# Patient Record
Sex: MALE | Race: WHITE | NOT HISPANIC OR LATINO | Employment: OTHER | URBAN - METROPOLITAN AREA
[De-identification: names, ages, dates, MRNs, and addresses within clinical notes are randomized per-mention and may not be internally consistent; named-entity substitution may affect disease eponyms.]

---

## 2019-03-12 ENCOUNTER — OFFICE VISIT (OUTPATIENT)
Dept: ORTHOPEDICS | Facility: CLINIC | Age: 64
End: 2019-03-12
Payer: COMMERCIAL

## 2019-03-12 VITALS
WEIGHT: 250 LBS | BODY MASS INDEX: 37.03 KG/M2 | HEIGHT: 69 IN | HEART RATE: 80 BPM | DIASTOLIC BLOOD PRESSURE: 82 MMHG | SYSTOLIC BLOOD PRESSURE: 132 MMHG

## 2019-03-12 DIAGNOSIS — M12.812 ROTATOR CUFF TEAR ARTHROPATHY OF LEFT SHOULDER: Primary | ICD-10-CM

## 2019-03-12 DIAGNOSIS — M75.102 ROTATOR CUFF TEAR ARTHROPATHY OF LEFT SHOULDER: Primary | ICD-10-CM

## 2019-03-12 DIAGNOSIS — M12.811 ROTATOR CUFF TEAR ARTHROPATHY OF RIGHT SHOULDER: ICD-10-CM

## 2019-03-12 DIAGNOSIS — M18.12 ARTHRITIS OF CARPOMETACARPAL (CMC) JOINT OF LEFT THUMB: ICD-10-CM

## 2019-03-12 DIAGNOSIS — M75.101 ROTATOR CUFF TEAR ARTHROPATHY OF RIGHT SHOULDER: ICD-10-CM

## 2019-03-12 PROCEDURE — 3008F PR BODY MASS INDEX (BMI) DOCUMENTED: ICD-10-PCS | Mod: ,,, | Performed by: ORTHOPAEDIC SURGERY

## 2019-03-12 PROCEDURE — 73140 X-RAY EXAM OF FINGER(S): CPT | Mod: LT,,, | Performed by: ORTHOPAEDIC SURGERY

## 2019-03-12 PROCEDURE — 73140 PR  X-RAY EXAM OF FINGER(S): ICD-10-PCS | Mod: LT,,, | Performed by: ORTHOPAEDIC SURGERY

## 2019-03-12 PROCEDURE — 73030 X-RAY EXAM OF SHOULDER: CPT | Mod: LT,,, | Performed by: ORTHOPAEDIC SURGERY

## 2019-03-12 PROCEDURE — 99214 OFFICE O/P EST MOD 30 MIN: CPT | Mod: 25,,, | Performed by: ORTHOPAEDIC SURGERY

## 2019-03-12 PROCEDURE — 99214 PR OFFICE/OUTPT VISIT, EST, LEVL IV, 30-39 MIN: ICD-10-PCS | Mod: 25,,, | Performed by: ORTHOPAEDIC SURGERY

## 2019-03-12 PROCEDURE — 20610 DRAIN/INJ JOINT/BURSA W/O US: CPT | Mod: 50,,, | Performed by: ORTHOPAEDIC SURGERY

## 2019-03-12 PROCEDURE — 20610 LARGE JOINT ASPIRATION/INJECTION: R SUBACROMIAL BURSA: ICD-10-PCS | Mod: 50,,, | Performed by: ORTHOPAEDIC SURGERY

## 2019-03-12 PROCEDURE — 3008F BODY MASS INDEX DOCD: CPT | Mod: ,,, | Performed by: ORTHOPAEDIC SURGERY

## 2019-03-12 PROCEDURE — 73030 PR  X-RAY SHOULDER 2+ VW: ICD-10-PCS | Mod: LT,,, | Performed by: ORTHOPAEDIC SURGERY

## 2019-03-12 RX ORDER — ESOMEPRAZOLE MAGNESIUM 40 MG/1
40 CAPSULE, DELAYED RELEASE ORAL DAILY
COMMUNITY
Start: 2019-02-22 | End: 2019-09-12 | Stop reason: SDUPTHER

## 2019-03-12 RX ORDER — METHYLPREDNISOLONE ACETATE 40 MG/ML
40 INJECTION, SUSPENSION INTRA-ARTICULAR; INTRALESIONAL; INTRAMUSCULAR; SOFT TISSUE
Status: DISCONTINUED | OUTPATIENT
Start: 2019-03-12 | End: 2019-03-12 | Stop reason: HOSPADM

## 2019-03-12 RX ORDER — NAPROXEN SODIUM 220 MG
220 TABLET ORAL
COMMUNITY
End: 2019-04-09

## 2019-03-12 RX ORDER — LISINOPRIL 10 MG/1
TABLET ORAL
COMMUNITY
Start: 2018-12-19 | End: 2019-09-12 | Stop reason: SDUPTHER

## 2019-03-12 RX ADMIN — METHYLPREDNISOLONE ACETATE 40 MG: 40 INJECTION, SUSPENSION INTRA-ARTICULAR; INTRALESIONAL; INTRAMUSCULAR; SOFT TISSUE at 05:03

## 2019-03-12 NOTE — PROCEDURES
Large Joint Aspiration/Injection: R subacromial bursa  Date/Time: 3/12/2019 5:12 PM  Performed by: Andres Blanco MD  Authorized by: Andres Blanco MD     Consent Done?:  Yes (Verbal)  Indications:  Pain  Procedure site marked: Yes    Timeout: Prior to procedure the correct patient, procedure, and site was verified      Location:  Shoulder  Site:  R subacromial bursa  Prep: Patient was prepped and draped in usual sterile fashion    Needle size:  25 G  Medications:  40 mg methylPREDNISolone acetate 40 mg/mL; 40 mg methylPREDNISolone acetate 40 mg/mL  Patient tolerance:  Patient tolerated the procedure well with no immediate complications  Large Joint Aspiration/Injection: L subacromial bursa  Date/Time: 3/12/2019 5:13 PM  Performed by: Andres Blanco MD  Authorized by: Andres Blanco MD     Consent Done?:  Yes (Verbal)  Indications:  Pain  Procedure site marked: Yes    Timeout: Prior to procedure the correct patient, procedure, and site was verified      Location:  Shoulder  Site:  L subacromial bursa  Prep: Patient was prepped and draped in usual sterile fashion    Needle size:  25 G  Medications:  40 mg methylPREDNISolone acetate 40 mg/mL; 40 mg methylPREDNISolone acetate 40 mg/mL  Patient tolerance:  Patient tolerated the procedure well with no immediate complications

## 2019-03-12 NOTE — PROGRESS NOTES
[facility]    Subjective:     Chief Complaint:   Chief Complaint   Patient presents with    Left Shoulder - Pain     Left shoulder pain x 2 months ago. States that he woke up with his shoulder pain. States that he does have decreased ROM.        Past Medical History:   Diagnosis Date    Hypertension        Past Surgical History:   Procedure Laterality Date    BACK SURGERY      CHOLECYSTECTOMY      HAND SURGERY      KIDNEY STONE SURGERY      TONSILLECTOMY         Current Outpatient Medications   Medication Sig    esomeprazole (NEXIUM) 40 MG capsule     lisinopril 10 MG tablet     naproxen sodium (ANAPROX) 220 MG tablet Take 220 mg by mouth every 12 (twelve) hours.     No current facility-administered medications for this visit.        Review of patient's allergies indicates:   Allergen Reactions    Iodinated contrast- oral and iv dye Hives and Shortness Of Breath       History reviewed. No pertinent family history.    Social History     Socioeconomic History    Marital status:      Spouse name: Not on file    Number of children: Not on file    Years of education: Not on file    Highest education level: Not on file   Social Needs    Financial resource strain: Not on file    Food insecurity - worry: Not on file    Food insecurity - inability: Not on file    Transportation needs - medical: Not on file    Transportation needs - non-medical: Not on file   Occupational History    Not on file   Tobacco Use    Smoking status: Never Smoker   Substance and Sexual Activity    Alcohol use: Not on file    Drug use: Not on file    Sexual activity: Not on file   Other Topics Concern    Not on file   Social History Narrative    Not on file       History of present illness: Patient comes in today for his bilateral shoulders and his left thumb. His left shoulder is bothering him the most. The thumb is also bothering him at the base of the thumb and his right shoulder bothers him to. He has  been in therapy for the right shoulder which she thinks has helped somewhat.      Review of Systems:    Constitution: Negative for chills, fever, and sweats.  Negative for unexplained weight loss.    HENT:  Negative for headaches and blurry vision.    Cardiovascular:Negative for chest pain or irregular heart beat. Negative for hypertension.    Respiratory:  Negative for cough and shortness of breath.    Gastrointestinal: Negative for abdominal pain, heartburn, melena, nausea, and vomitting.    Genitourinary:  Negative bladder incontinence and dysuria.    Musculoskeletal:  See HPI for details.     Neurological: Negative for numbness.    Psychiatric/Behavioral: Negative for depression.  The patient is not nervous/anxious.      Endocrine: Negative for polyuria    Hematologic/Lymphatic: Negative for bleeding problem.  Does not bruise/bleed easily.    Skin: Negative for poor would healing and rash    Objective:      Physical Examination:    Vital Signs:    Vitals:    03/12/19 1558   BP: 132/82   Pulse: 80       Body mass index is 36.92 kg/m².    This a well-developed, well nourished patient in no acute distress.  They are alert and oriented and cooperative to examination.         has positive impingement on the right. His rotator cuff is weak on the right. Spurling sign is negative. Crossover is positive. His positive impingement on the left. Rotator cuff is weak. Spurling sign is negative. Positive crossover. Severe pain with left CMC grind's. Negative Finkelstein's negative Tinel's bilaterally.  Pertinent New Results:    XRAY Report / Interpretation:   AP and lateral of the left hand demonstrates moderate basilar arthrosis no fractures or subluxations.    AP and lateral of the left shoulder demonstrates a type II acromion.    Assessment/Plan:      Left shoulder impingement syndrome. I injected the left shoulder with Depo-Medrol and lidocaine. Left rotator cuff tear I've ordered an MRI scan. 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 Right impingement syndrome. I injected the right shoulder with Depo-Medrol and lidocaine. Continue physical therapy.      This note was created using Dragon voice recognition software that occasionally misinterpreted phrases or words.

## 2019-04-09 ENCOUNTER — OFFICE VISIT (OUTPATIENT)
Dept: ORTHOPEDICS | Facility: CLINIC | Age: 64
End: 2019-04-09
Payer: COMMERCIAL

## 2019-04-09 VITALS
WEIGHT: 245 LBS | DIASTOLIC BLOOD PRESSURE: 80 MMHG | HEART RATE: 100 BPM | SYSTOLIC BLOOD PRESSURE: 112 MMHG | BODY MASS INDEX: 36.29 KG/M2 | HEIGHT: 69 IN

## 2019-04-09 DIAGNOSIS — M75.42 IMPINGEMENT SYNDROME OF LEFT SHOULDER: ICD-10-CM

## 2019-04-09 DIAGNOSIS — M12.812 ROTATOR CUFF TEAR ARTHROPATHY OF LEFT SHOULDER: Primary | ICD-10-CM

## 2019-04-09 DIAGNOSIS — M75.102 ROTATOR CUFF TEAR ARTHROPATHY OF LEFT SHOULDER: Primary | ICD-10-CM

## 2019-04-09 PROCEDURE — 99213 PR OFFICE/OUTPT VISIT, EST, LEVL III, 20-29 MIN: ICD-10-PCS | Mod: 25,,, | Performed by: ORTHOPAEDIC SURGERY

## 2019-04-09 PROCEDURE — 3008F PR BODY MASS INDEX (BMI) DOCUMENTED: ICD-10-PCS | Mod: ,,, | Performed by: ORTHOPAEDIC SURGERY

## 2019-04-09 PROCEDURE — 20610 LARGE JOINT ASPIRATION/INJECTION: L SUBACROMIAL BURSA: ICD-10-PCS | Mod: LT,,, | Performed by: ORTHOPAEDIC SURGERY

## 2019-04-09 PROCEDURE — 20610 DRAIN/INJ JOINT/BURSA W/O US: CPT | Mod: LT,,, | Performed by: ORTHOPAEDIC SURGERY

## 2019-04-09 PROCEDURE — 3008F BODY MASS INDEX DOCD: CPT | Mod: ,,, | Performed by: ORTHOPAEDIC SURGERY

## 2019-04-09 PROCEDURE — 99213 OFFICE O/P EST LOW 20 MIN: CPT | Mod: 25,,, | Performed by: ORTHOPAEDIC SURGERY

## 2019-04-09 RX ORDER — METHYLPREDNISOLONE ACETATE 40 MG/ML
40 INJECTION, SUSPENSION INTRA-ARTICULAR; INTRALESIONAL; INTRAMUSCULAR; SOFT TISSUE
Status: DISCONTINUED | OUTPATIENT
Start: 2019-04-09 | End: 2019-04-09 | Stop reason: HOSPADM

## 2019-04-09 RX ADMIN — METHYLPREDNISOLONE ACETATE 40 MG: 40 INJECTION, SUSPENSION INTRA-ARTICULAR; INTRALESIONAL; INTRAMUSCULAR; SOFT TISSUE at 01:04

## 2019-04-09 NOTE — PROCEDURES
Large Joint Aspiration/Injection: L subacromial bursa  Date/Time: 4/9/2019 1:45 PM  Performed by: Andres Blanco MD  Authorized by: Andres Blanco MD     Consent Done?:  Yes (Verbal)  Indications:  Pain  Procedure site marked: Yes    Timeout: Prior to procedure the correct patient, procedure, and site was verified      Location:  Shoulder  Site:  L subacromial bursa  Prep: Patient was prepped and draped in usual sterile fashion    Needle size:  25 G  Medications:  40 mg methylPREDNISolone acetate 40 mg/mL; 40 mg methylPREDNISolone acetate 40 mg/mL  Patient tolerance:  Patient tolerated the procedure well with no immediate complications

## 2019-04-09 NOTE — PROGRESS NOTES
[facility]    Subjective:     Chief Complaint:   Chief Complaint   Patient presents with    Left Shoulder - Pain     Left shoulder pain/MRI results 3.26.19. States that his pain is about the same and states that it is still hard for him to put his belt on.        Past Medical History:   Diagnosis Date    Hypertension        Past Surgical History:   Procedure Laterality Date    BACK SURGERY      CHOLECYSTECTOMY      HAND SURGERY      KIDNEY STONE SURGERY      TONSILLECTOMY         Current Outpatient Medications   Medication Sig    esomeprazole (NEXIUM) 40 MG capsule     lisinopril 10 MG tablet      No current facility-administered medications for this visit.        Review of patient's allergies indicates:   Allergen Reactions    Iodinated contrast- oral and iv dye Hives and Shortness Of Breath       History reviewed. No pertinent family history.    Social History     Socioeconomic History    Marital status:      Spouse name: Not on file    Number of children: Not on file    Years of education: Not on file    Highest education level: Not on file   Occupational History    Not on file   Social Needs    Financial resource strain: Not on file    Food insecurity:     Worry: Not on file     Inability: Not on file    Transportation needs:     Medical: Not on file     Non-medical: Not on file   Tobacco Use    Smoking status: Never Smoker   Substance and Sexual Activity    Alcohol use: Not on file    Drug use: Not on file    Sexual activity: Not on file   Lifestyle    Physical activity:     Days per week: Not on file     Minutes per session: Not on file    Stress: Not on file   Relationships    Social connections:     Talks on phone: Not on file     Gets together: Not on file     Attends Taoist service: Not on file     Active member of club or organization: Not on file     Attends meetings of clubs or organizations: Not on file     Relationship status: Not on file   Other Topics  Concern    Not on file   Social History Narrative    Not on file       History of present illness: Patient comes in today for the left shoulder. Her left shoulder pain. Difficulty sleeping at night.      Review of Systems:    Constitution: Negative for chills, fever, and sweats.  Negative for unexplained weight loss.    HENT:  Negative for headaches and blurry vision.    Cardiovascular:Negative for chest pain or irregular heart beat. Negative for hypertension.    Respiratory:  Negative for cough and shortness of breath.    Gastrointestinal: Negative for abdominal pain, heartburn, melena, nausea, and vomitting.    Genitourinary:  Negative bladder incontinence and dysuria.    Musculoskeletal:  See HPI for details.     Neurological: Negative for numbness.    Psychiatric/Behavioral: Negative for depression.  The patient is not nervous/anxious.      Endocrine: Negative for polyuria    Hematologic/Lymphatic: Negative for bleeding problem.  Does not bruise/bleed easily.    Skin: Negative for poor would healing and rash    Objective:      Physical Examination:    Vital Signs:    Vitals:    04/09/19 1325   BP: 112/80   Pulse: 100       Body mass index is 36.18 kg/m².    This a well-developed, well nourished patient in no acute distress.  They are alert and oriented and cooperative to examination.        Patient is full range of motion the left shoulder. Positive impingement. Negative crossover. His rotator cuff is very tender and weak in the right side.  Pertinent New Results:  MRI of the left shoulder demonstrates high-grade partial-thickness tearing but no full thickness tearing  XRAY Report / Interpretation:       Assessment/Plan:      Impingement syndrome, rotator cuff tear left shoulder. I injected him again with Depo-Medrol and lidocaine. Continue physical therapy. Follow-up in a month      This note was created using Dragon voice recognition software that occasionally misinterpreted phrases or words.

## 2019-05-09 ENCOUNTER — OFFICE VISIT (OUTPATIENT)
Dept: ORTHOPEDICS | Facility: CLINIC | Age: 64
End: 2019-05-09
Payer: COMMERCIAL

## 2019-05-09 VITALS
HEART RATE: 80 BPM | DIASTOLIC BLOOD PRESSURE: 70 MMHG | HEIGHT: 69 IN | WEIGHT: 240 LBS | BODY MASS INDEX: 35.55 KG/M2 | SYSTOLIC BLOOD PRESSURE: 120 MMHG

## 2019-05-09 DIAGNOSIS — M75.102 ROTATOR CUFF TEAR ARTHROPATHY OF LEFT SHOULDER: ICD-10-CM

## 2019-05-09 DIAGNOSIS — M12.812 ROTATOR CUFF TEAR ARTHROPATHY OF LEFT SHOULDER: ICD-10-CM

## 2019-05-09 DIAGNOSIS — M75.42 IMPINGEMENT SYNDROME OF LEFT SHOULDER: Primary | ICD-10-CM

## 2019-05-09 DIAGNOSIS — M18.12 ARTHRITIS OF CARPOMETACARPAL (CMC) JOINT OF LEFT THUMB: ICD-10-CM

## 2019-05-09 PROCEDURE — 99213 OFFICE O/P EST LOW 20 MIN: CPT | Mod: ,,, | Performed by: ORTHOPAEDIC SURGERY

## 2019-05-09 PROCEDURE — 99213 PR OFFICE/OUTPT VISIT, EST, LEVL III, 20-29 MIN: ICD-10-PCS | Mod: ,,, | Performed by: ORTHOPAEDIC SURGERY

## 2019-05-09 NOTE — PROGRESS NOTES
[facility]    Subjective:     Chief Complaint:   Chief Complaint   Patient presents with    Left Shoulder - Pain     4-9-19 left shoulder injection helped a lot and P.T is also helping alot       Past Medical History:   Diagnosis Date    Hypertension        Past Surgical History:   Procedure Laterality Date    BACK SURGERY      CHOLECYSTECTOMY      HAND SURGERY      KIDNEY STONE SURGERY      TONSILLECTOMY         Current Outpatient Medications   Medication Sig    esomeprazole (NEXIUM) 40 MG capsule     lisinopril 10 MG tablet      No current facility-administered medications for this visit.        Review of patient's allergies indicates:   Allergen Reactions    Iodinated contrast- oral and iv dye Hives and Shortness Of Breath       History reviewed. No pertinent family history.    Social History     Socioeconomic History    Marital status:      Spouse name: Not on file    Number of children: Not on file    Years of education: Not on file    Highest education level: Not on file   Occupational History    Not on file   Social Needs    Financial resource strain: Not on file    Food insecurity:     Worry: Not on file     Inability: Not on file    Transportation needs:     Medical: Not on file     Non-medical: Not on file   Tobacco Use    Smoking status: Never Smoker   Substance and Sexual Activity    Alcohol use: Not on file    Drug use: Not on file    Sexual activity: Not on file   Lifestyle    Physical activity:     Days per week: Not on file     Minutes per session: Not on file    Stress: Not on file   Relationships    Social connections:     Talks on phone: Not on file     Gets together: Not on file     Attends Latter day service: Not on file     Active member of club or organization: Not on file     Attends meetings of clubs or organizations: Not on file     Relationship status: Not on file   Other Topics Concern    Not on file   Social History Narrative    Not on file        History of present illness: Patient comes in today for the left shoulder. He is doing much better.      Review of Systems:    Constitution: Negative for chills, fever, and sweats.  Negative for unexplained weight loss.    HENT:  Negative for headaches and blurry vision.    Cardiovascular:Negative for chest pain or irregular heart beat. Negative for hypertension.    Respiratory:  Negative for cough and shortness of breath.    Gastrointestinal: Negative for abdominal pain, heartburn, melena, nausea, and vomitting.    Genitourinary:  Negative bladder incontinence and dysuria.    Musculoskeletal:  See HPI for details.     Neurological: Negative for numbness.    Psychiatric/Behavioral: Negative for depression.  The patient is not nervous/anxious.      Endocrine: Negative for polyuria    Hematologic/Lymphatic: Negative for bleeding problem.  Does not bruise/bleed easily.    Skin: Negative for poor would healing and rash    Objective:      Physical Examination:    Vital Signs:    Vitals:    05/09/19 0907   BP: 120/70   Pulse: 80       Body mass index is 35.44 kg/m².    This a well-developed, well nourished patient in no acute distress.  They are alert and oriented and cooperative to examination.        Patient has full range of motion of the left shoulder. No impingement. Mild crossover. His rotator cuff is intact and pain-free  Pertinent New Results:    XRAY Report / Interpretation:       Assessment/Plan:      Impingement syndrome. Doing well with conservative care. Follow-up when necessary.      This note was created using Dragon voice recognition software that occasionally misinterpreted phrases or words.

## 2019-06-20 ENCOUNTER — OFFICE VISIT (OUTPATIENT)
Dept: PODIATRY | Facility: CLINIC | Age: 64
End: 2019-06-20
Payer: COMMERCIAL

## 2019-06-20 VITALS
BODY MASS INDEX: 35.55 KG/M2 | DIASTOLIC BLOOD PRESSURE: 88 MMHG | SYSTOLIC BLOOD PRESSURE: 137 MMHG | HEART RATE: 88 BPM | WEIGHT: 240 LBS | HEIGHT: 69 IN

## 2019-06-20 DIAGNOSIS — M79.672 FOOT PAIN, LEFT: ICD-10-CM

## 2019-06-20 DIAGNOSIS — M72.2 PLANTAR FASCIITIS: Primary | ICD-10-CM

## 2019-06-20 PROCEDURE — 99203 PR OFFICE/OUTPT VISIT, NEW, LEVL III, 30-44 MIN: ICD-10-PCS | Mod: 25,,, | Performed by: PODIATRIST

## 2019-06-20 PROCEDURE — 73630 PR  X-RAY FOOT 3+ VW: ICD-10-PCS | Mod: LT,,, | Performed by: PODIATRIST

## 2019-06-20 PROCEDURE — 99203 OFFICE O/P NEW LOW 30 MIN: CPT | Mod: 25,,, | Performed by: PODIATRIST

## 2019-06-20 PROCEDURE — 99070 SPECIAL SUPPLIES PHYS/QHP: CPT | Mod: CSM,,, | Performed by: PODIATRIST

## 2019-06-20 PROCEDURE — 3008F BODY MASS INDEX DOCD: CPT | Mod: ,,, | Performed by: PODIATRIST

## 2019-06-20 PROCEDURE — 20550 NJX 1 TENDON SHEATH/LIGAMENT: CPT | Mod: LT,,, | Performed by: PODIATRIST

## 2019-06-20 PROCEDURE — 99070 PR SPECIAL SUPPLIES: ICD-10-PCS | Mod: CSM,,, | Performed by: PODIATRIST

## 2019-06-20 PROCEDURE — 3008F PR BODY MASS INDEX (BMI) DOCUMENTED: ICD-10-PCS | Mod: ,,, | Performed by: PODIATRIST

## 2019-06-20 PROCEDURE — 73630 X-RAY EXAM OF FOOT: CPT | Mod: LT,,, | Performed by: PODIATRIST

## 2019-06-20 PROCEDURE — 20550 PR INJECT TENDON SHEATH/LIGAMENT: ICD-10-PCS | Mod: LT,,, | Performed by: PODIATRIST

## 2019-06-20 RX ORDER — METHYLPREDNISOLONE ACETATE 40 MG/ML
40 INJECTION, SUSPENSION INTRA-ARTICULAR; INTRALESIONAL; INTRAMUSCULAR; SOFT TISSUE
Status: COMPLETED | OUTPATIENT
Start: 2019-06-20 | End: 2019-06-21

## 2019-06-20 RX ORDER — DEXAMETHASONE SODIUM PHOSPHATE 4 MG/ML
4 INJECTION, SOLUTION INTRA-ARTICULAR; INTRALESIONAL; INTRAMUSCULAR; INTRAVENOUS; SOFT TISSUE
Status: COMPLETED | OUTPATIENT
Start: 2019-06-20 | End: 2019-06-21

## 2019-06-20 RX ORDER — BUPIVACAINE HYDROCHLORIDE 5 MG/ML
1.5 INJECTION, SOLUTION PERINEURAL
Status: COMPLETED | OUTPATIENT
Start: 2019-06-20 | End: 2019-06-20

## 2019-06-20 RX ADMIN — BUPIVACAINE HYDROCHLORIDE 7.5 MG: 5 INJECTION, SOLUTION PERINEURAL at 03:06

## 2019-06-20 NOTE — PROGRESS NOTES
[address] [facility] [address]  [place] [address]  Phone: [phone]   [phone]    Patient's PCP:Kenyon Watkins MD  Referring Provider: No ref. provider found    Subjective:      Chief Complaint:: Foot Pain (left)    BARBI Matias is a 63 y.o. male who presents with a complaint of left foot pain lasting since the week before Easter. 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  Current symptoms include sharp, stabbing, burning pain and occasional swelling.  Aggravating factors are prolonged walking. Symptoms have not improved. Treatment to date have included PT, boot with gel frozen on foot, new tennis shoes, relaxing/staying off of foot. Patients rates pain 10/10 on pain scale.    Vitals:    06/20/19 1541   BP: 137/88   Pulse: 88     Shoe Size: 10    Past Surgical History:   Procedure Laterality Date    BACK SURGERY      CHOLECYSTECTOMY      HAND SURGERY      KIDNEY STONE SURGERY      TONSILLECTOMY       Past Medical History:   Diagnosis Date    Hypertension      History reviewed. No pertinent family history.     Social History:   Marital Status:   Alcohol History:  has no alcohol history on file.  Tobacco History:  reports that he has never smoked. He does not have any smokeless tobacco history on file.  Drug History:  has no drug history on file.    Review of patient's allergies indicates:   Allergen Reactions    Iodinated contrast- oral and iv dye Hives and Shortness Of Breath       Current Outpatient Medications   Medication Sig Dispense Refill    esomeprazole (NEXIUM) 40 MG capsule       lisinopril 10 MG tablet        No current facility-administered medications for this visit.        Review of Systems   Constitutional: Negative for chills, fatigue, fever and unexpected weight change.   HENT: Negative for hearing loss and trouble swallowing.    Eyes: Negative for  photophobia and visual disturbance.   Respiratory: Negative for cough, shortness of breath and wheezing.    Cardiovascular: Negative for chest pain, palpitations and leg swelling.   Gastrointestinal: Negative for abdominal pain and nausea.   Genitourinary: Negative for dysuria and frequency.   Musculoskeletal: Negative for arthralgias, back pain and joint swelling.   Skin: Negative for rash.   Neurological: Negative for tremors, seizures, weakness, numbness and headaches.   Hematological: Does not bruise/bleed easily.         Objective:        Physical Exam:   Foot Exam    General  General Appearance: appears stated age and healthy   Orientation: alert and oriented to person, place, and time   Affect: appropriate   Gait: unimpaired       Left Foot/Ankle      Inspection and Palpation  Ecchymosis: none  Tenderness: plantar fascia   Swelling: none   Arch: normal  Skin Exam: skin intact;     Neurovascular  Dorsalis pedis: 2+  Posterior tibial: 2+  Saphenous nerve sensation: normal  Tibial nerve sensation: normal  Superficial peroneal nerve sensation: normal  Deep peroneal nerve sensation: normal  Sural nerve sensation: normal    Muscle Strength  Ankle dorsiflexion: 5  Ankle plantar flexion: 5  Ankle inversion: 5  Ankle eversion: 5  Great toe extension: 5  Great toe flexion: 5    Range of Motion    Passive  Ankle dorsiflexion: 5      Comments  Pain on palpation of the infeior medial heel. No pain present  with side to side compression of the calcaneus. Negative tinnel's sign  at the tarsal tunnel. Negative Maharaj's nerve pain. Negative Calcaneal nerve pain. No soft tissue masses. Pain absent  with dorsiflexion of the ankle. No edema, erythema, or ecchymosis noted.       Physical Exam   Cardiovascular:   Pulses:       Dorsalis pedis pulses are 2+ on the left side.        Posterior tibial pulses are 2+ on the left side.       Imaging: X-Ray Foot Complete Left  X-ray 3 views of the left foot were taken AP, lateral, and  oblique, weight bearing showing:    No fracture or dislocation noted. No bone tumors or soft tissue masses. Joint spaces are relatively well preserved. There is a small plantar and posterior calcaneal osteophyte present.     Electronically Signed by: Jose Alexandre DPM               Assessment:       1. Plantar fasciitis    2. Foot pain, left      Plan:   Plantar fasciitis  -     CROSS  FOR HOME USE  -     methylPREDNISolone acetate injection 40 mg  -     bupivacaine 0.5% (5 mg/ml) injection 7.5 mg  -     dexamethasone injection 4 mg    Foot pain, left  -     X-Ray Foot Complete Left  -     CROSS  FOR HOME USE  -     methylPREDNISolone acetate injection 40 mg  -     bupivacaine 0.5% (5 mg/ml) injection 7.5 mg  -     dexamethasone injection 4 mg      Follow up if symptoms worsen or fail to improve.    Procedures - The area was prepped with alcohol, and a steroid injection was performed at left plantar fascia using 1 1/2 cc of 0.5% Marcaine w/out epi, 1 cc Dexamethasone, 1/2 cc Methylprednisolone. Patient tolerated the procedure well.     Plantar Fasciitis Level I Treatment:   Anti-inflammatory  No bare feet  Over the counter insert  Restrict activity level   Use running shoes at full time  No impact exercise and stretch gastrocnemius muscle        Counseling:     I provided patient education verbally regarding:   Patient diagnosis, treatment options, as well as alternatives, risks, and benefits.     Discussed different treatment options for heel pain. I gave written and verbal instructions on heel cord stretching and this was demonstrated for the patient. Patient expressed understanding. Discussed wearing appropriate shoe gear and avoiding flats, slippers, sandals, barefoot, and sockfeet. Recommended arch supports. My recommendation for OTC supports is Spenco polysorb replacement insoles or patient may elect more aggressive treatment with prescription arch supports. We also discussed cortisone injections  and NSAID therapy.       This note was created using Dragon voice recognition software that occasionally misinterpreted phrases or words.

## 2019-06-20 NOTE — PATIENT INSTRUCTIONS
Plantar Fasciitis  Plantar fasciitis is a painful swelling of the plantar fascia. The plantar fascia is a thick, fibrous layer of tissue. It covers the bones on the bottom of your foot. And it supports the foot bones in an arched position.  This can happen gradually or suddenly. It usually affects one foot at a time. Heel pain can be sharp, like a knife sticking into the bottom of your foot. You may feel pain after exercising, long-distance jogging, stair climbing, long periods of standing, or after standing up.  Risk factors include: non-active lifestyle, arthritis, diabetes, obesity or recent weight gain, flat foot, high arch. Wearing high heels, loose shoes, or shoes with poor arch support for long periods of time adds to the risk. This problem is commonly found in runners and dancers. It also found in people who stand on hard surfaces for long periods of time.  Foot pain from this condition is usually worse in the morning. But it improves with walking. By the end of the day there may be a dull aching. Treatment requires short-term rest and controlling swelling. It may take up to 9 months before all symptoms go away. Rarely, a steroid injection into the foot, or surgery, may be needed.  Home care  · If you are overweight, lose weight to help healing.  · Choose supportive shoes with good arch support and shock absorbency. Replace athletic shoes when they become worn out. Dont walk or run barefoot.  · Premade or custom-fitted shoe inserts may be helpful. Inserts made of silicone seem to be the most effective. Custom-made inserts can be provided by a podiatrist or foot specialist, physical therapist, or orthopedist.  · Premade or custom-made night splints keep the heel stretched out while you sleep. They may prevent morning pain.  · Avoid activities that stress the feet: jogging, prolonged standing or walking, contact sports, etc.  · First thing in the morning and before sports, stretch the bottom of your feet.  Gently flex your ankle so the toes move toward your knee.  · Icing may help control heel pain. Apply an ice pack to the heel for 10-20 minutes as a preventive. Or ice your heel after a severe flare-up of symptoms. You may repeat this every 1-2 hours as needed.  · You may use over-the-counter pain medicine to control pain, unless another medicine was prescribed. Anti-inflammatory pain medicines, such as ibuprofen or naproxen, may work better than acetaminophen. If you have chronic liver or kidney disease or ever had a stomach ulcer or GI bleeding, talk with your healthcare provider before using these medicines.  Follow-up care  Follow up with your healthcare provider, physical therapist, or podiatrist or foot specialist as advised.  Call for an appointment if pain worsens or there is no relief after a few weeks of home treatment. Shoe inserts, a night splint, or a special boot may be required.  If X-rays were taken, you will be told of any new findings that may affect your care.  When to seek medical advice  Call your healthcare provider right away if any of these occur:  · Foot swelling  · Redness with increasing pain  Date Last Reviewed: 11/21/2015  © 6950-7259 [employer]. [address] [place] [address], [place] [address]. All rights reserved. This information is not intended as a substitute for professional medical care. Always follow your healthcare professional's instructions.

## 2019-06-21 RX ADMIN — METHYLPREDNISOLONE ACETATE 40 MG: 40 INJECTION, SUSPENSION INTRA-ARTICULAR; INTRALESIONAL; INTRAMUSCULAR; SOFT TISSUE at 03:06

## 2019-06-21 RX ADMIN — DEXAMETHASONE SODIUM PHOSPHATE 4 MG: 4 INJECTION, SOLUTION INTRA-ARTICULAR; INTRALESIONAL; INTRAMUSCULAR; INTRAVENOUS; SOFT TISSUE at 03:06

## 2019-09-12 ENCOUNTER — OFFICE VISIT (OUTPATIENT)
Dept: FAMILY MEDICINE | Facility: CLINIC | Age: 64
End: 2019-09-12
Payer: MEDICARE

## 2019-09-12 VITALS
DIASTOLIC BLOOD PRESSURE: 80 MMHG | WEIGHT: 237 LBS | HEART RATE: 104 BPM | BODY MASS INDEX: 37.2 KG/M2 | HEIGHT: 67 IN | TEMPERATURE: 98 F | SYSTOLIC BLOOD PRESSURE: 118 MMHG

## 2019-09-12 DIAGNOSIS — I10 ESSENTIAL HYPERTENSION: ICD-10-CM

## 2019-09-12 DIAGNOSIS — R07.89 TIGHTNESS IN CHEST: Primary | ICD-10-CM

## 2019-09-12 DIAGNOSIS — K21.9 GASTROESOPHAGEAL REFLUX DISEASE WITHOUT ESOPHAGITIS: ICD-10-CM
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
## 2019-09-12 DIAGNOSIS — T63.301A SPIDER BITE WOUND, ACCIDENTAL OR UNINTENTIONAL, INITIAL ENCOUNTER: ICD-10-CM

## 2019-09-12 DIAGNOSIS — S80.861A TICK BITE OF CALF, RIGHT, INITIAL ENCOUNTER: ICD-10-CM

## 2019-09-12 PROCEDURE — 1101F PR PT FALLS ASSESS DOC 0-1 FALLS W/OUT INJ PAST YR: ICD-10-PCS | Mod: S$GLB,,, | Performed by: FAMILY MEDICINE

## 2019-09-12 PROCEDURE — 96372 THER/PROPH/DIAG INJ SC/IM: CPT | Mod: S$GLB,,, | Performed by: FAMILY MEDICINE

## 2019-09-12 PROCEDURE — 1101F PT FALLS ASSESS-DOCD LE1/YR: CPT | Mod: S$GLB,,, | Performed by: FAMILY MEDICINE

## 2019-09-12 PROCEDURE — 99215 OFFICE O/P EST HI 40 MIN: CPT | Mod: 25,S$GLB,, | Performed by: FAMILY MEDICINE

## 2019-09-12 PROCEDURE — 96372 PR INJECTION,THERAP/PROPH/DIAG2ST, IM OR SUBCUT: ICD-10-PCS | Mod: S$GLB,,, | Performed by: FAMILY MEDICINE

## 2019-09-12 PROCEDURE — 3008F BODY MASS INDEX DOCD: CPT | Mod: S$GLB,,, | Performed by: FAMILY MEDICINE

## 2019-09-12 PROCEDURE — 3008F PR BODY MASS INDEX (BMI) DOCUMENTED: ICD-10-PCS | Mod: S$GLB,,, | Performed by: FAMILY MEDICINE

## 2019-09-12 PROCEDURE — 99215 PR OFFICE/OUTPT VISIT, EST, LEVL V, 40-54 MIN: ICD-10-PCS | Mod: 25,S$GLB,, | Performed by: FAMILY MEDICINE

## 2019-09-12 RX ORDER — LISINOPRIL 10 MG/1
10 TABLET ORAL DAILY
Qty: 90 TABLET | Refills: 1 | Status: SHIPPED | OUTPATIENT
Start: 2019-09-12 | End: 2020-09-22 | Stop reason: SDUPTHER

## 2019-09-12 RX ORDER — ESOMEPRAZOLE MAGNESIUM 40 MG/1
40 CAPSULE, DELAYED RELEASE ORAL DAILY
Qty: 90 CAPSULE | Refills: 1 | Status: SHIPPED | OUTPATIENT
Start: 2019-09-12 | End: 2020-03-09 | Stop reason: SDUPTHER

## 2019-09-12 RX ORDER — DOXYCYCLINE 100 MG/1
100 CAPSULE ORAL 2 TIMES DAILY
Qty: 20 CAPSULE | Refills: 0 | Status: SHIPPED | OUTPATIENT
Start: 2019-09-12 | End: 2020-02-04 | Stop reason: ALTCHOICE

## 2019-09-12 RX ORDER — CEFTRIAXONE 1 G/1
1 INJECTION, POWDER, FOR SOLUTION INTRAMUSCULAR; INTRAVENOUS
Status: COMPLETED | OUTPATIENT
Start: 2019-09-12 | End: 2019-09-12

## 2019-09-12 RX ORDER — CEFTRIAXONE 1 G/1
1 INJECTION, POWDER, FOR SOLUTION INTRAMUSCULAR; INTRAVENOUS ONCE
Qty: 1 G | Refills: 0 | Status: SHIPPED | OUTPATIENT
Start: 2019-09-12 | End: 2019-09-12 | Stop reason: CLARIF

## 2019-09-12 RX ORDER — SILDENAFIL 100 MG/1
100 TABLET, FILM COATED ORAL DAILY PRN
Qty: 6 TABLET | Refills: 3 | Status: SHIPPED | OUTPATIENT
Start: 2019-09-12 | End: 2020-08-10 | Stop reason: SDUPTHER

## 2019-09-12 RX ORDER — METHYLPREDNISOLONE 4 MG/1
TABLET ORAL
Qty: 1 PACKAGE | Refills: 0 | Status: SHIPPED | OUTPATIENT
Start: 2019-09-12 | End: 2019-10-03

## 2019-09-12 RX ADMIN — CEFTRIAXONE 1 G: 1 INJECTION, POWDER, FOR SOLUTION INTRAMUSCULAR; INTRAVENOUS at 10:09

## 2019-09-12 NOTE — PROGRESS NOTES
SUBJECTIVE:    Patient ID: Lewis Matias is a 64 y.o. male.    Chief Complaint: Insect Bite (possible spider bite - right leg)    This 64-year-old male was bit on the right leg by an insect or spider a tick.  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  Since then he has developed fever chills body aches arthralgias in the hip and hand joints he has had a decreased appetite feels he can't breathe.  He has a dry cough with pleuritic chest pains his right leg is tender around the erythematous lesion indicating a bite with cellulitis.  He has been compliant taking his Nexium and his lisinopril for his hypertension and GERD      No visits with results within 6 Month(s) from this visit.   Latest known visit with results is:   Historical on 05/26/2010   Component Date Value Ref Range Status    Stone Analysis 05/26/2010    Final                    Value:No nidus observed in this specimen.    THE STONE IS COMPOSED OF:  CALCIUM OXALATE MONOHYDRATE 100%      PSA, SCREEN 03/22/2010 0.75  0 - 4.0 ng/ml Final       Past Medical History:   Diagnosis Date    Hypertension      Past Surgical History:   Procedure Laterality Date    BACK SURGERY      CHOLECYSTECTOMY      COLONOSCOPY  2012    Dr Silver- rosaline 10 yr    HAND SURGERY      KIDNEY STONE SURGERY      TONSILLECTOMY       No family history on file.    Marital Status:   Alcohol History:  has no alcohol history on file.  Tobacco History:  reports that he has never smoked. He does not have any smokeless tobacco history on file.  Drug History:  has no drug history on file.    Review of patient's allergies indicates:   Allergen Reactions    Iodinated contrast media Hives and Shortness Of Breath       Current Outpatient Medications:     esomeprazole (NEXIUM) 40 MG capsule, Take 1 capsule (40 mg total) by mouth once daily., Disp: 90 capsule, Rfl: 1    doxycycline (MONODOX) 100 MG capsule, Take 1  "capsule (100 mg total) by mouth 2 (two) times daily., Disp: 20 capsule, Rfl: 0    lisinopril 10 MG tablet, Take 1 tablet (10 mg total) by mouth once daily., Disp: 90 tablet, Rfl: 1    methylPREDNISolone (MEDROL DOSEPACK) 4 mg tablet, use as directed, Disp: 1 Package, Rfl: 0    sildenafil (VIAGRA) 100 MG tablet, Take 1 tablet (100 mg total) by mouth daily as needed for Erectile Dysfunction (prn sex)., Disp: 6 tablet, Rfl: 3    Current Facility-Administered Medications:     cefTRIAXone injection 1 g, 1 g, Intramuscular, 1 time in Clinic/HOD, Kenyon Watkins MD    Review of Systems   Constitutional: Negative for appetite change, chills, fatigue, fever and unexpected weight change.   HENT: Negative for congestion, ear pain and trouble swallowing.    Eyes: Negative for pain, discharge and visual disturbance.   Respiratory: Positive for cough (pleurisy pain), chest tightness and shortness of breath. Negative for apnea and wheezing.    Cardiovascular: Negative for chest pain and leg swelling.   Gastrointestinal: Negative for abdominal pain, blood in stool, constipation, diarrhea, nausea and vomiting.   Endocrine: Negative for cold intolerance, heat intolerance and polydipsia.   Genitourinary: Positive for frequency (nocturia 3 x a  nite). Negative for dysuria, hematuria, testicular pain and urgency.        Erectile dysfunction recently   Musculoskeletal: Negative for gait problem, joint swelling and myalgias.   Neurological: Negative for dizziness, seizures and numbness.   Psychiatric/Behavioral: Negative for agitation, behavioral problems and hallucinations. The patient is not nervous/anxious.           Objective:      Vitals:    09/12/19 0837   BP: 118/80   Pulse: 104   Temp: 97.9 °F (36.6 °C)   Weight: 107.5 kg (237 lb)   Height: 5' 7.25" (1.708 m)     Physical Exam   Constitutional: He is oriented to person, place, and time. He appears well-developed and well-nourished.   Patient looks ill and feels poorly "   HENT:   Head: Normocephalic and atraumatic.   Right Ear: External ear normal.   Left Ear: External ear normal.   Nose: Nose normal.   Mouth/Throat: Oropharynx is clear and moist.   Eyes: Pupils are equal, round, and reactive to light. EOM are normal.   Neck: Normal range of motion. Neck supple. Carotid bruit is not present. No thyromegaly present.   Cardiovascular: Normal rate, normal heart sounds and intact distal pulses.   No murmur heard.  Heart is tachycardic with no murmur   Pulmonary/Chest: Effort normal and breath sounds normal. He has no wheezes. He has no rales.   Abdominal: Soft. Bowel sounds are normal. He exhibits no distension. There is no hepatosplenomegaly. There is no tenderness.   Musculoskeletal: Normal range of motion. He exhibits no tenderness or deformity.        Lumbar back: Normal. He exhibits no pain and no spasm.   Bends 90 degrees at  waist   Lymphadenopathy:     He has no cervical adenopathy.   Neurological: He is alert and oriented to person, place, and time. No cranial nerve deficit. Coordination normal.   Skin: Skin is warm and dry. No rash noted.   He has a spider bite or tick bite had on his right lateral calf.  There is surrounding erythema and cellulitis.  No streaking up the leg however.  He has multiple insect bites to both lower legs both lower legs are tender to touch palpation .   Psychiatric: He has a normal mood and affect. His behavior is normal. Judgment and thought content normal.   Nursing note and vitals reviewed.        Assessment:       1. Tightness in chest    2. Spider bite wound, accidental or unintentional, initial encounter    3. Tick bite of calf, right, initial encounter    4. Essential hypertension    5. Gastroesophageal reflux disease without esophagitis         Plan:       Tightness in chest  -     POCT EKG 12-LEAD (NOT FOR [facility] USE)  -     methylPREDNISolone (MEDROL DOSEPACK) 4 mg tablet; use as directed  Dispense: 1 Package; Refill: 0  EKG shows sinus  tachycardia.  No acute changes or ST elevations.  Spider bite wound, accidental or unintentional, initial encounter  -     cefTRIAXone injection 1 g  -     methylPREDNISolone (MEDROL DOSEPACK) 4 mg tablet; use as directed  Dispense: 1 Package; Refill: 0  Suspect generalized systemic reaction to spider bite or tick bite.  Tick bite of calf, right, initial encounter  -     methylPREDNISolone (MEDROL DOSEPACK) 4 mg tablet; use as directed  Dispense: 1 Package; Refill: 0  -     CBC auto differential; Future; Expected date: 09/12/2019  -     Lyme AB screen; Future; Expected date: 09/12/2019    Essential hypertension  Stable on lisinopril  Gastroesophageal reflux disease without esophagitis  Stable on generic Nexium  Other orders  -     Discontinue: cefTRIAXone (ROCEPHIN) 1 gram injection; Inject 1 g into the muscle once. for 1 dose  Dispense: 1 g; Refill: 0  -     doxycycline (MONODOX) 100 MG capsule; Take 1 capsule (100 mg total) by mouth 2 (two) times daily.  Dispense: 20 capsule; Refill: 0  -     lisinopril 10 MG tablet; Take 1 tablet (10 mg total) by mouth once daily.  Dispense: 90 tablet; Refill: 1  -     esomeprazole (NEXIUM) 40 MG capsule; Take 1 capsule (40 mg total) by mouth once daily.  Dispense: 90 capsule; Refill: 1  -     sildenafil (VIAGRA) 100 MG tablet; Take 1 tablet (100 mg total) by mouth daily as needed for Erectile Dysfunction (prn sex).  Dispense: 6 tablet; Refill: 3      No follow-ups on file.

## 2019-09-16 ENCOUNTER — HOSPITAL ENCOUNTER (EMERGENCY)
Facility: HOSPITAL | Age: 64
Discharge: HOME | End: 2019-09-16
Attending: EMERGENCY MEDICINE
Payer: MEDICARE

## 2019-09-16 ENCOUNTER — TELEPHONE (OUTPATIENT)
Dept: FAMILY MEDICINE | Facility: CLINIC | Age: 64
End: 2019-09-16

## 2019-09-16 VITALS
HEART RATE: 93 BPM | WEIGHT: 236 LBS | DIASTOLIC BLOOD PRESSURE: 75 MMHG | RESPIRATION RATE: 18 BRPM | BODY MASS INDEX: 35.77 KG/M2 | HEIGHT: 68 IN | OXYGEN SATURATION: 94 % | TEMPERATURE: 98 F | SYSTOLIC BLOOD PRESSURE: 128 MMHG

## 2019-09-16 DIAGNOSIS — R07.9 CHEST PAIN: ICD-10-CM

## 2019-09-16 DIAGNOSIS — R06.00 DYSPNEA, UNSPECIFIED TYPE: ICD-10-CM

## 2019-09-16 DIAGNOSIS — J40 BRONCHITIS: Primary | ICD-10-CM

## 2019-09-16 DIAGNOSIS — R06.02 SHORTNESS OF BREATH: ICD-10-CM

## 2019-09-16 LAB
ALBUMIN SERPL BCP-MCNC: 3.9 G/DL (ref 3.5–5.2)
ALP SERPL-CCNC: 54 U/L (ref 55–135)
ALT SERPL W/O P-5'-P-CCNC: 34 U/L (ref 10–44)
AMPHET+METHAMPHET UR QL: NEGATIVE
AMYLASE SERPL-CCNC: 52 U/L (ref 20–110)
ANION GAP SERPL CALC-SCNC: 10 MMOL/L (ref 8–16)
APTT PPP: 29.5 SEC (ref 26.2–34.7)
AST SERPL-CCNC: 20 U/L (ref 10–40)
B BURGDOR IGG SER QL IB: NEGATIVE
B BURGDOR18KD IGG SER QL IB: REACTIVE
B BURGDOR23KD IGG SER QL IB: ABNORMAL
B BURGDOR28KD IGG SER QL IB: ABNORMAL
B BURGDOR30KD IGG SER QL IB: ABNORMAL
B BURGDOR39KD IGG SER QL IB: ABNORMAL
B BURGDOR41KD IGG SER QL IB: ABNORMAL
B BURGDOR45KD IGG SER QL IB: ABNORMAL
B BURGDOR58KD IGG SER QL IB: ABNORMAL
B BURGDOR66KD IGG SER QL IB: ABNORMAL
B BURGDOR93KD IGG SER QL IB: ABNORMAL
BARBITURATES UR QL SCN>200 NG/ML: NORMAL
BASOPHILS # BLD AUTO: 0.02 K/UL (ref 0–0.2)
BASOPHILS # BLD AUTO: 19 CELLS/UL (ref 0–200)
BASOPHILS NFR BLD AUTO: 0.3 %
BASOPHILS NFR BLD: 0.3 % (ref 0–1.9)
BENZODIAZ UR QL SCN>200 NG/ML: NEGATIVE
BILIRUB SERPL-MCNC: 1.1 MG/DL (ref 0.1–1)
BILIRUB UR QL STRIP: NEGATIVE
BUN SERPL-MCNC: 19 MG/DL (ref 8–23)
BZE UR QL SCN: NEGATIVE
CALCIUM SERPL-MCNC: 9.1 MG/DL (ref 8.7–10.5)
CANNABINOIDS UR QL SCN: NEGATIVE
CHLORIDE SERPL-SCNC: 98 MMOL/L (ref 95–110)
CK MB SERPL-MCNC: 1.7 NG/ML (ref 0.1–6.5)
CK SERPL-CCNC: 41 U/L (ref 20–200)
CLARITY UR: CLEAR
CO2 SERPL-SCNC: 27 MMOL/L (ref 23–29)
COLOR UR: YELLOW
CREAT SERPL-MCNC: 0.8 MG/DL (ref 0.5–1.4)
CREAT UR-MCNC: 89 MG/DL (ref 23–375)
DIFFERENTIAL METHOD: ABNORMAL
EOSINOPHIL # BLD AUTO: 0 CELLS/UL (ref 15–500)
EOSINOPHIL # BLD AUTO: 0 K/UL (ref 0–0.5)
EOSINOPHIL NFR BLD AUTO: 0 %
EOSINOPHIL NFR BLD: 0.3 % (ref 0–8)
ERYTHROCYTE [DISTWIDTH] IN BLOOD BY AUTOMATED COUNT: 12.7 % (ref 11.5–14.5)
ERYTHROCYTE [DISTWIDTH] IN BLOOD BY AUTOMATED COUNT: 12.9 % (ref 11–15)
ERYTHROCYTE [SEDIMENTATION RATE] IN BLOOD BY WESTERGREN METHOD: 5 MM/HR (ref 0–10)
EST. GFR  (AFRICAN AMERICAN): >60 ML/MIN/1.73 M^2
EST. GFR  (NON AFRICAN AMERICAN): >60 ML/MIN/1.73 M^2
GLUCOSE SERPL-MCNC: 116 MG/DL (ref 70–110)
GLUCOSE UR QL STRIP: NEGATIVE
HCT VFR BLD AUTO: 50.4 % (ref 40–54)
HCT VFR BLD AUTO: 53.7 % (ref 38.5–50)
HGB BLD-MCNC: 17.3 G/DL (ref 14–18)
HGB BLD-MCNC: 18.2 G/DL (ref 13.2–17.1)
HGB UR QL STRIP: NEGATIVE
IMM GRANULOCYTES # BLD AUTO: 0.04 K/UL (ref 0–0.04)
IMM GRANULOCYTES NFR BLD AUTO: 0.6 % (ref 0–0.5)
INFLUENZA A, MOLECULAR: NEGATIVE
INFLUENZA B, MOLECULAR: NEGATIVE
INR PPP: 1
KETONES UR QL STRIP: NEGATIVE
LACTATE SERPL-SCNC: 1.3 MMOL/L (ref 0.5–1.9)
LACTATE SERPL-SCNC: 2.2 MMOL/L (ref 0.5–1.9)
LEUKOCYTE ESTERASE UR QL STRIP: NEGATIVE
LIPASE SERPL-CCNC: 31 U/L (ref 4–60)
LYMPHOCYTES # BLD AUTO: 1.3 K/UL (ref 1–4.8)
LYMPHOCYTES # BLD AUTO: 831 CELLS/UL (ref 850–3900)
LYMPHOCYTES NFR BLD AUTO: 13.4 %
LYMPHOCYTES NFR BLD: 18.7 % (ref 18–48)
MAGNESIUM SERPL-MCNC: 2 MG/DL (ref 1.6–2.6)
MCH RBC QN AUTO: 30.4 PG (ref 27–33)
MCH RBC QN AUTO: 30.6 PG (ref 27–31)
MCHC RBC AUTO-ENTMCNC: 33.9 G/DL (ref 32–36)
MCHC RBC AUTO-ENTMCNC: 34.3 G/DL (ref 32–36)
MCV RBC AUTO: 89 FL (ref 82–98)
MCV RBC AUTO: 89.6 FL (ref 80–100)
MONOCYTES # BLD AUTO: 0.5 K/UL (ref 0.3–1)
MONOCYTES # BLD AUTO: 1029 CELLS/UL (ref 200–950)
MONOCYTES NFR BLD AUTO: 16.6 %
MONOCYTES NFR BLD: 8 % (ref 4–15)
NEUTROPHILS # BLD AUTO: 4.9 K/UL (ref 1.8–7.7)
NEUTROPHILS # BLD AUTO: 4321 CELLS/UL (ref 1500–7800)
NEUTROPHILS NFR BLD AUTO: 69.7 %
NEUTROPHILS NFR BLD: 72.1 % (ref 38–73)
NITRITE UR QL STRIP: NEGATIVE
NRBC BLD-RTO: 0 /100 WBC
OPIATES UR QL SCN: NEGATIVE
PCP UR QL SCN>25 NG/ML: NEGATIVE
PH UR STRIP: 6 [PH] (ref 5–8)
PLATELET # BLD AUTO: 126 THOUSAND/UL (ref 140–400)
PLATELET # BLD AUTO: 200 K/UL (ref 150–350)
PMV BLD AUTO: 11.5 FL (ref 9.2–12.9)
PMV BLD REES-ECKER: 12.3 FL (ref 7.5–12.5)
POTASSIUM SERPL-SCNC: 4.6 MMOL/L (ref 3.5–5.1)
PROT SERPL-MCNC: 7.1 G/DL (ref 6–8.4)
PROT UR QL STRIP: NEGATIVE
PROTHROMBIN TIME: 12.7 SEC (ref 11.7–14)
RBC # BLD AUTO: 5.66 M/UL (ref 4.6–6.2)
RBC # BLD AUTO: 5.99 MILLION/UL (ref 4.2–5.8)
SERVICE CMNT-IMP: ABNORMAL
SODIUM SERPL-SCNC: 135 MMOL/L (ref 136–145)
SP GR UR STRIP: 1.01 (ref 1–1.03)
SPECIMEN SOURCE: NORMAL
TOXICOLOGY INFORMATION: NORMAL
TROPONIN I SERPL DL<=0.01 NG/ML-MCNC: <0.03 NG/ML (ref 0.02–0.04)
URN SPEC COLLECT METH UR: NORMAL
UROBILINOGEN UR STRIP-ACNC: NEGATIVE EU/DL
WBC # BLD AUTO: 6.2 THOUSAND/UL (ref 3.8–10.8)
WBC # BLD AUTO: 6.73 K/UL (ref 3.9–12.7)

## 2019-09-16 PROCEDURE — 87502 INFLUENZA DNA AMP PROBE: CPT

## 2019-09-16 PROCEDURE — 85651 RBC SED RATE NONAUTOMATED: CPT

## 2019-09-16 PROCEDURE — 99285 EMERGENCY DEPT VISIT HI MDM: CPT | Mod: 25

## 2019-09-16 PROCEDURE — 84484 ASSAY OF TROPONIN QUANT: CPT

## 2019-09-16 PROCEDURE — 86618 LYME DISEASE ANTIBODY: CPT

## 2019-09-16 PROCEDURE — 80053 COMPREHEN METABOLIC PANEL: CPT

## 2019-09-16 PROCEDURE — 85730 THROMBOPLASTIN TIME PARTIAL: CPT

## 2019-09-16 PROCEDURE — 80307 DRUG TEST PRSMV CHEM ANLYZR: CPT

## 2019-09-16 PROCEDURE — 25000003 PHARM REV CODE 250: Performed by: EMERGENCY MEDICINE

## 2019-09-16 PROCEDURE — 85610 PROTHROMBIN TIME: CPT

## 2019-09-16 PROCEDURE — 83690 ASSAY OF LIPASE: CPT

## 2019-09-16 PROCEDURE — 83605 ASSAY OF LACTIC ACID: CPT | Mod: 91

## 2019-09-16 PROCEDURE — 63600175 PHARM REV CODE 636 W HCPCS: Performed by: EMERGENCY MEDICINE

## 2019-09-16 PROCEDURE — 96375 TX/PRO/DX INJ NEW DRUG ADDON: CPT

## 2019-09-16 PROCEDURE — 36415 COLL VENOUS BLD VENIPUNCTURE: CPT

## 2019-09-16 PROCEDURE — 94644 CONT INHLJ TX 1ST HOUR: CPT

## 2019-09-16 PROCEDURE — 83735 ASSAY OF MAGNESIUM: CPT

## 2019-09-16 PROCEDURE — 25000242 PHARM REV CODE 250 ALT 637 W/ HCPCS: Performed by: EMERGENCY MEDICINE

## 2019-09-16 PROCEDURE — 81003 URINALYSIS AUTO W/O SCOPE: CPT | Mod: 59

## 2019-09-16 PROCEDURE — 85025 COMPLETE CBC W/AUTO DIFF WBC: CPT

## 2019-09-16 PROCEDURE — 82550 ASSAY OF CK (CPK): CPT

## 2019-09-16 PROCEDURE — 96365 THER/PROPH/DIAG IV INF INIT: CPT

## 2019-09-16 PROCEDURE — 93005 ELECTROCARDIOGRAM TRACING: CPT

## 2019-09-16 PROCEDURE — 82150 ASSAY OF AMYLASE: CPT

## 2019-09-16 PROCEDURE — 82553 CREATINE MB FRACTION: CPT

## 2019-09-16 PROCEDURE — 87040 BLOOD CULTURE FOR BACTERIA: CPT

## 2019-09-16 RX ORDER — METHYLPREDNISOLONE SOD SUCC 125 MG
125 VIAL (EA) INJECTION
Status: COMPLETED | OUTPATIENT
Start: 2019-09-16 | End: 2019-09-16

## 2019-09-16 RX ORDER — ASPIRIN 325 MG
325 TABLET ORAL
Status: COMPLETED | OUTPATIENT
Start: 2019-09-16 | End: 2019-09-16

## 2019-09-16 RX ORDER — PREDNISONE 20 MG/1
40 TABLET ORAL DAILY
Qty: 10 TABLET | Refills: 0 | Status: SHIPPED | OUTPATIENT
Start: 2019-09-16 | End: 2019-09-21

## 2019-09-16 RX ORDER — AMOXICILLIN 875 MG/1
875 TABLET, FILM COATED ORAL 2 TIMES DAILY
Qty: 14 TABLET | Refills: 0 | Status: SHIPPED | OUTPATIENT
Start: 2019-09-16 | End: 2020-02-04 | Stop reason: ALTCHOICE

## 2019-09-16 RX ORDER — ACETAMINOPHEN 10 MG/ML
1000 INJECTION, SOLUTION INTRAVENOUS EVERY 8 HOURS
Status: DISCONTINUED | OUTPATIENT
Start: 2019-09-16 | End: 2019-09-16 | Stop reason: HOSPADM

## 2019-09-16 RX ORDER — ALBUTEROL SULFATE 90 UG/1
1-2 AEROSOL, METERED RESPIRATORY (INHALATION) EVERY 6 HOURS PRN
Qty: 1 INHALER | Refills: 0 | Status: SHIPPED | OUTPATIENT
Start: 2019-09-16 | End: 2020-09-22

## 2019-09-16 RX ORDER — IPRATROPIUM BROMIDE AND ALBUTEROL SULFATE 2.5; .5 MG/3ML; MG/3ML
3 SOLUTION RESPIRATORY (INHALATION)
Status: COMPLETED | OUTPATIENT
Start: 2019-09-16 | End: 2019-09-16

## 2019-09-16 RX ADMIN — METHYLPREDNISOLONE SODIUM SUCCINATE 125 MG: 125 INJECTION, POWDER, FOR SOLUTION INTRAMUSCULAR; INTRAVENOUS at 11:09

## 2019-09-16 RX ADMIN — IPRATROPIUM BROMIDE AND ALBUTEROL SULFATE 3 ML: .5; 3 SOLUTION RESPIRATORY (INHALATION) at 10:09

## 2019-09-16 RX ADMIN — ACETAMINOPHEN 1000 MG: 10 INJECTION, SOLUTION INTRAVENOUS at 04:09

## 2019-09-16 RX ADMIN — CEFTRIAXONE SODIUM 2000 MG: 1 INJECTION, POWDER, FOR SOLUTION INTRAMUSCULAR; INTRAVENOUS at 03:09

## 2019-09-16 RX ADMIN — IPRATROPIUM BROMIDE AND ALBUTEROL SULFATE 3 ML: .5; 3 SOLUTION RESPIRATORY (INHALATION) at 11:09

## 2019-09-16 RX ADMIN — ASPIRIN 325 MG ORAL TABLET 325 MG: 325 PILL ORAL at 01:09

## 2019-09-16 NOTE — CONSULTS
64-year-old gentleman without significant past medical history presented with chief complaint of feeling very weak, polyarthralgia and profound fatigue.  Patient recently had visited his PCP who prescribed doxycycline and steroid pack considering his recent tick bite on right lower extremity. Upon my interview patient's symptomatology is more consistent with viral URI.  Right lower extremity lesion is very minor and is already healing and does not appear like erythema migrans. His vital signs are stable.  Labs are not concerning. Influenza screen is negative.  Chest x-ray is not impressive for any pneumonia/CHF.  UAs negative.  ESR is normal.  EKG did not show any AV block or prolonged NH. Patient was given some IV fluids and Rocephin in ED and already feels better.  So far we do not have any enough subjective or objective findings to justify inpatient/observation admit.  Patient was advised to go home and continue taking his doxycycline and Medrol Dosepak.  He was also advised to take Tylenol and Motrin alternating.  I also instructed ED physician to prescribe him amoxicillin.  Patient was advised to come back to ED if he has high-grade fever, chest pain, shortness of breath, arrhythmia or confusion.

## 2019-09-16 NOTE — TELEPHONE ENCOUNTER
----- Message from Kenyon Watkins MD sent at 9/14/2019  5:38 PM CDT -----  Call pt,CBC shows  Some dehydration, white blood cells are not high(as one would expect with infection) Has he responded to the antibiotics? Needs to increase  His  Fluid intake,

## 2019-09-16 NOTE — DISCHARGE INSTRUCTIONS
Drink lots of fluids.  Rest.  Return to emergency department for worsening symptoms or any problems.  Continue doxycycline.  You must follow up with primary care provider for further management.  Tylenol or ibuprofen for pain on body aches

## 2019-09-16 NOTE — TELEPHONE ENCOUNTER
Spoke to pt that Call pt,CBC shows  Some dehydration, white blood cells are not high(as one would expect with infection) Has he responded to the antibiotics? Needs to increase . Patient needs to increase fluid intake.  Patient said that he just came to our office because he isn't feeling better and we referred him to the emergency room.

## 2019-09-16 NOTE — ED PROVIDER NOTES
Encounter Date: 9/16/2019       History     Chief Complaint   Patient presents with    Back Pain    Shortness of Breath     LAST WEEK     64-year-old male presented emergency department with generalized malaise and body aches for the past 3-4 days.  Patient had a recent wound on the right leg which healed.  Patient said he was in [facility] and was picking up a and noticed that lesion at that time.  Patient 3 days ago went and saw Dr. Watkins and Dr. Watkins started him on steroid and doxycycline thinking this could be a tick bite.  Patient did not see a take.  Patient denies fever or chills.  Patient complains of generalized malaise and weakness and body aches and arthralgias and myalgias and mild headache. Patient complains of chest tightness and shortness of breath and cough.  Patient describes this chest tightness as discomfort in the chest worse with coughing and moving and walking and every time he talks that is making him cough.  Patient did not have any history of smoking.  Denies any cardiac history as well.        Review of patient's allergies indicates:   Allergen Reactions    Iodinated contrast media Hives and Shortness Of Breath     Past Medical History:   Diagnosis Date    GERD (gastroesophageal reflux disease)     Hard of hearing     Hypertension      Past Surgical History:   Procedure Laterality Date    BACK SURGERY      CHOLECYSTECTOMY      COLONOSCOPY  2012    Dr Silver- rtc 10 yr    HAND SURGERY      KIDNEY STONE SURGERY      TONSILLECTOMY       No family history on file.  Social History     Tobacco Use    Smoking status: Never Smoker   Substance Use Topics    Alcohol use: Not on file    Drug use: Not on file     Review of Systems   Constitutional: Positive for fatigue.   HENT: Negative.    Eyes: Negative.    Respiratory: Positive for cough, chest tightness and shortness of breath.    Cardiovascular: Positive for chest pain.   Gastrointestinal: Negative.    Endocrine:  Negative.    Genitourinary: Negative.    Musculoskeletal: Positive for arthralgias and myalgias.   Skin: Negative.    Allergic/Immunologic: Negative.    Neurological: Positive for headaches.   Hematological: Negative.    Psychiatric/Behavioral: Negative.    All other systems reviewed and are negative.      Physical Exam     Initial Vitals [09/16/19 0943]   BP Pulse Resp Temp SpO2   (!) 142/90 81 20 98.1 °F (36.7 °C) 97 %      MAP       --         Physical Exam    Nursing note and vitals reviewed.  Constitutional: He appears well-developed and well-nourished.  Non-toxic appearance. He does not appear ill.   Feeling weak and fatigued   HENT:   Head: Normocephalic and atraumatic.   Nose: Nose normal.   Eyes: Conjunctivae and EOM are normal.   Neck: Normal range of motion. No tracheal deviation present.   Cardiovascular: Normal rate, regular rhythm, normal heart sounds and intact distal pulses. Exam reveals no friction rub.    No murmur heard.  Pulmonary/Chest: No respiratory distress. He has decreased breath sounds. He has wheezes in the right lower field and the left lower field. He has no rales. He exhibits no mass and no tenderness.   Abdominal: Soft. He exhibits no distension. There is no tenderness.   Musculoskeletal: Normal range of motion.        Right lower leg: Normal.        Left lower leg: Normal.   Neurological: He is alert and oriented to person, place, and time. He has normal strength.   Skin: Skin is warm and dry. Capillary refill takes less than 2 seconds. No erythema.   Healed lesion on the right leg laterally consistent with a healed wound   Psychiatric: He has a normal mood and affect. Thought content normal.         ED Course   Procedures  Labs Reviewed   CULTURE, BLOOD   CULTURE, BLOOD   TROPONIN I   DRUG SCREEN PANEL, URINE EMERGENCY   URINALYSIS, REFLEX TO URINE CULTURE   CK   COMPREHENSIVE METABOLIC PANEL   CK-MB   CBC W/ AUTO DIFFERENTIAL   APTT   PROTIME-INR   AMYLASE   LIPASE   MAGNESIUM   B.  BURGDORFERI ABS (LYME DISEASE)   LACTIC ACID, PLASMA   LACTIC ACID, PLASMA     EKG Readings: (Independently Interpreted)   Initial Reading: No STEMI. Rhythm: Normal Sinus Rhythm. Ectopy: No Ectopy. Conduction: Normal. ST Segments: Normal ST Segments. T Waves: Normal. Clinical Impression: Normal Sinus Rhythm       Imaging Results          X-Ray Chest AP Portable (Final result)  Result time 09/16/19 10:04:35   Procedure changed from X-Ray Chest 1 View     Final result by Ilya Oreilly MD (09/16/19 10:04:35)                 Impression:      Low lung volumes.  No acute pulmonary process.      Electronically signed by: Ilya Oreilly MD  Date:    09/16/2019  Time:    10:04             Narrative:    EXAMINATION:  XR CHEST AP PORTABLE    CLINICAL HISTORY:  chest pain; Chest pain, unspecified    COMPARISON:  01/27/2018    FINDINGS:  Low lung volumes with accentuation of cardiac silhouette size and vascular crowding.  Within these limitations, no confluent airspace disease.  No large pleural effusion or pneumothorax.  No acute osseous abnormality.                              X-Rays:   Independently Interpreted Readings:   Other Readings:  Chest x-ray unremarkable    Medical Decision Making:   Differential Diagnosis:   64-year-old male presented emergency department with shortness of breath and cough and upper back pain and generalized malaise and body aches.  Patient was presumed to have a possible tick bite in the right leg and given these symptoms treated with doxycycline.  Patient had improvement of symptoms with breathing treatments in the emergency department.  Screening labs unremarkable.  Patient has symptoms consistent with bronchitis.  Steroids and Rocephin given.  Patient already took doxycycline this morning so IV doxycycline not given.  Hospital Medicine consulted for evaluation.  A Lyme titer sent.  As patient still extremely weak and as failing outpatient treatment Hospital Medicine consulted for  evaluation for further management  Clinical Tests:   Lab Tests: Reviewed  Radiological Study: Reviewed  Medical Tests: Reviewed  ED Management:  Patient presented emergency department with generalized malaise and weakness and body aches.  Patient had consultation by Hospital Medicine who examined the patient and evaluate the patient.  Reviewed the chart and hospitalist recommended that patient can be discharged home as patient is slightly better and no abnormal findings or test results noted and patient is nontoxic appearing at this time.  Patient agreed with that recommendation so discharged with instructions and follow-up.  Patient advised to return to emergency department for worsening symptoms or any problems.                    ED Course as of Sep 16 1021   Mon Sep 16, 2019   0943 Chest tightness, SOB since last week. Seen by Dr. Watkins office last week for questionable tick bite to the RLE. On dose pack and doxy since last Thursday, polyarticular arthralgias as well.    [BF]      ED Course User Index  [BF] COLLIN Atkinson     Clinical Impression:       ICD-10-CM ICD-9-CM   1. Bronchitis J40 490   2. Chest pain R07.9 786.50   3. Shortness of breath R06.02 786.05   4. Dyspnea, unspecified type R06.00 786.09                                Amairani Calhoun MD  09/16/19 1315       Amairani Calhoun MD  09/16/19 1431       Amairani Calhoun MD  09/16/19 1655

## 2019-09-17 LAB — B BURGDOR IGG+IGM W REFLEX TO IB PNL SER: <0.91 ISR (ref 0–0.9)

## 2019-09-19 LAB
BARBITURATES UR QL SCN: NEGATIVE NG/ML
LABORATORY COMMENT REPORT: NORMAL

## 2019-09-21 LAB
BACTERIA BLD CULT: NORMAL
BACTERIA BLD CULT: NORMAL

## 2020-02-04 ENCOUNTER — TELEPHONE (OUTPATIENT)
Dept: FAMILY MEDICINE | Facility: CLINIC | Age: 65
End: 2020-02-04

## 2020-02-04 ENCOUNTER — OFFICE VISIT (OUTPATIENT)
Dept: FAMILY MEDICINE | Facility: CLINIC | Age: 65
End: 2020-02-04
Payer: MEDICARE

## 2020-02-04 VITALS
HEART RATE: 108 BPM | SYSTOLIC BLOOD PRESSURE: 124 MMHG | BODY MASS INDEX: 38.55 KG/M2 | DIASTOLIC BLOOD PRESSURE: 88 MMHG | WEIGHT: 254.38 LBS | HEIGHT: 68 IN | TEMPERATURE: 99 F

## 2020-02-04 DIAGNOSIS — J20.9 ACUTE BRONCHITIS, UNSPECIFIED ORGANISM: ICD-10-CM

## 2020-02-04 DIAGNOSIS — J01.40 ACUTE NON-RECURRENT PANSINUSITIS: Primary | ICD-10-CM

## 2020-02-04 DIAGNOSIS — R05.9 COUGH: ICD-10-CM

## 2020-02-04 PROCEDURE — 3079F DIAST BP 80-89 MM HG: CPT | Mod: S$GLB,,, | Performed by: NURSE PRACTITIONER

## 2020-02-04 PROCEDURE — 99213 PR OFFICE/OUTPT VISIT, EST, LEVL III, 20-29 MIN: ICD-10-PCS | Mod: 25,S$GLB,, | Performed by: NURSE PRACTITIONER

## 2020-02-04 PROCEDURE — 99213 OFFICE O/P EST LOW 20 MIN: CPT | Mod: 25,S$GLB,, | Performed by: NURSE PRACTITIONER

## 2020-02-04 PROCEDURE — 1101F PT FALLS ASSESS-DOCD LE1/YR: CPT | Mod: S$GLB,,, | Performed by: NURSE PRACTITIONER

## 2020-02-04 PROCEDURE — 3079F PR MOST RECENT DIASTOLIC BLOOD PRESSURE 80-89 MM HG: ICD-10-PCS | Mod: S$GLB,,, | Performed by: NURSE PRACTITIONER

## 2020-02-04 PROCEDURE — 3074F PR MOST RECENT SYSTOLIC BLOOD PRESSURE < 130 MM HG: ICD-10-PCS | Mod: S$GLB,,, | Performed by: NURSE PRACTITIONER

## 2020-02-04 PROCEDURE — 3008F BODY MASS INDEX DOCD: CPT | Mod: S$GLB,,, | Performed by: NURSE PRACTITIONER

## 2020-02-04 PROCEDURE — 96372 PR INJECTION,THERAP/PROPH/DIAG2ST, IM OR SUBCUT: ICD-10-PCS | Mod: S$GLB,,, | Performed by: NURSE PRACTITIONER

## 2020-02-04 PROCEDURE — 3074F SYST BP LT 130 MM HG: CPT | Mod: S$GLB,,, | Performed by: NURSE PRACTITIONER

## 2020-02-04 PROCEDURE — 1101F PR PT FALLS ASSESS DOC 0-1 FALLS W/OUT INJ PAST YR: ICD-10-PCS | Mod: S$GLB,,, | Performed by: NURSE PRACTITIONER

## 2020-02-04 PROCEDURE — 3008F PR BODY MASS INDEX (BMI) DOCUMENTED: ICD-10-PCS | Mod: S$GLB,,, | Performed by: NURSE PRACTITIONER

## 2020-02-04 PROCEDURE — 96372 THER/PROPH/DIAG INJ SC/IM: CPT | Mod: S$GLB,,, | Performed by: NURSE PRACTITIONER

## 2020-02-04 RX ORDER — AZITHROMYCIN 250 MG/1
TABLET, FILM COATED ORAL
Qty: 6 TABLET | Refills: 0 | Status: SHIPPED | OUTPATIENT
Start: 2020-02-04 | End: 2020-02-09

## 2020-02-04 RX ORDER — DEXAMETHASONE SODIUM PHOSPHATE 4 MG/ML
8 INJECTION, SOLUTION INTRA-ARTICULAR; INTRALESIONAL; INTRAMUSCULAR; INTRAVENOUS; SOFT TISSUE ONCE
Status: COMPLETED | OUTPATIENT
Start: 2020-02-04 | End: 2020-02-04

## 2020-02-04 RX ORDER — PROMETHAZINE HYDROCHLORIDE AND CODEINE PHOSPHATE 6.25; 1 MG/5ML; MG/5ML
5 SOLUTION ORAL EVERY 6 HOURS PRN
Qty: 80 ML | Refills: 0 | Status: SHIPPED | OUTPATIENT
Start: 2020-02-04 | End: 2020-02-08

## 2020-02-04 RX ADMIN — DEXAMETHASONE SODIUM PHOSPHATE 8 MG: 4 INJECTION, SOLUTION INTRA-ARTICULAR; INTRALESIONAL; INTRAMUSCULAR; INTRAVENOUS; SOFT TISSUE at 04:02

## 2020-02-04 NOTE — PROGRESS NOTES
"  SUBJECTIVE:    Patient ID: Lewis Matias is a 64 y.o. male.    Chief Complaint: Sinusitis (Pt reports he has a "sinus infection" reports runny nose, and productive cough since Thursday.  Has tried OTC meds, but no relief.....mlr)    Pt presents with c/o sinus infection x 5 days. Reports nasal congestion, sinus pain and pressure, rhinorrhea, productive cough with green sputum, and fever 2 nights ago. Denies ear pain, body aches, or chills. He has taken claritin but it has not helped. Has also taken nyquil but is still up all night coughing. Has been sleeping in recliner.        Admission on 09/16/2019, Discharged on 09/16/2019   Component Date Value Ref Range Status    Troponin I 09/16/2019 <0.030  0.020 - 0.040 ng/mL Final    Benzodiazepines 09/16/2019 Negative   Final    Cocaine (Metab.) 09/16/2019 Negative   Final    Opiate Scrn, Ur 09/16/2019 Negative   Final    Barbiturate Screen, Ur 09/16/2019 See [facility] Report   Final    Amphetamine Screen, Ur 09/16/2019 Negative   Final    THC 09/16/2019 Negative   Final    Phencyclidine 09/16/2019 Negative   Final    Creatinine, Random Ur 09/16/2019 89.0  23.0 - 375.0 mg/dL Final    Toxicology Information 09/16/2019 SEE COMMENT   Final    Specimen UA 09/16/2019 Urine, Clean CatchUrine, Illeal LoopUrin   Final    Color, UA 09/16/2019 Yellow  Yellow, Straw, Bee Final    Appearance, UA 09/16/2019 Clear  Clear Final    pH, UA 09/16/2019 6.0  5.0 - 8.0 Final    Specific Gravity, UA 09/16/2019 1.015  1.005 - 1.030 Final    Protein, UA 09/16/2019 Negative  Negative Final    Glucose, UA 09/16/2019 Negative  Negative Final    Ketones, UA 09/16/2019 Negative  Negative Final    Bilirubin (UA) 09/16/2019 Negative  Negative Final    Occult Blood UA 09/16/2019 Negative  Negative Final    Nitrite, UA 09/16/2019 Negative  Negative Final    Urobilinogen, UA 09/16/2019 Negative  Negative EU/dL Final    Leukocytes, UA 09/16/2019 Negative  Negative Final    CPK " 09/16/2019 41  20 - 200 U/L Final    Sodium 09/16/2019 135* 136 - 145 mmol/L Final    Potassium 09/16/2019 4.6  3.5 - 5.1 mmol/L Final    Chloride 09/16/2019 98  95 - 110 mmol/L Final    CO2 09/16/2019 27  23 - 29 mmol/L Final    Glucose 09/16/2019 116* 70 - 110 mg/dL Final    BUN, Bld 09/16/2019 19  8 - 23 mg/dL Final    Creatinine 09/16/2019 0.8  0.5 - 1.4 mg/dL Final    Calcium 09/16/2019 9.1  8.7 - 10.5 mg/dL Final    Total Protein 09/16/2019 7.1  6.0 - 8.4 g/dL Final    Albumin 09/16/2019 3.9  3.5 - 5.2 g/dL Final    Total Bilirubin 09/16/2019 1.1* 0.1 - 1.0 mg/dL Final    Alkaline Phosphatase 09/16/2019 54* 55 - 135 U/L Final    AST 09/16/2019 20  10 - 40 U/L Final    ALT 09/16/2019 34  10 - 44 U/L Final    Anion Gap 09/16/2019 10  8 - 16 mmol/L Final    eGFR if African American 09/16/2019 >60.0  >60 mL/min/1.73 m^2 Final    eGFR if non African American 09/16/2019 >60.0  >60 mL/min/1.73 m^2 Final    CPK MB 09/16/2019 1.7  0.1 - 6.5 ng/mL Final    WBC 09/16/2019 6.73  3.90 - 12.70 K/uL Final    RBC 09/16/2019 5.66  4.60 - 6.20 M/uL Final    Hemoglobin 09/16/2019 17.3  14.0 - 18.0 g/dL Final    Hematocrit 09/16/2019 50.4  40.0 - 54.0 % Final    Mean Corpuscular Volume 09/16/2019 89  82 - 98 fL Final    Mean Corpuscular Hemoglobin 09/16/2019 30.6  27.0 - 31.0 pg Final    Mean Corpuscular Hemoglobin Conc 09/16/2019 34.3  32.0 - 36.0 g/dL Final    RDW 09/16/2019 12.7  11.5 - 14.5 % Final    Platelets 09/16/2019 200  150 - 350 K/uL Final    MPV 09/16/2019 11.5  9.2 - 12.9 fL Final    Immature Granulocytes 09/16/2019 0.6* 0.0 - 0.5 % Final    Gran # (ANC) 09/16/2019 4.9  1.8 - 7.7 K/uL Final    Immature Grans (Abs) 09/16/2019 0.04  0.00 - 0.04 K/uL Final    Lymph # 09/16/2019 1.3  1.0 - 4.8 K/uL Final    Mono # 09/16/2019 0.5  0.3 - 1.0 K/uL Final    Eos # 09/16/2019 0.0  0.0 - 0.5 K/uL Final    Baso # 09/16/2019 0.02  0.00 - 0.20 K/uL Final    nRBC 09/16/2019 0  0 /100 WBC Final     Gran% 09/16/2019 72.1  38.0 - 73.0 % Final    Lymph% 09/16/2019 18.7  18.0 - 48.0 % Final    Mono% 09/16/2019 8.0  4.0 - 15.0 % Final    Eosinophil% 09/16/2019 0.3  0.0 - 8.0 % Final    Basophil% 09/16/2019 0.3  0.0 - 1.9 % Final    Differential Method 09/16/2019 Automated   Final    aPTT 09/16/2019 29.5  26.2 - 34.7 sec Final    Amylase 09/16/2019 52  20 - 110 U/L Final    Lipase 09/16/2019 31  4 - 60 U/L Final    Magnesium 09/16/2019 2.0  1.6 - 2.6 mg/dL Final    Lyme IgG/IgM Ab 09/16/2019 <0.91  0.00 - 0.90 ISR Final    Blood Culture, Routine 09/16/2019 No growth after 5 days.   Final    Blood Culture, Routine 09/16/2019 No growth after 5 days.   Final    Lactate (Lactic Acid) 09/16/2019 2.2* 0.5 - 1.9 mmol/L Final    Lactate (Lactic Acid) 09/16/2019 1.3  0.5 - 1.9 mmol/L Final    PT 09/16/2019 12.7  11.7 - 14.0 sec Final    INR 09/16/2019 1.0   Final    Sed Rate 09/16/2019 5  0 - 10 mm/Hr Final    Influenza A, Molecular 09/16/2019 Negative  Negative Final    Influenza B, Molecular 09/16/2019 Negative  Negative Final    Flu A & B Source 09/16/2019 Nasal swab   Final    Barbiturate Screen, Ur 09/16/2019 Negative  Hjurhb=336 ng/mL Final    Barbiturate Please Note: 09/16/2019 Comment   Final   Office Visit on 09/12/2019   Component Date Value Ref Range Status    WBC 09/12/2019 6.2  3.8 - 10.8 Thousand/uL Final    RBC 09/12/2019 5.99* 4.20 - 5.80 Million/uL Final    Hemoglobin 09/12/2019 18.2* 13.2 - 17.1 g/dL Final    Hematocrit 09/12/2019 53.7* 38.5 - 50.0 % Final    Mean Corpuscular Volume 09/12/2019 89.6  80.0 - 100.0 fL Final    Mean Corpuscular Hemoglobin 09/12/2019 30.4  27.0 - 33.0 pg Final    Mean Corpuscular Hemoglobin Conc 09/12/2019 33.9  32.0 - 36.0 g/dL Final    RDW 09/12/2019 12.9  11.0 - 15.0 % Final    Platelets 09/12/2019 126* 140 - 400 Thousand/uL Final    MPV 09/12/2019 12.3  7.5 - 12.5 fL Final    Neutrophils Absolute 09/12/2019 4,321  1,500 - 7,800 cells/uL  Final    Lymph # 09/12/2019 831* 850 - 3,900 cells/uL Final    Mono # 09/12/2019 1,029* 200 - 950 cells/uL Final    Eos # 09/12/2019 0* 15 - 500 cells/uL Final    Baso # 09/12/2019 19  0 - 200 cells/uL Final    Neutrophils Relative 09/12/2019 69.7  % Final    Lymph% 09/12/2019 13.4  % Final    Mono% 09/12/2019 16.6  % Final    Eosinophil% 09/12/2019 0.0  % Final    Basophil% 09/12/2019 0.3  % Final    Differential Comment 09/12/2019 Review of peripheral smear confirms automated results.   Final    Lyme Disease Ab (IGG) WB 09/12/2019 NEGATIVE  NEGATIVE Final    18 KD (IGG) Band 09/12/2019 REACTIVE*  Final    23 KD (IGG) Band 09/12/2019 NON-REACTIVE   Final    28 KD (IGG) Band 09/12/2019 NON-REACTIVE   Final    30 Kd (IGG) Band 09/12/2019 NON-REACTIVE   Final    39 KD (IGG) Band 09/12/2019 NON-REACTIVE   Final    41 KD (IGG) Band 09/12/2019 NON-REACTIVE   Final    45 KD (IGG) Band 09/12/2019 NON-REACTIVE   Final    58 KD (IGG) Band 09/12/2019 NON-REACTIVE   Final    66 KD (IGG) Band 09/12/2019 NON-REACTIVE   Final    93 KD (IGG) Band 09/12/2019 NON-REACTIVE   Final       Past Medical History:   Diagnosis Date    GERD (gastroesophageal reflux disease)     Hard of hearing     Hypertension      Past Surgical History:   Procedure Laterality Date    BACK SURGERY      CHOLECYSTECTOMY      COLONOSCOPY  2012    Dr Silver- rtc 10 yr    HAND SURGERY      KIDNEY STONE SURGERY      TONSILLECTOMY       History reviewed. No pertinent family history.    Marital Status:   Alcohol History:  reports that he drinks alcohol.  Tobacco History:  reports that he has never smoked. He has never used smokeless tobacco.  Drug History:  reports that he does not use drugs.    Review of patient's allergies indicates:   Allergen Reactions    Iodinated contrast media Hives and Shortness Of Breath       Current Outpatient Medications:     esomeprazole (NEXIUM) 40 MG capsule, Take 1 capsule (40 mg total) by  "mouth once daily., Disp: 90 capsule, Rfl: 1    lisinopril 10 MG tablet, Take 1 tablet (10 mg total) by mouth once daily., Disp: 90 tablet, Rfl: 1    albuterol (PROVENTIL/VENTOLIN HFA) 90 mcg/actuation inhaler, Inhale 1-2 puffs into the lungs every 6 (six) hours as needed for Wheezing. Rescue (Patient not taking: Reported on 2/4/2020), Disp: 1 Inhaler, Rfl: 0    azithromycin (Z-FISH) 250 MG tablet, Take 2 tablets by mouth on day 1; Take 1 tablet by mouth on days 2-5, Disp: 6 tablet, Rfl: 0    promethazine-codeine 6.25-10 mg/5 ml (PHENERGAN WITH CODEINE) 6.25-10 mg/5 mL syrup, Take 5 mLs by mouth every 6 (six) hours as needed for Cough., Disp: 80 mL, Rfl: 0    sildenafil (VIAGRA) 100 MG tablet, Take 1 tablet (100 mg total) by mouth daily as needed for Erectile Dysfunction (prn sex)., Disp: 6 tablet, Rfl: 3    Current Facility-Administered Medications:     dexamethasone injection 8 mg, 8 mg, Intramuscular, Once, Aida Shah NP    Review of Systems   Constitutional: Positive for fatigue and fever. Negative for chills.   HENT: Positive for congestion, postnasal drip, rhinorrhea, sinus pressure, sinus pain and sore throat. Negative for ear pain.    Eyes: Negative for pain and redness.   Respiratory: Positive for cough. Negative for shortness of breath and wheezing.    Cardiovascular: Negative for chest pain and palpitations.   Gastrointestinal: Negative for nausea and vomiting.   Genitourinary: Negative for dysuria.   Musculoskeletal: Negative for myalgias and neck pain.   Skin: Negative.    Allergic/Immunologic: Negative.    Neurological: Positive for headaches. Negative for dizziness and weakness.   Psychiatric/Behavioral: Negative.           Objective:      Vitals:    02/04/20 1542   BP: 124/88   Pulse: 108   Temp: 98.7 °F (37.1 °C)   TempSrc: Oral   Weight: 115.4 kg (254 lb 6.4 oz)   Height: 5' 8" (1.727 m)     Body mass index is 38.68 kg/m².  Physical Exam   Constitutional: He is oriented to person, " place, and time. He appears well-developed and well-nourished.   HENT:   Head: Normocephalic.   Nose: Mucosal edema and rhinorrhea present. Right sinus exhibits maxillary sinus tenderness and frontal sinus tenderness. Left sinus exhibits maxillary sinus tenderness and frontal sinus tenderness.   Mouth/Throat: Posterior oropharyngeal erythema present.   Eyes: Pupils are equal, round, and reactive to light.   Neck: Normal range of motion.   Cardiovascular: Normal rate and regular rhythm.   Pulmonary/Chest: Effort normal. He has no wheezes.   Abdominal: Soft.   Musculoskeletal: Normal range of motion.   Lymphadenopathy:     He has no cervical adenopathy.   Neurological: He is alert and oriented to person, place, and time.   Skin: Skin is warm and dry.   Psychiatric: He has a normal mood and affect.         Assessment:       1. Acute non-recurrent pansinusitis    2. Acute bronchitis, unspecified organism    3. Cough         Plan:       Acute non-recurrent pansinusitis  -     azithromycin (Z-FISH) 250 MG tablet; Take 2 tablets by mouth on day 1; Take 1 tablet by mouth on days 2-5  Dispense: 6 tablet; Refill: 0  - Try Coricidin HBP for symptom relief.    Acute bronchitis, unspecified organism  -     dexamethasone injection 8 mg  -     promethazine-codeine 6.25-10 mg/5 ml (PHENERGAN WITH CODEINE) 6.25-10 mg/5 mL syrup; Take 5 mLs by mouth every 6 (six) hours as needed for Cough.  Dispense: 80 mL; Refill: 0    Cough  -     promethazine-codeine 6.25-10 mg/5 ml (PHENERGAN WITH CODEINE) 6.25-10 mg/5 mL syrup; Take 5 mLs by mouth every 6 (six) hours as needed for Cough.  Dispense: 80 mL; Refill: 0  - Will give few days of stronger cough suppressant. Warned patient that medication can cause drowsiness and to reserve use for night time.  - Use Mucinex during the day.       Follow up if symptoms worsen or fail to improve.

## 2020-02-04 NOTE — TELEPHONE ENCOUNTER
----- Message from Siri Vargas sent at 2/4/2020 10:13 AM CST -----  Contact: SIDNEY LOZANO  The patient lmor. He has a sinus infection and wanted an apt today. I LMOR for him to call us back to make an apt. He will see anybody. pts # [phone] GH

## 2020-03-09 DIAGNOSIS — K21.9 GASTROESOPHAGEAL REFLUX DISEASE WITHOUT ESOPHAGITIS: Primary | ICD-10-CM

## 2020-03-09 RX ORDER — ESOMEPRAZOLE MAGNESIUM 40 MG/1
40 CAPSULE, DELAYED RELEASE ORAL DAILY
Qty: 90 CAPSULE | Refills: 1 | Status: SHIPPED | OUTPATIENT
Start: 2020-03-09 | End: 2020-09-22 | Stop reason: SDUPTHER

## 2020-03-09 NOTE — TELEPHONE ENCOUNTER
----- Message from Renée Adorno sent at 3/9/2020 10:18 AM CDT -----  Refill on Esomeprazole to [facility] on [address]. Pt would like a years worth of fills if possible.   CB # [phone]

## 2020-08-10 ENCOUNTER — OFFICE VISIT (OUTPATIENT)
Dept: FAMILY MEDICINE | Facility: CLINIC | Age: 65
End: 2020-08-10
Payer: MEDICARE

## 2020-08-10 VITALS
HEIGHT: 68 IN | WEIGHT: 259 LBS | BODY MASS INDEX: 39.25 KG/M2 | SYSTOLIC BLOOD PRESSURE: 124 MMHG | DIASTOLIC BLOOD PRESSURE: 82 MMHG | TEMPERATURE: 98 F | HEART RATE: 74 BPM

## 2020-08-10 DIAGNOSIS — Z79.899 HIGH RISK MEDICATION USE: ICD-10-CM

## 2020-08-10 DIAGNOSIS — K21.9 GASTROESOPHAGEAL REFLUX DISEASE WITHOUT ESOPHAGITIS: ICD-10-CM

## 2020-08-10 DIAGNOSIS — I10 ESSENTIAL HYPERTENSION: Primary | ICD-10-CM

## 2020-08-10 DIAGNOSIS — G47.30 SLEEP APNEA, UNSPECIFIED TYPE: ICD-10-CM

## 2020-08-10 DIAGNOSIS — E66.9 OBESITY, UNSPECIFIED CLASSIFICATION, UNSPECIFIED OBESITY TYPE, UNSPECIFIED WHETHER SERIOUS COMORBIDITY PRESENT: ICD-10-CM

## 2020-08-10 DIAGNOSIS — N52.9 ERECTILE DYSFUNCTION, UNSPECIFIED ERECTILE DYSFUNCTION TYPE: ICD-10-CM

## 2020-08-10 DIAGNOSIS — Z12.5 SCREENING FOR PROSTATE CANCER: ICD-10-CM

## 2020-08-10 PROCEDURE — 1101F PR PT FALLS ASSESS DOC 0-1 FALLS W/OUT INJ PAST YR: ICD-10-PCS | Mod: S$GLB,,, | Performed by: NURSE PRACTITIONER

## 2020-08-10 PROCEDURE — 3008F PR BODY MASS INDEX (BMI) DOCUMENTED: ICD-10-PCS | Mod: S$GLB,,, | Performed by: NURSE PRACTITIONER

## 2020-08-10 PROCEDURE — 1101F PT FALLS ASSESS-DOCD LE1/YR: CPT | Mod: S$GLB,,, | Performed by: NURSE PRACTITIONER

## 2020-08-10 PROCEDURE — 3079F PR MOST RECENT DIASTOLIC BLOOD PRESSURE 80-89 MM HG: ICD-10-PCS | Mod: S$GLB,,, | Performed by: NURSE PRACTITIONER

## 2020-08-10 PROCEDURE — 3074F SYST BP LT 130 MM HG: CPT | Mod: S$GLB,,, | Performed by: NURSE PRACTITIONER

## 2020-08-10 PROCEDURE — 1159F PR MEDICATION LIST DOCUMENTED IN MEDICAL RECORD: ICD-10-PCS | Mod: S$GLB,,, | Performed by: NURSE PRACTITIONER

## 2020-08-10 PROCEDURE — 3079F DIAST BP 80-89 MM HG: CPT | Mod: S$GLB,,, | Performed by: NURSE PRACTITIONER

## 2020-08-10 PROCEDURE — 99214 OFFICE O/P EST MOD 30 MIN: CPT | Mod: S$GLB,,, | Performed by: NURSE PRACTITIONER

## 2020-08-10 PROCEDURE — 3008F BODY MASS INDEX DOCD: CPT | Mod: S$GLB,,, | Performed by: NURSE PRACTITIONER

## 2020-08-10 PROCEDURE — 1159F MED LIST DOCD IN RCRD: CPT | Mod: S$GLB,,, | Performed by: NURSE PRACTITIONER

## 2020-08-10 PROCEDURE — 3074F PR MOST RECENT SYSTOLIC BLOOD PRESSURE < 130 MM HG: ICD-10-PCS | Mod: S$GLB,,, | Performed by: NURSE PRACTITIONER

## 2020-08-10 PROCEDURE — 99214 PR OFFICE/OUTPT VISIT, EST, LEVL IV, 30-39 MIN: ICD-10-PCS | Mod: S$GLB,,, | Performed by: NURSE PRACTITIONER

## 2020-08-10 RX ORDER — PHENTERMINE HYDROCHLORIDE 37.5 MG/1
37.5 TABLET ORAL
Qty: 30 TABLET | Refills: 0 | Status: SHIPPED | OUTPATIENT
Start: 2020-08-10 | End: 2020-09-09

## 2020-08-10 RX ORDER — SILDENAFIL 100 MG/1
100 TABLET, FILM COATED ORAL DAILY PRN
Qty: 30 TABLET | Refills: 0 | Status: SHIPPED | OUTPATIENT
Start: 2020-08-10 | End: 2022-02-06 | Stop reason: SDUPTHER

## 2020-08-10 NOTE — PROGRESS NOTES
SUBJECTIVE:    Patient ID: Lewis Matias is a 65 y.o. male.    Chief Complaint: Follow-up    65 year old male presents for check up. Patient is treated for hypertension and gerd. Taking medications as prescribed. Reports that has been eating more lately. Concerned about weight gain. .  Has tried weight loss medication the past with good results.  Stays active around the house.  Takes Nexium for GERD symptoms with good relief.  Checks blood pressure at home.  Readings are less than 130/90.  No chest pain.  No shortness of breath.      Office Visit on 08/10/2020   Component Date Value Ref Range Status    WBC 08/10/2020 5.4  3.8 - 10.8 Thousand/uL Final    RBC 08/10/2020 5.64  4.20 - 5.80 Million/uL Final    Hemoglobin 08/10/2020 17.4* 13.2 - 17.1 g/dL Final    Hematocrit 08/10/2020 51.2* 38.5 - 50.0 % Final    Mean Corpuscular Volume 08/10/2020 90.8  80.0 - 100.0 fL Final    Mean Corpuscular Hemoglobin 08/10/2020 30.9  27.0 - 33.0 pg Final    Mean Corpuscular Hemoglobin Conc 08/10/2020 34.0  32.0 - 36.0 g/dL Final    RDW 08/10/2020 13.3  11.0 - 15.0 % Final    Platelets 08/10/2020 140  140 - 400 Thousand/uL Final    MPV 08/10/2020 12.2  7.5 - 12.5 fL Final    Neutrophils Absolute 08/10/2020 3,235  1,500 - 7,800 cells/uL Final    Lymph # 08/10/2020 1,312  850 - 3,900 cells/uL Final    Mono # 08/10/2020 616  200 - 950 cells/uL Final    Eos # 08/10/2020 200  15 - 500 cells/uL Final    Baso # 08/10/2020 38  0 - 200 cells/uL Final    Neutrophils Relative 08/10/2020 59.9  % Final    Lymph% 08/10/2020 24.3  % Final    Mono% 08/10/2020 11.4  % Final    Eosinophil% 08/10/2020 3.7  % Final    Basophil% 08/10/2020 0.7  % Final    Glucose 08/10/2020 106* 65 - 99 mg/dL Final    BUN, Bld 08/10/2020 11  7 - 25 mg/dL Final    Creatinine 08/10/2020 0.89  0.70 - 1.25 mg/dL Final    eGFR if non African American 08/10/2020 89  > OR = 60 mL/min/1.73m2 Final    eGFR if African American 08/10/2020 103  > OR =  60 mL/min/1.73m2 Final    BUN/Creatinine Ratio 08/10/2020 NOT APPLICABLE  6 - 22 (calc) Final    Sodium 08/10/2020 139  135 - 146 mmol/L Final    Potassium 08/10/2020 4.4  3.5 - 5.3 mmol/L Final    Chloride 08/10/2020 102  98 - 110 mmol/L Final    CO2 08/10/2020 29  20 - 32 mmol/L Final    Calcium 08/10/2020 9.4  8.6 - 10.3 mg/dL Final    Total Protein 08/10/2020 6.4  6.1 - 8.1 g/dL Final    Albumin 08/10/2020 4.3  3.6 - 5.1 g/dL Final    Globulin, Total 08/10/2020 2.1  1.9 - 3.7 g/dL (calc) Final    Albumin/Globulin Ratio 08/10/2020 2.0  1.0 - 2.5 (calc) Final    Total Bilirubin 08/10/2020 1.8* 0.2 - 1.2 mg/dL Final    Alkaline Phosphatase 08/10/2020 59  35 - 144 U/L Final    AST 08/10/2020 21  10 - 35 U/L Final    ALT 08/10/2020 27  9 - 46 U/L Final    Cholesterol 08/10/2020 148  <200 mg/dL Final    HDL 08/10/2020 46  > OR = 40 mg/dL Final    Triglycerides 08/10/2020 93  <150 mg/dL Final    LDL Cholesterol 08/10/2020 83  mg/dL (calc) Final    Hdl/Cholesterol Ratio 08/10/2020 3.2  <5.0 (calc) Final    Non HDL Chol. (LDL+VLDL) 08/10/2020 102  <130 mg/dL (calc) Final    Color, UA 08/10/2020 YELLOW  YELLOW Final    Appearance, UA 08/10/2020 CLEAR  CLEAR Final    Specific [place], UA 08/10/2020 1.017  1.001 - 1.035 Final    pH, UA 08/10/2020 6.5  5.0 - 8.0 Final    Glucose, UA 08/10/2020 NEGATIVE  NEGATIVE Final    Bilirubin, UA 08/10/2020 NEGATIVE  NEGATIVE Final    Ketones, UA 08/10/2020 NEGATIVE  NEGATIVE Final    Occult Blood UA 08/10/2020 NEGATIVE  NEGATIVE Final    Protein, UA 08/10/2020 NEGATIVE  NEGATIVE Final    Nitrite, UA 08/10/2020 NEGATIVE  NEGATIVE Final    Leukocytes, UA 08/10/2020 NEGATIVE  NEGATIVE Final    WBC Casts, UA 08/10/2020 NONE SEEN  < OR = 5 /HPF Final    RBC Casts, UA 08/10/2020 NONE SEEN  < OR = 2 /HPF Final    Squam Epithel, UA 08/10/2020 NONE SEEN  < OR = 5 /HPF Final    Bacteria, UA 08/10/2020 NONE SEEN  NONE SEEN /HPF Final    Hyaline Casts, UA  08/10/2020 NONE SEEN  NONE SEEN /LPF Final    Reflexive Urine Culture 08/10/2020 NO CULTURE INDICATED   Final    PROSTATE SPECIFIC ANTIGEN, SCR - Q* 08/10/2020 1.4  < OR = 4.0 ng/mL Final    TSH w/reflex to FT4 08/10/2020 3.47  0.40 - 4.50 mIU/L Final       Past Medical History:   Diagnosis Date    GERD (gastroesophageal reflux disease)     Hard of hearing     Hypertension      Past Surgical History:   Procedure Laterality Date    BACK SURGERY      CHOLECYSTECTOMY      COLONOSCOPY  2012    Dr Silver- rtc 10 yr    HAND SURGERY      KIDNEY STONE SURGERY      TONSILLECTOMY       History reviewed. No pertinent family history.    Marital Status:   Alcohol History:  reports current alcohol use.  Tobacco History:  reports that he has never smoked. He has never used smokeless tobacco.  Drug History:  reports no history of drug use.    Review of patient's allergies indicates:   Allergen Reactions    Iodinated contrast media Hives and Shortness Of Breath       Current Outpatient Medications:     esomeprazole (NEXIUM) 40 MG capsule, Take 1 capsule (40 mg total) by mouth once daily., Disp: 90 capsule, Rfl: 1    lisinopril 10 MG tablet, Take 1 tablet (10 mg total) by mouth once daily., Disp: 90 tablet, Rfl: 1    albuterol (PROVENTIL/VENTOLIN HFA) 90 mcg/actuation inhaler, Inhale 1-2 puffs into the lungs every 6 (six) hours as needed for Wheezing. Rescue (Patient not taking: Reported on 2/4/2020), Disp: 1 Inhaler, Rfl: 0    phentermine (ADIPEX-P) 37.5 mg tablet, Take 1 tablet (37.5 mg total) by mouth before breakfast., Disp: 30 tablet, Rfl: 0    sildenafiL (VIAGRA) 100 MG tablet, Take 1 tablet (100 mg total) by mouth daily as needed for Erectile Dysfunction (prn sex)., Disp: 30 tablet, Rfl: 0    Review of Systems   Constitutional: Negative for fatigue, fever and unexpected weight change.   HENT: Negative for ear pain, sinus pressure and sore throat.    Eyes: Negative for pain.   Respiratory: Negative for  "cough and shortness of breath.    Cardiovascular: Negative for chest pain and leg swelling.   Gastrointestinal: Negative for abdominal pain, constipation, nausea and vomiting.   Genitourinary: Negative for dysuria, frequency and urgency.   Musculoskeletal: Negative for arthralgias.   Skin: Negative for rash.   Neurological: Negative for dizziness, weakness and headaches.   Psychiatric/Behavioral: Negative for sleep disturbance.          Objective:      Vitals:    08/10/20 1045 08/10/20 1132   BP: (!) 138/92 124/82   Pulse: 74    Temp: 98.4 °F (36.9 °C)    Weight: 117.5 kg (259 lb)    Height: 5' 8" (1.727 m)      Body mass index is 39.38 kg/m².  Physical Exam  Constitutional:       Appearance: He is well-developed. He is obese.   HENT:      Right Ear: External ear normal.      Left Ear: External ear normal.   Eyes:      Pupils: Pupils are equal, round, and reactive to light.   Neck:      Musculoskeletal: Neck supple.      Trachea: No tracheal deviation.   Cardiovascular:      Rate and Rhythm: Normal rate and regular rhythm.      Heart sounds: No murmur. No friction rub. No gallop.    Pulmonary:      Breath sounds: Normal breath sounds. No stridor. No wheezing or rales.   Abdominal:      Palpations: Abdomen is soft. There is no mass.      Tenderness: There is no abdominal tenderness.   Musculoskeletal:         General: No tenderness or deformity.   Lymphadenopathy:      Cervical: No cervical adenopathy.   Skin:     General: Skin is warm and dry.   Neurological:      Mental Status: He is alert and oriented to person, place, and time.      Coordination: Coordination normal.   Psychiatric:         Thought Content: Thought content normal.           Assessment:       1. Essential hypertension    2. Gastroesophageal reflux disease without esophagitis    3. High risk medication use    4. Erectile dysfunction, unspecified erectile dysfunction type    5. Screening for prostate cancer    6. Obesity, unspecified classification, " unspecified obesity type, unspecified whether serious comorbidity present         Plan:       Essential hypertension  -     CBC auto differential; Future; Expected date: 08/10/2020  -     Comprehensive metabolic panel; Future; Expected date: 08/10/2020  -     Lipid Panel; Future; Expected date: 08/10/2020    Gastroesophageal reflux disease without esophagitis    High risk medication use  -     CBC auto differential; Future; Expected date: 08/10/2020  -     Comprehensive metabolic panel; Future; Expected date: 08/10/2020  -     Lipid Panel; Future; Expected date: 08/10/2020  -     Urinalysis, Reflex to Urine Culture Urine, Clean Catch; Future; Expected date: 08/10/2020  -     PSA, Screening; Future; Expected date: 08/10/2020  -     TSH w/reflex to FT4; Future; Expected date: 08/10/2020  -     Microalbumin/creatinine urine ratio; Future; Expected date: 08/10/2020    Erectile dysfunction, unspecified erectile dysfunction type  -     sildenafiL (VIAGRA) 100 MG tablet; Take 1 tablet (100 mg total) by mouth daily as needed for Erectile Dysfunction (prn sex).  Dispense: 30 tablet; Refill: 0    Screening for prostate cancer  -     PSA, Screening; Future; Expected date: 08/10/2020    Obesity, unspecified classification, unspecified obesity type, unspecified whether serious comorbidity present      Follow up in about 4 weeks (around 9/7/2020) for medication management.

## 2020-08-11 LAB
ALBUMIN SERPL-MCNC: 4.3 G/DL (ref 3.6–5.1)
ALBUMIN/CREAT UR: 3 MCG/MG CREAT
ALBUMIN/GLOB SERPL: 2 (CALC) (ref 1–2.5)
ALP SERPL-CCNC: 59 U/L (ref 35–144)
ALT SERPL-CCNC: 27 U/L (ref 9–46)
APPEARANCE UR: CLEAR
AST SERPL-CCNC: 21 U/L (ref 10–35)
BACTERIA #/AREA URNS HPF: NORMAL /HPF
BACTERIA UR CULT: NORMAL
BASOPHILS # BLD AUTO: 38 CELLS/UL (ref 0–200)
BASOPHILS NFR BLD AUTO: 0.7 %
BILIRUB SERPL-MCNC: 1.8 MG/DL (ref 0.2–1.2)
BILIRUB UR QL STRIP: NEGATIVE
BUN SERPL-MCNC: 11 MG/DL (ref 7–25)
BUN/CREAT SERPL: ABNORMAL (CALC) (ref 6–22)
CALCIUM SERPL-MCNC: 9.4 MG/DL (ref 8.6–10.3)
CHLORIDE SERPL-SCNC: 102 MMOL/L (ref 98–110)
CHOLEST SERPL-MCNC: 148 MG/DL
CHOLEST/HDLC SERPL: 3.2 (CALC)
CO2 SERPL-SCNC: 29 MMOL/L (ref 20–32)
COLOR UR: YELLOW
CREAT SERPL-MCNC: 0.89 MG/DL (ref 0.7–1.25)
CREAT UR-MCNC: 100 MG/DL (ref 20–320)
EOSINOPHIL # BLD AUTO: 200 CELLS/UL (ref 15–500)
EOSINOPHIL NFR BLD AUTO: 3.7 %
ERYTHROCYTE [DISTWIDTH] IN BLOOD BY AUTOMATED COUNT: 13.3 % (ref 11–15)
GFRSERPLBLD MDRD-ARVRAT: 89 ML/MIN/1.73M2
GLOBULIN SER CALC-MCNC: 2.1 G/DL (CALC) (ref 1.9–3.7)
GLUCOSE SERPL-MCNC: 106 MG/DL (ref 65–99)
GLUCOSE UR QL STRIP: NEGATIVE
HCT VFR BLD AUTO: 51.2 % (ref 38.5–50)
HDLC SERPL-MCNC: 46 MG/DL
HGB BLD-MCNC: 17.4 G/DL (ref 13.2–17.1)
HGB UR QL STRIP: NEGATIVE
HYALINE CASTS #/AREA URNS LPF: NORMAL /LPF
KETONES UR QL STRIP: NEGATIVE
LDLC SERPL CALC-MCNC: 83 MG/DL (CALC)
LEUKOCYTE ESTERASE UR QL STRIP: NEGATIVE
LYMPHOCYTES # BLD AUTO: 1312 CELLS/UL (ref 850–3900)
LYMPHOCYTES NFR BLD AUTO: 24.3 %
MCH RBC QN AUTO: 30.9 PG (ref 27–33)
MCHC RBC AUTO-ENTMCNC: 34 G/DL (ref 32–36)
MCV RBC AUTO: 90.8 FL (ref 80–100)
MICROALBUMIN UR-MCNC: 0.3 MG/DL
MONOCYTES # BLD AUTO: 616 CELLS/UL (ref 200–950)
MONOCYTES NFR BLD AUTO: 11.4 %
NEUTROPHILS # BLD AUTO: 3235 CELLS/UL (ref 1500–7800)
NEUTROPHILS NFR BLD AUTO: 59.9 %
NITRITE UR QL STRIP: NEGATIVE
NONHDLC SERPL-MCNC: 102 MG/DL (CALC)
PH UR STRIP: 6.5 [PH] (ref 5–8)
PLATELET # BLD AUTO: 140 THOUSAND/UL (ref 140–400)
PMV BLD REES-ECKER: 12.2 FL (ref 7.5–12.5)
POTASSIUM SERPL-SCNC: 4.4 MMOL/L (ref 3.5–5.3)
PROT SERPL-MCNC: 6.4 G/DL (ref 6.1–8.1)
PROT UR QL STRIP: NEGATIVE
PSA SERPL-MCNC: 1.4 NG/ML
RBC # BLD AUTO: 5.64 MILLION/UL (ref 4.2–5.8)
RBC #/AREA URNS HPF: NORMAL /HPF
SODIUM SERPL-SCNC: 139 MMOL/L (ref 135–146)
SP GR UR STRIP: 1.02 (ref 1–1.03)
SQUAMOUS #/AREA URNS HPF: NORMAL /HPF
TRIGL SERPL-MCNC: 93 MG/DL
TSH SERPL-ACNC: 3.47 MIU/L (ref 0.4–4.5)
WBC # BLD AUTO: 5.4 THOUSAND/UL (ref 3.8–10.8)
WBC #/AREA URNS HPF: NORMAL /HPF

## 2020-08-12 ENCOUNTER — TELEPHONE (OUTPATIENT)
Dept: FAMILY MEDICINE | Facility: CLINIC | Age: 65
End: 2020-08-12

## 2020-08-20 ENCOUNTER — TELEPHONE (OUTPATIENT)
Dept: FAMILY MEDICINE | Facility: CLINIC | Age: 65
End: 2020-08-20

## 2020-08-20 NOTE — TELEPHONE ENCOUNTER
----- Message from Elin Gan sent at 8/20/2020  4:02 PM CDT -----  Contact: Melissa [facility] delivered needs signed chart notes and a demographic sheet on the pt. Fax #[phone]

## 2020-08-20 NOTE — TELEPHONE ENCOUNTER
----- Message from Renée Adorno sent at 8/20/2020  9:55 AM CDT -----  Mare with [facility] member services calling they need orders for new C-Pap machine and supplies fax to [phone]   # [phone]

## 2020-08-24 ENCOUNTER — TELEPHONE (OUTPATIENT)
Dept: FAMILY MEDICINE | Facility: CLINIC | Age: 65
End: 2020-08-24

## 2020-08-24 NOTE — TELEPHONE ENCOUNTER
----- Message from Mary Farooq sent at 8/24/2020 12:23 PM CDT -----  [facility] needs signed office notes faxed to them   [phone] fax

## 2020-08-26 ENCOUNTER — TELEPHONE (OUTPATIENT)
Dept: FAMILY MEDICINE | Facility: CLINIC | Age: 65
End: 2020-08-26

## 2020-08-26 NOTE — TELEPHONE ENCOUNTER
----- Message from Renée Adorno sent at 8/26/2020 12:34 PM CDT -----  Omar with [facility] needing a C-Pap machine and supplies they need clinical notes and orders faxed to  [phone]

## 2020-08-27 NOTE — TELEPHONE ENCOUNTER
Spoke to pt. He has not had the sleep study done yet.     Called [facility] twice. No answer. LMOR for someone to return my call to get a status update on this patient in regards to him doing the sleep study.

## 2020-08-28 ENCOUNTER — TELEPHONE (OUTPATIENT)
Dept: FAMILY MEDICINE | Facility: CLINIC | Age: 65
End: 2020-08-28

## 2020-08-28 NOTE — TELEPHONE ENCOUNTER
----- Message from Renée Adorno sent at 8/28/2020  9:47 AM CDT -----  Pt calling about getting a C-Pap machine and was told he needs a sleep study first.  He is saying they are requiring $300 out of pocket.  He called [facility] member services to find a network provider to do the test and they told him to call us for that info.   # [phone]

## 2020-09-17 ENCOUNTER — TELEPHONE (OUTPATIENT)
Dept: FAMILY MEDICINE | Facility: CLINIC | Age: 65
End: 2020-09-17

## 2020-09-17 NOTE — TELEPHONE ENCOUNTER
----- Message from Jenny Babcock LPN sent at 8/20/2020 12:13 PM CDT -----  Regarding: home sleep study  Make sure pt had home sleep study

## 2020-09-22 ENCOUNTER — HOSPITAL ENCOUNTER (OUTPATIENT)
Dept: RADIOLOGY | Facility: HOSPITAL | Age: 65
Discharge: HOME | End: 2020-09-22
Attending: NURSE PRACTITIONER
Payer: MEDICARE

## 2020-09-22 ENCOUNTER — OFFICE VISIT (OUTPATIENT)
Dept: FAMILY MEDICINE | Facility: CLINIC | Age: 65
End: 2020-09-22
Payer: MEDICARE

## 2020-09-22 VITALS
HEIGHT: 68 IN | TEMPERATURE: 98 F | WEIGHT: 258 LBS | DIASTOLIC BLOOD PRESSURE: 80 MMHG | BODY MASS INDEX: 39.1 KG/M2 | SYSTOLIC BLOOD PRESSURE: 130 MMHG | HEART RATE: 72 BPM

## 2020-09-22 DIAGNOSIS — M25.562 CHRONIC PAIN OF LEFT KNEE: ICD-10-CM

## 2020-09-22 DIAGNOSIS — K21.9 GASTROESOPHAGEAL REFLUX DISEASE WITHOUT ESOPHAGITIS: ICD-10-CM

## 2020-09-22 DIAGNOSIS — E66.9 OBESITY, UNSPECIFIED CLASSIFICATION, UNSPECIFIED OBESITY TYPE, UNSPECIFIED WHETHER SERIOUS COMORBIDITY PRESENT: ICD-10-CM

## 2020-09-22 DIAGNOSIS — G47.30 SLEEP APNEA, UNSPECIFIED TYPE: ICD-10-CM

## 2020-09-22 DIAGNOSIS — Z79.899 HIGH RISK MEDICATION USE: Primary | ICD-10-CM

## 2020-09-22 DIAGNOSIS — I10 ESSENTIAL HYPERTENSION: ICD-10-CM

## 2020-09-22 DIAGNOSIS — G89.29 CHRONIC PAIN OF LEFT KNEE: ICD-10-CM

## 2020-09-22 PROCEDURE — 3079F PR MOST RECENT DIASTOLIC BLOOD PRESSURE 80-89 MM HG: ICD-10-PCS | Mod: S$GLB,,, | Performed by: NURSE PRACTITIONER

## 2020-09-22 PROCEDURE — 3079F DIAST BP 80-89 MM HG: CPT | Mod: S$GLB,,, | Performed by: NURSE PRACTITIONER

## 2020-09-22 PROCEDURE — 1101F PT FALLS ASSESS-DOCD LE1/YR: CPT | Mod: S$GLB,,, | Performed by: NURSE PRACTITIONER

## 2020-09-22 PROCEDURE — 1159F MED LIST DOCD IN RCRD: CPT | Mod: S$GLB,,, | Performed by: NURSE PRACTITIONER

## 2020-09-22 PROCEDURE — 3075F PR MOST RECENT SYSTOLIC BLOOD PRESS GE 130-139MM HG: ICD-10-PCS | Mod: S$GLB,,, | Performed by: NURSE PRACTITIONER

## 2020-09-22 PROCEDURE — 1159F PR MEDICATION LIST DOCUMENTED IN MEDICAL RECORD: ICD-10-PCS | Mod: S$GLB,,, | Performed by: NURSE PRACTITIONER

## 2020-09-22 PROCEDURE — 99214 PR OFFICE/OUTPT VISIT, EST, LEVL IV, 30-39 MIN: ICD-10-PCS | Mod: S$GLB,,, | Performed by: NURSE PRACTITIONER

## 2020-09-22 PROCEDURE — 1101F PR PT FALLS ASSESS DOC 0-1 FALLS W/OUT INJ PAST YR: ICD-10-PCS | Mod: S$GLB,,, | Performed by: NURSE PRACTITIONER

## 2020-09-22 PROCEDURE — 73564 X-RAY EXAM KNEE 4 OR MORE: CPT | Mod: TC,PO,LT

## 2020-09-22 PROCEDURE — 3008F BODY MASS INDEX DOCD: CPT | Mod: S$GLB,,, | Performed by: NURSE PRACTITIONER

## 2020-09-22 PROCEDURE — 3008F PR BODY MASS INDEX (BMI) DOCUMENTED: ICD-10-PCS | Mod: S$GLB,,, | Performed by: NURSE PRACTITIONER

## 2020-09-22 PROCEDURE — 99214 OFFICE O/P EST MOD 30 MIN: CPT | Mod: S$GLB,,, | Performed by: NURSE PRACTITIONER

## 2020-09-22 PROCEDURE — 3075F SYST BP GE 130 - 139MM HG: CPT | Mod: S$GLB,,, | Performed by: NURSE PRACTITIONER

## 2020-09-22 RX ORDER — LISINOPRIL 10 MG/1
10 TABLET ORAL DAILY
Qty: 90 TABLET | Refills: 1 | Status: SHIPPED | OUTPATIENT
Start: 2020-09-22 | End: 2021-04-12 | Stop reason: SDUPTHER

## 2020-09-22 RX ORDER — MELOXICAM 15 MG/1
15 TABLET ORAL DAILY
Qty: 30 TABLET | Refills: 0 | Status: SHIPPED | OUTPATIENT
Start: 2020-09-22 | End: 2020-11-13

## 2020-09-22 RX ORDER — ESOMEPRAZOLE MAGNESIUM 40 MG/1
40 CAPSULE, DELAYED RELEASE ORAL DAILY
Qty: 90 CAPSULE | Refills: 1 | Status: SHIPPED | OUTPATIENT
Start: 2020-09-22 | End: 2021-11-15 | Stop reason: SDUPTHER

## 2020-09-22 RX ORDER — PHENTERMINE HYDROCHLORIDE 37.5 MG/1
37.5 TABLET ORAL
Qty: 30 TABLET | Refills: 0 | Status: SHIPPED | OUTPATIENT
Start: 2020-09-22 | End: 2020-10-22

## 2020-09-22 NOTE — PROGRESS NOTES
SUBJECTIVE:    Patient ID: Lewis Matias is a 65 y.o. male.    Chief Complaint: Follow-up (no bottles//needs a sleep study//declined all vaccines//DBL)    65 year old male presents for check up. Patient is treated for hypertension and gerd. Taking medications as prescribed. Reports that since starting on phentermine he is watching diet more. no side effects. Controlling appetite. Has ont had sleep study.  Stays active around the house.  Takes Nexium for GERD symptoms with good relief.  Checks blood pressure at home.  Readings are less than 130/90.  No chest pain.  No shortness of breath. Has been having left knee pain. 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 Pain is worse with weight bearing      Office Visit on 08/10/2020   Component Date Value Ref Range Status    WBC 08/10/2020 5.4  3.8 - 10.8 Thousand/uL Final    RBC 08/10/2020 5.64  4.20 - 5.80 Million/uL Final    Hemoglobin 08/10/2020 17.4* 13.2 - 17.1 g/dL Final    Hematocrit 08/10/2020 51.2* 38.5 - 50.0 % Final    Mean Corpuscular Volume 08/10/2020 90.8  80.0 - 100.0 fL Final    Mean Corpuscular Hemoglobin 08/10/2020 30.9  27.0 - 33.0 pg Final    Mean Corpuscular Hemoglobin Conc 08/10/2020 34.0  32.0 - 36.0 g/dL Final    RDW 08/10/2020 13.3  11.0 - 15.0 % Final    Platelets 08/10/2020 140  140 - 400 Thousand/uL Final    MPV 08/10/2020 12.2  7.5 - 12.5 fL Final    Neutrophils Absolute 08/10/2020 3,235  1,500 - 7,800 cells/uL Final    Lymph # 08/10/2020 1,312  850 - 3,900 cells/uL Final    Mono # 08/10/2020 616  200 - 950 cells/uL Final    Eos # 08/10/2020 200  15 - 500 cells/uL Final    Baso # 08/10/2020 38  0 - 200 cells/uL Final    Neutrophils Relative 08/10/2020 59.9  % Final    Lymph% 08/10/2020 24.3  % Final    Mono% 08/10/2020 11.4  % Final    Eosinophil% 08/10/2020 3.7  % Final    Basophil% 08/10/2020 0.7  % Final    Glucose 08/10/2020 106* 65 - 99 mg/dL Final    BUN, Bld 08/10/2020 11  7 - 25 mg/dL Final    Creatinine 08/10/2020  0.89  0.70 - 1.25 mg/dL Final    eGFR if non African American 08/10/2020 89  > OR = 60 mL/min/1.73m2 Final    eGFR if African American 08/10/2020 103  > OR = 60 mL/min/1.73m2 Final    BUN/Creatinine Ratio 08/10/2020 NOT APPLICABLE  6 - 22 (calc) Final    Sodium 08/10/2020 139  135 - 146 mmol/L Final    Potassium 08/10/2020 4.4  3.5 - 5.3 mmol/L Final    Chloride 08/10/2020 102  98 - 110 mmol/L Final    CO2 08/10/2020 29  20 - 32 mmol/L Final    Calcium 08/10/2020 9.4  8.6 - 10.3 mg/dL Final    Total Protein 08/10/2020 6.4  6.1 - 8.1 g/dL Final    Albumin 08/10/2020 4.3  3.6 - 5.1 g/dL Final    Globulin, Total 08/10/2020 2.1  1.9 - 3.7 g/dL (calc) Final    Albumin/Globulin Ratio 08/10/2020 2.0  1.0 - 2.5 (calc) Final    Total Bilirubin 08/10/2020 1.8* 0.2 - 1.2 mg/dL Final    Alkaline Phosphatase 08/10/2020 59  35 - 144 U/L Final    AST 08/10/2020 21  10 - 35 U/L Final    ALT 08/10/2020 27  9 - 46 U/L Final    Cholesterol 08/10/2020 148  <200 mg/dL Final    HDL 08/10/2020 46  > OR = 40 mg/dL Final    Triglycerides 08/10/2020 93  <150 mg/dL Final    LDL Cholesterol 08/10/2020 83  mg/dL (calc) Final    Hdl/Cholesterol Ratio 08/10/2020 3.2  <5.0 (calc) Final    Non HDL Chol. (LDL+VLDL) 08/10/2020 102  <130 mg/dL (calc) Final    Color, UA 08/10/2020 YELLOW  YELLOW Final    Appearance, UA 08/10/2020 CLEAR  CLEAR Final    Specific [place], UA 08/10/2020 1.017  1.001 - 1.035 Final    pH, UA 08/10/2020 6.5  5.0 - 8.0 Final    Glucose, UA 08/10/2020 NEGATIVE  NEGATIVE Final    Bilirubin, UA 08/10/2020 NEGATIVE  NEGATIVE Final    Ketones, UA 08/10/2020 NEGATIVE  NEGATIVE Final    Occult Blood UA 08/10/2020 NEGATIVE  NEGATIVE Final    Protein, UA 08/10/2020 NEGATIVE  NEGATIVE Final    Nitrite, UA 08/10/2020 NEGATIVE  NEGATIVE Final    Leukocytes, UA 08/10/2020 NEGATIVE  NEGATIVE Final    WBC Casts, UA 08/10/2020 NONE SEEN  < OR = 5 /HPF Final    RBC Casts, UA 08/10/2020 NONE SEEN  < OR = 2  /HPF Final    Squam Epithel, UA 08/10/2020 NONE SEEN  < OR = 5 /HPF Final    Bacteria, UA 08/10/2020 NONE SEEN  NONE SEEN /HPF Final    Hyaline Casts, UA 08/10/2020 NONE SEEN  NONE SEEN /LPF Final    Reflexive Urine Culture 08/10/2020 NO CULTURE INDICATED   Final    PROSTATE SPECIFIC ANTIGEN, SCR - Q* 08/10/2020 1.4  < OR = 4.0 ng/mL Final    TSH w/reflex to FT4 08/10/2020 3.47  0.40 - 4.50 mIU/L Final    Creatinine, Random Ur 08/10/2020 100  20 - 320 mg/dL Final    Microalb, Ur 08/10/2020 0.3  See Note: mg/dL Final    Microalb Creat Ratio 08/10/2020 3  <30 mcg/mg creat Final       Past Medical History:   Diagnosis Date    GERD (gastroesophageal reflux disease)     Hard of hearing     Hypertension      Social History     Socioeconomic History    Marital status:      Spouse name: Not on file    Number of children: Not on file    Years of education: Not on file    Highest education level: Not on file   Occupational History    Not on file   Social Needs    Financial resource strain: Not on file    Food insecurity     Worry: Not on file     Inability: Not on file    Transportation needs     Medical: Not on file     Non-medical: Not on file   Tobacco Use    Smoking status: Never Smoker    Smokeless tobacco: Never Used   Substance and Sexual Activity    Alcohol use: Yes    Drug use: Never    Sexual activity: Yes     Partners: Female   Lifestyle    Physical activity     Days per week: Not on file     Minutes per session: Not on file    Stress: Not on file   Relationships    Social connections     Talks on phone: Not on file     Gets together: Not on file     Attends Denominational service: Not on file     Active member of club or organization: Not on file     Attends meetings of clubs or organizations: Not on file     Relationship status: Not on file   Other Topics Concern    Not on file   Social History Narrative    Not on file     Past Surgical History:   Procedure Laterality Date    BACK  "SURGERY      CHOLECYSTECTOMY      COLONOSCOPY  2012    Dr Peteror- rtc 10 yr    HAND SURGERY      KIDNEY STONE SURGERY      TONSILLECTOMY       History reviewed. No pertinent family history.    Review of patient's allergies indicates:   Allergen Reactions    Iodinated contrast media Hives and Shortness Of Breath       Current Outpatient Medications:     esomeprazole (NEXIUM) 40 MG capsule, Take 1 capsule (40 mg total) by mouth once daily., Disp: 90 capsule, Rfl: 1    lisinopriL 10 MG tablet, Take 1 tablet (10 mg total) by mouth once daily., Disp: 90 tablet, Rfl: 1    sildenafiL (VIAGRA) 100 MG tablet, Take 1 tablet (100 mg total) by mouth daily as needed for Erectile Dysfunction (prn sex)., Disp: 30 tablet, Rfl: 0    meloxicam (MOBIC) 15 MG tablet, Take 1 tablet (15 mg total) by mouth once daily., Disp: 30 tablet, Rfl: 0    phentermine (ADIPEX-P) 37.5 mg tablet, Take 1 tablet (37.5 mg total) by mouth before breakfast., Disp: 30 tablet, Rfl: 0    Review of Systems   Constitutional: Negative for fatigue, fever and unexpected weight change.   HENT: Negative for ear pain, sinus pressure and sore throat.    Eyes: Negative for pain.   Respiratory: Negative for cough and shortness of breath.    Cardiovascular: Negative for chest pain and leg swelling.   Gastrointestinal: Negative for abdominal pain, constipation, nausea and vomiting.   Genitourinary: Negative for dysuria, frequency and urgency.   Musculoskeletal: Negative for arthralgias.   Skin: Negative for rash.   Neurological: Negative for dizziness, weakness and headaches.   Psychiatric/Behavioral: Negative for sleep disturbance.          Objective:      Vitals:    09/22/20 1150   BP: 130/80   Pulse: 72   Temp: 98.3 °F (36.8 °C)   Weight: 117 kg (258 lb)   Height: 5' 8" (1.727 m)     Physical Exam  Constitutional:       Appearance: He is well-developed. He is obese.   HENT:      Right Ear: External ear normal.      Left Ear: External ear normal.   Eyes:     "  Pupils: Pupils are equal, round, and reactive to light.   Neck:      Musculoskeletal: Neck supple.      Trachea: No tracheal deviation.   Cardiovascular:      Rate and Rhythm: Normal rate and regular rhythm.      Heart sounds: No murmur. No friction rub. No gallop.    Pulmonary:      Breath sounds: Normal breath sounds. No stridor. No wheezing or rales.   Abdominal:      Palpations: Abdomen is soft. There is no mass.      Tenderness: There is no abdominal tenderness.   Musculoskeletal:         General: No tenderness or deformity.   Lymphadenopathy:      Cervical: No cervical adenopathy.   Skin:     General: Skin is warm and dry.   Neurological:      Mental Status: He is alert and oriented to person, place, and time.      Coordination: Coordination normal.   Psychiatric:         Thought Content: Thought content normal.           Assessment:       1. High risk medication use    2. Gastroesophageal reflux disease without esophagitis    3. Essential hypertension    4. Sleep apnea, unspecified type    5. Obesity, unspecified classification, unspecified obesity type, unspecified whether serious comorbidity present    6. Chronic pain of left knee         Plan:       High risk medication use    Gastroesophageal reflux disease without esophagitis  -     esomeprazole (NEXIUM) 40 MG capsule; Take 1 capsule (40 mg total) by mouth once daily.  Dispense: 90 capsule; Refill: 1    Essential hypertension    Sleep apnea, unspecified type  -     Sleep Study Unattended/Attended; Future    Obesity, unspecified classification, unspecified obesity type, unspecified whether serious comorbidity present  Comments:  phentermine per dr. hernandez    Chronic pain of left knee  -     X-Ray Knee Complete 4 or More Views Left; Future  -     meloxicam (MOBIC) 15 MG tablet; Take 1 tablet (15 mg total) by mouth once daily.  Dispense: 30 tablet; Refill: 0    Other orders  -     lisinopriL 10 MG tablet; Take 1 tablet (10 mg total) by mouth once daily.   Dispense: 90 tablet; Refill: 1      Follow up in about 4 weeks (around 10/20/2020) for medication management.        9/28/2020 Ping Juarez

## 2020-09-30 ENCOUNTER — TELEPHONE (OUTPATIENT)
Dept: FAMILY MEDICINE | Facility: CLINIC | Age: 65
End: 2020-09-30

## 2020-09-30 DIAGNOSIS — G47.30 SLEEP APNEA, UNSPECIFIED TYPE: Primary | ICD-10-CM

## 2020-09-30 NOTE — TELEPHONE ENCOUNTER
"----- Message from Megan Garcia sent at 9/30/2020  2:32 PM CDT -----  Regarding: NEW ORDER NEEDED  I see you've ordered a sleep study for the above listed patient, however, it is ordered incorrectly. I have removed the order. Please replace the order using code "[id number]" and please make sure the referral lists "[facility]" as the referred to location so it falls in the queue.    Thank you.    "

## 2020-10-06 ENCOUNTER — OFFICE VISIT (OUTPATIENT)
Dept: FAMILY MEDICINE | Facility: CLINIC | Age: 65
End: 2020-10-06
Payer: MEDICARE

## 2020-10-06 VITALS — DIASTOLIC BLOOD PRESSURE: 76 MMHG | SYSTOLIC BLOOD PRESSURE: 130 MMHG | TEMPERATURE: 98 F | HEART RATE: 76 BPM

## 2020-10-06 DIAGNOSIS — M23.52 CHRONIC INSTABILITY OF LEFT KNEE: ICD-10-CM

## 2020-10-06 DIAGNOSIS — I10 ESSENTIAL HYPERTENSION: ICD-10-CM

## 2020-10-06 DIAGNOSIS — M23.92 INTERNAL DERANGEMENT OF LEFT KNEE: Primary | ICD-10-CM

## 2020-10-06 DIAGNOSIS — K21.9 GASTROESOPHAGEAL REFLUX DISEASE WITHOUT ESOPHAGITIS: ICD-10-CM

## 2020-10-06 PROCEDURE — 3078F PR MOST RECENT DIASTOLIC BLOOD PRESSURE < 80 MM HG: ICD-10-PCS | Mod: S$GLB,,, | Performed by: NURSE PRACTITIONER

## 2020-10-06 PROCEDURE — 99213 OFFICE O/P EST LOW 20 MIN: CPT | Mod: S$GLB,,, | Performed by: NURSE PRACTITIONER

## 2020-10-06 PROCEDURE — 99213 PR OFFICE/OUTPT VISIT, EST, LEVL III, 20-29 MIN: ICD-10-PCS | Mod: S$GLB,,, | Performed by: NURSE PRACTITIONER

## 2020-10-06 PROCEDURE — 3075F PR MOST RECENT SYSTOLIC BLOOD PRESS GE 130-139MM HG: ICD-10-PCS | Mod: S$GLB,,, | Performed by: NURSE PRACTITIONER

## 2020-10-06 PROCEDURE — 3078F DIAST BP <80 MM HG: CPT | Mod: S$GLB,,, | Performed by: NURSE PRACTITIONER

## 2020-10-06 PROCEDURE — 1159F MED LIST DOCD IN RCRD: CPT | Mod: S$GLB,,, | Performed by: NURSE PRACTITIONER

## 2020-10-06 PROCEDURE — 1101F PT FALLS ASSESS-DOCD LE1/YR: CPT | Mod: S$GLB,,, | Performed by: NURSE PRACTITIONER

## 2020-10-06 PROCEDURE — 1125F PR PAIN SEVERITY QUANTIFIED, PAIN PRESENT: ICD-10-PCS | Mod: S$GLB,,, | Performed by: NURSE PRACTITIONER

## 2020-10-06 PROCEDURE — 3075F SYST BP GE 130 - 139MM HG: CPT | Mod: S$GLB,,, | Performed by: NURSE PRACTITIONER

## 2020-10-06 PROCEDURE — 1125F AMNT PAIN NOTED PAIN PRSNT: CPT | Mod: S$GLB,,, | Performed by: NURSE PRACTITIONER

## 2020-10-06 PROCEDURE — 1101F PR PT FALLS ASSESS DOC 0-1 FALLS W/OUT INJ PAST YR: ICD-10-PCS | Mod: S$GLB,,, | Performed by: NURSE PRACTITIONER

## 2020-10-06 PROCEDURE — 1159F PR MEDICATION LIST DOCUMENTED IN MEDICAL RECORD: ICD-10-PCS | Mod: S$GLB,,, | Performed by: NURSE PRACTITIONER

## 2020-10-06 RX ORDER — METHYLPREDNISOLONE 4 MG/1
TABLET ORAL
Qty: 1 PACKAGE | Refills: 0 | Status: SHIPPED | OUTPATIENT
Start: 2020-10-06 | End: 2020-10-27

## 2020-10-06 RX ORDER — HYDROCODONE BITARTRATE AND ACETAMINOPHEN 7.5; 325 MG/1; MG/1
1 TABLET ORAL EVERY 8 HOURS PRN
Qty: 21 TABLET | Refills: 0 | Status: SHIPPED | OUTPATIENT
Start: 2020-10-06 | End: 2020-10-20 | Stop reason: SDUPTHER

## 2020-10-07 ENCOUNTER — HOSPITAL ENCOUNTER (OUTPATIENT)
Dept: RADIOLOGY | Facility: HOSPITAL | Age: 65
Discharge: HOME | End: 2020-10-07
Attending: NURSE PRACTITIONER
Payer: MEDICARE

## 2020-10-07 DIAGNOSIS — M23.92 INTERNAL DERANGEMENT OF LEFT KNEE: ICD-10-CM

## 2020-10-07 DIAGNOSIS — M23.52 CHRONIC INSTABILITY OF LEFT KNEE: ICD-10-CM

## 2020-10-07 PROCEDURE — 73721 MRI JNT OF LWR EXTRE W/O DYE: CPT | Mod: TC,PO,LT

## 2020-10-09 ENCOUNTER — OFFICE VISIT (OUTPATIENT)
Dept: ORTHOPEDICS | Facility: CLINIC | Age: 65
End: 2020-10-09
Payer: MEDICARE

## 2020-10-09 VITALS
DIASTOLIC BLOOD PRESSURE: 90 MMHG | BODY MASS INDEX: 39.1 KG/M2 | SYSTOLIC BLOOD PRESSURE: 130 MMHG | HEART RATE: 86 BPM | HEIGHT: 68 IN | WEIGHT: 258 LBS

## 2020-10-09 DIAGNOSIS — S83.232A COMPLEX TEAR OF MEDIAL MENISCUS OF LEFT KNEE AS CURRENT INJURY, INITIAL ENCOUNTER: Primary | ICD-10-CM

## 2020-10-09 DIAGNOSIS — M17.12 PRIMARY OSTEOARTHRITIS OF LEFT KNEE: ICD-10-CM

## 2020-10-09 PROCEDURE — 3075F PR MOST RECENT SYSTOLIC BLOOD PRESS GE 130-139MM HG: ICD-10-PCS | Mod: S$GLB,,, | Performed by: PHYSICIAN ASSISTANT

## 2020-10-09 PROCEDURE — 3008F BODY MASS INDEX DOCD: CPT | Mod: S$GLB,,, | Performed by: PHYSICIAN ASSISTANT

## 2020-10-09 PROCEDURE — 99213 PR OFFICE/OUTPT VISIT, EST, LEVL III, 20-29 MIN: ICD-10-PCS | Mod: S$GLB,,, | Performed by: PHYSICIAN ASSISTANT

## 2020-10-09 PROCEDURE — 3080F DIAST BP >= 90 MM HG: CPT | Mod: S$GLB,,, | Performed by: PHYSICIAN ASSISTANT

## 2020-10-09 PROCEDURE — 1125F AMNT PAIN NOTED PAIN PRSNT: CPT | Mod: S$GLB,,, | Performed by: PHYSICIAN ASSISTANT

## 2020-10-09 PROCEDURE — 1101F PT FALLS ASSESS-DOCD LE1/YR: CPT | Mod: S$GLB,,, | Performed by: PHYSICIAN ASSISTANT

## 2020-10-09 PROCEDURE — 3080F PR MOST RECENT DIASTOLIC BLOOD PRESSURE >= 90 MM HG: ICD-10-PCS | Mod: S$GLB,,, | Performed by: PHYSICIAN ASSISTANT

## 2020-10-09 PROCEDURE — 3075F SYST BP GE 130 - 139MM HG: CPT | Mod: S$GLB,,, | Performed by: PHYSICIAN ASSISTANT

## 2020-10-09 PROCEDURE — 3008F PR BODY MASS INDEX (BMI) DOCUMENTED: ICD-10-PCS | Mod: S$GLB,,, | Performed by: PHYSICIAN ASSISTANT

## 2020-10-09 PROCEDURE — 99213 OFFICE O/P EST LOW 20 MIN: CPT | Mod: S$GLB,,, | Performed by: PHYSICIAN ASSISTANT

## 2020-10-09 PROCEDURE — 1101F PR PT FALLS ASSESS DOC 0-1 FALLS W/OUT INJ PAST YR: ICD-10-PCS | Mod: S$GLB,,, | Performed by: PHYSICIAN ASSISTANT

## 2020-10-09 PROCEDURE — 1159F MED LIST DOCD IN RCRD: CPT | Mod: S$GLB,,, | Performed by: PHYSICIAN ASSISTANT

## 2020-10-09 PROCEDURE — 1159F PR MEDICATION LIST DOCUMENTED IN MEDICAL RECORD: ICD-10-PCS | Mod: S$GLB,,, | Performed by: PHYSICIAN ASSISTANT

## 2020-10-09 PROCEDURE — 1125F PR PAIN SEVERITY QUANTIFIED, PAIN PRESENT: ICD-10-PCS | Mod: S$GLB,,, | Performed by: PHYSICIAN ASSISTANT

## 2020-10-09 NOTE — PROGRESS NOTES
[facility]  [address]  [place] [address]  Phone: [phone]   [phone]    Patient's PCP: Ping Juarez NP  Referring Provider: No ref. provider found    Subjective:      Chief Complaint:   Chief Complaint   Patient presents with    Left Knee - Pain     Left knee pain x June. States that he was picking up some john of hay back in June. States that he woke up the next day with pain. States that his pain has gotten worse and hard to use the knee. Did have MRI done        Past Medical History:   Diagnosis Date    GERD (gastroesophageal reflux disease)     Hard of hearing     Hypertension        Past Surgical History:   Procedure Laterality Date    BACK SURGERY      CHOLECYSTECTOMY      COLONOSCOPY  2012    Dr Silver- rtc 10 yr    HAND SURGERY      KIDNEY STONE SURGERY      TONSILLECTOMY         Current Outpatient Medications   Medication Sig    esomeprazole (NEXIUM) 40 MG capsule Take 1 capsule (40 mg total) by mouth once daily.    HYDROcodone-acetaminophen (NORCO) 7.5-325 mg per tablet Take 1 tablet by mouth every 8 (eight) hours as needed for Pain.    lisinopriL 10 MG tablet Take 1 tablet (10 mg total) by mouth once daily.    methylPREDNISolone (MEDROL DOSEPACK) 4 mg tablet use as directed    phentermine (ADIPEX-P) 37.5 mg tablet Take 1 tablet (37.5 mg total) by mouth before breakfast.    sildenafiL (VIAGRA) 100 MG tablet Take 1 tablet (100 mg total) by mouth daily as needed for Erectile Dysfunction (prn sex).    meloxicam (MOBIC) 15 MG tablet Take 1 tablet (15 mg total) by mouth once daily. (Patient not taking: Reported on 10/9/2020)     No current facility-administered medications for this visit.        Review of patient's allergies indicates:   Allergen Reactions    Iodinated contrast media Hives and Shortness Of Breath       History reviewed. No pertinent family history.    Social History     Socioeconomic History    Marital status:      Spouse  name: Not on file    Number of children: Not on file    Years of education: Not on file    Highest education level: Not on file   Occupational History    Not on file   Social Needs    Financial resource strain: Not on file    Food insecurity     Worry: Not on file     Inability: Not on file    Transportation needs     Medical: Not on file     Non-medical: Not on file   Tobacco Use    Smoking status: Never Smoker    Smokeless tobacco: Never Used   Substance and Sexual Activity    Alcohol use: Yes    Drug use: Never    Sexual activity: Yes     Partners: Female   Lifestyle    Physical activity     Days per week: Not on file     Minutes per session: Not on file    Stress: Not on file   Relationships    Social connections     Talks on phone: Not on file     Gets together: Not on file     Attends [employer] service: Not on file     Active member of club or organization: Not on file     Attends meetings of clubs or organizations: Not on file     Relationship status: Not on file   Other Topics Concern    Not on file   Social History Narrative    Not on file       History of present illness:  Patient presents to clinic today for follow-up from primary care for left knee pain.  He states that back in June of this year he loaded up 150 Larry of hay for his horse, after that time he had developed some knee pain.  No known trauma.  Pain localizes to the medial joint line.  No locking clicking or popping.    Review of Systems:    Constitutional: Negative for chills, fever and weight loss.   HENT: Negative for congestion.    Eyes: Negative for discharge and redness.   Respiratory: Negative for cough and shortness of breath.    Cardiovascular: Negative for chest pain.   Gastrointestinal: Negative for nausea and vomiting.   Musculoskeletal: See HPI.   Skin: Negative for rash.   Neurological: Negative for headaches.   Endo/Heme/Allergies: Does not bruise/bleed easily.   Psychiatric/Behavioral: The patient is not  nervous/anxious.    All other systems reviewed and are negative.       Objective:      Physical Examination:    Vital Signs:    Vitals:    10/09/20 1127   BP: (!) 130/90   Pulse: 86       Body mass index is 39.23 kg/m².    This a well-developed, well nourished patient in no acute distress.  They are alert and oriented and cooperative to examination.     Examination of the left knee, there is a +1 effusion.  Patient has tenderness over the medial joint line.  No patellofemoral crepitus, no pain with patellofemoral grind testing.  Pain with medial Nisha's.  Normal range of motion, 0-120 degrees.  Calf soft nontender, no palpable Baker cyst.  Examination of the right knee demonstrates normal range of motion.  No crepitus.      Pertinent New Results:    IMPRESSION:     Complex tear involving medial meniscal body and posterior horn.     Small to moderate size knee joint effusion.     Mild medial femoral tibial osteoarthrosis.    XRAY Report / Interpretation:   Four views of the left knee taken on September 22nd reviewed in the office today.  Minimal arthritic changes noted.          Assessment:       1. Complex tear of medial meniscus of left knee as current injury, initial encounter    2. Primary osteoarthritis of left knee      Plan:     Complex tear of medial meniscus of left knee as current injury, initial encounter    Primary osteoarthritis of left knee        Follow up in about 4 days (around 10/13/2020) for LT Knee Follow up w/ Dr. Blanco.    This gentleman has insidious onset of left knee pain for the last 4 months.  Followed by primary care, MRI demonstrates a medial meniscal tear.  Plan is to have the patient return next week to have surgical consultation with Dr. Blanco.  Recommend arthroscopy for partial medial meniscectomy.  He is currently taking oral steroids and has pain medication as prescribed by primary care.  They do help with his pain.    [unfilled]  RAFAT Butler PA-C    This  note was created using [employer] voice recognition software that occasionally misinterprets words or phrases.

## 2020-10-12 NOTE — PROGRESS NOTES
SUBJECTIVE:    Patient ID: Lewis Matias is a 65 y.o. male.    Chief Complaint: Knee Pain (left side x months - getting worse // refused flu/pna today ac)    65 year old male presents with complaints of worsening knee pain.  Was seen in our office on 09/22.  At that time was started on meloxicam.  Initially seem to be helping.  Within the last 3 days pain has become severe.  Unable to bear weight for long periods of time.  Has been elevating and applying ice with minimal improvement.      Office Visit on 08/10/2020   Component Date Value Ref Range Status    WBC 08/10/2020 5.4  3.8 - 10.8 Thousand/uL Final    RBC 08/10/2020 5.64  4.20 - 5.80 Million/uL Final    Hemoglobin 08/10/2020 17.4* 13.2 - 17.1 g/dL Final    Hematocrit 08/10/2020 51.2* 38.5 - 50.0 % Final    Mean Corpuscular Volume 08/10/2020 90.8  80.0 - 100.0 fL Final    Mean Corpuscular Hemoglobin 08/10/2020 30.9  27.0 - 33.0 pg Final    Mean Corpuscular Hemoglobin Conc 08/10/2020 34.0  32.0 - 36.0 g/dL Final    RDW 08/10/2020 13.3  11.0 - 15.0 % Final    Platelets 08/10/2020 140  140 - 400 Thousand/uL Final    MPV 08/10/2020 12.2  7.5 - 12.5 fL Final    Neutrophils Absolute 08/10/2020 3,235  1,500 - 7,800 cells/uL Final    Lymph # 08/10/2020 1,312  850 - 3,900 cells/uL Final    Mono # 08/10/2020 616  200 - 950 cells/uL Final    Eos # 08/10/2020 200  15 - 500 cells/uL Final    Baso # 08/10/2020 38  0 - 200 cells/uL Final    Neutrophils Relative 08/10/2020 59.9  % Final    Lymph% 08/10/2020 24.3  % Final    Mono% 08/10/2020 11.4  % Final    Eosinophil% 08/10/2020 3.7  % Final    Basophil% 08/10/2020 0.7  % Final    Glucose 08/10/2020 106* 65 - 99 mg/dL Final    BUN, Bld 08/10/2020 11  7 - 25 mg/dL Final    Creatinine 08/10/2020 0.89  0.70 - 1.25 mg/dL Final    eGFR if non African American 08/10/2020 89  > OR = 60 mL/min/1.73m2 Final    eGFR if African American 08/10/2020 103  > OR = 60 mL/min/1.73m2 Final    BUN/Creatinine Ratio  08/10/2020 NOT APPLICABLE  6 - 22 (calc) Final    Sodium 08/10/2020 139  135 - 146 mmol/L Final    Potassium 08/10/2020 4.4  3.5 - 5.3 mmol/L Final    Chloride 08/10/2020 102  98 - 110 mmol/L Final    CO2 08/10/2020 29  20 - 32 mmol/L Final    Calcium 08/10/2020 9.4  8.6 - 10.3 mg/dL Final    Total Protein 08/10/2020 6.4  6.1 - 8.1 g/dL Final    Albumin 08/10/2020 4.3  3.6 - 5.1 g/dL Final    Globulin, Total 08/10/2020 2.1  1.9 - 3.7 g/dL (calc) Final    Albumin/Globulin Ratio 08/10/2020 2.0  1.0 - 2.5 (calc) Final    Total Bilirubin 08/10/2020 1.8* 0.2 - 1.2 mg/dL Final    Alkaline Phosphatase 08/10/2020 59  35 - 144 U/L Final    AST 08/10/2020 21  10 - 35 U/L Final    ALT 08/10/2020 27  9 - 46 U/L Final    Cholesterol 08/10/2020 148  <200 mg/dL Final    HDL 08/10/2020 46  > OR = 40 mg/dL Final    Triglycerides 08/10/2020 93  <150 mg/dL Final    LDL Cholesterol 08/10/2020 83  mg/dL (calc) Final    Hdl/Cholesterol Ratio 08/10/2020 3.2  <5.0 (calc) Final    Non HDL Chol. (LDL+VLDL) 08/10/2020 102  <130 mg/dL (calc) Final    Color, UA 08/10/2020 YELLOW  YELLOW Final    Appearance, UA 08/10/2020 CLEAR  CLEAR Final    Specific [place], UA 08/10/2020 1.017  1.001 - 1.035 Final    pH, UA 08/10/2020 6.5  5.0 - 8.0 Final    Glucose, UA 08/10/2020 NEGATIVE  NEGATIVE Final    Bilirubin, UA 08/10/2020 NEGATIVE  NEGATIVE Final    Ketones, UA 08/10/2020 NEGATIVE  NEGATIVE Final    Occult Blood UA 08/10/2020 NEGATIVE  NEGATIVE Final    Protein, UA 08/10/2020 NEGATIVE  NEGATIVE Final    Nitrite, UA 08/10/2020 NEGATIVE  NEGATIVE Final    Leukocytes, UA 08/10/2020 NEGATIVE  NEGATIVE Final    WBC Casts, UA 08/10/2020 NONE SEEN  < OR = 5 /HPF Final    RBC Casts, UA 08/10/2020 NONE SEEN  < OR = 2 /HPF Final    Squam Epithel, UA 08/10/2020 NONE SEEN  < OR = 5 /HPF Final    Bacteria, UA 08/10/2020 NONE SEEN  NONE SEEN /HPF Final    Hyaline Casts, UA 08/10/2020 NONE SEEN  NONE SEEN /LPF Final     Reflexive Urine Culture 08/10/2020 NO CULTURE INDICATED   Final    PROSTATE SPECIFIC ANTIGEN, SCR - Q* 08/10/2020 1.4  < OR = 4.0 ng/mL Final    TSH w/reflex to FT4 08/10/2020 3.47  0.40 - 4.50 mIU/L Final    Creatinine, Random Ur 08/10/2020 100  20 - 320 mg/dL Final    Microalb, Ur 08/10/2020 0.3  See Note: mg/dL Final    Microalb Creat Ratio 08/10/2020 3  <30 mcg/mg creat Final       Past Medical History:   Diagnosis Date    GERD (gastroesophageal reflux disease)     Hard of hearing     Hypertension      Social History     Socioeconomic History    Marital status:      Spouse name: Not on file    Number of children: Not on file    Years of education: Not on file    Highest education level: Not on file   Occupational History    Not on file   Social Needs    Financial resource strain: Not on file    Food insecurity     Worry: Not on file     Inability: Not on file    Transportation needs     Medical: Not on file     Non-medical: Not on file   Tobacco Use    Smoking status: Never Smoker    Smokeless tobacco: Never Used   Substance and Sexual Activity    Alcohol use: Yes    Drug use: Never    Sexual activity: Yes     Partners: Female   Lifestyle    Physical activity     Days per week: Not on file     Minutes per session: Not on file    Stress: Not on file   Relationships    Social connections     Talks on phone: Not on file     Gets together: Not on file     Attends Catholic service: Not on file     Active member of club or organization: Not on file     Attends meetings of clubs or organizations: Not on file     Relationship status: Not on file   Other Topics Concern    Not on file   Social History Narrative    Not on file     Past Surgical History:   Procedure Laterality Date    BACK SURGERY      CHOLECYSTECTOMY      COLONOSCOPY  2012    Dr Peteror- rtc 10 yr    HAND SURGERY      KIDNEY STONE SURGERY      TONSILLECTOMY       History reviewed. No pertinent family  history.    Review of patient's allergies indicates:   Allergen Reactions    Iodinated contrast media Hives and Shortness Of Breath       Current Outpatient Medications:     esomeprazole (NEXIUM) 40 MG capsule, Take 1 capsule (40 mg total) by mouth once daily., Disp: 90 capsule, Rfl: 1    HYDROcodone-acetaminophen (NORCO) 7.5-325 mg per tablet, Take 1 tablet by mouth every 8 (eight) hours as needed for Pain., Disp: 21 tablet, Rfl: 0    lisinopriL 10 MG tablet, Take 1 tablet (10 mg total) by mouth once daily., Disp: 90 tablet, Rfl: 1    meloxicam (MOBIC) 15 MG tablet, Take 1 tablet (15 mg total) by mouth once daily. (Patient not taking: Reported on 10/9/2020), Disp: 30 tablet, Rfl: 0    methylPREDNISolone (MEDROL DOSEPACK) 4 mg tablet, use as directed, Disp: 1 Package, Rfl: 0    phentermine (ADIPEX-P) 37.5 mg tablet, Take 1 tablet (37.5 mg total) by mouth before breakfast., Disp: 30 tablet, Rfl: 0    sildenafiL (VIAGRA) 100 MG tablet, Take 1 tablet (100 mg total) by mouth daily as needed for Erectile Dysfunction (prn sex)., Disp: 30 tablet, Rfl: 0    Review of Systems   Constitutional: Negative for activity change, chills and fever.   Respiratory: Negative for cough and shortness of breath.    Cardiovascular: Negative for chest pain.   Gastrointestinal: Negative for abdominal pain, diarrhea, nausea and vomiting.   Genitourinary: Negative for difficulty urinating and flank pain.   Musculoskeletal: Negative for back pain and gait problem.        Knee pain   Neurological: Negative for dizziness and weakness.          Objective:      Vitals:    10/06/20 0931   BP: 130/76   Pulse: 76   Temp: 98.3 °F (36.8 °C)     Physical Exam  Constitutional:       General: He is not in acute distress.     Appearance: He is well-developed.   Cardiovascular:      Rate and Rhythm: Normal rate and regular rhythm.      Heart sounds: No murmur. No friction rub. No gallop.    Pulmonary:      Breath sounds: Normal breath sounds. No  wheezing or rales.   Musculoskeletal:      Left knee: He exhibits decreased range of motion, swelling and MCL laxity. Tenderness found.      Lumbar back: He exhibits normal range of motion, no tenderness, no bony tenderness, no pain and no spasm.   Skin:     Findings: No rash.   Neurological:      Mental Status: He is alert and oriented to person, place, and time.      Sensory: No sensory deficit.      Gait: Gait normal.           Assessment:       1. Internal derangement of left knee    2. Chronic instability of left knee    3. Essential hypertension    4. Gastroesophageal reflux disease without esophagitis         Plan:       Internal derangement of left knee  -     methylPREDNISolone (MEDROL DOSEPACK) 4 mg tablet; use as directed  Dispense: 1 Package; Refill: 0  -     HYDROcodone-acetaminophen (NORCO) 7.5-325 mg per tablet; Take 1 tablet by mouth every 8 (eight) hours as needed for Pain.  Dispense: 21 tablet; Refill: 0  -     MRI Knee Without Contrast Left; Future; Expected date: 10/06/2020    Chronic instability of left knee  -     methylPREDNISolone (MEDROL DOSEPACK) 4 mg tablet; use as directed  Dispense: 1 Package; Refill: 0  -     HYDROcodone-acetaminophen (NORCO) 7.5-325 mg per tablet; Take 1 tablet by mouth every 8 (eight) hours as needed for Pain.  Dispense: 21 tablet; Refill: 0  -     MRI Knee Without Contrast Left; Future; Expected date: 10/06/2020    Essential hypertension    Gastroesophageal reflux disease without esophagitis      Follow up in about 4 weeks (around 11/3/2020) for medication management.        10/12/2020 Ping Juarez

## 2020-10-13 ENCOUNTER — OFFICE VISIT (OUTPATIENT)
Dept: ORTHOPEDICS | Facility: CLINIC | Age: 65
End: 2020-10-13
Payer: MEDICARE

## 2020-10-13 ENCOUNTER — TELEPHONE (OUTPATIENT)
Dept: ORTHOPEDICS | Facility: CLINIC | Age: 65
End: 2020-10-13

## 2020-10-13 VITALS
DIASTOLIC BLOOD PRESSURE: 70 MMHG | SYSTOLIC BLOOD PRESSURE: 116 MMHG | OXYGEN SATURATION: 97 % | HEIGHT: 68 IN | WEIGHT: 258 LBS | HEART RATE: 105 BPM | BODY MASS INDEX: 39.1 KG/M2

## 2020-10-13 DIAGNOSIS — S83.232D COMPLEX TEAR OF MEDIAL MENISCUS OF LEFT KNEE AS CURRENT INJURY, SUBSEQUENT ENCOUNTER: Primary | ICD-10-CM

## 2020-10-13 DIAGNOSIS — Z01.818 PREOP TESTING: ICD-10-CM

## 2020-10-13 DIAGNOSIS — M17.12 PRIMARY OSTEOARTHRITIS OF LEFT KNEE: ICD-10-CM

## 2020-10-13 DIAGNOSIS — Z01.818 PREOP EXAMINATION: ICD-10-CM

## 2020-10-13 PROBLEM — S83.232A COMPLEX TEAR OF MEDIAL MENISCUS OF LEFT KNEE AS CURRENT INJURY: Status: ACTIVE | Noted: 2020-10-13

## 2020-10-13 PROCEDURE — 3074F SYST BP LT 130 MM HG: CPT | Mod: S$GLB,,, | Performed by: ORTHOPAEDIC SURGERY

## 2020-10-13 PROCEDURE — 3008F BODY MASS INDEX DOCD: CPT | Mod: S$GLB,,, | Performed by: ORTHOPAEDIC SURGERY

## 2020-10-13 PROCEDURE — 1159F MED LIST DOCD IN RCRD: CPT | Mod: S$GLB,,, | Performed by: ORTHOPAEDIC SURGERY

## 2020-10-13 PROCEDURE — 99213 PR OFFICE/OUTPT VISIT, EST, LEVL III, 20-29 MIN: ICD-10-PCS | Mod: S$GLB,,, | Performed by: ORTHOPAEDIC SURGERY

## 2020-10-13 PROCEDURE — 3074F PR MOST RECENT SYSTOLIC BLOOD PRESSURE < 130 MM HG: ICD-10-PCS | Mod: S$GLB,,, | Performed by: ORTHOPAEDIC SURGERY

## 2020-10-13 PROCEDURE — 3078F PR MOST RECENT DIASTOLIC BLOOD PRESSURE < 80 MM HG: ICD-10-PCS | Mod: S$GLB,,, | Performed by: ORTHOPAEDIC SURGERY

## 2020-10-13 PROCEDURE — 1159F PR MEDICATION LIST DOCUMENTED IN MEDICAL RECORD: ICD-10-PCS | Mod: S$GLB,,, | Performed by: ORTHOPAEDIC SURGERY

## 2020-10-13 PROCEDURE — 1125F PR PAIN SEVERITY QUANTIFIED, PAIN PRESENT: ICD-10-PCS | Mod: S$GLB,,, | Performed by: ORTHOPAEDIC SURGERY

## 2020-10-13 PROCEDURE — 3008F PR BODY MASS INDEX (BMI) DOCUMENTED: ICD-10-PCS | Mod: S$GLB,,, | Performed by: ORTHOPAEDIC SURGERY

## 2020-10-13 PROCEDURE — 3078F DIAST BP <80 MM HG: CPT | Mod: S$GLB,,, | Performed by: ORTHOPAEDIC SURGERY

## 2020-10-13 PROCEDURE — 1101F PT FALLS ASSESS-DOCD LE1/YR: CPT | Mod: S$GLB,,, | Performed by: ORTHOPAEDIC SURGERY

## 2020-10-13 PROCEDURE — 99213 OFFICE O/P EST LOW 20 MIN: CPT | Mod: S$GLB,,, | Performed by: ORTHOPAEDIC SURGERY

## 2020-10-13 PROCEDURE — 1101F PR PT FALLS ASSESS DOC 0-1 FALLS W/OUT INJ PAST YR: ICD-10-PCS | Mod: S$GLB,,, | Performed by: ORTHOPAEDIC SURGERY

## 2020-10-13 PROCEDURE — 1125F AMNT PAIN NOTED PAIN PRSNT: CPT | Mod: S$GLB,,, | Performed by: ORTHOPAEDIC SURGERY

## 2020-10-13 NOTE — H&P (VIEW-ONLY)
[facility]    Subjective:     Chief Complaint:   Chief Complaint   Patient presents with    Left Knee - Pain     Left knee pain follow up. Would like to review MRI from 10/7/2020 and discuss surgery for meniscal tear.        Past Medical History:   Diagnosis Date    GERD (gastroesophageal reflux disease)     Hard of hearing     Hypertension        Past Surgical History:   Procedure Laterality Date    BACK SURGERY      CHOLECYSTECTOMY      COLONOSCOPY  2012    Dr Peteror- rtc 10 yr    HAND SURGERY      KIDNEY STONE SURGERY      TONSILLECTOMY         Current Outpatient Medications   Medication Sig    esomeprazole (NEXIUM) 40 MG capsule Take 1 capsule (40 mg total) by mouth once daily.    HYDROcodone-acetaminophen (NORCO) 7.5-325 mg per tablet Take 1 tablet by mouth every 8 (eight) hours as needed for Pain.    lisinopriL 10 MG tablet Take 1 tablet (10 mg total) by mouth once daily.    sildenafiL (VIAGRA) 100 MG tablet Take 1 tablet (100 mg total) by mouth daily as needed for Erectile Dysfunction (prn sex).    meloxicam (MOBIC) 15 MG tablet Take 1 tablet (15 mg total) by mouth once daily. (Patient not taking: Reported on 10/9/2020)    methylPREDNISolone (MEDROL DOSEPACK) 4 mg tablet use as directed (Patient not taking: Reported on 10/13/2020)    phentermine (ADIPEX-P) 37.5 mg tablet Take 1 tablet (37.5 mg total) by mouth before breakfast. (Patient not taking: Reported on 10/13/2020)     No current facility-administered medications for this visit.        Review of patient's allergies indicates:   Allergen Reactions    Iodinated contrast media Hives and Shortness Of Breath       No family history on file.    Social History     Socioeconomic History    Marital status:      Spouse name: Not on file    Number of children: Not on file    Years of education: Not on file    Highest education level: Not on file   Occupational History    Not on file   Social Needs    Financial resource  strain: Not on file    Food insecurity     Worry: Not on file     Inability: Not on file    Transportation needs     Medical: Not on file     Non-medical: Not on file   Tobacco Use    Smoking status: Never Smoker    Smokeless tobacco: Never Used   Substance and Sexual Activity    Alcohol use: Yes    Drug use: Never    Sexual activity: Yes     Partners: Female   Lifestyle    Physical activity     Days per week: Not on file     Minutes per session: Not on file    Stress: Not on file   Relationships    Social connections     Talks on phone: Not on file     Gets together: Not on file     Attends Rastafari service: Not on file     Active member of club or organization: Not on file     Attends meetings of clubs or organizations: Not on file     Relationship status: Not on file   Other Topics Concern    Not on file   Social History Narrative    Not on file       History of present illness:  Patient returns to clinic this week with his MRI, I saw him last week with an MRI that demonstrated a medial meniscal tear.  He is here to discuss arthroscopy with Dr. Blanco.  His pain is unchanged.      Review of Systems:    Constitution: Negative for chills, fever, and sweats.  Negative for unexplained weight loss.    HENT:  Negative for headaches and blurry vision.    Cardiovascular:Negative for chest pain or irregular heart beat. Negative for hypertension.    Respiratory:  Negative for cough and shortness of breath.    Gastrointestinal: Negative for abdominal pain, heartburn, melena, nausea, and vomitting.    Genitourinary:  Negative bladder incontinence and dysuria.    Musculoskeletal:  See HPI for details.     Neurological: Negative for numbness.    Psychiatric/Behavioral: Negative for depression.  The patient is not nervous/anxious.      Endocrine: Negative for polyuria    Hematologic/Lymphatic: Negative for bleeding problem.  Does not bruise/bleed easily.    Skin: Negative for poor would healing and  "rash    Objective:      Physical Examination:    Vital Signs:    Vitals:    10/13/20 1312   BP: 116/70   Pulse: 105       Body mass index is 39.23 kg/m².    This a well-developed, well nourished patient in no acute distress.  They are alert and oriented and cooperative to examination.        Examination of the left knee, there is a +1 effusion.  Patient has tenderness over the medial joint line.  No patellofemoral crepitus, no pain with patellofemoral grind testing.  Pain with medial Nisha's.  Normal range of motion, 0-120 degrees.  Calf soft nontender, no palpable Baker cyst.  Examination of the right knee demonstrates normal range of motion.  No crepitus.  Pertinent New Results:    XRAY Report / Interpretation:       Assessment/Plan:      Medial meniscal tear, plan for arthroscopy partial medial meniscectomy.  Risks and benefits of the procedure was cut with the patient in great detail.  Will get this scheduled at the next available appointment.  Will see him back postoperatively.    Rony Caicedo PA-C served as a scribe for this patient encounter. A "face to face" encounter occured with Dr. Andres Blanco on this date. The treatment plan and medical decision making are outlined as above:      This note was created using Dragon voice recognition software that occasionally misinterpreted phrases or words.          "

## 2020-10-13 NOTE — PROGRESS NOTES
[facility]    Subjective:     Chief Complaint:   Chief Complaint   Patient presents with    Left Knee - Pain     Left knee pain follow up. Would like to review MRI from 10/7/2020 and discuss surgery for meniscal tear.        Past Medical History:   Diagnosis Date    GERD (gastroesophageal reflux disease)     Hard of hearing     Hypertension        Past Surgical History:   Procedure Laterality Date    BACK SURGERY      CHOLECYSTECTOMY      COLONOSCOPY  2012    Dr Peteror- rtc 10 yr    HAND SURGERY      KIDNEY STONE SURGERY      TONSILLECTOMY         Current Outpatient Medications   Medication Sig    esomeprazole (NEXIUM) 40 MG capsule Take 1 capsule (40 mg total) by mouth once daily.    HYDROcodone-acetaminophen (NORCO) 7.5-325 mg per tablet Take 1 tablet by mouth every 8 (eight) hours as needed for Pain.    lisinopriL 10 MG tablet Take 1 tablet (10 mg total) by mouth once daily.    sildenafiL (VIAGRA) 100 MG tablet Take 1 tablet (100 mg total) by mouth daily as needed for Erectile Dysfunction (prn sex).    meloxicam (MOBIC) 15 MG tablet Take 1 tablet (15 mg total) by mouth once daily. (Patient not taking: Reported on 10/9/2020)    methylPREDNISolone (MEDROL DOSEPACK) 4 mg tablet use as directed (Patient not taking: Reported on 10/13/2020)    phentermine (ADIPEX-P) 37.5 mg tablet Take 1 tablet (37.5 mg total) by mouth before breakfast. (Patient not taking: Reported on 10/13/2020)     No current facility-administered medications for this visit.        Review of patient's allergies indicates:   Allergen Reactions    Iodinated contrast media Hives and Shortness Of Breath       No family history on file.    Social History     Socioeconomic History    Marital status:      Spouse name: Not on file    Number of children: Not on file    Years of education: Not on file    Highest education level: Not on file   Occupational History    Not on file   Social Needs    Financial resource  strain: Not on file    Food insecurity     Worry: Not on file     Inability: Not on file    Transportation needs     Medical: Not on file     Non-medical: Not on file   Tobacco Use    Smoking status: Never Smoker    Smokeless tobacco: Never Used   Substance and Sexual Activity    Alcohol use: Yes    Drug use: Never    Sexual activity: Yes     Partners: Female   Lifestyle    Physical activity     Days per week: Not on file     Minutes per session: Not on file    Stress: Not on file   Relationships    Social connections     Talks on phone: Not on file     Gets together: Not on file     Attends Druze service: Not on file     Active member of club or organization: Not on file     Attends meetings of clubs or organizations: Not on file     Relationship status: Not on file   Other Topics Concern    Not on file   Social History Narrative    Not on file       History of present illness:  Patient returns to clinic this week with his MRI, I saw him last week with an MRI that demonstrated a medial meniscal tear.  He is here to discuss arthroscopy with Dr. Blanco.  His pain is unchanged.      Review of Systems:    Constitution: Negative for chills, fever, and sweats.  Negative for unexplained weight loss.    HENT:  Negative for headaches and blurry vision.    Cardiovascular:Negative for chest pain or irregular heart beat. Negative for hypertension.    Respiratory:  Negative for cough and shortness of breath.    Gastrointestinal: Negative for abdominal pain, heartburn, melena, nausea, and vomitting.    Genitourinary:  Negative bladder incontinence and dysuria.    Musculoskeletal:  See HPI for details.     Neurological: Negative for numbness.    Psychiatric/Behavioral: Negative for depression.  The patient is not nervous/anxious.      Endocrine: Negative for polyuria    Hematologic/Lymphatic: Negative for bleeding problem.  Does not bruise/bleed easily.    Skin: Negative for poor would healing and  "rash    Objective:      Physical Examination:    Vital Signs:    Vitals:    10/13/20 1312   BP: 116/70   Pulse: 105       Body mass index is 39.23 kg/m².    This a well-developed, well nourished patient in no acute distress.  They are alert and oriented and cooperative to examination.        Examination of the left knee, there is a +1 effusion.  Patient has tenderness over the medial joint line.  No patellofemoral crepitus, no pain with patellofemoral grind testing.  Pain with medial Nisha's.  Normal range of motion, 0-120 degrees.  Calf soft nontender, no palpable Baker cyst.  Examination of the right knee demonstrates normal range of motion.  No crepitus.  Pertinent New Results:    XRAY Report / Interpretation:       Assessment/Plan:      Medial meniscal tear, plan for arthroscopy partial medial meniscectomy.  Risks and benefits of the procedure was cut with the patient in great detail.  Will get this scheduled at the next available appointment.  Will see him back postoperatively.    Rony Caicedo PA-C served as a scribe for this patient encounter. A "face to face" encounter occured with Dr. Andres Blanco on this date. The treatment plan and medical decision making are outlined as above:      This note was created using Dragon voice recognition software that occasionally misinterpreted phrases or words.          "

## 2020-10-19 ENCOUNTER — TELEPHONE (OUTPATIENT)
Dept: ORTHOPEDICS | Facility: CLINIC | Age: 65
End: 2020-10-19

## 2020-10-19 NOTE — TELEPHONE ENCOUNTER
----- Message from Soledad Wong sent at 10/19/2020  2:41 PM CDT -----  Regarding: COVID Test for Surgery  Contact: Patient  He wants to change the date and time for his COVID test, call him back at [phone].

## 2020-10-20 ENCOUNTER — PATIENT MESSAGE (OUTPATIENT)
Dept: FAMILY MEDICINE | Facility: CLINIC | Age: 65
End: 2020-10-20

## 2020-10-20 DIAGNOSIS — M23.92 INTERNAL DERANGEMENT OF LEFT KNEE: ICD-10-CM

## 2020-10-20 DIAGNOSIS — M23.52 CHRONIC INSTABILITY OF LEFT KNEE: ICD-10-CM

## 2020-10-20 RX ORDER — HYDROCODONE BITARTRATE AND ACETAMINOPHEN 7.5; 325 MG/1; MG/1
1 TABLET ORAL EVERY 8 HOURS PRN
Qty: 21 TABLET | Refills: 0 | Status: SHIPPED | OUTPATIENT
Start: 2020-10-20 | End: 2020-11-13

## 2020-10-23 ENCOUNTER — HOSPITAL ENCOUNTER (OUTPATIENT)
Dept: RADIOLOGY | Facility: HOSPITAL | Age: 65
Discharge: HOME | End: 2020-10-23
Attending: ORTHOPAEDIC SURGERY
Payer: MEDICARE

## 2020-10-23 ENCOUNTER — HOSPITAL ENCOUNTER (OUTPATIENT)
Dept: PREADMISSION TESTING | Facility: HOSPITAL | Age: 65
Discharge: HOME | End: 2020-10-23
Attending: ORTHOPAEDIC SURGERY
Payer: MEDICARE

## 2020-10-23 VITALS
HEIGHT: 68 IN | DIASTOLIC BLOOD PRESSURE: 76 MMHG | WEIGHT: 244.69 LBS | HEART RATE: 118 BPM | RESPIRATION RATE: 18 BRPM | BODY MASS INDEX: 37.08 KG/M2 | TEMPERATURE: 97 F | SYSTOLIC BLOOD PRESSURE: 113 MMHG | OXYGEN SATURATION: 95 %

## 2020-10-23 DIAGNOSIS — S83.232D COMPLEX TEAR OF MEDIAL MENISCUS OF LEFT KNEE AS CURRENT INJURY, SUBSEQUENT ENCOUNTER: ICD-10-CM

## 2020-10-23 DIAGNOSIS — Z01.818 PREOP TESTING: ICD-10-CM

## 2020-10-23 PROCEDURE — 93005 ELECTROCARDIOGRAM TRACING: CPT | Performed by: INTERNAL MEDICINE

## 2020-10-23 PROCEDURE — 93010 EKG 12-LEAD: ICD-10-PCS | Mod: ,,, | Performed by: INTERNAL MEDICINE

## 2020-10-23 PROCEDURE — 93010 ELECTROCARDIOGRAM REPORT: CPT | Mod: ,,, | Performed by: INTERNAL MEDICINE

## 2020-10-23 PROCEDURE — 71046 X-RAY EXAM CHEST 2 VIEWS: CPT | Mod: TC

## 2020-10-23 NOTE — DISCHARGE INSTRUCTIONS
To confirm, Your doctor has instructed you that surgery is scheduled for: Nov 4  COVID swab [facility] Nov 1 @ 3:40pm     Pre-Op will call the afternoon prior to surgery between 4:00 and 6:00 PM with the final arrival time.     1 Person can come with you the day of surgery.  Enter at [address] and come through front entrance.  You will be screened/ have temperature checked.    You may have 1 visitor who will also be screened.     Check in at registration.   After registration, proceed past gift shop and through glass door ( [facility]) Check in at the nurses station to the left.   Do not eat or drink anything after midnight the night before your surgery - THIS INCLUDES  WATER, GUM, MINTS AND CANDY.  YOU MAY BRUSH YOUR TEETH BUT DO NOT SWALLOW     TAKE ONLY THESE MEDICATIONS WITH A SMALL SIP OF WATER THE MORNING OF YOUR PROCEDURE: NONE      PLEASE NOTE:  The surgery schedule has many variables which may affect the time of your surgery case.  Family members should be available if your surgery time changes.  Plan to be here the day of your procedure between 4-6 hours.      DO NOT TAKE THESE MEDICATIONS 5-7 DAYS PRIOR to your procedure or per your surgeon's request: ASPIRIN, ALEVE, ADVIL, IBUPROFEN,  CARSON SELTZER, BC , FISH OIL , VITAMIN E, HERBALS  (May take Tylenol)                                                          IMPORTANT INSTRUCTIONS      · Do not smoke, vape or drink alcoholic beverages 24 hours prior to your procedure.  · Shower the night before AND the morning of your procedure with a Chlorhexidine wash such as Hibiclens or Dial antibacterial soap from the neck down.   ·  Do not get it on your face or in your eyes.  You may use your own shampoo and face wash. This helps your skin to be as bacteria free as possible.   ·  DO NOT remove hair from the surgery site.  Do not shave the incision site unless you are given specific instructions to do so.    · Sleep in a bed with clean sheets.  Do not  sleep with a pet in the bed.   · If you wear contact lenses, dentures, hearing aids or glasses, bring a container to put them in during surgery and give to a family member for safe keeping.    · Please leave all jewelry, piercing's and valuables at home.   · Wear rubber soled shoes (no flip flops).  ·     · Make arrangements in advance for transportation home by a responsible adult.      · You must make arrangements for transportation, TAXI'S, UBER'S OR LYFTS ARE NOT ALLOWED.        If you have any questions about these instructions, call (Monday - Friday) Pre-Op Admit  Nursing  at [phone] or the Pre-Op Day Surgery Unit at [phone].

## 2020-11-01 ENCOUNTER — LAB VISIT (OUTPATIENT)
Dept: PRIMARY CARE CLINIC | Facility: CLINIC | Age: 65
End: 2020-11-01
Payer: MEDICARE

## 2020-11-01 DIAGNOSIS — Z01.818 PREOP EXAMINATION: ICD-10-CM

## 2020-11-01 PROCEDURE — U0003 INFECTIOUS AGENT DETECTION BY NUCLEIC ACID (DNA OR RNA); SEVERE ACUTE RESPIRATORY SYNDROME CORONAVIRUS 2 (SARS-COV-2) (CORONAVIRUS DISEASE [COVID-19]), AMPLIFIED PROBE TECHNIQUE, MAKING USE OF HIGH THROUGHPUT TECHNOLOGIES AS DESCRIBED BY CMS-2020-01-R: HCPCS

## 2020-11-02 LAB — SARS-COV-2 RNA RESP QL NAA+PROBE: NOT DETECTED

## 2020-11-04 ENCOUNTER — ANESTHESIA EVENT (OUTPATIENT)
Dept: SURGERY | Facility: HOSPITAL | Age: 65
End: 2020-11-04
Payer: MEDICARE

## 2020-11-04 ENCOUNTER — ANESTHESIA (OUTPATIENT)
Dept: SURGERY | Facility: HOSPITAL | Age: 65
End: 2020-11-04
Payer: MEDICARE

## 2020-11-04 ENCOUNTER — HOSPITAL ENCOUNTER (OUTPATIENT)
Facility: HOSPITAL | Age: 65
Discharge: HOME | End: 2020-11-04
Attending: ORTHOPAEDIC SURGERY | Admitting: ORTHOPAEDIC SURGERY
Payer: MEDICARE

## 2020-11-04 VITALS
RESPIRATION RATE: 18 BRPM | OXYGEN SATURATION: 93 % | WEIGHT: 244.69 LBS | HEART RATE: 85 BPM | BODY MASS INDEX: 37.08 KG/M2 | HEIGHT: 68 IN | TEMPERATURE: 97 F | SYSTOLIC BLOOD PRESSURE: 138 MMHG | DIASTOLIC BLOOD PRESSURE: 92 MMHG

## 2020-11-04 DIAGNOSIS — S83.232D COMPLEX TEAR OF MEDIAL MENISCUS OF LEFT KNEE AS CURRENT INJURY, SUBSEQUENT ENCOUNTER: Primary | ICD-10-CM

## 2020-11-04 DIAGNOSIS — Z01.818 PREOP TESTING: ICD-10-CM

## 2020-11-04 PROCEDURE — 71000016 HC POSTOP RECOV ADDL HR: Performed by: ORTHOPAEDIC SURGERY

## 2020-11-04 PROCEDURE — 63600175 PHARM REV CODE 636 W HCPCS: Performed by: ANESTHESIOLOGY

## 2020-11-04 PROCEDURE — 36000710: Performed by: ORTHOPAEDIC SURGERY

## 2020-11-04 PROCEDURE — 25000003 PHARM REV CODE 250: Performed by: NURSE ANESTHETIST, CERTIFIED REGISTERED

## 2020-11-04 PROCEDURE — 25000003 PHARM REV CODE 250: Performed by: ORTHOPAEDIC SURGERY

## 2020-11-04 PROCEDURE — 63600175 PHARM REV CODE 636 W HCPCS: Performed by: ORTHOPAEDIC SURGERY

## 2020-11-04 PROCEDURE — 63600175 PHARM REV CODE 636 W HCPCS: Performed by: NURSE ANESTHETIST, CERTIFIED REGISTERED

## 2020-11-04 PROCEDURE — 71000039 HC RECOVERY, EACH ADD'L HOUR: Performed by: ORTHOPAEDIC SURGERY

## 2020-11-04 PROCEDURE — 27201423 OPTIME MED/SURG SUP & DEVICES STERILE SUPPLY: Performed by: ORTHOPAEDIC SURGERY

## 2020-11-04 PROCEDURE — 36000711: Performed by: ORTHOPAEDIC SURGERY

## 2020-11-04 PROCEDURE — 25000003 PHARM REV CODE 250: Performed by: ANESTHESIOLOGY

## 2020-11-04 PROCEDURE — 37000008 HC ANESTHESIA 1ST 15 MINUTES: Performed by: ORTHOPAEDIC SURGERY

## 2020-11-04 PROCEDURE — 71000015 HC POSTOP RECOV 1ST HR: Performed by: ORTHOPAEDIC SURGERY

## 2020-11-04 PROCEDURE — 37000009 HC ANESTHESIA EA ADD 15 MINS: Performed by: ORTHOPAEDIC SURGERY

## 2020-11-04 PROCEDURE — 71000033 HC RECOVERY, INTIAL HOUR: Performed by: ORTHOPAEDIC SURGERY

## 2020-11-04 RX ORDER — PROPOFOL 10 MG/ML
VIAL (ML) INTRAVENOUS
Status: DISCONTINUED | OUTPATIENT
Start: 2020-11-04 | End: 2020-11-04

## 2020-11-04 RX ORDER — BUPIVACAINE HYDROCHLORIDE AND EPINEPHRINE 5; 5 MG/ML; UG/ML
INJECTION, SOLUTION EPIDURAL; INTRACAUDAL; PERINEURAL
Status: DISCONTINUED | OUTPATIENT
Start: 2020-11-04 | End: 2020-11-04 | Stop reason: HOSPADM

## 2020-11-04 RX ORDER — OXYCODONE AND ACETAMINOPHEN 7.5; 325 MG/1; MG/1
1 TABLET ORAL EVERY 4 HOURS PRN
Qty: 28 TABLET | Refills: 0 | Status: SHIPPED | OUTPATIENT
Start: 2020-11-04 | End: 2021-05-11

## 2020-11-04 RX ORDER — SODIUM CHLORIDE 0.9 % (FLUSH) 0.9 %
10 SYRINGE (ML) INJECTION
Status: DISCONTINUED | OUTPATIENT
Start: 2020-11-04 | End: 2020-11-04 | Stop reason: HOSPADM

## 2020-11-04 RX ORDER — DIPHENHYDRAMINE HYDROCHLORIDE 50 MG/ML
12.5 INJECTION INTRAMUSCULAR; INTRAVENOUS
Status: DISCONTINUED | OUTPATIENT
Start: 2020-11-04 | End: 2020-11-04 | Stop reason: HOSPADM

## 2020-11-04 RX ORDER — SODIUM CHLORIDE, SODIUM LACTATE, POTASSIUM CHLORIDE, CALCIUM CHLORIDE 600; 310; 30; 20 MG/100ML; MG/100ML; MG/100ML; MG/100ML
INJECTION, SOLUTION INTRAVENOUS CONTINUOUS PRN
Status: DISCONTINUED | OUTPATIENT
Start: 2020-11-04 | End: 2020-11-04

## 2020-11-04 RX ORDER — CEFAZOLIN SODIUM 2 G/50ML
2 SOLUTION INTRAVENOUS
Status: COMPLETED | OUTPATIENT
Start: 2020-11-04 | End: 2020-11-04

## 2020-11-04 RX ORDER — HYDROMORPHONE HYDROCHLORIDE 1 MG/ML
0.2 INJECTION, SOLUTION INTRAMUSCULAR; INTRAVENOUS; SUBCUTANEOUS
Status: DISCONTINUED | OUTPATIENT
Start: 2020-11-04 | End: 2020-11-04 | Stop reason: HOSPADM

## 2020-11-04 RX ORDER — SODIUM CHLORIDE 0.9 G/100ML
IRRIGANT IRRIGATION
Status: DISCONTINUED | OUTPATIENT
Start: 2020-11-04 | End: 2020-11-04 | Stop reason: HOSPADM

## 2020-11-04 RX ORDER — FENTANYL CITRATE 50 UG/ML
INJECTION, SOLUTION INTRAMUSCULAR; INTRAVENOUS
Status: DISCONTINUED | OUTPATIENT
Start: 2020-11-04 | End: 2020-11-04

## 2020-11-04 RX ORDER — HYDROCODONE BITARTRATE AND ACETAMINOPHEN 5; 325 MG/1; MG/1
1 TABLET ORAL EVERY 4 HOURS PRN
Status: DISCONTINUED | OUTPATIENT
Start: 2020-11-04 | End: 2020-11-04 | Stop reason: HOSPADM

## 2020-11-04 RX ORDER — CELECOXIB 100 MG/1
200 CAPSULE ORAL ONCE
Status: COMPLETED | OUTPATIENT
Start: 2020-11-04 | End: 2020-11-04

## 2020-11-04 RX ORDER — METHYLPREDNISOLONE ACETATE 80 MG/ML
INJECTION, SUSPENSION INTRA-ARTICULAR; INTRALESIONAL; INTRAMUSCULAR; SOFT TISSUE
Status: DISCONTINUED | OUTPATIENT
Start: 2020-11-04 | End: 2020-11-04 | Stop reason: HOSPADM

## 2020-11-04 RX ORDER — ACETAMINOPHEN 500 MG
1000 TABLET ORAL ONCE
Status: COMPLETED | OUTPATIENT
Start: 2020-11-04 | End: 2020-11-04

## 2020-11-04 RX ORDER — MEPERIDINE HYDROCHLORIDE 50 MG/ML
12.5 INJECTION INTRAMUSCULAR; INTRAVENOUS; SUBCUTANEOUS EVERY 10 MIN PRN
Status: DISCONTINUED | OUTPATIENT
Start: 2020-11-04 | End: 2020-11-04 | Stop reason: HOSPADM

## 2020-11-04 RX ORDER — KETAMINE HYDROCHLORIDE 50 MG/ML
INJECTION, SOLUTION INTRAMUSCULAR; INTRAVENOUS
Status: DISCONTINUED | OUTPATIENT
Start: 2020-11-04 | End: 2020-11-04

## 2020-11-04 RX ORDER — ONDANSETRON 2 MG/ML
4 INJECTION INTRAMUSCULAR; INTRAVENOUS DAILY PRN
Status: DISCONTINUED | OUTPATIENT
Start: 2020-11-04 | End: 2020-11-04 | Stop reason: HOSPADM

## 2020-11-04 RX ORDER — OXYCODONE HYDROCHLORIDE 5 MG/1
5 TABLET ORAL
Status: DISCONTINUED | OUTPATIENT
Start: 2020-11-04 | End: 2020-11-04 | Stop reason: HOSPADM

## 2020-11-04 RX ORDER — MUPIROCIN 20 MG/G
1 OINTMENT TOPICAL 2 TIMES DAILY
Status: DISCONTINUED | OUTPATIENT
Start: 2020-11-04 | End: 2020-11-04 | Stop reason: HOSPADM

## 2020-11-04 RX ORDER — FAMOTIDINE 10 MG/ML
INJECTION INTRAVENOUS
Status: DISCONTINUED | OUTPATIENT
Start: 2020-11-04 | End: 2020-11-04

## 2020-11-04 RX ORDER — DEXAMETHASONE SODIUM PHOSPHATE 4 MG/ML
INJECTION, SOLUTION INTRA-ARTICULAR; INTRALESIONAL; INTRAMUSCULAR; INTRAVENOUS; SOFT TISSUE
Status: DISCONTINUED | OUTPATIENT
Start: 2020-11-04 | End: 2020-11-04

## 2020-11-04 RX ORDER — SUCCINYLCHOLINE CHLORIDE 20 MG/ML
INJECTION INTRAMUSCULAR; INTRAVENOUS
Status: DISCONTINUED | OUTPATIENT
Start: 2020-11-04 | End: 2020-11-04

## 2020-11-04 RX ADMIN — FENTANYL CITRATE 50 MCG: 50 INJECTION INTRAMUSCULAR; INTRAVENOUS at 09:11

## 2020-11-04 RX ADMIN — CELECOXIB 200 MG: 100 CAPSULE ORAL at 09:11

## 2020-11-04 RX ADMIN — HYDROMORPHONE HYDROCHLORIDE 0.2 MG: 1 INJECTION, SOLUTION INTRAMUSCULAR; INTRAVENOUS; SUBCUTANEOUS at 10:11

## 2020-11-04 RX ADMIN — ONDANSETRON 4 MG: 2 INJECTION INTRAMUSCULAR; INTRAVENOUS at 10:11

## 2020-11-04 RX ADMIN — CEFAZOLIN SODIUM 2 G: 2 SOLUTION INTRAVENOUS at 10:11

## 2020-11-04 RX ADMIN — FENTANYL CITRATE 50 MCG: 50 INJECTION INTRAMUSCULAR; INTRAVENOUS at 10:11

## 2020-11-04 RX ADMIN — DEXAMETHASONE SODIUM PHOSPHATE 8 MG: 4 INJECTION, SOLUTION INTRAMUSCULAR; INTRAVENOUS at 10:11

## 2020-11-04 RX ADMIN — KETAMINE HYDROCHLORIDE 25 MG: 50 INJECTION INTRAMUSCULAR; INTRAVENOUS at 09:11

## 2020-11-04 RX ADMIN — HYDROMORPHONE HYDROCHLORIDE 0.2 MG: 1 INJECTION, SOLUTION INTRAMUSCULAR; INTRAVENOUS; SUBCUTANEOUS at 11:11

## 2020-11-04 RX ADMIN — OXYCODONE HYDROCHLORIDE 5 MG: 5 TABLET ORAL at 11:11

## 2020-11-04 RX ADMIN — PROPOFOL 200 MG: 10 INJECTION, EMULSION INTRAVENOUS at 09:11

## 2020-11-04 RX ADMIN — FAMOTIDINE 20 MG: 10 INJECTION, SOLUTION INTRAVENOUS at 10:11

## 2020-11-04 RX ADMIN — Medication 120 MG: at 09:11

## 2020-11-04 RX ADMIN — SODIUM CHLORIDE, SODIUM LACTATE, POTASSIUM CHLORIDE, AND CALCIUM CHLORIDE: .6; .31; .03; .02 INJECTION, SOLUTION INTRAVENOUS at 09:11

## 2020-11-04 RX ADMIN — ACETAMINOPHEN 1000 MG: 500 TABLET, FILM COATED ORAL at 09:11

## 2020-11-04 NOTE — ANESTHESIA POSTPROCEDURE EVALUATION
Anesthesia Post Evaluation    Patient: Lewis Matias    Procedure(s) Performed: Procedure(s) (LRB):  ARTHROSCOPY, KNEE, WITH PARTIAL MEDIAL MENISCECTOMY (Left)    Final Anesthesia Type: general    Patient location during evaluation: PACU  Patient participation: Yes- Able to Participate  Level of consciousness: awake and alert  Post-procedure vital signs: reviewed and stable  Pain management: adequate  Airway patency: patent  ROSE mitigation strategies: Extubation while patient is awake and Extubation and recovery carried out in lateral, semiupright, or other nonsupine position  PONV status at discharge: No PONV  Anesthetic complications: no      Cardiovascular status: blood pressure returned to baseline  Respiratory status: unassisted  Hydration status: euvolemic  Follow-up not needed.          Vitals Value Taken Time   /95 11/04/20 1145   Temp 36.3 °C (97.4 °F) 11/04/20 1038   Pulse 98 11/04/20 1155   Resp 17 11/04/20 1155   SpO2 95 % 11/04/20 1155   Vitals shown include unvalidated device data.      No case tracking events are documented in the log.      Pain/Shar Score: Pain Rating Prior to Med Admin: 5 (11/4/2020 11:20 AM)  Shar Score: 10 (11/4/2020 11:45 AM)

## 2020-11-04 NOTE — OP NOTE
[facility]  Surgery Department  Operative Note    SUMMARY     Date of Procedure: 11/4/2020     Procedure:  Partial medial meniscectomy left knee arthroscopic    Surgeon(s) and Role:     * Andres Blanco MD - Primary    Assisting Surgeon:  David    Pre-Operative Diagnosis: Complex tear of medial meniscus of left knee as current injury, subsequent encounter [S83.232D]  Preop testing [Z01.818]    Post-Operative Diagnosis: Post-Op Diagnosis Codes:     * Complex tear of medial meniscus of left knee as current injury, subsequent encounter [S83.232D]     * Preop testing [Z01.818]    Anesthesia: General    Intraoperative Findings:  Patellofemoral joint was noted to have grade 2 chondromalacia throughout.  The ACL and PCL were intact.  The lateral compartment was pristine.  The medial compartment demonstrated grade 2 chondromalacia throughout.  There was a posterior horn medial meniscal tear consisting of 60% of the posterior horn of the medial meniscus    Description of the Findings of the Procedure:  Patient was placed on the operating room in the supine position.  He was prepped and draped in the usual sterile manner for surgery.  Inferomedial and inferolateral portals were established and diagnostic arthroscopy was undertaken.  The findings of those stated above.  This point I turned my attention to the medial compartment.  There series of Wen and biters were utilized approximately 60% of the posterior horn of the medial meniscus was excised and well-balanced into the body.  We now removed the arthroscopic equipment.  The portals were closed with nylon stitches    Complications: No    Estimated Blood Loss (EBL): * No values recorded between 11/4/2020 10:10 AM and 11/4/2020 10:24 AM *           Implants: * No implants in log *    Specimens:   Specimen (12h ago, onward)    None                  Condition: Good    Disposition: PACU - hemodynamically stable.

## 2020-11-04 NOTE — TRANSFER OF CARE
"Anesthesia Transfer of Care Note    Patient: Lewis Matias    Procedure(s) Performed: Procedure(s) (LRB):  ARTHROSCOPY, KNEE, WITH PARTIAL MEDIAL MENISCECTOMY (Left)    Patient location: PACU    Anesthesia Type: general    Transport from OR: Transported from OR on 2-3 L/min O2 by NC with adequate spontaneous ventilation    Post pain: adequate analgesia    Post assessment: no apparent anesthetic complications    Post vital signs: stable    Level of consciousness: awake and alert    Nausea/Vomiting: no nausea/vomiting    Complications: none    Transfer of care protocol was followed      Last vitals:   Visit Vitals  BP (!) 140/85 (BP Location: Right arm, Patient Position: Lying)   Pulse 94   Temp 36.6 °C (97.8 °F) (Oral)   Resp 20   Ht 5' 8" (1.727 m)   Wt 111 kg (244 lb 11.4 oz)   SpO2 96%   BMI 37.21 kg/m²     "

## 2020-11-04 NOTE — DISCHARGE INSTRUCTIONS
After Knee Arthroscopy  After surgery, your joint may be swollen, painful, and stiff. Your recovery time will depend on what was done. Your surgeon will tell you when to resume activity and weight bearing. If you had meniscal cartilage or loose bodies removed, you may be told to bear weight early on. After ACL repair, do not pivot or make sudden moves.        At home  Follow your surgeons guidelines for healing:  · Elevate your knee as much as possible and ice your knee for 20 minutes. then allow at least 20 minutes before the next icing session.  · Limit weight-bearing, if instructed to do so.  · Keep your knee bandaged according to your surgeon's instructions.  · When you shower, wrap your knee with plastic to keep it dry.  · Take pain medicine as directed.  Your checklist  The checklist below helps remind you what to do after arthroscopy.    ? Take care of your incision and bathe as directed.    ? Talk with your surgeon about activities you can do immediately and those that need to wait.  Contact your surgeon right away if you have any of the following:  · Fever  · Chills  · Persistent warmth or redness around the knee  · Persistent or increased pain  · Significant swelling in your knee  · Increasing pain in your calf muscle  Date Last Reviewed: 9/22/2015  © 7689-3789 [employer]. [address] [place] [address], [place] [address]. All rights reserved. This information is not intended as a substitute for professional medical care. Always follow your healthcare professional's instructions.

## 2020-11-04 NOTE — INTERVAL H&P NOTE
The patient has been examined and the H&P has been reviewed:    I concur with the findings and no changes have occurred since H&P was written.    Surgery risks, benefits and alternative options discussed and understood by patient/family.          Active Hospital Problems    Diagnosis  POA    Complex tear of medial meniscus of left knee as current injury [S83.232A]  Yes      Resolved Hospital Problems   No resolved problems to display.

## 2020-11-04 NOTE — ANESTHESIA PREPROCEDURE EVALUATION
11/04/2020  Lewis Matias is a 65 y.o., male.    Anesthesia Evaluation    I have reviewed the Patient Summary Reports.    I have reviewed the Nursing Notes. I have reviewed the NPO Status.   I have reviewed the Medications.     Review of Systems  Anesthesia Hx:  Denies Family Hx of Anesthesia complications.  Personal Hx of Anesthesia complications (aspiration during elective cysto many years ago.  Pt told he had a hiatal hernia and proton pump inhibitor started.  No problems since then.)   Social:  Non-Smoker, No Alcohol Use    Hematology/Oncology:  Hematology Normal   Oncology Normal     EENT/Dental:   crowns   Cardiovascular:   Exercise tolerance: good Hypertension, well controlled    Pulmonary:   Sleep Apnea (CPAP currently broken)    Education provided regarding risk of obstructive sleep apnea     Renal/:   renal calculi    Hepatic/GI:   Hiatal Hernia, GERD, well controlled    Musculoskeletal:   Arthritis   Spine Disorders: knees.    Neurological:  Neurology Normal    Endocrine:  Endocrine Normal    Psych:  Psychiatric Normal           Physical Exam  General:  Obesity    Airway/Jaw/Neck:  Airway Findings: Mouth Opening: Normal Tongue: Large  Mallampati: III  TM Distance: Normal, at least 6 cm  Jaw/Neck Findings:  Neck ROM: Normal ROM      Dental:  Dental Findings: In tact   Chest/Lungs:  Chest/Lungs Findings: Clear to auscultation, Normal Respiratory Rate     Heart/Vascular:  Heart Findings: Rate: Normal  Rhythm: Regular Rhythm  Sounds: Normal  Heart murmur: negative       Mental Status:  Mental Status Findings:  Cooperative, Alert and Oriented         Anesthesia Plan  Type of Anesthesia, risks & benefits discussed:  Anesthesia Type:  general  Patient's Preference:   Intra-op Monitoring Plan: standard ASA monitors  Intra-op Monitoring Plan Comments:   Post Op Pain Control Plan: multimodal analgesia  and IV/PO Opioids PRN  Post Op Pain Control Plan Comments:   Induction:   IV  Beta Blocker:  Patient is not currently on a Beta-Blocker (No further documentation required).       Informed Consent: Patient understands risks and agrees with Anesthesia plan.  Questions answered. Anesthesia consent signed with patient.  ASA Score: 3     Day of Surgery Review of History & Physical:        Anesthesia Plan Notes: GETA, video laryngoscope available  Sleep apnea precautions: No Versed, fentanyl 1 cc, extubate awake and sitting up, sugammadex if muscle relaxant used  Multimodal analgesia: Tylenol and Celebrex given, ketamine 25mg  Antiemetics: Zofran, Pepcid, Decadron 8mg        Ready For Surgery From Anesthesia Perspective.

## 2020-11-09 ENCOUNTER — TELEPHONE (OUTPATIENT)
Dept: FAMILY MEDICINE | Facility: CLINIC | Age: 65
End: 2020-11-09

## 2020-11-09 NOTE — TELEPHONE ENCOUNTER
Spoke to pts wife regarding message below. I was able to give pts wife the contact info to get in touch with someone regarding sleep study.

## 2020-11-09 NOTE — TELEPHONE ENCOUNTER
----- Message from Megan Garcia sent at 11/9/2020  8:06 AM CST -----  Regarding: REMOVING SLEEP STUDY ORDER  We have made 3 attempts to contact pt and have left several messages. We have been unable to reach or receive call back. We will be removing this order from the queue.

## 2020-11-13 ENCOUNTER — CLINICAL SUPPORT (OUTPATIENT)
Dept: ORTHOPEDICS | Facility: CLINIC | Age: 65
End: 2020-11-13
Payer: MEDICARE

## 2020-11-13 VITALS
HEIGHT: 68 IN | BODY MASS INDEX: 36.98 KG/M2 | DIASTOLIC BLOOD PRESSURE: 78 MMHG | WEIGHT: 244 LBS | SYSTOLIC BLOOD PRESSURE: 128 MMHG | HEART RATE: 75 BPM

## 2020-11-13 DIAGNOSIS — Z98.890 STATUS POST ARTHROSCOPY OF KNEE: Primary | ICD-10-CM

## 2020-11-13 NOTE — PROGRESS NOTES
Removed sutures from left knee, no drainage or redness noted. Steri strips applied. Return appointment made

## 2020-11-16 ENCOUNTER — PROCEDURE VISIT (OUTPATIENT)
Dept: SLEEP MEDICINE | Facility: HOSPITAL | Age: 65
End: 2020-11-16
Attending: NURSE PRACTITIONER
Payer: MEDICARE

## 2020-11-16 DIAGNOSIS — G47.30 SLEEP APNEA, UNSPECIFIED TYPE: ICD-10-CM

## 2020-11-16 PROCEDURE — 95806 SLEEP STUDY UNATT&RESP EFFT: CPT

## 2020-11-27 ENCOUNTER — TELEPHONE (OUTPATIENT)
Dept: FAMILY MEDICINE | Facility: CLINIC | Age: 65
End: 2020-11-27

## 2020-12-01 ENCOUNTER — PATIENT MESSAGE (OUTPATIENT)
Dept: FAMILY MEDICINE | Facility: CLINIC | Age: 65
End: 2020-12-01

## 2020-12-01 DIAGNOSIS — K21.9 GASTROESOPHAGEAL REFLUX DISEASE WITHOUT ESOPHAGITIS: ICD-10-CM

## 2020-12-02 RX ORDER — LISINOPRIL 10 MG/1
10 TABLET ORAL NIGHTLY
Qty: 90 TABLET | Refills: 1 | Status: CANCELLED | OUTPATIENT
Start: 2020-12-02

## 2020-12-02 RX ORDER — ESOMEPRAZOLE MAGNESIUM 40 MG/1
40 CAPSULE, DELAYED RELEASE ORAL NIGHTLY
Qty: 90 CAPSULE | Refills: 1 | Status: CANCELLED | OUTPATIENT
Start: 2020-12-02

## 2020-12-03 ENCOUNTER — OFFICE VISIT (OUTPATIENT)
Dept: ORTHOPEDICS | Facility: CLINIC | Age: 65
End: 2020-12-03
Payer: MEDICARE

## 2020-12-03 VITALS
HEART RATE: 89 BPM | BODY MASS INDEX: 36.37 KG/M2 | SYSTOLIC BLOOD PRESSURE: 136 MMHG | DIASTOLIC BLOOD PRESSURE: 81 MMHG | HEIGHT: 68 IN | WEIGHT: 240 LBS

## 2020-12-03 DIAGNOSIS — Z98.890 STATUS POST ARTHROSCOPY OF KNEE: Primary | ICD-10-CM

## 2020-12-03 PROCEDURE — 3288F FALL RISK ASSESSMENT DOCD: CPT | Mod: S$GLB,,, | Performed by: ORTHOPAEDIC SURGERY

## 2020-12-03 PROCEDURE — 3008F PR BODY MASS INDEX (BMI) DOCUMENTED: ICD-10-PCS | Mod: S$GLB,,, | Performed by: ORTHOPAEDIC SURGERY

## 2020-12-03 PROCEDURE — 1101F PT FALLS ASSESS-DOCD LE1/YR: CPT | Mod: S$GLB,,, | Performed by: ORTHOPAEDIC SURGERY

## 2020-12-03 PROCEDURE — 3008F BODY MASS INDEX DOCD: CPT | Mod: S$GLB,,, | Performed by: ORTHOPAEDIC SURGERY

## 2020-12-03 PROCEDURE — 1126F AMNT PAIN NOTED NONE PRSNT: CPT | Mod: S$GLB,,, | Performed by: ORTHOPAEDIC SURGERY

## 2020-12-03 PROCEDURE — 3288F PR FALLS RISK ASSESSMENT DOCUMENTED: ICD-10-PCS | Mod: S$GLB,,, | Performed by: ORTHOPAEDIC SURGERY

## 2020-12-03 PROCEDURE — 1101F PR PT FALLS ASSESS DOC 0-1 FALLS W/OUT INJ PAST YR: ICD-10-PCS | Mod: S$GLB,,, | Performed by: ORTHOPAEDIC SURGERY

## 2020-12-03 PROCEDURE — 1126F PR PAIN SEVERITY QUANTIFIED, NO PAIN PRESENT: ICD-10-PCS | Mod: S$GLB,,, | Performed by: ORTHOPAEDIC SURGERY

## 2020-12-03 PROCEDURE — 99024 POSTOP FOLLOW-UP VISIT: CPT | Mod: S$GLB,,, | Performed by: ORTHOPAEDIC SURGERY

## 2020-12-03 PROCEDURE — 99024 PR POST-OP FOLLOW-UP VISIT: ICD-10-PCS | Mod: S$GLB,,, | Performed by: ORTHOPAEDIC SURGERY

## 2020-12-03 NOTE — PROGRESS NOTES
[facility] ORTHOPEDICS POST-OP NOTE    Subjective:           Chief Complaint:   Chief Complaint   Patient presents with    Left Knee - Post-op Evaluation     S/p left knee scope 11/4/2020, doing good, he gets an occasional pain, starting to do more activity, ice pack helps       Past Medical History:   Diagnosis Date    GERD (gastroesophageal reflux disease)     Hard of hearing     Hiatal hernia     Hypertension     Kidney stones     Knee pain 06/2020    left       Past Surgical History:   Procedure Laterality Date    CHOLECYSTECTOMY      COLONOSCOPY  2012    Dr Nadeem- rtc 10 yr    HAND SURGERY      traumatic injury    HEMORRHOID SURGERY      KIDNEY STONE SURGERY      KNEE ARTHROSCOPY W/ MENISCECTOMY Left 11/4/2020    Procedure: ARTHROSCOPY, KNEE, WITH PARTIAL MEDIAL MENISCECTOMY;  Surgeon: Andres Blanco MD;  Location: [facility];  Service: Orthopedics;  Laterality: Left;    TONSILLECTOMY         Current Outpatient Medications   Medication Sig    esomeprazole (NEXIUM) 40 MG capsule Take 1 capsule (40 mg total) by mouth once daily. (Patient taking differently: Take 40 mg by mouth every evening. )    lisinopriL 10 MG tablet Take 1 tablet (10 mg total) by mouth once daily. (Patient taking differently: Take 10 mg by mouth every evening. )    sildenafiL (VIAGRA) 100 MG tablet Take 1 tablet (100 mg total) by mouth daily as needed for Erectile Dysfunction (prn sex).    oxyCODONE-acetaminophen (PERCOCET) 7.5-325 mg per tablet Take 1 tablet by mouth every 4 (four) hours as needed for Pain. (Patient not taking: Reported on 12/3/2020)     No current facility-administered medications for this visit.        Review of patient's allergies indicates:   Allergen Reactions    Iodinated contrast media Hives and Shortness Of Breath       History reviewed. No pertinent family history.    Social History     Socioeconomic History    Marital status:      Spouse name: Not on file    Number of children: Not on file     Years of education: Not on file    Highest education level: Not on file   Occupational History    Not on file   Social Needs    Financial resource strain: Not on file    Food insecurity     Worry: Not on file     Inability: Not on file    Transportation needs     Medical: Not on file     Non-medical: Not on file   Tobacco Use    Smoking status: Never Smoker    Smokeless tobacco: Never Used   Substance and Sexual Activity    Alcohol use: Yes     Comment: beer    Drug use: Never    Sexual activity: Yes     Partners: Female   Lifestyle    Physical activity     Days per week: Not on file     Minutes per session: Not on file    Stress: Not on file   Relationships    Social connections     Talks on phone: Not on file     Gets together: Not on file     Attends [employer] service: Not on file     Active member of club or organization: Not on file     Attends meetings of clubs or organizations: Not on file     Relationship status: Not on file   Other Topics Concern    Not on file   Social History Narrative    Not on file       History of present illness:  Patient comes in today for the left knee.  He is generally doing very well.  Not having any complaints.      Review of Systems:    Musculoskeletal:  See HPI      Objective:        Physical Examination:    Vital Signs:    Vitals:    12/03/20 0941   BP: 136/81   Pulse: 89       Body mass index is 36.49 kg/m².    This a well-developed, well nourished patient in no acute distress.  They are alert and oriented and cooperative to examination.        Patient has full range of motion left knee.  Negative Homans.  No effusion.  Pertinent New Results:    XRAY Report / Interpretation:       Assessment/Plan:      Stable following arthroscopy of the left knee.  Follow-up p.r.n.    This note was created using Dragon voice recognition software that occasionally misinterpreted phrases or words.

## 2020-12-04 ENCOUNTER — INITIAL CONSULT (OUTPATIENT)
Dept: PULMONOLOGY | Facility: CLINIC | Age: 65
End: 2020-12-04
Payer: MEDICARE

## 2020-12-04 VITALS
OXYGEN SATURATION: 95 % | DIASTOLIC BLOOD PRESSURE: 80 MMHG | TEMPERATURE: 97 F | HEIGHT: 68 IN | BODY MASS INDEX: 37.89 KG/M2 | HEART RATE: 107 BPM | WEIGHT: 250 LBS | SYSTOLIC BLOOD PRESSURE: 130 MMHG

## 2020-12-04 DIAGNOSIS — G47.33 OSA (OBSTRUCTIVE SLEEP APNEA): Primary | ICD-10-CM

## 2020-12-04 PROCEDURE — 3075F SYST BP GE 130 - 139MM HG: CPT | Mod: S$GLB,,, | Performed by: NURSE PRACTITIONER

## 2020-12-04 PROCEDURE — 99214 OFFICE O/P EST MOD 30 MIN: CPT | Mod: S$GLB,,, | Performed by: NURSE PRACTITIONER

## 2020-12-04 PROCEDURE — 1159F MED LIST DOCD IN RCRD: CPT | Mod: S$GLB,,, | Performed by: NURSE PRACTITIONER

## 2020-12-04 PROCEDURE — 3075F PR MOST RECENT SYSTOLIC BLOOD PRESS GE 130-139MM HG: ICD-10-PCS | Mod: S$GLB,,, | Performed by: NURSE PRACTITIONER

## 2020-12-04 PROCEDURE — 3079F PR MOST RECENT DIASTOLIC BLOOD PRESSURE 80-89 MM HG: ICD-10-PCS | Mod: S$GLB,,, | Performed by: NURSE PRACTITIONER

## 2020-12-04 PROCEDURE — 1159F PR MEDICATION LIST DOCUMENTED IN MEDICAL RECORD: ICD-10-PCS | Mod: S$GLB,,, | Performed by: NURSE PRACTITIONER

## 2020-12-04 PROCEDURE — 3079F DIAST BP 80-89 MM HG: CPT | Mod: S$GLB,,, | Performed by: NURSE PRACTITIONER

## 2020-12-04 PROCEDURE — 99214 PR OFFICE/OUTPT VISIT, EST, LEVL IV, 30-39 MIN: ICD-10-PCS | Mod: S$GLB,,, | Performed by: NURSE PRACTITIONER

## 2020-12-04 NOTE — PROGRESS NOTES
SUBJECTIVE:    Patient ID: Lewis Matias is a 65 y.o. male.    Chief Complaint: Establish Care and Apnea    HPI     Patient here today to be treated for ROSE. His CPAP machine broke 2-3 months ago, it was greater then 10 years old.   He has been feeling fatigued, decrease libido, swelling, headaches since his machine broke.  His PCP ordered a home sleep study which showed significant ROSE.    Past Medical History:   Diagnosis Date    GERD (gastroesophageal reflux disease)     Hard of hearing     Hiatal hernia     Hypertension     Kidney stones     Knee pain 06/2020    left     Past Surgical History:   Procedure Laterality Date    CHOLECYSTECTOMY      COLONOSCOPY  2012    Dr Silver- rtc 10 yr    HAND SURGERY      traumatic injury    HEMORRHOID SURGERY      KIDNEY STONE SURGERY      KNEE ARTHROSCOPY W/ MENISCECTOMY Left 11/4/2020    Procedure: ARTHROSCOPY, KNEE, WITH PARTIAL MEDIAL MENISCECTOMY;  Surgeon: Andres Blanco MD;  Location: [facility];  Service: Orthopedics;  Laterality: Left;    TONSILLECTOMY       History reviewed. No pertinent family history.     Social History:   Marital Status:   Occupation: Data Unavailable  Alcohol History:  reports current alcohol use.  Tobacco History:  reports that he has never smoked. He has never used smokeless tobacco.  Drug History:  reports no history of drug use.    Review of patient's allergies indicates:   Allergen Reactions    Iodinated contrast media Hives and Shortness Of Breath       Current Outpatient Medications   Medication Sig Dispense Refill    esomeprazole (NEXIUM) 40 MG capsule Take 1 capsule (40 mg total) by mouth once daily. (Patient taking differently: Take 40 mg by mouth every evening. ) 90 capsule 1    lisinopriL 10 MG tablet Take 1 tablet (10 mg total) by mouth once daily. (Patient taking differently: Take 10 mg by mouth every evening. ) 90 tablet 1    sildenafiL (VIAGRA) 100 MG tablet Take 1 tablet (100 mg total) by mouth daily as  "needed for Erectile Dysfunction (prn sex). 30 tablet 0    oxyCODONE-acetaminophen (PERCOCET) 7.5-325 mg per tablet Take 1 tablet by mouth every 4 (four) hours as needed for Pain. (Patient not taking: Reported on 12/3/2020) 28 tablet 0     No current facility-administered medications for this visit.            Review of Systems  General: Feeling Well. Decreased Libido   Eyes: Vision is good.  ENT:  Sinus congestion  Heart:: No chest pain or palpitations.  Lungs: No cough, sputum, or wheezing.  GI: No Nausea, vomiting, constipation, diarrhea, or reflux.  : No dysuria, hesitancy, or nocturia.  Musculoskeletal: No joint pain or myalgias.  Skin: No lesions or rashes.  Neuro: headache.  Lymph: swelling to legs   Psych: No anxiety or depression.  Endo: No weight change.    OBJECTIVE:      /80 (BP Location: Left arm, Patient Position: Sitting, BP Method: Medium (Manual))   Pulse 107   Temp 97.4 °F (36.3 °C)   Ht 5' 8" (1.727 m)   Wt 113.4 kg (250 lb)   SpO2 95%   BMI 38.01 kg/m²     Physical Exam  GENERAL: Older patient in no distress.  HEENT: Pupils equal and reactive. Extraocular movements intact. Nose intact.      Pharynx moist. malampatti 4  NECK: Supple. 19 inches   HEART: Regular rate and rhythm. No murmur or gallop auscultated.  LUNGS: Clear to auscultation and percussion. Lung excursion symmetrical. No change in fremitus. No adventitial noises.  ABDOMEN: Bowel sounds present. Non-tender, no masses palpated.  EXTREMITIES: Normal muscle tone and joint movement, no cyanosis or clubbing.   LYMPHATICS: No adenopathy palpated, no edema.  SKIN: Dry, intact, no lesions.   NEURO: Cranial nerves II-XII intact. Motor strength 5/5 bilaterally, upper and lower extremities.  PSYCH: Appropriate affect.    Assessment:       1. ROSE (obstructive sleep apnea)          Plan:       ROSE (obstructive sleep apnea)  -     CPAP Titration (Must have dx of ROSE from previous sleep study.); Future; Expected date: " 12/04/2020    Other orders  -     COVID-19 Routine Screening; Future; Expected date: 12/04/2020       CPAP titration in lab   Will order machine after and call with results   Follow up in about 3 months (around 3/4/2021).

## 2020-12-04 NOTE — PATIENT INSTRUCTIONS
Obstructive Sleep Apnea  Obstructive sleep apnea is a condition that causes your air passages to become narrowed or blocked during sleep. As a result, breathing stops for short periods. Your body wakes up enough for breathing to begin again, though you don't remember it. The cycle of stopped breathing and brief awakenings can repeat dozens of times a night. This prevents the body from getting to the deeper stages of sleep that are needed for good rest and may cause your body's oxygen level to fall.  Signs of sleep apnea include loud snoring, noisy breathing, and gasping sounds during sleep. Daytime symptoms include waking up tired after a full night's sleep, waking up with headaches, feeling very sleepy or falling asleep during the day, and having problems with memory or concentration.  Risk factors for sleep apnea include:  · Being overweight  · Being a man, or a woman in menopause  · Smoking  · Using alcohol or sedating medicines  · Having enlarged structures in the nose or throat  Home care  Lifestyle changes that can help treat snoring and sleep apnea include the following:  · If you are overweight, lose weight. Talk to your healthcare provider about a weight-loss plan for you.  · Avoid alcohol for 3 to 4 hours before bedtime. Avoid sedating medications. Ask your healthcare provider about the medicines you take.  · If you smoke, talk to your healthcare provider about ways to quit.  · Sleep on your side. This can help prevent gravity from pulling relaxed throat tissues into your breathing passages.  · If you have allergies or sinus problems that block your nose, ask your healthcare provider for help.  Follow-up care  Follow up with your healthcare provider, or as advised. A diagnosis of sleep apnea is made with a sleep study. Your healthcare provider can tell you more about this test.  When to seek medical advice  Sleep apnea can make you more likely to have certain health problems. These include high blood  pressure, heart attack, stroke, and sexual dysfunction. If you have sleep apnea, talk to your healthcare provider about the best treatments for you.  Date Last Reviewed: 4/1/2017  © 1429-1926 [employer]. [address] [place] [address], [place] [address]. All rights reserved. This information is not intended as a substitute for professional medical care. Always follow your healthcare professional's instructions.       CPAP titration in lab

## 2020-12-18 ENCOUNTER — HOSPITAL ENCOUNTER (OUTPATIENT)
Dept: PREADMISSION TESTING | Facility: HOSPITAL | Age: 65
Discharge: HOME | End: 2020-12-18
Attending: NURSE PRACTITIONER
Payer: MEDICARE

## 2020-12-18 PROCEDURE — U0003 INFECTIOUS AGENT DETECTION BY NUCLEIC ACID (DNA OR RNA); SEVERE ACUTE RESPIRATORY SYNDROME CORONAVIRUS 2 (SARS-COV-2) (CORONAVIRUS DISEASE [COVID-19]), AMPLIFIED PROBE TECHNIQUE, MAKING USE OF HIGH THROUGHPUT TECHNOLOGIES AS DESCRIBED BY CMS-2020-01-R: HCPCS

## 2020-12-20 LAB — SARS-COV-2 RNA RESP QL NAA+PROBE: NOT DETECTED

## 2020-12-28 ENCOUNTER — TELEPHONE (OUTPATIENT)
Dept: PULMONOLOGY | Facility: CLINIC | Age: 65
End: 2020-12-28

## 2020-12-28 DIAGNOSIS — Z01.818 PRE-OP TESTING: ICD-10-CM

## 2020-12-28 NOTE — TELEPHONE ENCOUNTER
----- Message from Megan Garcia sent at 12/28/2020  7:28 AM CST -----  Regarding: NEW COVID ORDERS NEEDED - THIRD REQUEST  Contact: [phone]  ENDY Miranda and Staff,    It has been decided that each in-lab Sleep Study Order should be accompanied with a COVID Screening Order.   I have an in-lab order for a CPAP for this PT.  Please add the screening order to Epic.  Use the [id number]/Covid Screening & DX code: Z01.818 to order the Covid Screen.  Home Sleep Studies do not require the Covid Screen order, only in-lab Sleep Studies, such as PSG; CPAP & SPLIT nights.      Thank you,Aga

## 2021-01-12 ENCOUNTER — TELEPHONE (OUTPATIENT)
Dept: PULMONOLOGY | Facility: CLINIC | Age: 66
End: 2021-01-12

## 2021-01-26 ENCOUNTER — TELEPHONE (OUTPATIENT)
Dept: ORTHOPEDICS | Facility: CLINIC | Age: 66
End: 2021-01-26

## 2021-01-28 ENCOUNTER — OFFICE VISIT (OUTPATIENT)
Dept: ORTHOPEDICS | Facility: CLINIC | Age: 66
End: 2021-01-28
Payer: MEDICARE

## 2021-01-28 VITALS
HEART RATE: 72 BPM | SYSTOLIC BLOOD PRESSURE: 142 MMHG | BODY MASS INDEX: 37.89 KG/M2 | WEIGHT: 250 LBS | DIASTOLIC BLOOD PRESSURE: 88 MMHG | HEIGHT: 68 IN

## 2021-01-28 DIAGNOSIS — M17.12 PRIMARY OSTEOARTHRITIS OF LEFT KNEE: ICD-10-CM

## 2021-01-28 DIAGNOSIS — Z98.890 STATUS POST ARTHROSCOPY OF KNEE: Primary | ICD-10-CM

## 2021-01-28 PROCEDURE — 1125F AMNT PAIN NOTED PAIN PRSNT: CPT | Mod: S$GLB,,, | Performed by: ORTHOPAEDIC SURGERY

## 2021-01-28 PROCEDURE — 20610 LARGE JOINT ASPIRATION/INJECTION: L KNEE: ICD-10-PCS | Mod: 58,LT,S$GLB, | Performed by: ORTHOPAEDIC SURGERY

## 2021-01-28 PROCEDURE — 1101F PT FALLS ASSESS-DOCD LE1/YR: CPT | Mod: S$GLB,,, | Performed by: ORTHOPAEDIC SURGERY

## 2021-01-28 PROCEDURE — 1101F PR PT FALLS ASSESS DOC 0-1 FALLS W/OUT INJ PAST YR: ICD-10-PCS | Mod: S$GLB,,, | Performed by: ORTHOPAEDIC SURGERY

## 2021-01-28 PROCEDURE — 20610 DRAIN/INJ JOINT/BURSA W/O US: CPT | Mod: 58,LT,S$GLB, | Performed by: ORTHOPAEDIC SURGERY

## 2021-01-28 PROCEDURE — 99024 POSTOP FOLLOW-UP VISIT: CPT | Mod: S$GLB,POP,, | Performed by: ORTHOPAEDIC SURGERY

## 2021-01-28 PROCEDURE — 3008F PR BODY MASS INDEX (BMI) DOCUMENTED: ICD-10-PCS | Mod: S$GLB,,, | Performed by: ORTHOPAEDIC SURGERY

## 2021-01-28 PROCEDURE — 99024 PR POST-OP FOLLOW-UP VISIT: ICD-10-PCS | Mod: S$GLB,POP,, | Performed by: ORTHOPAEDIC SURGERY

## 2021-01-28 PROCEDURE — 3008F BODY MASS INDEX DOCD: CPT | Mod: S$GLB,,, | Performed by: ORTHOPAEDIC SURGERY

## 2021-01-28 PROCEDURE — 3288F PR FALLS RISK ASSESSMENT DOCUMENTED: ICD-10-PCS | Mod: S$GLB,,, | Performed by: ORTHOPAEDIC SURGERY

## 2021-01-28 PROCEDURE — 3288F FALL RISK ASSESSMENT DOCD: CPT | Mod: S$GLB,,, | Performed by: ORTHOPAEDIC SURGERY

## 2021-01-28 PROCEDURE — 1125F PR PAIN SEVERITY QUANTIFIED, PAIN PRESENT: ICD-10-PCS | Mod: S$GLB,,, | Performed by: ORTHOPAEDIC SURGERY

## 2021-01-28 RX ORDER — METHYLPREDNISOLONE ACETATE 40 MG/ML
40 INJECTION, SUSPENSION INTRA-ARTICULAR; INTRALESIONAL; INTRAMUSCULAR; SOFT TISSUE
Status: DISCONTINUED | OUTPATIENT
Start: 2021-01-28 | End: 2021-01-28 | Stop reason: HOSPADM

## 2021-01-28 RX ADMIN — METHYLPREDNISOLONE ACETATE 40 MG: 40 INJECTION, SUSPENSION INTRA-ARTICULAR; INTRALESIONAL; INTRAMUSCULAR; SOFT TISSUE at 08:01

## 2021-02-01 ENCOUNTER — HOSPITAL ENCOUNTER (OUTPATIENT)
Dept: PREADMISSION TESTING | Facility: HOSPITAL | Age: 66
Discharge: HOME | End: 2021-02-01
Attending: NURSE PRACTITIONER
Payer: MEDICARE

## 2021-02-01 DIAGNOSIS — Z01.818 PRE-OP TESTING: Primary | ICD-10-CM

## 2021-02-01 LAB — SARS-COV-2 RNA RESP QL NAA+PROBE: NOT DETECTED

## 2021-02-01 PROCEDURE — U0003 INFECTIOUS AGENT DETECTION BY NUCLEIC ACID (DNA OR RNA); SEVERE ACUTE RESPIRATORY SYNDROME CORONAVIRUS 2 (SARS-COV-2) (CORONAVIRUS DISEASE [COVID-19]), AMPLIFIED PROBE TECHNIQUE, MAKING USE OF HIGH THROUGHPUT TECHNOLOGIES AS DESCRIBED BY CMS-2020-01-R: HCPCS

## 2021-02-03 ENCOUNTER — TELEPHONE (OUTPATIENT)
Dept: FAMILY MEDICINE | Facility: CLINIC | Age: 66
End: 2021-02-03

## 2021-02-04 ENCOUNTER — PROCEDURE VISIT (OUTPATIENT)
Dept: SLEEP MEDICINE | Facility: HOSPITAL | Age: 66
End: 2021-02-04
Attending: NURSE PRACTITIONER
Payer: MEDICARE

## 2021-02-04 DIAGNOSIS — G47.33 OSA (OBSTRUCTIVE SLEEP APNEA): ICD-10-CM

## 2021-02-04 PROCEDURE — 95811 POLYSOM 6/>YRS CPAP 4/> PARM: CPT

## 2021-02-08 ENCOUNTER — TELEPHONE (OUTPATIENT)
Dept: PULMONOLOGY | Facility: CLINIC | Age: 66
End: 2021-02-08

## 2021-02-08 DIAGNOSIS — G47.33 OSA (OBSTRUCTIVE SLEEP APNEA): Primary | ICD-10-CM

## 2021-02-11 ENCOUNTER — OFFICE VISIT (OUTPATIENT)
Dept: ORTHOPEDICS | Facility: CLINIC | Age: 66
End: 2021-02-11
Payer: MEDICARE

## 2021-02-11 VITALS
DIASTOLIC BLOOD PRESSURE: 69 MMHG | HEIGHT: 68 IN | WEIGHT: 250 LBS | BODY MASS INDEX: 37.89 KG/M2 | SYSTOLIC BLOOD PRESSURE: 110 MMHG | HEART RATE: 80 BPM

## 2021-02-11 DIAGNOSIS — Z98.890 HISTORY OF ARTHROSCOPY OF LEFT KNEE: Primary | ICD-10-CM

## 2021-02-11 DIAGNOSIS — M17.12 PRIMARY OSTEOARTHRITIS OF LEFT KNEE: ICD-10-CM

## 2021-02-11 PROCEDURE — 1101F PT FALLS ASSESS-DOCD LE1/YR: CPT | Mod: S$GLB,,, | Performed by: ORTHOPAEDIC SURGERY

## 2021-02-11 PROCEDURE — 20610 DRAIN/INJ JOINT/BURSA W/O US: CPT | Mod: LT,S$GLB,, | Performed by: ORTHOPAEDIC SURGERY

## 2021-02-11 PROCEDURE — 3074F SYST BP LT 130 MM HG: CPT | Mod: S$GLB,,, | Performed by: ORTHOPAEDIC SURGERY

## 2021-02-11 PROCEDURE — 3288F PR FALLS RISK ASSESSMENT DOCUMENTED: ICD-10-PCS | Mod: S$GLB,,, | Performed by: ORTHOPAEDIC SURGERY

## 2021-02-11 PROCEDURE — 3008F PR BODY MASS INDEX (BMI) DOCUMENTED: ICD-10-PCS | Mod: S$GLB,,, | Performed by: ORTHOPAEDIC SURGERY

## 2021-02-11 PROCEDURE — 1159F PR MEDICATION LIST DOCUMENTED IN MEDICAL RECORD: ICD-10-PCS | Mod: S$GLB,,, | Performed by: ORTHOPAEDIC SURGERY

## 2021-02-11 PROCEDURE — 99213 OFFICE O/P EST LOW 20 MIN: CPT | Mod: 25,S$GLB,, | Performed by: ORTHOPAEDIC SURGERY

## 2021-02-11 PROCEDURE — 3078F DIAST BP <80 MM HG: CPT | Mod: S$GLB,,, | Performed by: ORTHOPAEDIC SURGERY

## 2021-02-11 PROCEDURE — 1125F PR PAIN SEVERITY QUANTIFIED, PAIN PRESENT: ICD-10-PCS | Mod: S$GLB,,, | Performed by: ORTHOPAEDIC SURGERY

## 2021-02-11 PROCEDURE — 3078F PR MOST RECENT DIASTOLIC BLOOD PRESSURE < 80 MM HG: ICD-10-PCS | Mod: S$GLB,,, | Performed by: ORTHOPAEDIC SURGERY

## 2021-02-11 PROCEDURE — 1125F AMNT PAIN NOTED PAIN PRSNT: CPT | Mod: S$GLB,,, | Performed by: ORTHOPAEDIC SURGERY

## 2021-02-11 PROCEDURE — 1159F MED LIST DOCD IN RCRD: CPT | Mod: S$GLB,,, | Performed by: ORTHOPAEDIC SURGERY

## 2021-02-11 PROCEDURE — 3288F FALL RISK ASSESSMENT DOCD: CPT | Mod: S$GLB,,, | Performed by: ORTHOPAEDIC SURGERY

## 2021-02-11 PROCEDURE — 20610 LARGE JOINT ASPIRATION/INJECTION: L KNEE: ICD-10-PCS | Mod: LT,S$GLB,, | Performed by: ORTHOPAEDIC SURGERY

## 2021-02-11 PROCEDURE — 3074F PR MOST RECENT SYSTOLIC BLOOD PRESSURE < 130 MM HG: ICD-10-PCS | Mod: S$GLB,,, | Performed by: ORTHOPAEDIC SURGERY

## 2021-02-11 PROCEDURE — 99213 PR OFFICE/OUTPT VISIT, EST, LEVL III, 20-29 MIN: ICD-10-PCS | Mod: 25,S$GLB,, | Performed by: ORTHOPAEDIC SURGERY

## 2021-02-11 PROCEDURE — 1101F PR PT FALLS ASSESS DOC 0-1 FALLS W/OUT INJ PAST YR: ICD-10-PCS | Mod: S$GLB,,, | Performed by: ORTHOPAEDIC SURGERY

## 2021-02-11 PROCEDURE — 3008F BODY MASS INDEX DOCD: CPT | Mod: S$GLB,,, | Performed by: ORTHOPAEDIC SURGERY

## 2021-02-11 RX ADMIN — METHYLPREDNISOLONE ACETATE 40 MG: 40 INJECTION, SUSPENSION INTRA-ARTICULAR; INTRALESIONAL; INTRAMUSCULAR; SOFT TISSUE at 03:02

## 2021-02-22 DIAGNOSIS — Z98.890 HISTORY OF ARTHROSCOPY OF LEFT KNEE: Primary | ICD-10-CM

## 2021-02-22 DIAGNOSIS — M17.12 PRIMARY OSTEOARTHRITIS OF LEFT KNEE: ICD-10-CM

## 2021-02-23 ENCOUNTER — TELEPHONE (OUTPATIENT)
Dept: PULMONOLOGY | Facility: CLINIC | Age: 66
End: 2021-02-23

## 2021-02-23 RX ORDER — HYLAN G-F 20 16MG/2ML
48 SYRINGE (ML) INTRAARTICULAR ONCE
Qty: 48 MG | Refills: 0 | Status: SHIPPED | OUTPATIENT
Start: 2021-02-23 | End: 2021-02-23

## 2021-02-23 RX ORDER — METHYLPREDNISOLONE ACETATE 40 MG/ML
40 INJECTION, SUSPENSION INTRA-ARTICULAR; INTRALESIONAL; INTRAMUSCULAR; SOFT TISSUE
Status: DISCONTINUED | OUTPATIENT
Start: 2021-02-11 | End: 2021-02-23 | Stop reason: HOSPADM

## 2021-03-16 ENCOUNTER — OFFICE VISIT (OUTPATIENT)
Dept: ORTHOPEDICS | Facility: CLINIC | Age: 66
End: 2021-03-16
Payer: MEDICARE

## 2021-03-16 VITALS
DIASTOLIC BLOOD PRESSURE: 74 MMHG | SYSTOLIC BLOOD PRESSURE: 119 MMHG | HEIGHT: 68 IN | WEIGHT: 248 LBS | HEART RATE: 79 BPM | BODY MASS INDEX: 37.59 KG/M2

## 2021-03-16 DIAGNOSIS — M17.12 PRIMARY OSTEOARTHRITIS OF LEFT KNEE: ICD-10-CM

## 2021-03-16 DIAGNOSIS — Z98.890 HISTORY OF ARTHROSCOPY OF LEFT KNEE: Primary | ICD-10-CM

## 2021-03-16 PROCEDURE — 1101F PR PT FALLS ASSESS DOC 0-1 FALLS W/OUT INJ PAST YR: ICD-10-PCS | Mod: S$GLB,,, | Performed by: ORTHOPAEDIC SURGERY

## 2021-03-16 PROCEDURE — 20610 PR DRAIN/INJECT LARGE JOINT/BURSA: ICD-10-PCS | Mod: LT,S$GLB,, | Performed by: ORTHOPAEDIC SURGERY

## 2021-03-16 PROCEDURE — 20610 DRAIN/INJ JOINT/BURSA W/O US: CPT | Mod: LT,S$GLB,, | Performed by: ORTHOPAEDIC SURGERY

## 2021-03-16 PROCEDURE — 1101F PT FALLS ASSESS-DOCD LE1/YR: CPT | Mod: S$GLB,,, | Performed by: ORTHOPAEDIC SURGERY

## 2021-03-16 PROCEDURE — 3288F FALL RISK ASSESSMENT DOCD: CPT | Mod: S$GLB,,, | Performed by: ORTHOPAEDIC SURGERY

## 2021-03-16 PROCEDURE — 3008F BODY MASS INDEX DOCD: CPT | Mod: S$GLB,,, | Performed by: ORTHOPAEDIC SURGERY

## 2021-03-16 PROCEDURE — 99499 NO LOS: ICD-10-PCS | Mod: S$GLB,,, | Performed by: ORTHOPAEDIC SURGERY

## 2021-03-16 PROCEDURE — 3074F SYST BP LT 130 MM HG: CPT | Mod: S$GLB,,, | Performed by: ORTHOPAEDIC SURGERY

## 2021-03-16 PROCEDURE — 1125F PR PAIN SEVERITY QUANTIFIED, PAIN PRESENT: ICD-10-PCS | Mod: S$GLB,,, | Performed by: ORTHOPAEDIC SURGERY

## 2021-03-16 PROCEDURE — 3074F PR MOST RECENT SYSTOLIC BLOOD PRESSURE < 130 MM HG: ICD-10-PCS | Mod: S$GLB,,, | Performed by: ORTHOPAEDIC SURGERY

## 2021-03-16 PROCEDURE — 1125F AMNT PAIN NOTED PAIN PRSNT: CPT | Mod: S$GLB,,, | Performed by: ORTHOPAEDIC SURGERY

## 2021-03-16 PROCEDURE — 3008F PR BODY MASS INDEX (BMI) DOCUMENTED: ICD-10-PCS | Mod: S$GLB,,, | Performed by: ORTHOPAEDIC SURGERY

## 2021-03-16 PROCEDURE — 1159F PR MEDICATION LIST DOCUMENTED IN MEDICAL RECORD: ICD-10-PCS | Mod: S$GLB,,, | Performed by: ORTHOPAEDIC SURGERY

## 2021-03-16 PROCEDURE — 3288F PR FALLS RISK ASSESSMENT DOCUMENTED: ICD-10-PCS | Mod: S$GLB,,, | Performed by: ORTHOPAEDIC SURGERY

## 2021-03-16 PROCEDURE — 3078F PR MOST RECENT DIASTOLIC BLOOD PRESSURE < 80 MM HG: ICD-10-PCS | Mod: S$GLB,,, | Performed by: ORTHOPAEDIC SURGERY

## 2021-03-16 PROCEDURE — 3078F DIAST BP <80 MM HG: CPT | Mod: S$GLB,,, | Performed by: ORTHOPAEDIC SURGERY

## 2021-03-16 PROCEDURE — 99499 UNLISTED E&M SERVICE: CPT | Mod: S$GLB,,, | Performed by: ORTHOPAEDIC SURGERY

## 2021-03-16 PROCEDURE — 1159F MED LIST DOCD IN RCRD: CPT | Mod: S$GLB,,, | Performed by: ORTHOPAEDIC SURGERY

## 2021-03-16 RX ORDER — HYLAN G-F 20 16MG/2ML
SYRINGE (ML) INTRAARTICULAR
COMMUNITY
Start: 2021-03-11 | End: 2021-05-11

## 2021-04-09 ENCOUNTER — OFFICE VISIT (OUTPATIENT)
Dept: PULMONOLOGY | Facility: CLINIC | Age: 66
End: 2021-04-09
Payer: MEDICARE

## 2021-04-09 ENCOUNTER — PATIENT MESSAGE (OUTPATIENT)
Dept: FAMILY MEDICINE | Facility: CLINIC | Age: 66
End: 2021-04-09

## 2021-04-09 VITALS
DIASTOLIC BLOOD PRESSURE: 80 MMHG | HEIGHT: 68 IN | SYSTOLIC BLOOD PRESSURE: 115 MMHG | OXYGEN SATURATION: 93 % | WEIGHT: 258 LBS | BODY MASS INDEX: 39.1 KG/M2 | HEART RATE: 70 BPM

## 2021-04-09 DIAGNOSIS — G47.33 OSA (OBSTRUCTIVE SLEEP APNEA): Primary | ICD-10-CM

## 2021-04-09 PROCEDURE — 99213 OFFICE O/P EST LOW 20 MIN: CPT | Mod: S$GLB,,, | Performed by: NURSE PRACTITIONER

## 2021-04-09 PROCEDURE — 1159F MED LIST DOCD IN RCRD: CPT | Mod: S$GLB,,, | Performed by: NURSE PRACTITIONER

## 2021-04-09 PROCEDURE — 1159F PR MEDICATION LIST DOCUMENTED IN MEDICAL RECORD: ICD-10-PCS | Mod: S$GLB,,, | Performed by: NURSE PRACTITIONER

## 2021-04-09 PROCEDURE — 1125F AMNT PAIN NOTED PAIN PRSNT: CPT | Mod: S$GLB,,, | Performed by: NURSE PRACTITIONER

## 2021-04-09 PROCEDURE — 1125F PR PAIN SEVERITY QUANTIFIED, PAIN PRESENT: ICD-10-PCS | Mod: S$GLB,,, | Performed by: NURSE PRACTITIONER

## 2021-04-09 PROCEDURE — 99213 PR OFFICE/OUTPT VISIT, EST, LEVL III, 20-29 MIN: ICD-10-PCS | Mod: S$GLB,,, | Performed by: NURSE PRACTITIONER

## 2021-04-09 RX ORDER — HYDROCODONE BITARTRATE AND ACETAMINOPHEN 10; 300 MG/1; MG/1
TABLET ORAL
COMMUNITY
Start: 2021-04-07 | End: 2021-05-11

## 2021-04-09 RX ORDER — AMOXICILLIN 500 MG/1
CAPSULE ORAL
COMMUNITY
Start: 2021-04-07 | End: 2021-05-11

## 2021-04-12 RX ORDER — LISINOPRIL 10 MG/1
10 TABLET ORAL DAILY
Qty: 30 TABLET | Refills: 0 | Status: SHIPPED | OUTPATIENT
Start: 2021-04-12 | End: 2021-05-11 | Stop reason: SDUPTHER

## 2021-04-29 ENCOUNTER — PATIENT MESSAGE (OUTPATIENT)
Dept: RESEARCH | Facility: HOSPITAL | Age: 66
End: 2021-04-29

## 2021-05-11 ENCOUNTER — PATIENT MESSAGE (OUTPATIENT)
Dept: FAMILY MEDICINE | Facility: CLINIC | Age: 66
End: 2021-05-11

## 2021-05-11 ENCOUNTER — OFFICE VISIT (OUTPATIENT)
Dept: FAMILY MEDICINE | Facility: CLINIC | Age: 66
End: 2021-05-11
Payer: MEDICARE

## 2021-05-11 VITALS
SYSTOLIC BLOOD PRESSURE: 128 MMHG | DIASTOLIC BLOOD PRESSURE: 82 MMHG | WEIGHT: 256 LBS | HEART RATE: 76 BPM | BODY MASS INDEX: 38.8 KG/M2 | HEIGHT: 68 IN | OXYGEN SATURATION: 97 %

## 2021-05-11 DIAGNOSIS — K21.9 GASTROESOPHAGEAL REFLUX DISEASE WITHOUT ESOPHAGITIS: ICD-10-CM

## 2021-05-11 DIAGNOSIS — N52.9 ERECTILE DYSFUNCTION, UNSPECIFIED ERECTILE DYSFUNCTION TYPE: ICD-10-CM

## 2021-05-11 DIAGNOSIS — I10 ESSENTIAL HYPERTENSION: Primary | ICD-10-CM

## 2021-05-11 DIAGNOSIS — R42 VERTIGO: ICD-10-CM

## 2021-05-11 DIAGNOSIS — Z12.5 SPECIAL SCREENING FOR MALIGNANT NEOPLASM OF PROSTATE: ICD-10-CM

## 2021-05-11 DIAGNOSIS — S83.232S COMPLEX TEAR OF MEDIAL MENISCUS OF LEFT KNEE AS CURRENT INJURY, SEQUELA: ICD-10-CM

## 2021-05-11 PROCEDURE — 1101F PR PT FALLS ASSESS DOC 0-1 FALLS W/OUT INJ PAST YR: ICD-10-PCS | Mod: S$GLB,,, | Performed by: FAMILY MEDICINE

## 2021-05-11 PROCEDURE — 1159F MED LIST DOCD IN RCRD: CPT | Mod: S$GLB,,, | Performed by: FAMILY MEDICINE

## 2021-05-11 PROCEDURE — 99213 PR OFFICE/OUTPT VISIT, EST, LEVL III, 20-29 MIN: ICD-10-PCS | Mod: S$GLB,,, | Performed by: FAMILY MEDICINE

## 2021-05-11 PROCEDURE — 1101F PT FALLS ASSESS-DOCD LE1/YR: CPT | Mod: S$GLB,,, | Performed by: FAMILY MEDICINE

## 2021-05-11 PROCEDURE — 1159F PR MEDICATION LIST DOCUMENTED IN MEDICAL RECORD: ICD-10-PCS | Mod: S$GLB,,, | Performed by: FAMILY MEDICINE

## 2021-05-11 PROCEDURE — 3288F FALL RISK ASSESSMENT DOCD: CPT | Mod: S$GLB,,, | Performed by: FAMILY MEDICINE

## 2021-05-11 PROCEDURE — 3008F PR BODY MASS INDEX (BMI) DOCUMENTED: ICD-10-PCS | Mod: S$GLB,,, | Performed by: FAMILY MEDICINE

## 2021-05-11 PROCEDURE — 3008F BODY MASS INDEX DOCD: CPT | Mod: S$GLB,,, | Performed by: FAMILY MEDICINE

## 2021-05-11 PROCEDURE — 3079F PR MOST RECENT DIASTOLIC BLOOD PRESSURE 80-89 MM HG: ICD-10-PCS | Mod: S$GLB,,, | Performed by: FAMILY MEDICINE

## 2021-05-11 PROCEDURE — 3074F PR MOST RECENT SYSTOLIC BLOOD PRESSURE < 130 MM HG: ICD-10-PCS | Mod: S$GLB,,, | Performed by: FAMILY MEDICINE

## 2021-05-11 PROCEDURE — 99213 OFFICE O/P EST LOW 20 MIN: CPT | Mod: S$GLB,,, | Performed by: FAMILY MEDICINE

## 2021-05-11 PROCEDURE — 3074F SYST BP LT 130 MM HG: CPT | Mod: S$GLB,,, | Performed by: FAMILY MEDICINE

## 2021-05-11 PROCEDURE — 3079F DIAST BP 80-89 MM HG: CPT | Mod: S$GLB,,, | Performed by: FAMILY MEDICINE

## 2021-05-11 PROCEDURE — 3288F PR FALLS RISK ASSESSMENT DOCUMENTED: ICD-10-PCS | Mod: S$GLB,,, | Performed by: FAMILY MEDICINE

## 2021-05-11 RX ORDER — LISINOPRIL 10 MG/1
10 TABLET ORAL DAILY
Qty: 90 TABLET | Refills: 1 | Status: SHIPPED | OUTPATIENT
Start: 2021-05-11 | End: 2021-09-15 | Stop reason: SDUPTHER

## 2021-05-11 RX ORDER — ESOMEPRAZOLE MAGNESIUM 40 MG/1
40 CAPSULE, DELAYED RELEASE ORAL DAILY
Qty: 90 CAPSULE | Refills: 1 | Status: CANCELLED | OUTPATIENT
Start: 2021-05-11

## 2021-05-11 RX ORDER — SILDENAFIL 100 MG/1
100 TABLET, FILM COATED ORAL DAILY PRN
Qty: 30 TABLET | Refills: 0 | Status: CANCELLED | OUTPATIENT
Start: 2021-05-11

## 2021-07-27 ENCOUNTER — TELEPHONE (OUTPATIENT)
Dept: FAMILY MEDICINE | Facility: CLINIC | Age: 66
End: 2021-07-27

## 2021-08-09 ENCOUNTER — TELEPHONE (OUTPATIENT)
Dept: FAMILY MEDICINE | Facility: CLINIC | Age: 66
End: 2021-08-09

## 2021-08-10 ENCOUNTER — TELEPHONE (OUTPATIENT)
Dept: FAMILY MEDICINE | Facility: CLINIC | Age: 66
End: 2021-08-10

## 2021-08-11 ENCOUNTER — TELEPHONE (OUTPATIENT)
Dept: FAMILY MEDICINE | Facility: CLINIC | Age: 66
End: 2021-08-11

## 2021-08-12 ENCOUNTER — PATIENT MESSAGE (OUTPATIENT)
Dept: FAMILY MEDICINE | Facility: CLINIC | Age: 66
End: 2021-08-12

## 2021-08-23 ENCOUNTER — TELEPHONE (OUTPATIENT)
Dept: FAMILY MEDICINE | Facility: CLINIC | Age: 66
End: 2021-08-23

## 2021-08-23 DIAGNOSIS — R05.9 COUGH: Primary | ICD-10-CM

## 2021-08-25 ENCOUNTER — HOSPITAL ENCOUNTER (OUTPATIENT)
Dept: RADIOLOGY | Facility: HOSPITAL | Age: 66
Discharge: HOME | End: 2021-08-25
Attending: NURSE PRACTITIONER
Payer: MEDICARE

## 2021-08-25 ENCOUNTER — PATIENT MESSAGE (OUTPATIENT)
Dept: FAMILY MEDICINE | Facility: CLINIC | Age: 66
End: 2021-08-25

## 2021-08-25 DIAGNOSIS — R05.9 COUGH: ICD-10-CM

## 2021-08-25 DIAGNOSIS — J18.9 PNEUMONIA OF BOTH LUNGS DUE TO INFECTIOUS ORGANISM, UNSPECIFIED PART OF LUNG: Primary | ICD-10-CM

## 2021-08-25 PROCEDURE — 71046 X-RAY EXAM CHEST 2 VIEWS: CPT | Mod: TC,PO

## 2021-08-25 RX ORDER — AMOXICILLIN AND CLAVULANATE POTASSIUM 875; 125 MG/1; MG/1
1 TABLET, FILM COATED ORAL 2 TIMES DAILY
Qty: 14 TABLET | Refills: 0 | Status: SHIPPED | OUTPATIENT
Start: 2021-08-25 | End: 2021-09-15

## 2021-09-08 ENCOUNTER — TELEPHONE (OUTPATIENT)
Dept: FAMILY MEDICINE | Facility: CLINIC | Age: 66
End: 2021-09-08

## 2021-09-08 DIAGNOSIS — J18.9 PNEUMONIA OF BOTH LUNGS DUE TO INFECTIOUS ORGANISM, UNSPECIFIED PART OF LUNG: Primary | ICD-10-CM

## 2021-09-10 ENCOUNTER — HOSPITAL ENCOUNTER (OUTPATIENT)
Dept: RADIOLOGY | Facility: HOSPITAL | Age: 66
Discharge: HOME | End: 2021-09-10
Attending: NURSE PRACTITIONER
Payer: MEDICARE

## 2021-09-10 DIAGNOSIS — J18.9 PNEUMONIA OF BOTH LUNGS DUE TO INFECTIOUS ORGANISM, UNSPECIFIED PART OF LUNG: ICD-10-CM

## 2021-09-10 PROCEDURE — 71046 X-RAY EXAM CHEST 2 VIEWS: CPT | Mod: TC,PO

## 2021-09-13 ENCOUNTER — TELEPHONE (OUTPATIENT)
Dept: FAMILY MEDICINE | Facility: CLINIC | Age: 66
End: 2021-09-13

## 2021-09-15 ENCOUNTER — OFFICE VISIT (OUTPATIENT)
Dept: FAMILY MEDICINE | Facility: CLINIC | Age: 66
End: 2021-09-15
Payer: MEDICARE

## 2021-09-15 VITALS
SYSTOLIC BLOOD PRESSURE: 116 MMHG | WEIGHT: 251 LBS | HEIGHT: 68 IN | HEART RATE: 64 BPM | BODY MASS INDEX: 38.04 KG/M2 | DIASTOLIC BLOOD PRESSURE: 72 MMHG

## 2021-09-15 DIAGNOSIS — J18.9 PNEUMONIA DUE TO INFECTIOUS ORGANISM, UNSPECIFIED LATERALITY, UNSPECIFIED PART OF LUNG: Primary | ICD-10-CM

## 2021-09-15 DIAGNOSIS — K21.9 GASTROESOPHAGEAL REFLUX DISEASE WITHOUT ESOPHAGITIS: ICD-10-CM

## 2021-09-15 DIAGNOSIS — I10 ESSENTIAL HYPERTENSION: ICD-10-CM

## 2021-09-15 DIAGNOSIS — U07.1 COVID-19: ICD-10-CM

## 2021-09-15 LAB
ALBUMIN SERPL-MCNC: 4.1 G/DL (ref 3.6–5.1)
ALBUMIN/GLOB SERPL: 1.6 (CALC) (ref 1–2.5)
ALP SERPL-CCNC: 63 U/L (ref 35–144)
ALT SERPL-CCNC: 18 U/L (ref 9–46)
AST SERPL-CCNC: 16 U/L (ref 10–35)
BASOPHILS # BLD AUTO: 62 CELLS/UL (ref 0–200)
BASOPHILS NFR BLD AUTO: 1.2 %
BILIRUB SERPL-MCNC: 1.6 MG/DL (ref 0.2–1.2)
BUN SERPL-MCNC: 12 MG/DL (ref 7–25)
BUN/CREAT SERPL: ABNORMAL (CALC) (ref 6–22)
CALCIUM SERPL-MCNC: 9.1 MG/DL (ref 8.6–10.3)
CHLORIDE SERPL-SCNC: 101 MMOL/L (ref 98–110)
CHOLEST SERPL-MCNC: 158 MG/DL
CHOLEST/HDLC SERPL: 3.4 (CALC)
CO2 SERPL-SCNC: 26 MMOL/L (ref 20–32)
CREAT SERPL-MCNC: 0.84 MG/DL (ref 0.7–1.25)
EOSINOPHIL # BLD AUTO: 270 CELLS/UL (ref 15–500)
EOSINOPHIL NFR BLD AUTO: 5.2 %
ERYTHROCYTE [DISTWIDTH] IN BLOOD BY AUTOMATED COUNT: 14.4 % (ref 11–15)
GLOBULIN SER CALC-MCNC: 2.5 G/DL (CALC) (ref 1.9–3.7)
GLUCOSE SERPL-MCNC: 106 MG/DL (ref 65–99)
HCT VFR BLD AUTO: 44.2 % (ref 38.5–50)
HDLC SERPL-MCNC: 47 MG/DL
HGB BLD-MCNC: 15.1 G/DL (ref 13.2–17.1)
LDLC SERPL CALC-MCNC: 96 MG/DL (CALC)
LYMPHOCYTES # BLD AUTO: 1357 CELLS/UL (ref 850–3900)
LYMPHOCYTES NFR BLD AUTO: 26.1 %
MCH RBC QN AUTO: 30.8 PG (ref 27–33)
MCHC RBC AUTO-ENTMCNC: 34.2 G/DL (ref 32–36)
MCV RBC AUTO: 90.2 FL (ref 80–100)
MONOCYTES # BLD AUTO: 816 CELLS/UL (ref 200–950)
MONOCYTES NFR BLD AUTO: 15.7 %
NEUTROPHILS # BLD AUTO: 2694 CELLS/UL (ref 1500–7800)
NEUTROPHILS NFR BLD AUTO: 51.8 %
NONHDLC SERPL-MCNC: 111 MG/DL (CALC)
PLATELET # BLD AUTO: 172 THOUSAND/UL (ref 140–400)
PMV BLD REES-ECKER: 11.8 FL (ref 7.5–12.5)
POTASSIUM SERPL-SCNC: 4.2 MMOL/L (ref 3.5–5.3)
PROT SERPL-MCNC: 6.6 G/DL (ref 6.1–8.1)
PSA SERPL-MCNC: 1.68 NG/ML
RBC # BLD AUTO: 4.9 MILLION/UL (ref 4.2–5.8)
SODIUM SERPL-SCNC: 138 MMOL/L (ref 135–146)
TRIGL SERPL-MCNC: 68 MG/DL
WBC # BLD AUTO: 5.2 THOUSAND/UL (ref 3.8–10.8)

## 2021-09-15 PROCEDURE — 4010F PR ACE/ARB THEARPY RXD/TAKEN: ICD-10-PCS | Mod: S$GLB,,, | Performed by: NURSE PRACTITIONER

## 2021-09-15 PROCEDURE — 99214 OFFICE O/P EST MOD 30 MIN: CPT | Mod: S$GLB,,, | Performed by: NURSE PRACTITIONER

## 2021-09-15 PROCEDURE — 3008F PR BODY MASS INDEX (BMI) DOCUMENTED: ICD-10-PCS | Mod: S$GLB,,, | Performed by: NURSE PRACTITIONER

## 2021-09-15 PROCEDURE — 3288F PR FALLS RISK ASSESSMENT DOCUMENTED: ICD-10-PCS | Mod: S$GLB,,, | Performed by: NURSE PRACTITIONER

## 2021-09-15 PROCEDURE — 1160F PR REVIEW ALL MEDS BY PRESCRIBER/CLIN PHARMACIST DOCUMENTED: ICD-10-PCS | Mod: S$GLB,,, | Performed by: NURSE PRACTITIONER

## 2021-09-15 PROCEDURE — 1101F PT FALLS ASSESS-DOCD LE1/YR: CPT | Mod: S$GLB,,, | Performed by: NURSE PRACTITIONER

## 2021-09-15 PROCEDURE — 1101F PR PT FALLS ASSESS DOC 0-1 FALLS W/OUT INJ PAST YR: ICD-10-PCS | Mod: S$GLB,,, | Performed by: NURSE PRACTITIONER

## 2021-09-15 PROCEDURE — 3078F DIAST BP <80 MM HG: CPT | Mod: S$GLB,,, | Performed by: NURSE PRACTITIONER

## 2021-09-15 PROCEDURE — 3288F FALL RISK ASSESSMENT DOCD: CPT | Mod: S$GLB,,, | Performed by: NURSE PRACTITIONER

## 2021-09-15 PROCEDURE — 3074F PR MOST RECENT SYSTOLIC BLOOD PRESSURE < 130 MM HG: ICD-10-PCS | Mod: S$GLB,,, | Performed by: NURSE PRACTITIONER

## 2021-09-15 PROCEDURE — 4010F ACE/ARB THERAPY RXD/TAKEN: CPT | Mod: S$GLB,,, | Performed by: NURSE PRACTITIONER

## 2021-09-15 PROCEDURE — 3074F SYST BP LT 130 MM HG: CPT | Mod: S$GLB,,, | Performed by: NURSE PRACTITIONER

## 2021-09-15 PROCEDURE — 3008F BODY MASS INDEX DOCD: CPT | Mod: S$GLB,,, | Performed by: NURSE PRACTITIONER

## 2021-09-15 PROCEDURE — 1159F PR MEDICATION LIST DOCUMENTED IN MEDICAL RECORD: ICD-10-PCS | Mod: S$GLB,,, | Performed by: NURSE PRACTITIONER

## 2021-09-15 PROCEDURE — 1159F MED LIST DOCD IN RCRD: CPT | Mod: S$GLB,,, | Performed by: NURSE PRACTITIONER

## 2021-09-15 PROCEDURE — 99214 PR OFFICE/OUTPT VISIT, EST, LEVL IV, 30-39 MIN: ICD-10-PCS | Mod: S$GLB,,, | Performed by: NURSE PRACTITIONER

## 2021-09-15 PROCEDURE — 1160F RVW MEDS BY RX/DR IN RCRD: CPT | Mod: S$GLB,,, | Performed by: NURSE PRACTITIONER

## 2021-09-15 PROCEDURE — 3078F PR MOST RECENT DIASTOLIC BLOOD PRESSURE < 80 MM HG: ICD-10-PCS | Mod: S$GLB,,, | Performed by: NURSE PRACTITIONER

## 2021-09-15 RX ORDER — DOXYCYCLINE HYCLATE 100 MG
100 TABLET ORAL 2 TIMES DAILY
Qty: 20 TABLET | Refills: 0 | Status: SHIPPED | OUTPATIENT
Start: 2021-09-15 | End: 2022-01-24

## 2021-09-15 RX ORDER — LISINOPRIL 10 MG/1
10 TABLET ORAL DAILY
Qty: 90 TABLET | Refills: 1 | Status: SHIPPED | OUTPATIENT
Start: 2021-09-15 | End: 2022-03-30 | Stop reason: SDUPTHER

## 2021-09-28 ENCOUNTER — HOSPITAL ENCOUNTER (OUTPATIENT)
Dept: RADIOLOGY | Facility: HOSPITAL | Age: 66
Discharge: HOME | End: 2021-09-28
Attending: NURSE PRACTITIONER
Payer: MEDICARE

## 2021-09-28 DIAGNOSIS — J18.9 PNEUMONIA, UNSPECIFIED ORGANISM: ICD-10-CM

## 2021-09-28 DIAGNOSIS — J18.9 PNEUMONIA DUE TO INFECTIOUS ORGANISM, UNSPECIFIED LATERALITY, UNSPECIFIED PART OF LUNG: ICD-10-CM

## 2021-09-28 PROCEDURE — 71046 X-RAY EXAM CHEST 2 VIEWS: CPT | Mod: TC,PO

## 2021-09-29 ENCOUNTER — TELEPHONE (OUTPATIENT)
Dept: FAMILY MEDICINE | Facility: CLINIC | Age: 66
End: 2021-09-29

## 2021-09-30 ENCOUNTER — TELEPHONE (OUTPATIENT)
Dept: FAMILY MEDICINE | Facility: CLINIC | Age: 66
End: 2021-09-30

## 2021-10-07 ENCOUNTER — HOSPITAL ENCOUNTER (OUTPATIENT)
Dept: RADIOLOGY | Facility: HOSPITAL | Age: 66
Discharge: HOME | End: 2021-10-07
Attending: NURSE PRACTITIONER
Payer: MEDICARE

## 2021-10-07 DIAGNOSIS — J18.9 PNEUMONIA, UNSPECIFIED ORGANISM: ICD-10-CM

## 2021-10-08 ENCOUNTER — OFFICE VISIT (OUTPATIENT)
Dept: FAMILY MEDICINE | Facility: CLINIC | Age: 66
End: 2021-10-08
Payer: MEDICARE

## 2021-10-08 VITALS
DIASTOLIC BLOOD PRESSURE: 82 MMHG | BODY MASS INDEX: 38.95 KG/M2 | HEIGHT: 68 IN | WEIGHT: 257 LBS | HEART RATE: 60 BPM | SYSTOLIC BLOOD PRESSURE: 132 MMHG

## 2021-10-08 DIAGNOSIS — H60.312 ACUTE DIFFUSE OTITIS EXTERNA OF LEFT EAR: Primary | ICD-10-CM

## 2021-10-08 PROCEDURE — 69210 EAR CERUMEN REMOVAL: ICD-10-PCS | Mod: S$GLB,,, | Performed by: NURSE PRACTITIONER

## 2021-10-08 PROCEDURE — 4010F ACE/ARB THERAPY RXD/TAKEN: CPT | Mod: S$GLB,,, | Performed by: NURSE PRACTITIONER

## 2021-10-08 PROCEDURE — 3288F PR FALLS RISK ASSESSMENT DOCUMENTED: ICD-10-PCS | Mod: S$GLB,,, | Performed by: NURSE PRACTITIONER

## 2021-10-08 PROCEDURE — 99213 OFFICE O/P EST LOW 20 MIN: CPT | Mod: 25,S$GLB,, | Performed by: NURSE PRACTITIONER

## 2021-10-08 PROCEDURE — 1159F PR MEDICATION LIST DOCUMENTED IN MEDICAL RECORD: ICD-10-PCS | Mod: S$GLB,,, | Performed by: NURSE PRACTITIONER

## 2021-10-08 PROCEDURE — 3008F PR BODY MASS INDEX (BMI) DOCUMENTED: ICD-10-PCS | Mod: S$GLB,,, | Performed by: NURSE PRACTITIONER

## 2021-10-08 PROCEDURE — 1101F PT FALLS ASSESS-DOCD LE1/YR: CPT | Mod: S$GLB,,, | Performed by: NURSE PRACTITIONER

## 2021-10-08 PROCEDURE — 4010F PR ACE/ARB THEARPY RXD/TAKEN: ICD-10-PCS | Mod: S$GLB,,, | Performed by: NURSE PRACTITIONER

## 2021-10-08 PROCEDURE — 69210 REMOVE IMPACTED EAR WAX UNI: CPT | Mod: S$GLB,,, | Performed by: NURSE PRACTITIONER

## 2021-10-08 PROCEDURE — 99213 PR OFFICE/OUTPT VISIT, EST, LEVL III, 20-29 MIN: ICD-10-PCS | Mod: 25,S$GLB,, | Performed by: NURSE PRACTITIONER

## 2021-10-08 PROCEDURE — 1160F PR REVIEW ALL MEDS BY PRESCRIBER/CLIN PHARMACIST DOCUMENTED: ICD-10-PCS | Mod: S$GLB,,, | Performed by: NURSE PRACTITIONER

## 2021-10-08 PROCEDURE — 1101F PR PT FALLS ASSESS DOC 0-1 FALLS W/OUT INJ PAST YR: ICD-10-PCS | Mod: S$GLB,,, | Performed by: NURSE PRACTITIONER

## 2021-10-08 PROCEDURE — 3008F BODY MASS INDEX DOCD: CPT | Mod: S$GLB,,, | Performed by: NURSE PRACTITIONER

## 2021-10-08 PROCEDURE — 1159F MED LIST DOCD IN RCRD: CPT | Mod: S$GLB,,, | Performed by: NURSE PRACTITIONER

## 2021-10-08 PROCEDURE — 1160F RVW MEDS BY RX/DR IN RCRD: CPT | Mod: S$GLB,,, | Performed by: NURSE PRACTITIONER

## 2021-10-08 PROCEDURE — 3288F FALL RISK ASSESSMENT DOCD: CPT | Mod: S$GLB,,, | Performed by: NURSE PRACTITIONER

## 2021-10-08 RX ORDER — CIPROFLOXACIN AND DEXAMETHASONE 3; 1 MG/ML; MG/ML
4 SUSPENSION/ DROPS AURICULAR (OTIC) 2 TIMES DAILY
Qty: 7.5 ML | Refills: 0 | Status: SHIPPED | OUTPATIENT
Start: 2021-10-08 | End: 2022-01-24

## 2021-10-11 ENCOUNTER — TELEPHONE (OUTPATIENT)
Dept: FAMILY MEDICINE | Facility: CLINIC | Age: 66
End: 2021-10-11

## 2021-10-11 ENCOUNTER — HOSPITAL ENCOUNTER (OUTPATIENT)
Dept: RADIOLOGY | Facility: HOSPITAL | Age: 66
Discharge: HOME | End: 2021-10-11
Attending: NURSE PRACTITIONER
Payer: MEDICARE

## 2021-10-11 DIAGNOSIS — J18.9 PNEUMONIA DUE TO INFECTIOUS ORGANISM, UNSPECIFIED LATERALITY, UNSPECIFIED PART OF LUNG: Primary | ICD-10-CM

## 2021-10-11 PROCEDURE — 71260 CT THORAX DX C+: CPT | Mod: TC

## 2021-10-11 PROCEDURE — 25500020 PHARM REV CODE 255: Performed by: NURSE PRACTITIONER

## 2021-10-11 RX ADMIN — IOHEXOL 100 ML: 350 INJECTION, SOLUTION INTRAVENOUS at 11:10

## 2021-10-12 ENCOUNTER — OFFICE VISIT (OUTPATIENT)
Dept: PULMONOLOGY | Facility: CLINIC | Age: 66
End: 2021-10-12
Payer: MEDICARE

## 2021-10-12 ENCOUNTER — LAB VISIT (OUTPATIENT)
Dept: LAB | Facility: HOSPITAL | Age: 66
End: 2021-10-12
Attending: NURSE PRACTITIONER
Payer: MEDICARE

## 2021-10-12 VITALS
DIASTOLIC BLOOD PRESSURE: 74 MMHG | WEIGHT: 260 LBS | HEART RATE: 78 BPM | BODY MASS INDEX: 39.4 KG/M2 | SYSTOLIC BLOOD PRESSURE: 123 MMHG | OXYGEN SATURATION: 97 % | HEIGHT: 68 IN

## 2021-10-12 DIAGNOSIS — Z01.84 ENCOUNTER FOR ANTIBODY RESPONSE EXAMINATION: ICD-10-CM

## 2021-10-12 DIAGNOSIS — J18.9 PNEUMONIA DUE TO INFECTIOUS ORGANISM, UNSPECIFIED LATERALITY, UNSPECIFIED PART OF LUNG: ICD-10-CM

## 2021-10-12 DIAGNOSIS — G47.33 OSA (OBSTRUCTIVE SLEEP APNEA): Primary | ICD-10-CM

## 2021-10-12 PROCEDURE — 1159F PR MEDICATION LIST DOCUMENTED IN MEDICAL RECORD: ICD-10-PCS | Mod: S$GLB,,, | Performed by: NURSE PRACTITIONER

## 2021-10-12 PROCEDURE — 86769 SARS-COV-2 COVID-19 ANTIBODY: CPT | Performed by: NURSE PRACTITIONER

## 2021-10-12 PROCEDURE — 3074F SYST BP LT 130 MM HG: CPT | Mod: S$GLB,,, | Performed by: NURSE PRACTITIONER

## 2021-10-12 PROCEDURE — 1159F MED LIST DOCD IN RCRD: CPT | Mod: S$GLB,,, | Performed by: NURSE PRACTITIONER

## 2021-10-12 PROCEDURE — 3074F PR MOST RECENT SYSTOLIC BLOOD PRESSURE < 130 MM HG: ICD-10-PCS | Mod: S$GLB,,, | Performed by: NURSE PRACTITIONER

## 2021-10-12 PROCEDURE — 3008F BODY MASS INDEX DOCD: CPT | Mod: S$GLB,,, | Performed by: NURSE PRACTITIONER

## 2021-10-12 PROCEDURE — 99214 PR OFFICE/OUTPT VISIT, EST, LEVL IV, 30-39 MIN: ICD-10-PCS | Mod: S$GLB,ICN,, | Performed by: NURSE PRACTITIONER

## 2021-10-12 PROCEDURE — 4010F PR ACE/ARB THEARPY RXD/TAKEN: ICD-10-PCS | Mod: S$GLB,,, | Performed by: NURSE PRACTITIONER

## 2021-10-12 PROCEDURE — 1126F AMNT PAIN NOTED NONE PRSNT: CPT | Mod: S$GLB,,, | Performed by: NURSE PRACTITIONER

## 2021-10-12 PROCEDURE — 4010F ACE/ARB THERAPY RXD/TAKEN: CPT | Mod: S$GLB,,, | Performed by: NURSE PRACTITIONER

## 2021-10-12 PROCEDURE — 36415 COLL VENOUS BLD VENIPUNCTURE: CPT | Performed by: NURSE PRACTITIONER

## 2021-10-12 PROCEDURE — 3078F PR MOST RECENT DIASTOLIC BLOOD PRESSURE < 80 MM HG: ICD-10-PCS | Mod: S$GLB,,, | Performed by: NURSE PRACTITIONER

## 2021-10-12 PROCEDURE — 1126F PR PAIN SEVERITY QUANTIFIED, NO PAIN PRESENT: ICD-10-PCS | Mod: S$GLB,,, | Performed by: NURSE PRACTITIONER

## 2021-10-12 PROCEDURE — 3008F PR BODY MASS INDEX (BMI) DOCUMENTED: ICD-10-PCS | Mod: S$GLB,,, | Performed by: NURSE PRACTITIONER

## 2021-10-12 PROCEDURE — 3078F DIAST BP <80 MM HG: CPT | Mod: S$GLB,,, | Performed by: NURSE PRACTITIONER

## 2021-10-12 PROCEDURE — 99214 OFFICE O/P EST MOD 30 MIN: CPT | Mod: S$GLB,ICN,, | Performed by: NURSE PRACTITIONER

## 2021-10-13 ENCOUNTER — PATIENT MESSAGE (OUTPATIENT)
Dept: PULMONOLOGY | Facility: CLINIC | Age: 66
End: 2021-10-13
Payer: MEDICARE

## 2021-10-13 LAB
SARS-COV-2 IGG SERPL IA-ACNC: ABNORMAL AU/ML
SARS-COV-2 IGG SERPL QL IA: POSITIVE

## 2021-10-20 ENCOUNTER — TELEPHONE (OUTPATIENT)
Dept: FAMILY MEDICINE | Facility: CLINIC | Age: 66
End: 2021-10-20

## 2021-11-14 ENCOUNTER — PATIENT MESSAGE (OUTPATIENT)
Dept: FAMILY MEDICINE | Facility: CLINIC | Age: 66
End: 2021-11-14
Payer: MEDICARE

## 2021-11-14 DIAGNOSIS — K21.9 GASTROESOPHAGEAL REFLUX DISEASE WITHOUT ESOPHAGITIS: ICD-10-CM

## 2021-11-15 RX ORDER — ESOMEPRAZOLE MAGNESIUM 40 MG/1
40 CAPSULE, DELAYED RELEASE ORAL DAILY
Qty: 90 CAPSULE | Refills: 1 | Status: SHIPPED | OUTPATIENT
Start: 2021-11-15 | End: 2022-03-30 | Stop reason: SDUPTHER

## 2022-01-17 ENCOUNTER — TELEPHONE (OUTPATIENT)
Dept: FAMILY MEDICINE | Facility: CLINIC | Age: 67
End: 2022-01-17
Payer: MEDICARE

## 2022-01-17 NOTE — TELEPHONE ENCOUNTER
----- Message from Shawanda Kemp sent at 1/17/2022 10:58 AM CST -----  Patient's wife, Felisha Matias, wants to schedule an appointment for Lewis for a weight check and to discuss his breathing issues. Felisha's callback number is [phone].

## 2022-01-17 NOTE — TELEPHONE ENCOUNTER
Spoke with pts wife and got her scheduled - states she thinks the breathing issue is related to weight. States he cancelled the appts with pulmonology because the issues are no longer related to covid or pna.

## 2022-01-24 ENCOUNTER — OFFICE VISIT (OUTPATIENT)
Dept: FAMILY MEDICINE | Facility: CLINIC | Age: 67
End: 2022-01-24
Payer: MEDICARE

## 2022-01-24 VITALS
HEART RATE: 68 BPM | SYSTOLIC BLOOD PRESSURE: 124 MMHG | BODY MASS INDEX: 40.01 KG/M2 | HEIGHT: 68 IN | WEIGHT: 264 LBS | DIASTOLIC BLOOD PRESSURE: 86 MMHG

## 2022-01-24 DIAGNOSIS — I10 ESSENTIAL HYPERTENSION: Primary | ICD-10-CM

## 2022-01-24 DIAGNOSIS — Z12.5 SCREENING FOR PROSTATE CANCER: ICD-10-CM

## 2022-01-24 DIAGNOSIS — K21.9 GASTROESOPHAGEAL REFLUX DISEASE WITHOUT ESOPHAGITIS: ICD-10-CM

## 2022-01-24 DIAGNOSIS — G47.30 SLEEP APNEA, UNSPECIFIED TYPE: ICD-10-CM

## 2022-01-24 DIAGNOSIS — Z79.899 HIGH RISK MEDICATION USE: ICD-10-CM

## 2022-01-24 DIAGNOSIS — E66.9 OBESITY, UNSPECIFIED CLASSIFICATION, UNSPECIFIED OBESITY TYPE, UNSPECIFIED WHETHER SERIOUS COMORBIDITY PRESENT: ICD-10-CM

## 2022-01-24 PROCEDURE — 1101F PT FALLS ASSESS-DOCD LE1/YR: CPT | Mod: S$GLB,,, | Performed by: NURSE PRACTITIONER

## 2022-01-24 PROCEDURE — 3079F PR MOST RECENT DIASTOLIC BLOOD PRESSURE 80-89 MM HG: ICD-10-PCS | Mod: S$GLB,,, | Performed by: NURSE PRACTITIONER

## 2022-01-24 PROCEDURE — 3288F FALL RISK ASSESSMENT DOCD: CPT | Mod: S$GLB,,, | Performed by: NURSE PRACTITIONER

## 2022-01-24 PROCEDURE — 1159F PR MEDICATION LIST DOCUMENTED IN MEDICAL RECORD: ICD-10-PCS | Mod: S$GLB,,, | Performed by: NURSE PRACTITIONER

## 2022-01-24 PROCEDURE — 3061F NEG MICROALBUMINURIA REV: CPT | Mod: S$GLB,,, | Performed by: NURSE PRACTITIONER

## 2022-01-24 PROCEDURE — 1101F PR PT FALLS ASSESS DOC 0-1 FALLS W/OUT INJ PAST YR: ICD-10-PCS | Mod: S$GLB,,, | Performed by: NURSE PRACTITIONER

## 2022-01-24 PROCEDURE — 3074F PR MOST RECENT SYSTOLIC BLOOD PRESSURE < 130 MM HG: ICD-10-PCS | Mod: S$GLB,,, | Performed by: NURSE PRACTITIONER

## 2022-01-24 PROCEDURE — 1160F PR REVIEW ALL MEDS BY PRESCRIBER/CLIN PHARMACIST DOCUMENTED: ICD-10-PCS | Mod: S$GLB,,, | Performed by: NURSE PRACTITIONER

## 2022-01-24 PROCEDURE — 3066F PR DOCUMENTATION OF TREATMENT FOR NEPHROPATHY: ICD-10-PCS | Mod: S$GLB,,, | Performed by: NURSE PRACTITIONER

## 2022-01-24 PROCEDURE — 3074F SYST BP LT 130 MM HG: CPT | Mod: S$GLB,,, | Performed by: NURSE PRACTITIONER

## 2022-01-24 PROCEDURE — 99214 PR OFFICE/OUTPT VISIT, EST, LEVL IV, 30-39 MIN: ICD-10-PCS | Mod: S$GLB,,, | Performed by: NURSE PRACTITIONER

## 2022-01-24 PROCEDURE — 3008F BODY MASS INDEX DOCD: CPT | Mod: S$GLB,,, | Performed by: NURSE PRACTITIONER

## 2022-01-24 PROCEDURE — 3288F PR FALLS RISK ASSESSMENT DOCUMENTED: ICD-10-PCS | Mod: S$GLB,,, | Performed by: NURSE PRACTITIONER

## 2022-01-24 PROCEDURE — 3061F PR NEG MICROALBUMINURIA RESULT DOCUMENTED/REVIEW: ICD-10-PCS | Mod: S$GLB,,, | Performed by: NURSE PRACTITIONER

## 2022-01-24 PROCEDURE — 3079F DIAST BP 80-89 MM HG: CPT | Mod: S$GLB,,, | Performed by: NURSE PRACTITIONER

## 2022-01-24 PROCEDURE — 3008F PR BODY MASS INDEX (BMI) DOCUMENTED: ICD-10-PCS | Mod: S$GLB,,, | Performed by: NURSE PRACTITIONER

## 2022-01-24 PROCEDURE — 1159F MED LIST DOCD IN RCRD: CPT | Mod: S$GLB,,, | Performed by: NURSE PRACTITIONER

## 2022-01-24 PROCEDURE — 1160F RVW MEDS BY RX/DR IN RCRD: CPT | Mod: S$GLB,,, | Performed by: NURSE PRACTITIONER

## 2022-01-24 PROCEDURE — 3066F NEPHROPATHY DOC TX: CPT | Mod: S$GLB,,, | Performed by: NURSE PRACTITIONER

## 2022-01-24 PROCEDURE — 99214 OFFICE O/P EST MOD 30 MIN: CPT | Mod: S$GLB,,, | Performed by: NURSE PRACTITIONER

## 2022-01-24 NOTE — PROGRESS NOTES
SUBJECTIVE:    Patient ID: Lewis Matias is a 66 y.o. male.    Chief Complaint: Weight Check (Did not bring bottles. Ac )    66-year-old male presents for routine check up. Treated for htn, gerd, oa. Taking medication as prescribed. Taking lisinopril daily. bp is well controlled. Taking nexium for reflux with good results.  He is finding it difficult to lose weight.  He does have sweet cravings at night in eats candy & ice cream at times.  He reports that he does not feel quantity of food is excessive  History of osteoarthritis of the left knee.  Doing well with this at this time.   Remains active      Orders Only on 01/24/2022   Component Date Value Ref Range Status    Cholesterol 01/24/2022 139  <200 mg/dL Final    HDL 01/24/2022 47  > OR = 40 mg/dL Final    Triglycerides 01/24/2022 132  <150 mg/dL Final    LDL Cholesterol 01/24/2022 70  mg/dL (calc) Final    HDL/Cholesterol Ratio 01/24/2022 3.0  <5.0 (calc) Final    Non HDL Chol. (LDL+VLDL) 01/24/2022 92  <130 mg/dL (calc) Final    Glucose 01/24/2022 99  65 - 139 mg/dL Final    BUN 01/24/2022 14  7 - 25 mg/dL Final    Creatinine 01/24/2022 1.06  0.70 - 1.25 mg/dL Final    eGFR if non  01/24/2022 72  > OR = 60 mL/min/1.73m2 Final    eGFR if  01/24/2022 84  > OR = 60 mL/min/1.73m2 Final    BUN/Creatinine Ratio 01/24/2022 NOT APPLICABLE  6 - 22 (calc) Final    Sodium 01/24/2022 138  135 - 146 mmol/L Final    Potassium 01/24/2022 4.3  3.5 - 5.3 mmol/L Final    Chloride 01/24/2022 102  98 - 110 mmol/L Final    CO2 01/24/2022 30  20 - 32 mmol/L Final    Calcium 01/24/2022 9.7  8.6 - 10.3 mg/dL Final    Total Protein 01/24/2022 6.6  6.1 - 8.1 g/dL Final    Albumin 01/24/2022 4.3  3.6 - 5.1 g/dL Final    Globulin, Total 01/24/2022 2.3  1.9 - 3.7 g/dL (calc) Final    Albumin/Globulin Ratio 01/24/2022 1.9  1.0 - 2.5 (calc) Final    Total Bilirubin 01/24/2022 1.2  0.2 - 1.2 mg/dL Final    Alkaline Phosphatase  01/24/2022 59  35 - 144 U/L Final    AST 01/24/2022 19  10 - 35 U/L Final    ALT 01/24/2022 21  9 - 46 U/L Final    Creatinine, Urine 01/24/2022 85  20 - 320 mg/dL Final    Microalb, Ur 01/24/2022 0.2  See Note: mg/dL Final    Microalb/Creat Ratio 01/24/2022 2  <30 mcg/mg creat Final    Color, UA 01/24/2022 YELLOW  YELLOW Final    Appearance, UA 01/24/2022 CLEAR  CLEAR Final    Specific Gravity, UA 01/24/2022 1.014  1.001 - 1.035 Final    pH, UA 01/24/2022 7.0  5.0 - 8.0 Final    Glucose, UA 01/24/2022 NEGATIVE  NEGATIVE Final    Bilirubin, UA 01/24/2022 NEGATIVE  NEGATIVE Final    Ketones, UA 01/24/2022 NEGATIVE  NEGATIVE Final    Occult Blood UA 01/24/2022 NEGATIVE  NEGATIVE Final    Protein, UA 01/24/2022 NEGATIVE  NEGATIVE Final    Nitrite, UA 01/24/2022 NEGATIVE  NEGATIVE Final    Leukocytes, UA 01/24/2022 NEGATIVE  NEGATIVE Final    WBC Casts, UA 01/24/2022 NONE SEEN  < OR = 5 /HPF Final    RBC Casts, UA 01/24/2022 NONE SEEN  < OR = 2 /HPF Final    Squam Epithel, UA 01/24/2022 NONE SEEN  < OR = 5 /HPF Final    Bacteria, UA 01/24/2022 NONE SEEN  NONE SEEN /HPF Final    Hyaline Casts, UA 01/24/2022 NONE SEEN  NONE SEEN /LPF Final    Reflexive Urine Culture 01/24/2022    Final    WBC 01/24/2022 5.5  3.8 - 10.8 Thousand/uL Final    RBC 01/24/2022 5.48  4.20 - 5.80 Million/uL Final    Hemoglobin 01/24/2022 16.3  13.2 - 17.1 g/dL Final    Hematocrit 01/24/2022 48.7  38.5 - 50.0 % Final    MCV 01/24/2022 88.9  80.0 - 100.0 fL Final    MCH 01/24/2022 29.7  27.0 - 33.0 pg Final    MCHC 01/24/2022 33.5  32.0 - 36.0 g/dL Final    RDW 01/24/2022 13.3  11.0 - 15.0 % Final    Platelets 01/24/2022 159  140 - 400 Thousand/uL Final    MPV 01/24/2022 12.0  7.5 - 12.5 fL Final    Neutrophils, Abs 01/24/2022 3,218  1,500 - 7,800 cells/uL Final    Lymph # 01/24/2022 1,447  850 - 3,900 cells/uL Final    Mono # 01/24/2022 616  200 - 950 cells/uL Final    Eos # 01/24/2022 182  15 - 500 cells/uL  Final    Baso # 01/24/2022 39  0 - 200 cells/uL Final    Neutrophils Relative 01/24/2022 58.5  % Final    Lymph % 01/24/2022 26.3  % Final    Mono % 01/24/2022 11.2  % Final    Eosinophil % 01/24/2022 3.3  % Final    Basophil % 01/24/2022 0.7  % Final    TSH w/reflex to FT4 01/24/2022 3.89  0.40 - 4.50 mIU/L Final    PROSTATE SPECIFIC ANTIGEN, SCR - Q* 01/24/2022 1.30  < OR = 4.00 ng/mL Final   Lab Visit on 10/12/2021   Component Date Value Ref Range Status    COVID-19 (SARS CoV-2) IgG Antibody* 10/12/2021 19227.7* <50.0 AU/ml Final    COVID-19 (SARS CoV-2) IgG Antibody* 10/12/2021 Positive* Negative Final       Past Medical History:   Diagnosis Date    COVID-19 8/2/20021    GERD (gastroesophageal reflux disease)     Hard of hearing     Hiatal hernia     Hypertension     Kidney stones     Knee pain 06/2020    left     Social History     Socioeconomic History    Marital status:    Tobacco Use    Smoking status: Never Smoker    Smokeless tobacco: Never Used   Substance and Sexual Activity    Alcohol use: Yes     Comment: beer    Drug use: Never    Sexual activity: Yes     Partners: Female     Past Surgical History:   Procedure Laterality Date    CHOLECYSTECTOMY      COLONOSCOPY  2012    Dr Peteror- rtc 10 yr    HAND SURGERY      traumatic injury    HEMORRHOID SURGERY      KIDNEY STONE SURGERY      KNEE ARTHROSCOPY W/ MENISCECTOMY Left 11/4/2020    Procedure: ARTHROSCOPY, KNEE, WITH PARTIAL MEDIAL MENISCECTOMY;  Surgeon: Andres Blanco MD;  Location: [facility];  Service: Orthopedics;  Laterality: Left;    TONSILLECTOMY       No family history on file.    Review of patient's allergies indicates:   Allergen Reactions    Iodinated contrast media Hives and Shortness Of Breath       Current Outpatient Medications:     esomeprazole (NEXIUM) 40 MG capsule, Take 1 capsule (40 mg total) by mouth once daily., Disp: 90 capsule, Rfl: 1    lisinopriL 10 MG tablet, Take 1 tablet (10 mg total) by  "mouth once daily., Disp: 90 tablet, Rfl: 1    sildenafiL (VIAGRA) 100 MG tablet, Take 1 tablet (100 mg total) by mouth daily as needed for Erectile Dysfunction (prn sex)., Disp: 30 tablet, Rfl: 0    Review of Systems   Constitutional: Negative for fatigue, fever and unexpected weight change.   HENT: Negative for ear pain, sinus pressure and sore throat.    Eyes: Negative for pain.   Respiratory: Negative for cough and shortness of breath.    Cardiovascular: Negative for chest pain and leg swelling.   Gastrointestinal: Negative for abdominal pain, constipation, nausea and vomiting.   Genitourinary: Negative for dysuria, frequency and urgency.   Musculoskeletal: Negative for arthralgias.   Skin: Negative for rash.   Neurological: Negative for dizziness, weakness and headaches.   Psychiatric/Behavioral: Negative for sleep disturbance.          Objective:      Vitals:    01/24/22 1201   BP: 124/86   Pulse: 68   Weight: 119.7 kg (264 lb)   Height: 5' 8" (1.727 m)     Physical Exam  Constitutional:       General: He is not in acute distress.     Appearance: He is well-developed and well-nourished. He is obese. He is not ill-appearing.   HENT:      Right Ear: External ear normal.      Left Ear: External ear normal.      Mouth/Throat:      Mouth: Oropharynx is clear and moist.   Neck:      Trachea: No tracheal deviation.   Cardiovascular:      Rate and Rhythm: Normal rate and regular rhythm.      Heart sounds: No murmur heard.  No friction rub. No gallop.    Pulmonary:      Breath sounds: Normal breath sounds. No stridor. No wheezing or rales.   Abdominal:      Palpations: Abdomen is soft. There is no mass.      Tenderness: There is no abdominal tenderness.   Musculoskeletal:         General: No tenderness, deformity or edema.      Cervical back: Neck supple.   Lymphadenopathy:      Cervical: No cervical adenopathy.   Skin:     General: Skin is warm and dry.   Neurological:      Mental Status: He is alert and oriented to " person, place, and time.      Coordination: Coordination normal.   Psychiatric:         Mood and Affect: Mood and affect normal.         Thought Content: Thought content normal.           Assessment:       1. Essential hypertension    2. High risk medication use    3. Obesity, unspecified classification, unspecified obesity type, unspecified whether serious comorbidity present    4. Sleep apnea, unspecified type    5. Screening for prostate cancer    6. Gastroesophageal reflux disease without esophagitis         Plan:       Essential hypertension  -     CBC Auto Differential; Future; Expected date: 01/24/2022  -     Comprehensive Metabolic Panel; Future; Expected date: 01/24/2022  -     Lipid Panel; Future; Expected date: 01/24/2022  -     Urinalysis, Reflex to Urine Culture Urine, Clean Catch; Future; Expected date: 01/24/2022  -     TSH w/reflex to FT4; Future; Expected date: 01/24/2022  -     Microalbumin/Creatinine Ratio, Urine; Future; Expected date: 01/24/2022    High risk medication use  -     CBC Auto Differential; Future; Expected date: 01/24/2022  -     Comprehensive Metabolic Panel; Future; Expected date: 01/24/2022  -     Lipid Panel; Future; Expected date: 01/24/2022  -     Urinalysis, Reflex to Urine Culture Urine, Clean Catch; Future; Expected date: 01/24/2022  -     TSH w/reflex to FT4; Future; Expected date: 01/24/2022  -     Microalbumin/Creatinine Ratio, Urine; Future; Expected date: 01/24/2022    Obesity, unspecified classification, unspecified obesity type, unspecified whether serious comorbidity present    Sleep apnea, unspecified type    Screening for prostate cancer  -     PSA, Screening; Future; Expected date: 01/24/2022    Gastroesophageal reflux disease without esophagitis      Follow up in about 3 months (around 4/24/2022), or if symptoms worsen or fail to improve, for medication management.        1/27/2022 Ping Juarez

## 2022-01-25 LAB
ALBUMIN SERPL-MCNC: 4.3 G/DL (ref 3.6–5.1)
ALBUMIN/CREAT UR: 2 MCG/MG CREAT
ALBUMIN/GLOB SERPL: 1.9 (CALC) (ref 1–2.5)
ALP SERPL-CCNC: 59 U/L (ref 35–144)
ALT SERPL-CCNC: 21 U/L (ref 9–46)
APPEARANCE UR: CLEAR
AST SERPL-CCNC: 19 U/L (ref 10–35)
BACTERIA #/AREA URNS HPF: NORMAL /HPF
BACTERIA UR CULT: NORMAL
BASOPHILS # BLD AUTO: 39 CELLS/UL (ref 0–200)
BASOPHILS NFR BLD AUTO: 0.7 %
BILIRUB SERPL-MCNC: 1.2 MG/DL (ref 0.2–1.2)
BILIRUB UR QL STRIP: NEGATIVE
BUN SERPL-MCNC: 14 MG/DL (ref 7–25)
BUN/CREAT SERPL: NORMAL (CALC) (ref 6–22)
CALCIUM SERPL-MCNC: 9.7 MG/DL (ref 8.6–10.3)
CHLORIDE SERPL-SCNC: 102 MMOL/L (ref 98–110)
CHOLEST SERPL-MCNC: 139 MG/DL
CHOLEST/HDLC SERPL: 3 (CALC)
CO2 SERPL-SCNC: 30 MMOL/L (ref 20–32)
COLOR UR: YELLOW
CREAT SERPL-MCNC: 1.06 MG/DL (ref 0.7–1.25)
CREAT UR-MCNC: 85 MG/DL (ref 20–320)
EOSINOPHIL # BLD AUTO: 182 CELLS/UL (ref 15–500)
EOSINOPHIL NFR BLD AUTO: 3.3 %
ERYTHROCYTE [DISTWIDTH] IN BLOOD BY AUTOMATED COUNT: 13.3 % (ref 11–15)
GLOBULIN SER CALC-MCNC: 2.3 G/DL (CALC) (ref 1.9–3.7)
GLUCOSE SERPL-MCNC: 99 MG/DL (ref 65–139)
GLUCOSE UR QL STRIP: NEGATIVE
HCT VFR BLD AUTO: 48.7 % (ref 38.5–50)
HDLC SERPL-MCNC: 47 MG/DL
HGB BLD-MCNC: 16.3 G/DL (ref 13.2–17.1)
HGB UR QL STRIP: NEGATIVE
HYALINE CASTS #/AREA URNS LPF: NORMAL /LPF
KETONES UR QL STRIP: NEGATIVE
LDLC SERPL CALC-MCNC: 70 MG/DL (CALC)
LEUKOCYTE ESTERASE UR QL STRIP: NEGATIVE
LYMPHOCYTES # BLD AUTO: 1447 CELLS/UL (ref 850–3900)
LYMPHOCYTES NFR BLD AUTO: 26.3 %
MCH RBC QN AUTO: 29.7 PG (ref 27–33)
MCHC RBC AUTO-ENTMCNC: 33.5 G/DL (ref 32–36)
MCV RBC AUTO: 88.9 FL (ref 80–100)
MICROALBUMIN UR-MCNC: 0.2 MG/DL
MONOCYTES # BLD AUTO: 616 CELLS/UL (ref 200–950)
MONOCYTES NFR BLD AUTO: 11.2 %
NEUTROPHILS # BLD AUTO: 3218 CELLS/UL (ref 1500–7800)
NEUTROPHILS NFR BLD AUTO: 58.5 %
NITRITE UR QL STRIP: NEGATIVE
NONHDLC SERPL-MCNC: 92 MG/DL (CALC)
PH UR STRIP: 7 [PH] (ref 5–8)
PLATELET # BLD AUTO: 159 THOUSAND/UL (ref 140–400)
PMV BLD REES-ECKER: 12 FL (ref 7.5–12.5)
POTASSIUM SERPL-SCNC: 4.3 MMOL/L (ref 3.5–5.3)
PROT SERPL-MCNC: 6.6 G/DL (ref 6.1–8.1)
PROT UR QL STRIP: NEGATIVE
PSA SERPL-MCNC: 1.3 NG/ML
RBC # BLD AUTO: 5.48 MILLION/UL (ref 4.2–5.8)
RBC #/AREA URNS HPF: NORMAL /HPF
SODIUM SERPL-SCNC: 138 MMOL/L (ref 135–146)
SP GR UR STRIP: 1.01 (ref 1–1.03)
SQUAMOUS #/AREA URNS HPF: NORMAL /HPF
TRIGL SERPL-MCNC: 132 MG/DL
TSH SERPL-ACNC: 3.89 MIU/L (ref 0.4–4.5)
WBC # BLD AUTO: 5.5 THOUSAND/UL (ref 3.8–10.8)
WBC #/AREA URNS HPF: NORMAL /HPF

## 2022-01-26 ENCOUNTER — TELEPHONE (OUTPATIENT)
Dept: FAMILY MEDICINE | Facility: CLINIC | Age: 67
End: 2022-01-26
Payer: MEDICARE

## 2022-01-26 NOTE — TELEPHONE ENCOUNTER
----- Message from Ping Juarez NP sent at 1/25/2022  1:07 PM CST -----  Labs are within normal limits.

## 2022-02-06 DIAGNOSIS — N52.9 ERECTILE DYSFUNCTION, UNSPECIFIED ERECTILE DYSFUNCTION TYPE: ICD-10-CM

## 2022-02-07 RX ORDER — SILDENAFIL 100 MG/1
100 TABLET, FILM COATED ORAL DAILY PRN
Qty: 20 TABLET | Refills: 0 | Status: SHIPPED | OUTPATIENT
Start: 2022-02-07 | End: 2023-07-13

## 2022-03-14 ENCOUNTER — OFFICE VISIT (OUTPATIENT)
Dept: FAMILY MEDICINE | Facility: CLINIC | Age: 67
End: 2022-03-14
Payer: MEDICARE

## 2022-03-14 ENCOUNTER — HOSPITAL ENCOUNTER (OUTPATIENT)
Dept: RADIOLOGY | Facility: HOSPITAL | Age: 67
Discharge: HOME | End: 2022-03-14
Attending: NURSE PRACTITIONER
Payer: MEDICARE

## 2022-03-14 VITALS
HEART RATE: 64 BPM | WEIGHT: 259 LBS | HEIGHT: 68 IN | BODY MASS INDEX: 39.25 KG/M2 | DIASTOLIC BLOOD PRESSURE: 72 MMHG | SYSTOLIC BLOOD PRESSURE: 118 MMHG

## 2022-03-14 DIAGNOSIS — N45.1 EPIDIDYMITIS: ICD-10-CM

## 2022-03-14 DIAGNOSIS — I10 ESSENTIAL HYPERTENSION: Primary | ICD-10-CM

## 2022-03-14 DIAGNOSIS — N50.812 LEFT TESTICULAR PAIN: ICD-10-CM

## 2022-03-14 DIAGNOSIS — K21.9 GASTROESOPHAGEAL REFLUX DISEASE WITHOUT ESOPHAGITIS: ICD-10-CM

## 2022-03-14 LAB
BILIRUB UR QL STRIP: NEGATIVE
GLUCOSE UR QL STRIP: NEGATIVE
KETONES UR QL STRIP: NEGATIVE
LEUKOCYTE ESTERASE UR QL STRIP: NEGATIVE
POC BLOOD, URINE: NEGATIVE
POC NITRATES, URINE: NEGATIVE
PROT UR QL STRIP: NEGATIVE
SP GR UR STRIP: 1.01 (ref 1–1.03)
UROBILINOGEN UR STRIP-ACNC: 0.2 (ref 0.3–2.2)

## 2022-03-14 PROCEDURE — 4010F ACE/ARB THERAPY RXD/TAKEN: CPT | Mod: S$GLB,,, | Performed by: NURSE PRACTITIONER

## 2022-03-14 PROCEDURE — 3066F PR DOCUMENTATION OF TREATMENT FOR NEPHROPATHY: ICD-10-PCS | Mod: S$GLB,,, | Performed by: NURSE PRACTITIONER

## 2022-03-14 PROCEDURE — 1159F MED LIST DOCD IN RCRD: CPT | Mod: S$GLB,,, | Performed by: NURSE PRACTITIONER

## 2022-03-14 PROCEDURE — 81003 POCT URINALYSIS, DIPSTICK, AUTOMATED, W/O SCOPE: ICD-10-PCS | Mod: QW,S$GLB,, | Performed by: NURSE PRACTITIONER

## 2022-03-14 PROCEDURE — 1160F PR REVIEW ALL MEDS BY PRESCRIBER/CLIN PHARMACIST DOCUMENTED: ICD-10-PCS | Mod: S$GLB,,, | Performed by: NURSE PRACTITIONER

## 2022-03-14 PROCEDURE — 3074F SYST BP LT 130 MM HG: CPT | Mod: S$GLB,,, | Performed by: NURSE PRACTITIONER

## 2022-03-14 PROCEDURE — 3008F PR BODY MASS INDEX (BMI) DOCUMENTED: ICD-10-PCS | Mod: S$GLB,,, | Performed by: NURSE PRACTITIONER

## 2022-03-14 PROCEDURE — 3066F NEPHROPATHY DOC TX: CPT | Mod: S$GLB,,, | Performed by: NURSE PRACTITIONER

## 2022-03-14 PROCEDURE — 1160F RVW MEDS BY RX/DR IN RCRD: CPT | Mod: S$GLB,,, | Performed by: NURSE PRACTITIONER

## 2022-03-14 PROCEDURE — 1159F PR MEDICATION LIST DOCUMENTED IN MEDICAL RECORD: ICD-10-PCS | Mod: S$GLB,,, | Performed by: NURSE PRACTITIONER

## 2022-03-14 PROCEDURE — 3078F DIAST BP <80 MM HG: CPT | Mod: S$GLB,,, | Performed by: NURSE PRACTITIONER

## 2022-03-14 PROCEDURE — 1125F AMNT PAIN NOTED PAIN PRSNT: CPT | Mod: S$GLB,,, | Performed by: NURSE PRACTITIONER

## 2022-03-14 PROCEDURE — 99214 OFFICE O/P EST MOD 30 MIN: CPT | Mod: S$GLB,,, | Performed by: NURSE PRACTITIONER

## 2022-03-14 PROCEDURE — 76870 US EXAM SCROTUM: CPT | Mod: TC

## 2022-03-14 PROCEDURE — 4010F PR ACE/ARB THEARPY RXD/TAKEN: ICD-10-PCS | Mod: S$GLB,,, | Performed by: NURSE PRACTITIONER

## 2022-03-14 PROCEDURE — 3061F NEG MICROALBUMINURIA REV: CPT | Mod: S$GLB,,, | Performed by: NURSE PRACTITIONER

## 2022-03-14 PROCEDURE — 3074F PR MOST RECENT SYSTOLIC BLOOD PRESSURE < 130 MM HG: ICD-10-PCS | Mod: S$GLB,,, | Performed by: NURSE PRACTITIONER

## 2022-03-14 PROCEDURE — 1125F PR PAIN SEVERITY QUANTIFIED, PAIN PRESENT: ICD-10-PCS | Mod: S$GLB,,, | Performed by: NURSE PRACTITIONER

## 2022-03-14 PROCEDURE — 81003 URINALYSIS AUTO W/O SCOPE: CPT | Mod: QW,S$GLB,, | Performed by: NURSE PRACTITIONER

## 2022-03-14 PROCEDURE — 99214 PR OFFICE/OUTPT VISIT, EST, LEVL IV, 30-39 MIN: ICD-10-PCS | Mod: S$GLB,,, | Performed by: NURSE PRACTITIONER

## 2022-03-14 PROCEDURE — 3008F BODY MASS INDEX DOCD: CPT | Mod: S$GLB,,, | Performed by: NURSE PRACTITIONER

## 2022-03-14 PROCEDURE — 3061F PR NEG MICROALBUMINURIA RESULT DOCUMENTED/REVIEW: ICD-10-PCS | Mod: S$GLB,,, | Performed by: NURSE PRACTITIONER

## 2022-03-14 PROCEDURE — 3078F PR MOST RECENT DIASTOLIC BLOOD PRESSURE < 80 MM HG: ICD-10-PCS | Mod: S$GLB,,, | Performed by: NURSE PRACTITIONER

## 2022-03-14 RX ORDER — DOXYCYCLINE 100 MG/1
100 CAPSULE ORAL 2 TIMES DAILY
Qty: 14 CAPSULE | Refills: 0 | Status: SHIPPED | OUTPATIENT
Start: 2022-03-14 | End: 2022-03-14

## 2022-03-14 RX ORDER — DOXYCYCLINE HYCLATE 100 MG
100 TABLET ORAL 2 TIMES DAILY
Qty: 28 TABLET | Refills: 0 | Status: SHIPPED | OUTPATIENT
Start: 2022-03-14 | End: 2022-03-28

## 2022-03-14 RX ORDER — IBUPROFEN 800 MG/1
800 TABLET ORAL EVERY 6 HOURS PRN
Qty: 20 TABLET | Refills: 0 | Status: SHIPPED | OUTPATIENT
Start: 2022-03-14 | End: 2022-03-19

## 2022-03-14 NOTE — PROGRESS NOTES
SUBJECTIVE:    Patient ID: Lewis Matias is a 66 y.o. male.    Chief Complaint: Testicle Pain (Left testicle pain x 3-4 days // did not bring bottles ac )    66 year old male presents for urgent visit.  Treated for htn, gerd, oa. Taking medication as prescribed. Taking lisinopril daily. bp is well controlled. Reports that last week did do an extra amount of sitting. Reports that on Friday night noticed pain and swelling to left testicle. Pain is constant. Worse with movement. Does have pain with urination. No blood. No back pain      Office Visit on 03/14/2022   Component Date Value Ref Range Status    POC Blood, Urine 03/14/2022 Negative  Negative Final    POC Bilirubin, Urine 03/14/2022 Negative  Negative Final    POC Urobilinogen, Urine 03/14/2022 0.2 (A) 0.3 - 2.2 Final    POC Ketones, Urine 03/14/2022 Negative  Negative Final    POC Protein, Urine 03/14/2022 Negative  Negative Final    POC Nitrates, Urine 03/14/2022 Negative  Negative Final    POC Glucose, Urine 03/14/2022 Negative  Negative Final    POC Specific Gravity, Urine 03/14/2022 1.015  1.003 - 1.029 Final    POC Leukocytes, Urine 03/14/2022 Negative  Negative Final   Orders Only on 01/24/2022   Component Date Value Ref Range Status    Cholesterol 01/24/2022 139  <200 mg/dL Final    HDL 01/24/2022 47  > OR = 40 mg/dL Final    Triglycerides 01/24/2022 132  <150 mg/dL Final    LDL Cholesterol 01/24/2022 70  mg/dL (calc) Final    HDL/Cholesterol Ratio 01/24/2022 3.0  <5.0 (calc) Final    Non HDL Chol. (LDL+VLDL) 01/24/2022 92  <130 mg/dL (calc) Final    Glucose 01/24/2022 99  65 - 139 mg/dL Final    BUN 01/24/2022 14  7 - 25 mg/dL Final    Creatinine 01/24/2022 1.06  0.70 - 1.25 mg/dL Final    eGFR if non  01/24/2022 72  > OR = 60 mL/min/1.73m2 Final    eGFR if  01/24/2022 84  > OR = 60 mL/min/1.73m2 Final    BUN/Creatinine Ratio 01/24/2022 NOT APPLICABLE  6 - 22 (calc) Final    Sodium 01/24/2022  138  135 - 146 mmol/L Final    Potassium 01/24/2022 4.3  3.5 - 5.3 mmol/L Final    Chloride 01/24/2022 102  98 - 110 mmol/L Final    CO2 01/24/2022 30  20 - 32 mmol/L Final    Calcium 01/24/2022 9.7  8.6 - 10.3 mg/dL Final    Total Protein 01/24/2022 6.6  6.1 - 8.1 g/dL Final    Albumin 01/24/2022 4.3  3.6 - 5.1 g/dL Final    Globulin, Total 01/24/2022 2.3  1.9 - 3.7 g/dL (calc) Final    Albumin/Globulin Ratio 01/24/2022 1.9  1.0 - 2.5 (calc) Final    Total Bilirubin 01/24/2022 1.2  0.2 - 1.2 mg/dL Final    Alkaline Phosphatase 01/24/2022 59  35 - 144 U/L Final    AST 01/24/2022 19  10 - 35 U/L Final    ALT 01/24/2022 21  9 - 46 U/L Final    Creatinine, Urine 01/24/2022 85  20 - 320 mg/dL Final    Microalb, Ur 01/24/2022 0.2  See Note: mg/dL Final    Microalb/Creat Ratio 01/24/2022 2  <30 mcg/mg creat Final    Color, UA 01/24/2022 YELLOW  YELLOW Final    Appearance, UA 01/24/2022 CLEAR  CLEAR Final    Specific Gravity, UA 01/24/2022 1.014  1.001 - 1.035 Final    pH, UA 01/24/2022 7.0  5.0 - 8.0 Final    Glucose, UA 01/24/2022 NEGATIVE  NEGATIVE Final    Bilirubin, UA 01/24/2022 NEGATIVE  NEGATIVE Final    Ketones, UA 01/24/2022 NEGATIVE  NEGATIVE Final    Occult Blood UA 01/24/2022 NEGATIVE  NEGATIVE Final    Protein, UA 01/24/2022 NEGATIVE  NEGATIVE Final    Nitrite, UA 01/24/2022 NEGATIVE  NEGATIVE Final    Leukocytes, UA 01/24/2022 NEGATIVE  NEGATIVE Final    WBC Casts, UA 01/24/2022 NONE SEEN  < OR = 5 /HPF Final    RBC Casts, UA 01/24/2022 NONE SEEN  < OR = 2 /HPF Final    Squam Epithel, UA 01/24/2022 NONE SEEN  < OR = 5 /HPF Final    Bacteria, UA 01/24/2022 NONE SEEN  NONE SEEN /HPF Final    Hyaline Casts, UA 01/24/2022 NONE SEEN  NONE SEEN /LPF Final    Reflexive Urine Culture 01/24/2022    Final    WBC 01/24/2022 5.5  3.8 - 10.8 Thousand/uL Final    RBC 01/24/2022 5.48  4.20 - 5.80 Million/uL Final    Hemoglobin 01/24/2022 16.3  13.2 - 17.1 g/dL Final    Hematocrit  01/24/2022 48.7  38.5 - 50.0 % Final    MCV 01/24/2022 88.9  80.0 - 100.0 fL Final    MCH 01/24/2022 29.7  27.0 - 33.0 pg Final    MCHC 01/24/2022 33.5  32.0 - 36.0 g/dL Final    RDW 01/24/2022 13.3  11.0 - 15.0 % Final    Platelets 01/24/2022 159  140 - 400 Thousand/uL Final    MPV 01/24/2022 12.0  7.5 - 12.5 fL Final    Neutrophils, Abs 01/24/2022 3,218  1,500 - 7,800 cells/uL Final    Lymph # 01/24/2022 1,447  850 - 3,900 cells/uL Final    Mono # 01/24/2022 616  200 - 950 cells/uL Final    Eos # 01/24/2022 182  15 - 500 cells/uL Final    Baso # 01/24/2022 39  0 - 200 cells/uL Final    Neutrophils Relative 01/24/2022 58.5  % Final    Lymph % 01/24/2022 26.3  % Final    Mono % 01/24/2022 11.2  % Final    Eosinophil % 01/24/2022 3.3  % Final    Basophil % 01/24/2022 0.7  % Final    TSH w/reflex to FT4 01/24/2022 3.89  0.40 - 4.50 mIU/L Final    PROSTATE SPECIFIC ANTIGEN, SCR - Q* 01/24/2022 1.30  < OR = 4.00 ng/mL Final   Lab Visit on 10/12/2021   Component Date Value Ref Range Status    COVID-19 (SARS CoV-2) IgG Antibody* 10/12/2021 29831.7 (A) <50.0 AU/ml Final    COVID-19 (SARS CoV-2) IgG Antibody* 10/12/2021 Positive (A) Negative Final       Past Medical History:   Diagnosis Date    COVID-19 8/2/20021    GERD (gastroesophageal reflux disease)     Hard of hearing     Hiatal hernia     Hypertension     Kidney stones     Knee pain 06/2020    left     Social History     Socioeconomic History    Marital status:    Tobacco Use    Smoking status: Never Smoker    Smokeless tobacco: Never Used   Substance and Sexual Activity    Alcohol use: Yes     Comment: beer    Drug use: Never    Sexual activity: Yes     Partners: Female     Past Surgical History:   Procedure Laterality Date    CHOLECYSTECTOMY      COLONOSCOPY  2012    Dr Peteror- rtc 10 yr    HAND SURGERY      traumatic injury    HEMORRHOID SURGERY      KIDNEY STONE SURGERY      KNEE ARTHROSCOPY W/ MENISCECTOMY Left  "11/4/2020    Procedure: ARTHROSCOPY, KNEE, WITH PARTIAL MEDIAL MENISCECTOMY;  Surgeon: Andres Blanco MD;  Location: [facility];  Service: Orthopedics;  Laterality: Left;    TONSILLECTOMY       History reviewed. No pertinent family history.    Review of patient's allergies indicates:   Allergen Reactions    Iodinated contrast media Hives and Shortness Of Breath       Current Outpatient Medications:     doxycycline (VIBRA-TABS) 100 MG tablet, Take 1 tablet (100 mg total) by mouth 2 (two) times daily. for 14 days, Disp: 28 tablet, Rfl: 0    esomeprazole (NEXIUM) 40 MG capsule, Take 1 capsule (40 mg total) by mouth once daily., Disp: 90 capsule, Rfl: 1    ibuprofen (ADVIL,MOTRIN) 800 MG tablet, Take 1 tablet (800 mg total) by mouth every 6 (six) hours as needed for Pain., Disp: 20 tablet, Rfl: 0    lisinopriL 10 MG tablet, Take 1 tablet (10 mg total) by mouth once daily., Disp: 90 tablet, Rfl: 1    sildenafiL (VIAGRA) 100 MG tablet, Take 1 tablet (100 mg total) by mouth daily as needed for Erectile Dysfunction (prn sex)., Disp: 20 tablet, Rfl: 0    Review of Systems   Constitutional: Negative for fatigue, fever and unexpected weight change.   Respiratory: Negative for cough and shortness of breath.    Cardiovascular: Negative for chest pain and leg swelling.   Gastrointestinal: Negative for abdominal pain, constipation, nausea and vomiting.   Genitourinary: Positive for testicular pain. Negative for dysuria, frequency and urgency.   Musculoskeletal: Negative for arthralgias.   Neurological: Negative for dizziness, weakness and headaches.          Objective:      Vitals:    03/14/22 1010   BP: 118/72   Pulse: 64   Weight: 117.5 kg (259 lb)   Height: 5' 8" (1.727 m)     Physical Exam  Vitals and nursing note reviewed.   Constitutional:       General: He is not in acute distress.     Appearance: He is well-developed. He is obese. He is not ill-appearing.   Neck:      Trachea: No tracheal deviation.   Cardiovascular:    "   Rate and Rhythm: Normal rate and regular rhythm.      Heart sounds: No murmur heard.    No friction rub. No gallop.   Pulmonary:      Breath sounds: Normal breath sounds. No stridor. No wheezing or rales.   Abdominal:      Palpations: Abdomen is soft. There is no mass.      Tenderness: There is no abdominal tenderness.   Genitourinary:     Testes:         Left: Tenderness and swelling present.      Epididymis:      Left: Enlarged. Tenderness present.   Musculoskeletal:         General: No tenderness or deformity.      Cervical back: Neck supple.   Lymphadenopathy:      Cervical: No cervical adenopathy.   Skin:     General: Skin is warm and dry.   Neurological:      Mental Status: He is alert and oriented to person, place, and time.      Coordination: Coordination normal.           Assessment:       1. Essential hypertension    2. Left testicular pain    3. Epididymitis    4. Gastroesophageal reflux disease without esophagitis         Plan:       Essential hypertension    Left testicular pain  -     US Scrotum And Testicles; Future; Expected date: 03/14/2022  -     POCT Urinalysis, Dipstick, Automated, W/O Scope    Epididymitis    Gastroesophageal reflux disease without esophagitis    Other orders  -     Discontinue: doxycycline (MONODOX) 100 MG capsule; Take 1 capsule (100 mg total) by mouth 2 (two) times daily. for 7 days  Dispense: 14 capsule; Refill: 0  -     ibuprofen (ADVIL,MOTRIN) 800 MG tablet; Take 1 tablet (800 mg total) by mouth every 6 (six) hours as needed for Pain.  Dispense: 20 tablet; Refill: 0  -     doxycycline (VIBRA-TABS) 100 MG tablet; Take 1 tablet (100 mg total) by mouth 2 (two) times daily. for 14 days  Dispense: 28 tablet; Refill: 0      Follow up in 1 week (on 3/21/2022), or if symptoms worsen or fail to improve, for medication management.        3/16/2022 Ping Juarez

## 2022-03-17 ENCOUNTER — OFFICE VISIT (OUTPATIENT)
Dept: FAMILY MEDICINE | Facility: CLINIC | Age: 67
End: 2022-03-17
Payer: MEDICARE

## 2022-03-17 VITALS
HEIGHT: 68 IN | DIASTOLIC BLOOD PRESSURE: 86 MMHG | WEIGHT: 261 LBS | BODY MASS INDEX: 39.56 KG/M2 | HEART RATE: 84 BPM | SYSTOLIC BLOOD PRESSURE: 138 MMHG

## 2022-03-17 DIAGNOSIS — N50.812 LEFT TESTICULAR PAIN: ICD-10-CM

## 2022-03-17 DIAGNOSIS — N45.1 EPIDIDYMITIS: ICD-10-CM

## 2022-03-17 DIAGNOSIS — I10 ESSENTIAL HYPERTENSION: Primary | ICD-10-CM

## 2022-03-17 PROCEDURE — 1125F AMNT PAIN NOTED PAIN PRSNT: CPT | Mod: S$GLB,,, | Performed by: NURSE PRACTITIONER

## 2022-03-17 PROCEDURE — 3061F NEG MICROALBUMINURIA REV: CPT | Mod: S$GLB,,, | Performed by: NURSE PRACTITIONER

## 2022-03-17 PROCEDURE — 1160F PR REVIEW ALL MEDS BY PRESCRIBER/CLIN PHARMACIST DOCUMENTED: ICD-10-PCS | Mod: S$GLB,,, | Performed by: NURSE PRACTITIONER

## 2022-03-17 PROCEDURE — 1159F MED LIST DOCD IN RCRD: CPT | Mod: S$GLB,,, | Performed by: NURSE PRACTITIONER

## 2022-03-17 PROCEDURE — 3075F PR MOST RECENT SYSTOLIC BLOOD PRESS GE 130-139MM HG: ICD-10-PCS | Mod: S$GLB,,, | Performed by: NURSE PRACTITIONER

## 2022-03-17 PROCEDURE — 1125F PR PAIN SEVERITY QUANTIFIED, PAIN PRESENT: ICD-10-PCS | Mod: S$GLB,,, | Performed by: NURSE PRACTITIONER

## 2022-03-17 PROCEDURE — 3075F SYST BP GE 130 - 139MM HG: CPT | Mod: S$GLB,,, | Performed by: NURSE PRACTITIONER

## 2022-03-17 PROCEDURE — 3066F NEPHROPATHY DOC TX: CPT | Mod: S$GLB,,, | Performed by: NURSE PRACTITIONER

## 2022-03-17 PROCEDURE — 99213 PR OFFICE/OUTPT VISIT, EST, LEVL III, 20-29 MIN: ICD-10-PCS | Mod: S$GLB,,, | Performed by: NURSE PRACTITIONER

## 2022-03-17 PROCEDURE — 1159F PR MEDICATION LIST DOCUMENTED IN MEDICAL RECORD: ICD-10-PCS | Mod: S$GLB,,, | Performed by: NURSE PRACTITIONER

## 2022-03-17 PROCEDURE — 3079F PR MOST RECENT DIASTOLIC BLOOD PRESSURE 80-89 MM HG: ICD-10-PCS | Mod: S$GLB,,, | Performed by: NURSE PRACTITIONER

## 2022-03-17 PROCEDURE — 3061F PR NEG MICROALBUMINURIA RESULT DOCUMENTED/REVIEW: ICD-10-PCS | Mod: S$GLB,,, | Performed by: NURSE PRACTITIONER

## 2022-03-17 PROCEDURE — 99213 OFFICE O/P EST LOW 20 MIN: CPT | Mod: S$GLB,,, | Performed by: NURSE PRACTITIONER

## 2022-03-17 PROCEDURE — 3079F DIAST BP 80-89 MM HG: CPT | Mod: S$GLB,,, | Performed by: NURSE PRACTITIONER

## 2022-03-17 PROCEDURE — 3008F BODY MASS INDEX DOCD: CPT | Mod: S$GLB,,, | Performed by: NURSE PRACTITIONER

## 2022-03-17 PROCEDURE — 4010F PR ACE/ARB THEARPY RXD/TAKEN: ICD-10-PCS | Mod: S$GLB,,, | Performed by: NURSE PRACTITIONER

## 2022-03-17 PROCEDURE — 3008F PR BODY MASS INDEX (BMI) DOCUMENTED: ICD-10-PCS | Mod: S$GLB,,, | Performed by: NURSE PRACTITIONER

## 2022-03-17 PROCEDURE — 4010F ACE/ARB THERAPY RXD/TAKEN: CPT | Mod: S$GLB,,, | Performed by: NURSE PRACTITIONER

## 2022-03-17 PROCEDURE — 1160F RVW MEDS BY RX/DR IN RCRD: CPT | Mod: S$GLB,,, | Performed by: NURSE PRACTITIONER

## 2022-03-17 PROCEDURE — 3066F PR DOCUMENTATION OF TREATMENT FOR NEPHROPATHY: ICD-10-PCS | Mod: S$GLB,,, | Performed by: NURSE PRACTITIONER

## 2022-03-17 NOTE — PROGRESS NOTES
SUBJECTIVE:    Patient ID: Lewis Matias is a 66 y.o. male.    Chief Complaint: Follow-up (1 week f/u for testicle swelling ac )    66 year old male presents for follow up.  Treated for htn, gerd, oa. Was seen in our office earlier this week with complaints of left testicular pain and swelling. Was sent for ultrasound which did show Heterogeneous enlarged hyperemic left epididymis most suggestive of epididymitis and  focal rounded area of heterogeneity within the left epididymis suggestive of focal inflammation. Was started on doxycycline, motrin and scrotal elevation. Today is feeling better. Still has some discomfort but has decreased to manageable level. No fever. Leaving for cruise on Saturday.   Taking lisinopril daily. bp is well controlled.      Office Visit on 03/14/2022   Component Date Value Ref Range Status    POC Blood, Urine 03/14/2022 Negative  Negative Final    POC Bilirubin, Urine 03/14/2022 Negative  Negative Final    POC Urobilinogen, Urine 03/14/2022 0.2 (A) 0.3 - 2.2 Final    POC Ketones, Urine 03/14/2022 Negative  Negative Final    POC Protein, Urine 03/14/2022 Negative  Negative Final    POC Nitrates, Urine 03/14/2022 Negative  Negative Final    POC Glucose, Urine 03/14/2022 Negative  Negative Final    POC Specific Gravity, Urine 03/14/2022 1.015  1.003 - 1.029 Final    POC Leukocytes, Urine 03/14/2022 Negative  Negative Final   Orders Only on 01/24/2022   Component Date Value Ref Range Status    Cholesterol 01/24/2022 139  <200 mg/dL Final    HDL 01/24/2022 47  > OR = 40 mg/dL Final    Triglycerides 01/24/2022 132  <150 mg/dL Final    LDL Cholesterol 01/24/2022 70  mg/dL (calc) Final    HDL/Cholesterol Ratio 01/24/2022 3.0  <5.0 (calc) Final    Non HDL Chol. (LDL+VLDL) 01/24/2022 92  <130 mg/dL (calc) Final    Glucose 01/24/2022 99  65 - 139 mg/dL Final    BUN 01/24/2022 14  7 - 25 mg/dL Final    Creatinine 01/24/2022 1.06  0.70 - 1.25 mg/dL Final    eGFR if non African  American 01/24/2022 72  > OR = 60 mL/min/1.73m2 Final    eGFR if  01/24/2022 84  > OR = 60 mL/min/1.73m2 Final    BUN/Creatinine Ratio 01/24/2022 NOT APPLICABLE  6 - 22 (calc) Final    Sodium 01/24/2022 138  135 - 146 mmol/L Final    Potassium 01/24/2022 4.3  3.5 - 5.3 mmol/L Final    Chloride 01/24/2022 102  98 - 110 mmol/L Final    CO2 01/24/2022 30  20 - 32 mmol/L Final    Calcium 01/24/2022 9.7  8.6 - 10.3 mg/dL Final    Total Protein 01/24/2022 6.6  6.1 - 8.1 g/dL Final    Albumin 01/24/2022 4.3  3.6 - 5.1 g/dL Final    Globulin, Total 01/24/2022 2.3  1.9 - 3.7 g/dL (calc) Final    Albumin/Globulin Ratio 01/24/2022 1.9  1.0 - 2.5 (calc) Final    Total Bilirubin 01/24/2022 1.2  0.2 - 1.2 mg/dL Final    Alkaline Phosphatase 01/24/2022 59  35 - 144 U/L Final    AST 01/24/2022 19  10 - 35 U/L Final    ALT 01/24/2022 21  9 - 46 U/L Final    Creatinine, Urine 01/24/2022 85  20 - 320 mg/dL Final    Microalb, Ur 01/24/2022 0.2  See Note: mg/dL Final    Microalb/Creat Ratio 01/24/2022 2  <30 mcg/mg creat Final    Color, UA 01/24/2022 YELLOW  YELLOW Final    Appearance, UA 01/24/2022 CLEAR  CLEAR Final    Specific Gravity, UA 01/24/2022 1.014  1.001 - 1.035 Final    pH, UA 01/24/2022 7.0  5.0 - 8.0 Final    Glucose, UA 01/24/2022 NEGATIVE  NEGATIVE Final    Bilirubin, UA 01/24/2022 NEGATIVE  NEGATIVE Final    Ketones, UA 01/24/2022 NEGATIVE  NEGATIVE Final    Occult Blood UA 01/24/2022 NEGATIVE  NEGATIVE Final    Protein, UA 01/24/2022 NEGATIVE  NEGATIVE Final    Nitrite, UA 01/24/2022 NEGATIVE  NEGATIVE Final    Leukocytes, UA 01/24/2022 NEGATIVE  NEGATIVE Final    WBC Casts, UA 01/24/2022 NONE SEEN  < OR = 5 /HPF Final    RBC Casts, UA 01/24/2022 NONE SEEN  < OR = 2 /HPF Final    Squam Epithel, UA 01/24/2022 NONE SEEN  < OR = 5 /HPF Final    Bacteria, UA 01/24/2022 NONE SEEN  NONE SEEN /HPF Final    Hyaline Casts, UA 01/24/2022 NONE SEEN  NONE SEEN /LPF Final     Reflexive Urine Culture 01/24/2022    Final    WBC 01/24/2022 5.5  3.8 - 10.8 Thousand/uL Final    RBC 01/24/2022 5.48  4.20 - 5.80 Million/uL Final    Hemoglobin 01/24/2022 16.3  13.2 - 17.1 g/dL Final    Hematocrit 01/24/2022 48.7  38.5 - 50.0 % Final    MCV 01/24/2022 88.9  80.0 - 100.0 fL Final    MCH 01/24/2022 29.7  27.0 - 33.0 pg Final    MCHC 01/24/2022 33.5  32.0 - 36.0 g/dL Final    RDW 01/24/2022 13.3  11.0 - 15.0 % Final    Platelets 01/24/2022 159  140 - 400 Thousand/uL Final    MPV 01/24/2022 12.0  7.5 - 12.5 fL Final    Neutrophils, Abs 01/24/2022 3,218  1,500 - 7,800 cells/uL Final    Lymph # 01/24/2022 1,447  850 - 3,900 cells/uL Final    Mono # 01/24/2022 616  200 - 950 cells/uL Final    Eos # 01/24/2022 182  15 - 500 cells/uL Final    Baso # 01/24/2022 39  0 - 200 cells/uL Final    Neutrophils Relative 01/24/2022 58.5  % Final    Lymph % 01/24/2022 26.3  % Final    Mono % 01/24/2022 11.2  % Final    Eosinophil % 01/24/2022 3.3  % Final    Basophil % 01/24/2022 0.7  % Final    TSH w/reflex to FT4 01/24/2022 3.89  0.40 - 4.50 mIU/L Final    PROSTATE SPECIFIC ANTIGEN, SCR - Q* 01/24/2022 1.30  < OR = 4.00 ng/mL Final   Lab Visit on 10/12/2021   Component Date Value Ref Range Status    COVID-19 (SARS CoV-2) IgG Antibody* 10/12/2021 71037.7 (A) <50.0 AU/ml Final    COVID-19 (SARS CoV-2) IgG Antibody* 10/12/2021 Positive (A) Negative Final       Past Medical History:   Diagnosis Date    COVID-19 8/2/20021    GERD (gastroesophageal reflux disease)     Hard of hearing     Hiatal hernia     Hypertension     Kidney stones     Knee pain 06/2020    left     Social History     Socioeconomic History    Marital status:    Tobacco Use    Smoking status: Never Smoker    Smokeless tobacco: Never Used   Substance and Sexual Activity    Alcohol use: Yes     Comment: beer    Drug use: Never    Sexual activity: Yes     Partners: Female     Past Surgical History:   Procedure  "Laterality Date    CHOLECYSTECTOMY      COLONOSCOPY  2012     Nadeem- rtc 10 yr    HAND SURGERY      traumatic injury    HEMORRHOID SURGERY      KIDNEY STONE SURGERY      KNEE ARTHROSCOPY W/ MENISCECTOMY Left 11/4/2020    Procedure: ARTHROSCOPY, KNEE, WITH PARTIAL MEDIAL MENISCECTOMY;  Surgeon: Andres Blanco MD;  Location: [facility];  Service: Orthopedics;  Laterality: Left;    TONSILLECTOMY       History reviewed. No pertinent family history.    Review of patient's allergies indicates:   Allergen Reactions    Iodinated contrast media Hives and Shortness Of Breath       Current Outpatient Medications:     doxycycline (VIBRA-TABS) 100 MG tablet, Take 1 tablet (100 mg total) by mouth 2 (two) times daily. for 14 days, Disp: 28 tablet, Rfl: 0    esomeprazole (NEXIUM) 40 MG capsule, Take 1 capsule (40 mg total) by mouth once daily., Disp: 90 capsule, Rfl: 1    ibuprofen (ADVIL,MOTRIN) 800 MG tablet, Take 1 tablet (800 mg total) by mouth every 6 (six) hours as needed for Pain., Disp: 20 tablet, Rfl: 0    lisinopriL 10 MG tablet, Take 1 tablet (10 mg total) by mouth once daily., Disp: 90 tablet, Rfl: 1    sildenafiL (VIAGRA) 100 MG tablet, Take 1 tablet (100 mg total) by mouth daily as needed for Erectile Dysfunction (prn sex)., Disp: 20 tablet, Rfl: 0    Review of Systems   Constitutional: Negative for fatigue, fever and unexpected weight change.   Respiratory: Negative for cough and shortness of breath.    Cardiovascular: Negative for chest pain and leg swelling.   Gastrointestinal: Negative for abdominal pain, constipation, nausea and vomiting.   Genitourinary: Positive for testicular pain. Negative for dysuria, frequency and urgency.   Musculoskeletal: Negative for arthralgias.   Neurological: Negative for dizziness, weakness and headaches.          Objective:      Vitals:    03/17/22 1015   BP: 138/86   Pulse: 84   Weight: 118.4 kg (261 lb)   Height: 5' 8" (1.727 m)     Physical Exam  Vitals and nursing " note reviewed.   Constitutional:       General: He is not in acute distress.     Appearance: He is well-developed. He is obese. He is not ill-appearing.   Neck:      Trachea: No tracheal deviation.   Cardiovascular:      Rate and Rhythm: Normal rate and regular rhythm.      Heart sounds: No murmur heard.    No friction rub. No gallop.   Pulmonary:      Breath sounds: Normal breath sounds. No stridor. No wheezing or rales.   Abdominal:      Palpations: Abdomen is soft. There is no mass.      Tenderness: There is no abdominal tenderness.   Genitourinary:     Testes:         Left: Tenderness and swelling present.      Epididymis:      Left: Enlarged. Tenderness present.      Comments: Decrease in swelling since last visit. Decrease in tenderness  Musculoskeletal:         General: No tenderness or deformity.      Cervical back: Neck supple.   Lymphadenopathy:      Cervical: No cervical adenopathy.   Skin:     General: Skin is warm and dry.   Neurological:      Mental Status: He is alert and oriented to person, place, and time.      Coordination: Coordination normal.           Assessment:       1. Essential hypertension    2. Epididymitis    3. Left testicular pain         Plan:       Essential hypertension    Epididymitis  Comments:  continue doxycycline bid and motrin    Left testicular pain      Follow up in about 2 weeks (around 3/31/2022), or if symptoms worsen or fail to improve, for medication management.        3/17/2022 Ping Juarez

## 2022-03-30 ENCOUNTER — OFFICE VISIT (OUTPATIENT)
Dept: FAMILY MEDICINE | Facility: CLINIC | Age: 67
End: 2022-03-30
Payer: MEDICARE

## 2022-03-30 VITALS
HEART RATE: 88 BPM | HEIGHT: 68 IN | BODY MASS INDEX: 39.4 KG/M2 | DIASTOLIC BLOOD PRESSURE: 66 MMHG | WEIGHT: 260 LBS | SYSTOLIC BLOOD PRESSURE: 110 MMHG

## 2022-03-30 DIAGNOSIS — N45.1 EPIDIDYMITIS: ICD-10-CM

## 2022-03-30 DIAGNOSIS — Z79.899 HIGH RISK MEDICATION USE: ICD-10-CM

## 2022-03-30 DIAGNOSIS — I10 ESSENTIAL HYPERTENSION: ICD-10-CM

## 2022-03-30 DIAGNOSIS — R93.89 ABNORMAL ULTRASOUND: Primary | ICD-10-CM

## 2022-03-30 DIAGNOSIS — K21.9 GASTROESOPHAGEAL REFLUX DISEASE WITHOUT ESOPHAGITIS: ICD-10-CM

## 2022-03-30 PROCEDURE — 99214 OFFICE O/P EST MOD 30 MIN: CPT | Mod: S$GLB,,, | Performed by: NURSE PRACTITIONER

## 2022-03-30 PROCEDURE — 4010F PR ACE/ARB THEARPY RXD/TAKEN: ICD-10-PCS | Mod: S$GLB,,, | Performed by: NURSE PRACTITIONER

## 2022-03-30 PROCEDURE — 1159F PR MEDICATION LIST DOCUMENTED IN MEDICAL RECORD: ICD-10-PCS | Mod: S$GLB,,, | Performed by: NURSE PRACTITIONER

## 2022-03-30 PROCEDURE — 1159F MED LIST DOCD IN RCRD: CPT | Mod: S$GLB,,, | Performed by: NURSE PRACTITIONER

## 2022-03-30 PROCEDURE — 3008F PR BODY MASS INDEX (BMI) DOCUMENTED: ICD-10-PCS | Mod: S$GLB,,, | Performed by: NURSE PRACTITIONER

## 2022-03-30 PROCEDURE — 4010F ACE/ARB THERAPY RXD/TAKEN: CPT | Mod: S$GLB,,, | Performed by: NURSE PRACTITIONER

## 2022-03-30 PROCEDURE — 99214 PR OFFICE/OUTPT VISIT, EST, LEVL IV, 30-39 MIN: ICD-10-PCS | Mod: S$GLB,,, | Performed by: NURSE PRACTITIONER

## 2022-03-30 PROCEDURE — 3061F PR NEG MICROALBUMINURIA RESULT DOCUMENTED/REVIEW: ICD-10-PCS | Mod: S$GLB,,, | Performed by: NURSE PRACTITIONER

## 2022-03-30 PROCEDURE — 1160F RVW MEDS BY RX/DR IN RCRD: CPT | Mod: S$GLB,,, | Performed by: NURSE PRACTITIONER

## 2022-03-30 PROCEDURE — 1160F PR REVIEW ALL MEDS BY PRESCRIBER/CLIN PHARMACIST DOCUMENTED: ICD-10-PCS | Mod: S$GLB,,, | Performed by: NURSE PRACTITIONER

## 2022-03-30 PROCEDURE — 3061F NEG MICROALBUMINURIA REV: CPT | Mod: S$GLB,,, | Performed by: NURSE PRACTITIONER

## 2022-03-30 PROCEDURE — 3066F PR DOCUMENTATION OF TREATMENT FOR NEPHROPATHY: ICD-10-PCS | Mod: S$GLB,,, | Performed by: NURSE PRACTITIONER

## 2022-03-30 PROCEDURE — 3066F NEPHROPATHY DOC TX: CPT | Mod: S$GLB,,, | Performed by: NURSE PRACTITIONER

## 2022-03-30 PROCEDURE — 3008F BODY MASS INDEX DOCD: CPT | Mod: S$GLB,,, | Performed by: NURSE PRACTITIONER

## 2022-03-30 PROCEDURE — 3078F DIAST BP <80 MM HG: CPT | Mod: S$GLB,,, | Performed by: NURSE PRACTITIONER

## 2022-03-30 PROCEDURE — 3074F PR MOST RECENT SYSTOLIC BLOOD PRESSURE < 130 MM HG: ICD-10-PCS | Mod: S$GLB,,, | Performed by: NURSE PRACTITIONER

## 2022-03-30 PROCEDURE — 3078F PR MOST RECENT DIASTOLIC BLOOD PRESSURE < 80 MM HG: ICD-10-PCS | Mod: S$GLB,,, | Performed by: NURSE PRACTITIONER

## 2022-03-30 PROCEDURE — 3074F SYST BP LT 130 MM HG: CPT | Mod: S$GLB,,, | Performed by: NURSE PRACTITIONER

## 2022-03-30 RX ORDER — ESOMEPRAZOLE MAGNESIUM 40 MG/1
40 CAPSULE, DELAYED RELEASE ORAL DAILY
Qty: 90 CAPSULE | Refills: 1 | Status: SHIPPED | OUTPATIENT
Start: 2022-03-30 | End: 2023-05-10 | Stop reason: SDUPTHER

## 2022-03-30 RX ORDER — LISINOPRIL 10 MG/1
10 TABLET ORAL DAILY
Qty: 90 TABLET | Refills: 1 | Status: SHIPPED | OUTPATIENT
Start: 2022-03-30 | End: 2022-12-16 | Stop reason: SDUPTHER

## 2022-04-05 ENCOUNTER — HOSPITAL ENCOUNTER (OUTPATIENT)
Dept: RADIOLOGY | Facility: HOSPITAL | Age: 67
Discharge: HOME | End: 2022-04-05
Attending: NURSE PRACTITIONER
Payer: MEDICARE

## 2022-04-05 ENCOUNTER — TELEPHONE (OUTPATIENT)
Dept: FAMILY MEDICINE | Facility: CLINIC | Age: 67
End: 2022-04-05
Payer: MEDICARE

## 2022-04-05 DIAGNOSIS — R93.89 ABNORMAL ULTRASOUND: ICD-10-CM

## 2022-04-05 PROCEDURE — 76870 US EXAM SCROTUM: CPT | Mod: TC

## 2022-04-05 NOTE — PROGRESS NOTES
SUBJECTIVE:    Patient ID: Lewis Matias is a 66 y.o. male.    Chief Complaint: Hypertension (Went over meds verbally// SW)    66 year old male presents for follow up.  Treated for htn, gerd, oa. Was seen in our office 2 weeks ago with complaints of left testicular pain and swelling. Was sent for ultrasound which did show Heterogeneous enlarged hyperemic left epididymis most suggestive of epididymitis and  focal rounded area of heterogeneity within the left epididymis suggestive of focal inflammation. completed doxycycline, motrin and scrotal elevation. Today is feeling better. No discomfort persists. No hematuria. No pain with urination Taking lisinopril daily. bp is well controlled.      Office Visit on 03/14/2022   Component Date Value Ref Range Status    POC Blood, Urine 03/14/2022 Negative  Negative Final    POC Bilirubin, Urine 03/14/2022 Negative  Negative Final    POC Urobilinogen, Urine 03/14/2022 0.2 (A) 0.3 - 2.2 Final    POC Ketones, Urine 03/14/2022 Negative  Negative Final    POC Protein, Urine 03/14/2022 Negative  Negative Final    POC Nitrates, Urine 03/14/2022 Negative  Negative Final    POC Glucose, Urine 03/14/2022 Negative  Negative Final    POC Specific Gravity, Urine 03/14/2022 1.015  1.003 - 1.029 Final    POC Leukocytes, Urine 03/14/2022 Negative  Negative Final   Orders Only on 01/24/2022   Component Date Value Ref Range Status    Cholesterol 01/24/2022 139  <200 mg/dL Final    HDL 01/24/2022 47  > OR = 40 mg/dL Final    Triglycerides 01/24/2022 132  <150 mg/dL Final    LDL Cholesterol 01/24/2022 70  mg/dL (calc) Final    HDL/Cholesterol Ratio 01/24/2022 3.0  <5.0 (calc) Final    Non HDL Chol. (LDL+VLDL) 01/24/2022 92  <130 mg/dL (calc) Final    Glucose 01/24/2022 99  65 - 139 mg/dL Final    BUN 01/24/2022 14  7 - 25 mg/dL Final    Creatinine 01/24/2022 1.06  0.70 - 1.25 mg/dL Final    eGFR if non  01/24/2022 72  > OR = 60 mL/min/1.73m2 Final    eGFR  if  01/24/2022 84  > OR = 60 mL/min/1.73m2 Final    BUN/Creatinine Ratio 01/24/2022 NOT APPLICABLE  6 - 22 (calc) Final    Sodium 01/24/2022 138  135 - 146 mmol/L Final    Potassium 01/24/2022 4.3  3.5 - 5.3 mmol/L Final    Chloride 01/24/2022 102  98 - 110 mmol/L Final    CO2 01/24/2022 30  20 - 32 mmol/L Final    Calcium 01/24/2022 9.7  8.6 - 10.3 mg/dL Final    Total Protein 01/24/2022 6.6  6.1 - 8.1 g/dL Final    Albumin 01/24/2022 4.3  3.6 - 5.1 g/dL Final    Globulin, Total 01/24/2022 2.3  1.9 - 3.7 g/dL (calc) Final    Albumin/Globulin Ratio 01/24/2022 1.9  1.0 - 2.5 (calc) Final    Total Bilirubin 01/24/2022 1.2  0.2 - 1.2 mg/dL Final    Alkaline Phosphatase 01/24/2022 59  35 - 144 U/L Final    AST 01/24/2022 19  10 - 35 U/L Final    ALT 01/24/2022 21  9 - 46 U/L Final    Creatinine, Urine 01/24/2022 85  20 - 320 mg/dL Final    Microalb, Ur 01/24/2022 0.2  See Note: mg/dL Final    Microalb/Creat Ratio 01/24/2022 2  <30 mcg/mg creat Final    Color, UA 01/24/2022 YELLOW  YELLOW Final    Appearance, UA 01/24/2022 CLEAR  CLEAR Final    Specific Gravity, UA 01/24/2022 1.014  1.001 - 1.035 Final    pH, UA 01/24/2022 7.0  5.0 - 8.0 Final    Glucose, UA 01/24/2022 NEGATIVE  NEGATIVE Final    Bilirubin, UA 01/24/2022 NEGATIVE  NEGATIVE Final    Ketones, UA 01/24/2022 NEGATIVE  NEGATIVE Final    Occult Blood UA 01/24/2022 NEGATIVE  NEGATIVE Final    Protein, UA 01/24/2022 NEGATIVE  NEGATIVE Final    Nitrite, UA 01/24/2022 NEGATIVE  NEGATIVE Final    Leukocytes, UA 01/24/2022 NEGATIVE  NEGATIVE Final    WBC Casts, UA 01/24/2022 NONE SEEN  < OR = 5 /HPF Final    RBC Casts, UA 01/24/2022 NONE SEEN  < OR = 2 /HPF Final    Squam Epithel, UA 01/24/2022 NONE SEEN  < OR = 5 /HPF Final    Bacteria, UA 01/24/2022 NONE SEEN  NONE SEEN /HPF Final    Hyaline Casts, UA 01/24/2022 NONE SEEN  NONE SEEN /LPF Final    Reflexive Urine Culture 01/24/2022    Final    WBC 01/24/2022 5.5   3.8 - 10.8 Thousand/uL Final    RBC 01/24/2022 5.48  4.20 - 5.80 Million/uL Final    Hemoglobin 01/24/2022 16.3  13.2 - 17.1 g/dL Final    Hematocrit 01/24/2022 48.7  38.5 - 50.0 % Final    MCV 01/24/2022 88.9  80.0 - 100.0 fL Final    MCH 01/24/2022 29.7  27.0 - 33.0 pg Final    MCHC 01/24/2022 33.5  32.0 - 36.0 g/dL Final    RDW 01/24/2022 13.3  11.0 - 15.0 % Final    Platelets 01/24/2022 159  140 - 400 Thousand/uL Final    MPV 01/24/2022 12.0  7.5 - 12.5 fL Final    Neutrophils, Abs 01/24/2022 3,218  1,500 - 7,800 cells/uL Final    Lymph # 01/24/2022 1,447  850 - 3,900 cells/uL Final    Mono # 01/24/2022 616  200 - 950 cells/uL Final    Eos # 01/24/2022 182  15 - 500 cells/uL Final    Baso # 01/24/2022 39  0 - 200 cells/uL Final    Neutrophils Relative 01/24/2022 58.5  % Final    Lymph % 01/24/2022 26.3  % Final    Mono % 01/24/2022 11.2  % Final    Eosinophil % 01/24/2022 3.3  % Final    Basophil % 01/24/2022 0.7  % Final    TSH w/reflex to FT4 01/24/2022 3.89  0.40 - 4.50 mIU/L Final    PROSTATE SPECIFIC ANTIGEN, SCR - Q* 01/24/2022 1.30  < OR = 4.00 ng/mL Final   Lab Visit on 10/12/2021   Component Date Value Ref Range Status    COVID-19 (SARS CoV-2) IgG Antibody* 10/12/2021 48097.7 (A) <50.0 AU/ml Final    COVID-19 (SARS CoV-2) IgG Antibody* 10/12/2021 Positive (A) Negative Final       Past Medical History:   Diagnosis Date    COVID-19 8/2/20021    GERD (gastroesophageal reflux disease)     Hard of hearing     Hiatal hernia     Hypertension     Kidney stones     Knee pain 06/2020    left     Social History     Socioeconomic History    Marital status:    Tobacco Use    Smoking status: Never Smoker    Smokeless tobacco: Never Used   Substance and Sexual Activity    Alcohol use: Yes     Comment: beer    Drug use: Never    Sexual activity: Yes     Partners: Female     Past Surgical History:   Procedure Laterality Date    CHOLECYSTECTOMY      COLONOSCOPY  2012      "Nadeem- rtc 10 yr    HAND SURGERY      traumatic injury    HEMORRHOID SURGERY      KIDNEY STONE SURGERY      KNEE ARTHROSCOPY W/ MENISCECTOMY Left 11/4/2020    Procedure: ARTHROSCOPY, KNEE, WITH PARTIAL MEDIAL MENISCECTOMY;  Surgeon: Andres Blanco MD;  Location[facility];  Service: Orthopedics;  Laterality: Left;    TONSILLECTOMY       History reviewed. No pertinent family history.    Review of patient's allergies indicates:   Allergen Reactions    Iodinated contrast media Hives and Shortness Of Breath       Current Outpatient Medications:     sildenafiL (VIAGRA) 100 MG tablet, Take 1 tablet (100 mg total) by mouth daily as needed for Erectile Dysfunction (prn sex)., Disp: 20 tablet, Rfl: 0    esomeprazole (NEXIUM) 40 MG capsule, Take 1 capsule (40 mg total) by mouth once daily., Disp: 90 capsule, Rfl: 1    lisinopriL 10 MG tablet, Take 1 tablet (10 mg total) by mouth once daily., Disp: 90 tablet, Rfl: 1    Review of Systems   Constitutional: Negative for fatigue, fever and unexpected weight change.   Respiratory: Negative for cough and shortness of breath.    Cardiovascular: Negative for chest pain and leg swelling.   Gastrointestinal: Negative for abdominal pain, constipation, nausea and vomiting.   Genitourinary: Negative for dysuria, frequency, testicular pain and urgency.   Musculoskeletal: Negative for arthralgias.   Neurological: Negative for dizziness, weakness and headaches.          Objective:      Vitals:    03/30/22 1301   BP: 110/66   Pulse: 88   Weight: 117.9 kg (260 lb)   Height: 5' 8" (1.727 m)     Physical Exam  Vitals and nursing note reviewed.   Constitutional:       General: He is not in acute distress.     Appearance: He is well-developed. He is obese. He is not ill-appearing.   Neck:      Trachea: No tracheal deviation.   Cardiovascular:      Rate and Rhythm: Normal rate and regular rhythm.      Heart sounds: No murmur heard.    No friction rub. No gallop.   Pulmonary:      Breath " sounds: Normal breath sounds. No stridor. No wheezing or rales.   Abdominal:      Palpations: Abdomen is soft. There is no mass.      Tenderness: There is no abdominal tenderness.   Genitourinary:     Testes:         Left: Tenderness or swelling not present.      Epididymis:      Left: Not enlarged. No tenderness.      Comments: Decrease in swelling since last visit. Decrease in tenderness  Musculoskeletal:         General: No tenderness or deformity.   Skin:     General: Skin is warm and dry.   Neurological:      Mental Status: He is alert and oriented to person, place, and time.      Coordination: Coordination normal.           Assessment:       1. Abnormal ultrasound    2. Gastroesophageal reflux disease without esophagitis    3. Essential hypertension    4. High risk medication use    5. Epididymitis         Plan:       Abnormal ultrasound  -     US Scrotum And Testicles; Future; Expected date: 03/30/2022    Gastroesophageal reflux disease without esophagitis  -     esomeprazole (NEXIUM) 40 MG capsule; Take 1 capsule (40 mg total) by mouth once daily.  Dispense: 90 capsule; Refill: 1    Essential hypertension    High risk medication use    Epididymitis    Other orders  -     lisinopriL 10 MG tablet; Take 1 tablet (10 mg total) by mouth once daily.  Dispense: 90 tablet; Refill: 1      Follow up in about 3 months (around 6/30/2022), or if symptoms worsen or fail to improve, for medication management.        4/4/2022 Ping Juarez

## 2022-04-05 NOTE — TELEPHONE ENCOUNTER
----- Message from Ping Juarez NP sent at 4/5/2022  1:54 PM CDT -----  Ultrasound shows resolution of epididymitis.

## 2022-04-06 ENCOUNTER — PATIENT MESSAGE (OUTPATIENT)
Dept: FAMILY MEDICINE | Facility: CLINIC | Age: 67
End: 2022-04-06

## 2022-07-15 ENCOUNTER — CLINICAL SUPPORT (OUTPATIENT)
Dept: FAMILY MEDICINE | Facility: CLINIC | Age: 67
End: 2022-07-15
Payer: MEDICARE

## 2022-07-15 DIAGNOSIS — U07.1 COVID-19: Primary | ICD-10-CM

## 2022-07-15 LAB
CTP QC/QA: YES
SARS-COV-2 RDRP RESP QL NAA+PROBE: NEGATIVE

## 2022-07-15 PROCEDURE — U0002 COVID-19 LAB TEST NON-CDC: HCPCS | Mod: QW,S$GLB,, | Performed by: PHYSICIAN ASSISTANT

## 2022-07-15 PROCEDURE — U0002: ICD-10-PCS | Mod: QW,S$GLB,, | Performed by: PHYSICIAN ASSISTANT

## 2022-08-23 ENCOUNTER — TELEPHONE (OUTPATIENT)
Dept: FAMILY MEDICINE | Facility: CLINIC | Age: 67
End: 2022-08-23
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
## 2022-08-24 ENCOUNTER — OFFICE VISIT (OUTPATIENT)
Dept: FAMILY MEDICINE | Facility: CLINIC | Age: 67
End: 2022-08-24
Payer: MEDICARE

## 2022-08-24 VITALS
OXYGEN SATURATION: 98 % | HEIGHT: 68 IN | DIASTOLIC BLOOD PRESSURE: 76 MMHG | WEIGHT: 257 LBS | HEART RATE: 80 BPM | TEMPERATURE: 98 F | SYSTOLIC BLOOD PRESSURE: 110 MMHG | BODY MASS INDEX: 38.95 KG/M2

## 2022-08-24 DIAGNOSIS — K21.9 GASTROESOPHAGEAL REFLUX DISEASE WITHOUT ESOPHAGITIS: ICD-10-CM

## 2022-08-24 DIAGNOSIS — I10 ESSENTIAL HYPERTENSION: ICD-10-CM

## 2022-08-24 DIAGNOSIS — F43.21 GRIEVING: ICD-10-CM

## 2022-08-24 DIAGNOSIS — T14.8XXA ABRASION: Primary | ICD-10-CM

## 2022-08-24 PROCEDURE — 1160F RVW MEDS BY RX/DR IN RCRD: CPT | Mod: CPTII,S$GLB,, | Performed by: PHYSICIAN ASSISTANT

## 2022-08-24 PROCEDURE — 3066F NEPHROPATHY DOC TX: CPT | Mod: CPTII,S$GLB,, | Performed by: PHYSICIAN ASSISTANT

## 2022-08-24 PROCEDURE — 3066F PR DOCUMENTATION OF TREATMENT FOR NEPHROPATHY: ICD-10-PCS | Mod: CPTII,S$GLB,, | Performed by: PHYSICIAN ASSISTANT

## 2022-08-24 PROCEDURE — 99213 PR OFFICE/OUTPT VISIT, EST, LEVL III, 20-29 MIN: ICD-10-PCS | Mod: S$GLB,,, | Performed by: PHYSICIAN ASSISTANT

## 2022-08-24 PROCEDURE — 3078F DIAST BP <80 MM HG: CPT | Mod: CPTII,S$GLB,, | Performed by: PHYSICIAN ASSISTANT

## 2022-08-24 PROCEDURE — 1159F MED LIST DOCD IN RCRD: CPT | Mod: CPTII,S$GLB,, | Performed by: PHYSICIAN ASSISTANT

## 2022-08-24 PROCEDURE — 1101F PR PT FALLS ASSESS DOC 0-1 FALLS W/OUT INJ PAST YR: ICD-10-PCS | Mod: CPTII,S$GLB,, | Performed by: PHYSICIAN ASSISTANT

## 2022-08-24 PROCEDURE — 4010F PR ACE/ARB THEARPY RXD/TAKEN: ICD-10-PCS | Mod: CPTII,S$GLB,, | Performed by: PHYSICIAN ASSISTANT

## 2022-08-24 PROCEDURE — 3061F NEG MICROALBUMINURIA REV: CPT | Mod: CPTII,S$GLB,, | Performed by: PHYSICIAN ASSISTANT

## 2022-08-24 PROCEDURE — 3074F SYST BP LT 130 MM HG: CPT | Mod: CPTII,S$GLB,, | Performed by: PHYSICIAN ASSISTANT

## 2022-08-24 PROCEDURE — 3074F PR MOST RECENT SYSTOLIC BLOOD PRESSURE < 130 MM HG: ICD-10-PCS | Mod: CPTII,S$GLB,, | Performed by: PHYSICIAN ASSISTANT

## 2022-08-24 PROCEDURE — 3288F PR FALLS RISK ASSESSMENT DOCUMENTED: ICD-10-PCS | Mod: CPTII,S$GLB,, | Performed by: PHYSICIAN ASSISTANT

## 2022-08-24 PROCEDURE — 3288F FALL RISK ASSESSMENT DOCD: CPT | Mod: CPTII,S$GLB,, | Performed by: PHYSICIAN ASSISTANT

## 2022-08-24 PROCEDURE — 1101F PT FALLS ASSESS-DOCD LE1/YR: CPT | Mod: CPTII,S$GLB,, | Performed by: PHYSICIAN ASSISTANT

## 2022-08-24 PROCEDURE — 3008F PR BODY MASS INDEX (BMI) DOCUMENTED: ICD-10-PCS | Mod: CPTII,S$GLB,, | Performed by: PHYSICIAN ASSISTANT

## 2022-08-24 PROCEDURE — 99213 OFFICE O/P EST LOW 20 MIN: CPT | Mod: S$GLB,,, | Performed by: PHYSICIAN ASSISTANT

## 2022-08-24 PROCEDURE — 1160F PR REVIEW ALL MEDS BY PRESCRIBER/CLIN PHARMACIST DOCUMENTED: ICD-10-PCS | Mod: CPTII,S$GLB,, | Performed by: PHYSICIAN ASSISTANT

## 2022-08-24 PROCEDURE — 3061F PR NEG MICROALBUMINURIA RESULT DOCUMENTED/REVIEW: ICD-10-PCS | Mod: CPTII,S$GLB,, | Performed by: PHYSICIAN ASSISTANT

## 2022-08-24 PROCEDURE — 4010F ACE/ARB THERAPY RXD/TAKEN: CPT | Mod: CPTII,S$GLB,, | Performed by: PHYSICIAN ASSISTANT

## 2022-08-24 PROCEDURE — 3008F BODY MASS INDEX DOCD: CPT | Mod: CPTII,S$GLB,, | Performed by: PHYSICIAN ASSISTANT

## 2022-08-24 PROCEDURE — 1159F PR MEDICATION LIST DOCUMENTED IN MEDICAL RECORD: ICD-10-PCS | Mod: CPTII,S$GLB,, | Performed by: PHYSICIAN ASSISTANT

## 2022-08-24 PROCEDURE — 3078F PR MOST RECENT DIASTOLIC BLOOD PRESSURE < 80 MM HG: ICD-10-PCS | Mod: CPTII,S$GLB,, | Performed by: PHYSICIAN ASSISTANT

## 2022-08-24 RX ORDER — CHLORHEXIDINE GLUCONATE 40 MG/ML
SOLUTION TOPICAL DAILY PRN
Qty: 473 ML | Refills: 0 | Status: SHIPPED | OUTPATIENT
Start: 2022-08-24 | End: 2023-03-17

## 2022-08-24 RX ORDER — MUPIROCIN 20 MG/G
OINTMENT TOPICAL 3 TIMES DAILY
Qty: 30 G | Refills: 0 | Status: SHIPPED | OUTPATIENT
Start: 2022-08-24 | End: 2023-03-17

## 2022-08-24 NOTE — PROGRESS NOTES
"  SUBJECTIVE:    Patient ID: Lewis Matias is a 67 y.o. male.    Chief Complaint: Abrasion (No bottles/ pna denied/ c-scope postponed/ rope burn around left ankle//dp)    Pt is a 67 y.o. male who presents today for an urgent visit with a CC of "rope burn" located on lateral aspect of the left ankle.  Last Thursday, he was tending to his horses. A horse was startled and when jolting away, the rope tied to its halter quickly brushed the lateral aspect of his left ankle.  When doing so, it resulted in an abrasion. He is experiencing a burning/stinging sensation over the abrasion.  He has been applying hydrogen peroxide and Neosporin to the wound at this time and denies any bleeding, drainage, or red streaking around the wound.  He denies any joint pains.  He is able to ambulate without any assistance of a DME.     Tetanus vaccine is UTD (8/7/19)    Of note, he is currently in the grieving process.  He lost his mother March and his mother-in-law last Friday.  He states he is coping with these emotions well and is not interested in any counseling or pharmacological measures at this time. He denies any SI, HI, or agitation.     Clinical Support on 07/15/2022   Component Date Value Ref Range Status    POC Rapid COVID 07/15/2022 Negative  Negative Final     Acceptable 07/15/2022 Yes   Final   Office Visit on 03/14/2022   Component Date Value Ref Range Status    POC Blood, Urine 03/14/2022 Negative  Negative Final    POC Bilirubin, Urine 03/14/2022 Negative  Negative Final    POC Urobilinogen, Urine 03/14/2022 0.2 (A) 0.3 - 2.2 Final    POC Ketones, Urine 03/14/2022 Negative  Negative Final    POC Protein, Urine 03/14/2022 Negative  Negative Final    POC Nitrates, Urine 03/14/2022 Negative  Negative Final    POC Glucose, Urine 03/14/2022 Negative  Negative Final    POC Specific Gravity, Urine 03/14/2022 1.015  1.003 - 1.029 Final    POC Leukocytes, Urine 03/14/2022 Negative  Negative Final "       Past Medical History:   Diagnosis Date    COVID-19 8/2/20021    GERD (gastroesophageal reflux disease)     Hard of hearing     Hiatal hernia     Hypertension     Kidney stones     Knee pain 06/2020    left     Past Surgical History:   Procedure Laterality Date    CHOLECYSTECTOMY      COLONOSCOPY  2012     Nadeem- rtc 10 yr    HAND SURGERY      traumatic injury    HEMORRHOID SURGERY      KIDNEY STONE SURGERY      KNEE ARTHROSCOPY W/ MENISCECTOMY Left 11/4/2020    Procedure: ARTHROSCOPY, KNEE, WITH PARTIAL MEDIAL MENISCECTOMY;  Surgeon: Andres Blanco MD;  Location: [facility];  Service: Orthopedics;  Laterality: Left;    TONSILLECTOMY       History reviewed. No pertinent family history.    Marital Status:   Alcohol History:  reports current alcohol use.  Tobacco History:  reports that he has never smoked. He has never used smokeless tobacco.  Drug History:  reports no history of drug use.    Health Maintenance Topics with due status: Not Due       Topic Last Completion Date    TETANUS VACCINE 08/07/2019    Lipid Panel 01/24/2022    Influenza Vaccine Not Due     Immunization History   Administered Date(s) Administered    COVID-19, MRNA, LN-S, PF (Pfizer) (Purple Cap) 11/10/2021, 12/01/2021    Dtap, Unspecified Formulation 06/02/2016    Tdap 06/02/2016, 08/07/2019       Review of patient's allergies indicates:   Allergen Reactions    Iodinated contrast media Hives and Shortness Of Breath       Current Outpatient Medications:     esomeprazole (NEXIUM) 40 MG capsule, Take 1 capsule (40 mg total) by mouth once daily., Disp: 90 capsule, Rfl: 1    lisinopriL 10 MG tablet, Take 1 tablet (10 mg total) by mouth once daily., Disp: 90 tablet, Rfl: 1    sildenafiL (VIAGRA) 100 MG tablet, Take 1 tablet (100 mg total) by mouth daily as needed for Erectile Dysfunction (prn sex)., Disp: 20 tablet, Rfl: 0    chlorhexidine (HIBICLENS) 4 % external liquid, Apply topically daily as needed., Disp: 473 mL,  "Rfl: 0    mupirocin (BACTROBAN) 2 % ointment, Apply topically 3 (three) times daily., Disp: 30 g, Rfl: 0    Review of Systems   Constitutional: Negative for activity change, chills, fatigue and fever.   Respiratory: Negative for cough, shortness of breath and wheezing.    Cardiovascular: Positive for leg swelling ("Swelling around the rope burn."). Negative for chest pain and palpitations.   Gastrointestinal: Negative for abdominal pain, constipation, diarrhea, nausea and vomiting.   Genitourinary: Negative for difficulty urinating and dysuria.   Musculoskeletal: Negative for arthralgias and myalgias.   Skin: Positive for wound. Negative for rash.   Neurological: Negative for dizziness, syncope, weakness, light-headedness and numbness.   Psychiatric/Behavioral: Negative for agitation, behavioral problems, self-injury and suicidal ideas.        "Grieivng the recent lose of my mother-in-law last week."          Objective:      Vitals:    08/24/22 0801   BP: 110/76   Pulse: 80   Temp: 98.2 °F (36.8 °C)   SpO2: 98%   Weight: 116.6 kg (257 lb)   Height: 5' 8" (1.727 m)     Physical Exam  Vitals and nursing note reviewed.   Constitutional:       General: He is not in acute distress.     Appearance: Normal appearance. He is obese. He is not ill-appearing, toxic-appearing or diaphoretic.   HENT:      Head: Normocephalic and atraumatic.      Right Ear: External ear normal.      Left Ear: External ear normal.      Nose: No rhinorrhea.      Mouth/Throat:      Mouth: Mucous membranes are moist.      Pharynx: Oropharynx is clear.   Eyes:      General: No scleral icterus.     Extraocular Movements: Extraocular movements intact.      Conjunctiva/sclera: Conjunctivae normal.   Neck:      Vascular: No carotid bruit.   Cardiovascular:      Rate and Rhythm: Normal rate and regular rhythm.      Pulses: Normal pulses.      Heart sounds: Normal heart sounds. No murmur heard.    No friction rub.   Pulmonary:      Effort: Pulmonary effort " is normal. No respiratory distress.      Breath sounds: Normal breath sounds. No wheezing, rhonchi or rales.   Abdominal:      Palpations: Abdomen is soft.      Tenderness: There is no abdominal tenderness. There is no guarding or rebound.      Comments: Protuberant, soft belly noted.   Musculoskeletal:         General: Normal range of motion.      Cervical back: Normal range of motion.      Right lower leg: No edema.      Left lower leg: Edema (trace) present.      Comments: Trace edema to the left ankle.   5/5 strength on plantar and dorsiflexion of the left ankle.  Left ankle has full passive ROM.  Nikolai Rules - NEGATIVE      Skin:     General: Skin is warm and dry.      Findings: Abrasion present. No bruising or rash.          Neurological:      General: No focal deficit present.      Mental Status: He is alert. Mental status is at baseline.      Cranial Nerves: No cranial nerve deficit.      Motor: No weakness.      Coordination: Coordination normal.      Gait: Gait normal.   Psychiatric:         Behavior: Behavior normal. Behavior is cooperative.               Assessment:       1. Abrasion    2. Grieving    3. Essential hypertension    4. Gastroesophageal reflux disease without esophagitis           Plan:       Abrasion  Tetanus vaccine is UTD.  Stop hydrogen peroxide cleansing.  Clean wound with soap and water alone.    Clean with Hibiclens 4% solution p.r.n  Begin applying Mupirocin 2% ointment t.i.d. and OTC Aquaphor t.i.d  Keep abrasion site covered with 4 x 4 gauze.   Contact the office if no improvement in symptoms after initiate treatment regimen.  -     mupirocin (BACTROBAN) 2 % ointment; Apply topically 3 (three) times daily.  Dispense: 30 g; Refill: 0  -     chlorhexidine (HIBICLENS) 4 % external liquid; Apply topically daily as needed.  Dispense: 473 mL; Refill: 0    Grieving  Offered antidepressant therapy today and CBT counseling, but patient declined.  States he will contact the office if he  reconsiders.    Essential hypertension  Stable and well controlled.  Continue as is.    Gastroesophageal reflux disease without esophagitis      Follow up if symptoms worsen or fail to improve, for Abrasion, grieving.        8/24/2022 Turner Rodriguez PA-C

## 2022-10-11 ENCOUNTER — TELEPHONE (OUTPATIENT)
Dept: FAMILY MEDICINE | Facility: CLINIC | Age: 67
End: 2022-10-11

## 2022-10-11 ENCOUNTER — OFFICE VISIT (OUTPATIENT)
Dept: FAMILY MEDICINE | Facility: CLINIC | Age: 67
End: 2022-10-11
Payer: MEDICARE

## 2022-10-11 ENCOUNTER — HOSPITAL ENCOUNTER (OUTPATIENT)
Dept: RADIOLOGY | Facility: HOSPITAL | Age: 67
Discharge: HOME | End: 2022-10-11
Attending: NURSE PRACTITIONER
Payer: MEDICARE

## 2022-10-11 VITALS
DIASTOLIC BLOOD PRESSURE: 68 MMHG | HEART RATE: 72 BPM | HEIGHT: 68 IN | WEIGHT: 262 LBS | BODY MASS INDEX: 39.71 KG/M2 | SYSTOLIC BLOOD PRESSURE: 114 MMHG

## 2022-10-11 DIAGNOSIS — M25.561 ACUTE PAIN OF RIGHT KNEE: Primary | ICD-10-CM

## 2022-10-11 DIAGNOSIS — K21.9 GASTROESOPHAGEAL REFLUX DISEASE WITHOUT ESOPHAGITIS: ICD-10-CM

## 2022-10-11 DIAGNOSIS — M25.561 ACUTE PAIN OF RIGHT KNEE: ICD-10-CM

## 2022-10-11 DIAGNOSIS — I10 ESSENTIAL HYPERTENSION: ICD-10-CM

## 2022-10-11 PROCEDURE — 4010F PR ACE/ARB THEARPY RXD/TAKEN: ICD-10-PCS | Mod: CPTII,S$GLB,, | Performed by: NURSE PRACTITIONER

## 2022-10-11 PROCEDURE — 1125F PR PAIN SEVERITY QUANTIFIED, PAIN PRESENT: ICD-10-PCS | Mod: CPTII,S$GLB,, | Performed by: NURSE PRACTITIONER

## 2022-10-11 PROCEDURE — 3061F NEG MICROALBUMINURIA REV: CPT | Mod: CPTII,S$GLB,, | Performed by: NURSE PRACTITIONER

## 2022-10-11 PROCEDURE — 73562 X-RAY EXAM OF KNEE 3: CPT | Mod: TC,PO,RT

## 2022-10-11 PROCEDURE — 3061F PR NEG MICROALBUMINURIA RESULT DOCUMENTED/REVIEW: ICD-10-PCS | Mod: CPTII,S$GLB,, | Performed by: NURSE PRACTITIONER

## 2022-10-11 PROCEDURE — 3074F SYST BP LT 130 MM HG: CPT | Mod: CPTII,S$GLB,, | Performed by: NURSE PRACTITIONER

## 2022-10-11 PROCEDURE — 3066F NEPHROPATHY DOC TX: CPT | Mod: CPTII,S$GLB,, | Performed by: NURSE PRACTITIONER

## 2022-10-11 PROCEDURE — 1159F MED LIST DOCD IN RCRD: CPT | Mod: CPTII,S$GLB,, | Performed by: NURSE PRACTITIONER

## 2022-10-11 PROCEDURE — 3078F PR MOST RECENT DIASTOLIC BLOOD PRESSURE < 80 MM HG: ICD-10-PCS | Mod: CPTII,S$GLB,, | Performed by: NURSE PRACTITIONER

## 2022-10-11 PROCEDURE — 3066F PR DOCUMENTATION OF TREATMENT FOR NEPHROPATHY: ICD-10-PCS | Mod: CPTII,S$GLB,, | Performed by: NURSE PRACTITIONER

## 2022-10-11 PROCEDURE — 1160F RVW MEDS BY RX/DR IN RCRD: CPT | Mod: CPTII,S$GLB,, | Performed by: NURSE PRACTITIONER

## 2022-10-11 PROCEDURE — 1160F PR REVIEW ALL MEDS BY PRESCRIBER/CLIN PHARMACIST DOCUMENTED: ICD-10-PCS | Mod: CPTII,S$GLB,, | Performed by: NURSE PRACTITIONER

## 2022-10-11 PROCEDURE — 99213 OFFICE O/P EST LOW 20 MIN: CPT | Mod: S$GLB,,, | Performed by: NURSE PRACTITIONER

## 2022-10-11 PROCEDURE — 3074F PR MOST RECENT SYSTOLIC BLOOD PRESSURE < 130 MM HG: ICD-10-PCS | Mod: CPTII,S$GLB,, | Performed by: NURSE PRACTITIONER

## 2022-10-11 PROCEDURE — 4010F ACE/ARB THERAPY RXD/TAKEN: CPT | Mod: CPTII,S$GLB,, | Performed by: NURSE PRACTITIONER

## 2022-10-11 PROCEDURE — 3078F DIAST BP <80 MM HG: CPT | Mod: CPTII,S$GLB,, | Performed by: NURSE PRACTITIONER

## 2022-10-11 PROCEDURE — 1125F AMNT PAIN NOTED PAIN PRSNT: CPT | Mod: CPTII,S$GLB,, | Performed by: NURSE PRACTITIONER

## 2022-10-11 PROCEDURE — 99213 PR OFFICE/OUTPT VISIT, EST, LEVL III, 20-29 MIN: ICD-10-PCS | Mod: S$GLB,,, | Performed by: NURSE PRACTITIONER

## 2022-10-11 PROCEDURE — 1159F PR MEDICATION LIST DOCUMENTED IN MEDICAL RECORD: ICD-10-PCS | Mod: CPTII,S$GLB,, | Performed by: NURSE PRACTITIONER

## 2022-10-11 RX ORDER — METHYLPREDNISOLONE 4 MG/1
TABLET ORAL
Qty: 21 EACH | Refills: 0 | Status: SHIPPED | OUTPATIENT
Start: 2022-10-11 | End: 2022-11-01

## 2022-10-11 NOTE — TELEPHONE ENCOUNTER
----- Message from Ping Juarez NP sent at 10/11/2022  1:20 PM CDT -----  No fracture or dislocation. Lets do medrol. Knee brace. Call if no improvement

## 2022-10-11 NOTE — TELEPHONE ENCOUNTER
----- Message from Yung Davidson MA sent at 10/11/2022 12:30 PM CDT -----  Isa from the imaging center calling because the pt is there now and [facility] hasn't sent the knee xray over. # [phone]

## 2022-10-11 NOTE — PROGRESS NOTES
SUBJECTIVE:    Patient ID: Lewis Matias is a 67 y.o. male.    Chief Complaint: Knee Pain (Right since being on cruise last Thursday, has taken aleve, went over meds verbally// SW)    67 year old male presents with complaints of knee pain. Recently returned from cruise. Has been experiencing right knee pain since. Denies specific injury or trauma. Has not taken anything. Pain is worse with weight bearing.       Clinical Support on 07/15/2022   Component Date Value Ref Range Status    POC Rapid COVID 07/15/2022 Negative  Negative Final     Acceptable 07/15/2022 Yes   Final       Past Medical History:   Diagnosis Date    COVID-19 8/2/20021    GERD (gastroesophageal reflux disease)     Hard of hearing     Hiatal hernia     Hypertension     Kidney stones     Knee pain 06/2020    left     Social History     Socioeconomic History    Marital status:    Tobacco Use    Smoking status: Never    Smokeless tobacco: Never   Substance and Sexual Activity    Alcohol use: Yes     Comment: beer    Drug use: Never    Sexual activity: Yes     Partners: Female     Past Surgical History:   Procedure Laterality Date    CHOLECYSTECTOMY      COLONOSCOPY  2012     Nadeem- rtc 10 yr    HAND SURGERY      traumatic injury    HEMORRHOID SURGERY      KIDNEY STONE SURGERY      KNEE ARTHROSCOPY W/ MENISCECTOMY Left 11/4/2020    Procedure: ARTHROSCOPY, KNEE, WITH PARTIAL MEDIAL MENISCECTOMY;  Surgeon: Andres Blanco MD;  Location: [facility];  Service: Orthopedics;  Laterality: Left;    TONSILLECTOMY       History reviewed. No pertinent family history.    Review of patient's allergies indicates:   Allergen Reactions    Iodinated contrast media Hives and Shortness Of Breath       Current Outpatient Medications:     chlorhexidine (HIBICLENS) 4 % external liquid, Apply topically daily as needed., Disp: 473 mL, Rfl: 0    esomeprazole (NEXIUM) 40 MG capsule, Take 1 capsule (40 mg total) by mouth once daily., Disp: 90 capsule,  "Rfl: 1    lisinopriL 10 MG tablet, Take 1 tablet (10 mg total) by mouth once daily., Disp: 90 tablet, Rfl: 1    mupirocin (BACTROBAN) 2 % ointment, Apply topically 3 (three) times daily., Disp: 30 g, Rfl: 0    sildenafiL (VIAGRA) 100 MG tablet, Take 1 tablet (100 mg total) by mouth daily as needed for Erectile Dysfunction (prn sex)., Disp: 20 tablet, Rfl: 0    methylPREDNISolone (MEDROL DOSEPACK) 4 mg tablet, use as directed, Disp: 21 each, Rfl: 0    Review of Systems   Constitutional:  Negative for fatigue, fever and unexpected weight change.   HENT:  Negative for sinus pressure.    Eyes:  Negative for pain.   Respiratory:  Negative for cough and shortness of breath.    Cardiovascular:  Negative for chest pain and leg swelling.   Gastrointestinal:  Negative for vomiting.   Genitourinary:  Negative for urgency.   Musculoskeletal:  Negative for arthralgias.        Knee pain   Skin:  Negative for rash.   Neurological:  Negative for dizziness, weakness and headaches.   Psychiatric/Behavioral:  Negative for sleep disturbance.         Objective:      Vitals:    10/11/22 1142   BP: 114/68   Pulse: 72   Weight: 118.8 kg (262 lb)   Height: 5' 8" (1.727 m)     Physical Exam  Vitals and nursing note reviewed.   Constitutional:       General: He is not in acute distress.     Appearance: Normal appearance. He is well-developed.   Cardiovascular:      Rate and Rhythm: Normal rate and regular rhythm.      Heart sounds: No murmur heard.    No friction rub. No gallop.   Pulmonary:      Breath sounds: Normal breath sounds. No wheezing or rales.   Musculoskeletal:      Lumbar back: No spasms, tenderness or bony tenderness. Normal range of motion.      Right knee: Effusion present. Decreased range of motion. Tenderness present.      Comments: Negative bilat SLR   Skin:     Findings: No rash.   Neurological:      Mental Status: He is alert and oriented to person, place, and time.      Sensory: No sensory deficit.      Gait: Gait " normal.         Assessment:       1. Acute pain of right knee    2. Essential hypertension    3. Gastroesophageal reflux disease without esophagitis         Plan:       Acute pain of right knee  Comments:  xray, medrol. call if symptoms do not improve  Orders:  -     X-Ray Knee 3 View Right; Future; Expected date: 10/11/2022    Essential hypertension    Gastroesophageal reflux disease without esophagitis    Other orders  -     methylPREDNISolone (MEDROL DOSEPACK) 4 mg tablet; use as directed  Dispense: 21 each; Refill: 0    Follow up if symptoms worsen or fail to improve, for medication management.        10/24/2022 Ping Juarez

## 2022-12-16 ENCOUNTER — CLINICAL SUPPORT (OUTPATIENT)
Dept: FAMILY MEDICINE | Facility: CLINIC | Age: 67
End: 2022-12-16
Payer: MEDICARE

## 2022-12-16 RX ORDER — LISINOPRIL 10 MG/1
10 TABLET ORAL DAILY
Qty: 90 TABLET | Refills: 1 | Status: SHIPPED | OUTPATIENT
Start: 2022-12-16 | End: 2023-05-10 | Stop reason: SDUPTHER

## 2023-02-02 ENCOUNTER — PES CALL (OUTPATIENT)
Dept: ADMINISTRATIVE | Facility: OTHER | Age: 68
End: 2023-02-02
Payer: MEDICARE

## 2023-02-13 ENCOUNTER — TELEPHONE (OUTPATIENT)
Dept: FAMILY MEDICINE | Facility: CLINIC | Age: 68
End: 2023-02-13

## 2023-02-13 ENCOUNTER — HOSPITAL ENCOUNTER (OUTPATIENT)
Dept: RADIOLOGY | Facility: HOSPITAL | Age: 68
Discharge: HOME | End: 2023-02-13
Attending: NURSE PRACTITIONER
Payer: MEDICARE

## 2023-02-13 ENCOUNTER — OFFICE VISIT (OUTPATIENT)
Dept: FAMILY MEDICINE | Facility: CLINIC | Age: 68
End: 2023-02-13
Payer: MEDICARE

## 2023-02-13 DIAGNOSIS — G89.29 CHRONIC PAIN OF RIGHT KNEE: Primary | ICD-10-CM

## 2023-02-13 DIAGNOSIS — K21.9 GASTROESOPHAGEAL REFLUX DISEASE WITHOUT ESOPHAGITIS: ICD-10-CM

## 2023-02-13 DIAGNOSIS — M25.561 CHRONIC PAIN OF RIGHT KNEE: Primary | ICD-10-CM

## 2023-02-13 DIAGNOSIS — E66.01 SEVERE OBESITY (BMI 35.0-39.9) WITH COMORBIDITY: ICD-10-CM

## 2023-02-13 DIAGNOSIS — M25.561 ACUTE PAIN OF RIGHT KNEE: ICD-10-CM

## 2023-02-13 DIAGNOSIS — I10 ESSENTIAL HYPERTENSION: ICD-10-CM

## 2023-02-13 DIAGNOSIS — M25.561 CHRONIC PAIN OF RIGHT KNEE: ICD-10-CM

## 2023-02-13 DIAGNOSIS — G89.29 CHRONIC PAIN OF RIGHT KNEE: ICD-10-CM

## 2023-02-13 PROCEDURE — 96372 PR INJECTION,THERAP/PROPH/DIAG2ST, IM OR SUBCUT: ICD-10-PCS | Mod: S$GLB,,, | Performed by: NURSE PRACTITIONER

## 2023-02-13 PROCEDURE — 99214 PR OFFICE/OUTPT VISIT, EST, LEVL IV, 30-39 MIN: ICD-10-PCS | Mod: 25,S$GLB,, | Performed by: NURSE PRACTITIONER

## 2023-02-13 PROCEDURE — 1159F PR MEDICATION LIST DOCUMENTED IN MEDICAL RECORD: ICD-10-PCS | Mod: CPTII,S$GLB,, | Performed by: NURSE PRACTITIONER

## 2023-02-13 PROCEDURE — 96372 THER/PROPH/DIAG INJ SC/IM: CPT | Mod: S$GLB,,, | Performed by: NURSE PRACTITIONER

## 2023-02-13 PROCEDURE — 1160F RVW MEDS BY RX/DR IN RCRD: CPT | Mod: CPTII,S$GLB,, | Performed by: NURSE PRACTITIONER

## 2023-02-13 PROCEDURE — 73564 X-RAY EXAM KNEE 4 OR MORE: CPT | Mod: TC,PO,RT

## 2023-02-13 PROCEDURE — 99214 OFFICE O/P EST MOD 30 MIN: CPT | Mod: 25,S$GLB,, | Performed by: NURSE PRACTITIONER

## 2023-02-13 PROCEDURE — 1160F PR REVIEW ALL MEDS BY PRESCRIBER/CLIN PHARMACIST DOCUMENTED: ICD-10-PCS | Mod: CPTII,S$GLB,, | Performed by: NURSE PRACTITIONER

## 2023-02-13 PROCEDURE — 1159F MED LIST DOCD IN RCRD: CPT | Mod: CPTII,S$GLB,, | Performed by: NURSE PRACTITIONER

## 2023-02-13 RX ORDER — METHYLPREDNISOLONE 4 MG/1
TABLET ORAL
Qty: 21 EACH | Refills: 0 | Status: SHIPPED | OUTPATIENT
Start: 2023-02-13 | End: 2023-03-06

## 2023-02-13 RX ORDER — HYDROCODONE BITARTRATE AND ACETAMINOPHEN 5; 325 MG/1; MG/1
1 TABLET ORAL EVERY 12 HOURS PRN
Qty: 20 TABLET | Refills: 0 | Status: SHIPPED | OUTPATIENT
Start: 2023-02-13 | End: 2023-03-17 | Stop reason: ALTCHOICE

## 2023-02-13 RX ORDER — DEXAMETHASONE SODIUM PHOSPHATE 4 MG/ML
8 INJECTION, SOLUTION INTRA-ARTICULAR; INTRALESIONAL; INTRAMUSCULAR; INTRAVENOUS; SOFT TISSUE ONCE
Status: COMPLETED | OUTPATIENT
Start: 2023-02-13 | End: 2023-02-13

## 2023-02-13 RX ORDER — TIZANIDINE 4 MG/1
4 TABLET ORAL EVERY 6 HOURS PRN
Qty: 30 TABLET | Refills: 0 | Status: SHIPPED | OUTPATIENT
Start: 2023-02-13 | End: 2023-02-23

## 2023-02-13 RX ADMIN — DEXAMETHASONE SODIUM PHOSPHATE 8 MG: 4 INJECTION, SOLUTION INTRA-ARTICULAR; INTRALESIONAL; INTRAMUSCULAR; INTRAVENOUS; SOFT TISSUE at 11:02

## 2023-02-13 NOTE — TELEPHONE ENCOUNTER
----- Message from Ping Juarez NP sent at 2/13/2023  1:01 PM CST -----  No acute findings.   2mm bb seen in posterior soft tissues. Arthritic changes.

## 2023-02-13 NOTE — TELEPHONE ENCOUNTER
Spoke with patient who states he was walking all last week at [place]. He woke up the other morning and it was so stiff he is hardly able to bend it or walk on it. States he has been taking Aleve and using biofreeze with little relief.

## 2023-02-13 NOTE — TELEPHONE ENCOUNTER
----- Message from Lupe Zhu sent at 2/13/2023  8:09 AM CST -----  Patient called and stated that the was out of town and did a lot of walking and on Friday night his right knee starting hurting and now he is unable to bend it he would like to be seen today please give him a call at [phone]

## 2023-02-16 PROBLEM — E66.01 SEVERE OBESITY (BMI 35.0-39.9) WITH COMORBIDITY: Status: ACTIVE | Noted: 2023-02-16

## 2023-02-16 NOTE — PROGRESS NOTES
SUBJECTIVE:    Patient ID: Lewis Matias is a 67 y.o. male.    Chief Complaint: No chief complaint on file.    67 year old male presents with complaints of knee pain. Recently returned from  [place]. Was on feet more than usual. Has been experiencing right knee pain since. Pain is worse with weight bearing. Reports can barely bend knee. Has taken otc meds with no improvement.      No visits with results within 6 Month(s) from this visit.   Latest known visit with results is:   Clinical Support on 07/15/2022   Component Date Value Ref Range Status    POC Rapid COVID 07/15/2022 Negative  Negative Final     Acceptable 07/15/2022 Yes   Final       Past Medical History:   Diagnosis Date    COVID-19 8/2/20021    GERD (gastroesophageal reflux disease)     Hard of hearing     Hiatal hernia     Hypertension     Kidney stones     Knee pain 06/2020    left     Social History     Socioeconomic History    Marital status:    Tobacco Use    Smoking status: Never    Smokeless tobacco: Never   Substance and Sexual Activity    Alcohol use: Yes     Comment: beer    Drug use: Never    Sexual activity: Yes     Partners: Female     Past Surgical History:   Procedure Laterality Date    CHOLECYSTECTOMY      COLONOSCOPY  2012    Dr Peteror- rtc 10 yr    HAND SURGERY      traumatic injury    HEMORRHOID SURGERY      KIDNEY STONE SURGERY      KNEE ARTHROSCOPY W/ MENISCECTOMY Left 11/4/2020    Procedure: ARTHROSCOPY, KNEE, WITH PARTIAL MEDIAL MENISCECTOMY;  Surgeon: Andres Blanco MD;  Location: [facility];  Service: Orthopedics;  Laterality: Left;    TONSILLECTOMY       History reviewed. No pertinent family history.    Review of patient's allergies indicates:   Allergen Reactions    Iodinated contrast media Hives and Shortness Of Breath       Current Outpatient Medications:     chlorhexidine (HIBICLENS) 4 % external liquid, Apply topically daily as needed., Disp: 473 mL, Rfl: 0    esomeprazole (NEXIUM) 40 MG  capsule, Take 1 capsule (40 mg total) by mouth once daily., Disp: 90 capsule, Rfl: 1    HYDROcodone-acetaminophen (NORCO) 5-325 mg per tablet, Take 1 tablet by mouth every 12 (twelve) hours as needed for Pain., Disp: 20 tablet, Rfl: 0    lisinopriL 10 MG tablet, Take 1 tablet (10 mg total) by mouth once daily., Disp: 90 tablet, Rfl: 1    methylPREDNISolone (MEDROL DOSEPACK) 4 mg tablet, use as directed, Disp: 21 each, Rfl: 0    mupirocin (BACTROBAN) 2 % ointment, Apply topically 3 (three) times daily., Disp: 30 g, Rfl: 0    sildenafiL (VIAGRA) 100 MG tablet, Take 1 tablet (100 mg total) by mouth daily as needed for Erectile Dysfunction (prn sex)., Disp: 20 tablet, Rfl: 0    tiZANidine (ZANAFLEX) 4 MG tablet, Take 1 tablet (4 mg total) by mouth every 6 (six) hours as needed., Disp: 30 tablet, Rfl: 0    Review of Systems   Constitutional:  Negative for fatigue, fever and unexpected weight change.   HENT:  Negative for ear pain, sinus pressure and sore throat.    Eyes:  Negative for pain.   Respiratory:  Negative for cough and shortness of breath.    Cardiovascular:  Negative for chest pain and leg swelling.   Gastrointestinal:  Negative for abdominal pain, constipation, nausea and vomiting.   Musculoskeletal:  Negative for arthralgias.        Right knee pain   Skin:  Negative for rash.   Neurological:  Negative for dizziness, weakness and headaches.   Psychiatric/Behavioral:  Negative for sleep disturbance.         Objective:      There were no vitals filed for this visit.  Physical Exam  Vitals and nursing note reviewed.   Constitutional:       General: He is not in acute distress.     Appearance: Normal appearance. He is well-developed. He is not ill-appearing.   Eyes:      Pupils: Pupils are equal, round, and reactive to light.   Neck:      Trachea: No tracheal deviation.   Cardiovascular:      Rate and Rhythm: Normal rate and regular rhythm.      Heart sounds: No murmur heard.    No friction rub. No gallop.    Pulmonary:      Breath sounds: Normal breath sounds. No stridor. No wheezing or rales.   Abdominal:      Palpations: Abdomen is soft. There is no mass.      Tenderness: There is no abdominal tenderness.   Musculoskeletal:         General: No tenderness or deformity.      Cervical back: Neck supple.      Right knee: Swelling and crepitus present. No effusion. Decreased range of motion. ACL laxity present.   Lymphadenopathy:      Cervical: No cervical adenopathy.   Skin:     General: Skin is warm and dry.   Neurological:      Mental Status: He is alert and oriented to person, place, and time.      Coordination: Coordination normal.   Psychiatric:         Thought Content: Thought content normal.         Assessment:       1. Chronic pain of right knee    2. Severe obesity (BMI 35.0-39.9) with comorbidity    3. Essential hypertension    4. Gastroesophageal reflux disease without esophagitis    5. Acute pain of right knee           Plan:       Chronic pain of right knee  -     Cancel: X-Ray Knee 3 View Right; Future; Expected date: 02/13/2023    Severe obesity (BMI 35.0-39.9) with comorbidity    Essential hypertension    Gastroesophageal reflux disease without esophagitis    Acute pain of right knee    Other orders  -     dexAMETHasone injection 8 mg  -     methylPREDNISolone (MEDROL DOSEPACK) 4 mg tablet; use as directed  Dispense: 21 each; Refill: 0  -     HYDROcodone-acetaminophen (NORCO) 5-325 mg per tablet; Take 1 tablet by mouth every 12 (twelve) hours as needed for Pain.  Dispense: 20 tablet; Refill: 0  -     tiZANidine (ZANAFLEX) 4 MG tablet; Take 1 tablet (4 mg total) by mouth every 6 (six) hours as needed.  Dispense: 30 tablet; Refill: 0      Follow up if symptoms worsen or fail to improve, for medication management.        2/16/2023 Ping Juarez

## 2023-02-27 ENCOUNTER — TELEPHONE (OUTPATIENT)
Dept: FAMILY MEDICINE | Facility: CLINIC | Age: 68
End: 2023-02-27

## 2023-02-27 DIAGNOSIS — M25.561 CHRONIC PAIN OF RIGHT KNEE: Primary | ICD-10-CM

## 2023-02-27 DIAGNOSIS — G89.29 CHRONIC PAIN OF RIGHT KNEE: Primary | ICD-10-CM

## 2023-02-27 NOTE — TELEPHONE ENCOUNTER
----- Message from Lupe Zhu sent at 2/27/2023 10:37 AM CST -----  Patient called and stated that he was told to call the office when he was back in town so that he can get his MRI done please give him a call at [phone]

## 2023-03-06 ENCOUNTER — HOSPITAL ENCOUNTER (OUTPATIENT)
Dept: RADIOLOGY | Facility: HOSPITAL | Age: 68
Discharge: HOME | End: 2023-03-06
Attending: NURSE PRACTITIONER
Payer: MEDICARE

## 2023-03-06 DIAGNOSIS — M25.561 CHRONIC PAIN OF RIGHT KNEE: ICD-10-CM

## 2023-03-06 DIAGNOSIS — G89.29 CHRONIC PAIN OF RIGHT KNEE: ICD-10-CM

## 2023-03-06 PROCEDURE — 73721 MRI JNT OF LWR EXTRE W/O DYE: CPT | Mod: TC,PO,RT

## 2023-03-15 ENCOUNTER — TELEPHONE (OUTPATIENT)
Dept: ADMINISTRATIVE | Facility: CLINIC | Age: 68
End: 2023-03-15
Payer: MEDICARE

## 2023-03-17 ENCOUNTER — OFFICE VISIT (OUTPATIENT)
Dept: FAMILY MEDICINE | Facility: CLINIC | Age: 68
End: 2023-03-17
Payer: MEDICARE

## 2023-03-17 VITALS
WEIGHT: 267 LBS | DIASTOLIC BLOOD PRESSURE: 68 MMHG | HEART RATE: 78 BPM | SYSTOLIC BLOOD PRESSURE: 120 MMHG | TEMPERATURE: 98 F | OXYGEN SATURATION: 97 % | BODY MASS INDEX: 40.47 KG/M2 | HEIGHT: 68 IN

## 2023-03-17 DIAGNOSIS — I10 ESSENTIAL HYPERTENSION: ICD-10-CM

## 2023-03-17 DIAGNOSIS — R26.9 ABNORMALITY OF GAIT AND MOBILITY: ICD-10-CM

## 2023-03-17 DIAGNOSIS — E66.01 MORBID OBESITY WITH BMI OF 40.0-44.9, ADULT: ICD-10-CM

## 2023-03-17 DIAGNOSIS — Z00.00 ENCOUNTER FOR PREVENTIVE HEALTH EXAMINATION: Primary | ICD-10-CM

## 2023-03-17 PROCEDURE — 3008F PR BODY MASS INDEX (BMI) DOCUMENTED: ICD-10-PCS | Mod: CPTII,S$GLB,, | Performed by: NURSE PRACTITIONER

## 2023-03-17 PROCEDURE — G0439 PR MEDICARE ANNUAL WELLNESS SUBSEQUENT VISIT: ICD-10-PCS | Mod: S$GLB,,, | Performed by: NURSE PRACTITIONER

## 2023-03-17 PROCEDURE — 3078F DIAST BP <80 MM HG: CPT | Mod: CPTII,S$GLB,, | Performed by: NURSE PRACTITIONER

## 2023-03-17 PROCEDURE — 4010F PR ACE/ARB THEARPY RXD/TAKEN: ICD-10-PCS | Mod: CPTII,S$GLB,, | Performed by: NURSE PRACTITIONER

## 2023-03-17 PROCEDURE — 99999 PR PBB SHADOW E&M-EST. PATIENT-LVL IV: CPT | Mod: PBBFAC,,, | Performed by: NURSE PRACTITIONER

## 2023-03-17 PROCEDURE — 99999 PR PBB SHADOW E&M-EST. PATIENT-LVL IV: ICD-10-PCS | Mod: PBBFAC,,, | Performed by: NURSE PRACTITIONER

## 2023-03-17 PROCEDURE — 1101F PR PT FALLS ASSESS DOC 0-1 FALLS W/OUT INJ PAST YR: ICD-10-PCS | Mod: CPTII,S$GLB,, | Performed by: NURSE PRACTITIONER

## 2023-03-17 PROCEDURE — 3008F BODY MASS INDEX DOCD: CPT | Mod: CPTII,S$GLB,, | Performed by: NURSE PRACTITIONER

## 2023-03-17 PROCEDURE — 3074F PR MOST RECENT SYSTOLIC BLOOD PRESSURE < 130 MM HG: ICD-10-PCS | Mod: CPTII,S$GLB,, | Performed by: NURSE PRACTITIONER

## 2023-03-17 PROCEDURE — 1159F MED LIST DOCD IN RCRD: CPT | Mod: CPTII,S$GLB,, | Performed by: NURSE PRACTITIONER

## 2023-03-17 PROCEDURE — 1159F PR MEDICATION LIST DOCUMENTED IN MEDICAL RECORD: ICD-10-PCS | Mod: CPTII,S$GLB,, | Performed by: NURSE PRACTITIONER

## 2023-03-17 PROCEDURE — 3074F SYST BP LT 130 MM HG: CPT | Mod: CPTII,S$GLB,, | Performed by: NURSE PRACTITIONER

## 2023-03-17 PROCEDURE — 3288F PR FALLS RISK ASSESSMENT DOCUMENTED: ICD-10-PCS | Mod: CPTII,S$GLB,, | Performed by: NURSE PRACTITIONER

## 2023-03-17 PROCEDURE — 1126F AMNT PAIN NOTED NONE PRSNT: CPT | Mod: CPTII,S$GLB,, | Performed by: NURSE PRACTITIONER

## 2023-03-17 PROCEDURE — 1101F PT FALLS ASSESS-DOCD LE1/YR: CPT | Mod: CPTII,S$GLB,, | Performed by: NURSE PRACTITIONER

## 2023-03-17 PROCEDURE — 4010F ACE/ARB THERAPY RXD/TAKEN: CPT | Mod: CPTII,S$GLB,, | Performed by: NURSE PRACTITIONER

## 2023-03-17 PROCEDURE — 3288F FALL RISK ASSESSMENT DOCD: CPT | Mod: CPTII,S$GLB,, | Performed by: NURSE PRACTITIONER

## 2023-03-17 PROCEDURE — 1126F PR PAIN SEVERITY QUANTIFIED, NO PAIN PRESENT: ICD-10-PCS | Mod: CPTII,S$GLB,, | Performed by: NURSE PRACTITIONER

## 2023-03-17 PROCEDURE — 3078F PR MOST RECENT DIASTOLIC BLOOD PRESSURE < 80 MM HG: ICD-10-PCS | Mod: CPTII,S$GLB,, | Performed by: NURSE PRACTITIONER

## 2023-03-17 PROCEDURE — G0439 PPPS, SUBSEQ VISIT: HCPCS | Mod: S$GLB,,, | Performed by: NURSE PRACTITIONER

## 2023-03-17 NOTE — PROGRESS NOTES
"  Lewis Matias presented for a  Medicare AWV and comprehensive Health Risk Assessment today. The following components were reviewed and updated:    Medical history  Family History  Social history  Allergies and Current Medications  Health Risk Assessment  Health Maintenance  Care Team         ** See Completed Assessments for Annual Wellness Visit within the encounter summary.**         The following assessments were completed:  Living Situation  CAGE  Depression Screening  Timed Get Up and Go  Whisper Test  Cognitive Function Screening  Nutrition Screening  ADL Screening  PAQ Screening    Clock in media    Vitals:    03/17/23 1605   BP: 120/68   Pulse: 78   Temp: 97.6 °F (36.4 °C)   TempSrc: Oral   SpO2: 97%   Weight: 121.1 kg (266 lb 15.6 oz)   Height: 5' 8" (1.727 m)     Body mass index is 40.59 kg/m².  Physical Exam  Constitutional:       Appearance: He is well-developed.   HENT:      Head: Normocephalic and atraumatic.      Right Ear: Hearing normal.      Left Ear: Hearing normal.      Nose: Nose normal.   Eyes:      General: Lids are normal.      Conjunctiva/sclera: Conjunctivae normal.      Pupils: Pupils are equal, round, and reactive to light.   Cardiovascular:      Rate and Rhythm: Normal rate.   Pulmonary:      Effort: Pulmonary effort is normal.   Abdominal:      Palpations: Abdomen is soft.   Musculoskeletal:         General: Normal range of motion.      Cervical back: Normal range of motion and neck supple.   Skin:     General: Skin is warm and dry.   Neurological:      Mental Status: He is alert and oriented to person, place, and time.             Diagnoses and health risks identified today and associated recommendations/orders:    1. Encounter for preventive health examination  Discussed health maintenance guidelines appropriate for age.    Review for Opioid Screening: Patient does not have rx for Opioids.    Review for Substance Use Disorders: Patient does not use substance.  Patient plans to " "schedule colonoscopy with  Nadeem   Declines all vaccines  today       2. Morbid obesity with BMI of 40.0-44.9, adult  Uncontrolled, Counseled patient on his ideal body weight, health consequences of being obese and current recommendations including weekly exercise and a heart healthy diet.  Current BMI is:Estimated body mass index is 40.59 kg/m² as calculated from the following:    Height as of this encounter: 5' 8" (1.727 m).    Weight as of this encounter: 121.1 kg (266 lb 15.6 oz)..  Patient is aware that ideal BMI < 25 or Weight in (lb) to have BMI = 25: 164.1.      3. Abnormality of gait and mobility  Stable, continue to [facility]    4. Essential hypertension  Controlled, continue current medication regimen  Low salt diet  Increase physical activity  Followed by pcp        Provided Lewis with a 5-10 year written screening schedule and personal prevention plan. Recommendations were developed using the USPSTF age appropriate recommendations. Education, counseling, and referrals were provided as needed. After Visit Summary printed and given to patient which includes a list of additional screenings\tests needed.    Follow up for One year for Annual Wellness Visit.    Laila Alvarado, NP      I offered to discuss advanced care planning, including how to pick a person who would make decisions for you if you were unable to make them for yourself, called a health care power of , and what kind of decisions you might make such as use of life sustaining treatments such as ventilators and tube feeding when faced with a life limiting illness recorded on a living will that they will need to know. (How you want to be cared for as you near the end of your natural life)     X Patient is interested in learning more about how to make advanced directives.  I provided them paperwork and offered to discuss this with them.  "

## 2023-03-17 NOTE — PATIENT INSTRUCTIONS
Counseling and Referral of Other Preventative  (Italic type indicates deductible and co-insurance are waived)    Patient Name: Lewis Matias  Today's Date: 3/17/2023    Health Maintenance       Date Due Completion Date    Hepatitis C Screening Never done ---    Hemoglobin A1c (Diabetic Prevention Screening) Never done ---    Shingles Vaccine (1 of 2) Never done ---    Pneumococcal Vaccines (Age 65+) (1 - PCV) Never done ---    COVID-19 Vaccine (3 - Booster for Pfizer series) 01/26/2022 12/1/2021    Colorectal Cancer Screening 08/15/2022 8/15/2012    Influenza Vaccine (1) Never done ---    Lipid Panel 01/24/2027 1/24/2022    TETANUS VACCINE 08/07/2029 8/7/2019        No orders of the defined types were placed in this encounter.      The following information is provided to all patients.  This information is to help you find resources for any of the problems found today that may be affecting your health:                Living healthy guide: www.[facility].[place].[facility]      Understanding Diabetes: www.diabetes.org      Eating healthy: www.cdc.gov/healthyweight      [facility] home safety checklist: www.cdc.gov/steadi/patient.html      Agency on Aging: www.goea.[place].[facility]      Alcoholics anonymous (AA): www.aa.org      Physical Activity: www.pebbles.nih.gov/op8tvou      Tobacco use: www.quitwithusla.org

## 2023-04-11 ENCOUNTER — PATIENT MESSAGE (OUTPATIENT)
Dept: ADMINISTRATIVE | Facility: HOSPITAL | Age: 68
End: 2023-04-11
Payer: MEDICARE

## 2023-04-15 ENCOUNTER — HOSPITAL ENCOUNTER (EMERGENCY)
Facility: HOSPITAL | Age: 68
Discharge: HOME | End: 2023-04-15
Attending: FAMILY MEDICINE
Payer: MEDICARE

## 2023-04-15 VITALS
TEMPERATURE: 98 F | OXYGEN SATURATION: 95 % | RESPIRATION RATE: 17 BRPM | DIASTOLIC BLOOD PRESSURE: 85 MMHG | SYSTOLIC BLOOD PRESSURE: 129 MMHG | WEIGHT: 230 LBS | BODY MASS INDEX: 34.86 KG/M2 | HEART RATE: 75 BPM | HEIGHT: 68 IN

## 2023-04-15 DIAGNOSIS — R10.9 LEFT FLANK PAIN: ICD-10-CM

## 2023-04-15 DIAGNOSIS — N13.2 HYDRONEPHROSIS CONCURRENT WITH AND DUE TO CALCULI OF KIDNEY AND URETER: Primary | ICD-10-CM

## 2023-04-15 LAB
ALBUMIN SERPL BCP-MCNC: 3.9 G/DL (ref 3.5–5.2)
ALP SERPL-CCNC: 80 U/L (ref 55–135)
ALT SERPL W/O P-5'-P-CCNC: 27 U/L (ref 10–44)
ANION GAP SERPL CALC-SCNC: 12 MMOL/L (ref 8–16)
AST SERPL-CCNC: 17 U/L (ref 10–40)
BASOPHILS # BLD AUTO: 0.04 K/UL (ref 0–0.2)
BASOPHILS NFR BLD: 0.6 % (ref 0–1.9)
BILIRUB SERPL-MCNC: 1.2 MG/DL (ref 0.1–1)
BILIRUB UR QL STRIP: NEGATIVE
BUN SERPL-MCNC: 14 MG/DL (ref 8–23)
CALCIUM SERPL-MCNC: 9.2 MG/DL (ref 8.7–10.5)
CHLORIDE SERPL-SCNC: 104 MMOL/L (ref 95–110)
CLARITY UR: CLEAR
CO2 SERPL-SCNC: 25 MMOL/L (ref 23–29)
COLOR UR: YELLOW
CREAT SERPL-MCNC: 0.9 MG/DL (ref 0.5–1.4)
DIFFERENTIAL METHOD: ABNORMAL
EOSINOPHIL # BLD AUTO: 0.3 K/UL (ref 0–0.5)
EOSINOPHIL NFR BLD: 4.2 % (ref 0–8)
ERYTHROCYTE [DISTWIDTH] IN BLOOD BY AUTOMATED COUNT: 14 % (ref 11.5–14.5)
EST. GFR  (NO RACE VARIABLE): >60 ML/MIN/1.73 M^2
GLUCOSE SERPL-MCNC: 132 MG/DL (ref 70–110)
GLUCOSE UR QL STRIP: NEGATIVE
HCT VFR BLD AUTO: 47.3 % (ref 40–54)
HCV AB SERPL QL IA: NORMAL
HGB BLD-MCNC: 16.1 G/DL (ref 14–18)
HGB UR QL STRIP: ABNORMAL
HIV 1+2 AB+HIV1 P24 AG SERPL QL IA: NORMAL
IMM GRANULOCYTES # BLD AUTO: 0.1 K/UL (ref 0–0.04)
IMM GRANULOCYTES NFR BLD AUTO: 1.4 % (ref 0–0.5)
KETONES UR QL STRIP: NEGATIVE
LEUKOCYTE ESTERASE UR QL STRIP: NEGATIVE
LIPASE SERPL-CCNC: 18 U/L (ref 4–60)
LYMPHOCYTES # BLD AUTO: 1.5 K/UL (ref 1–4.8)
LYMPHOCYTES NFR BLD: 20.3 % (ref 18–48)
MCH RBC QN AUTO: 30.9 PG (ref 27–31)
MCHC RBC AUTO-ENTMCNC: 34 G/DL (ref 32–36)
MCV RBC AUTO: 91 FL (ref 82–98)
MONOCYTES # BLD AUTO: 0.9 K/UL (ref 0.3–1)
MONOCYTES NFR BLD: 12.4 % (ref 4–15)
NEUTROPHILS # BLD AUTO: 4.4 K/UL (ref 1.8–7.7)
NEUTROPHILS NFR BLD: 61.1 % (ref 38–73)
NITRITE UR QL STRIP: NEGATIVE
NRBC BLD-RTO: 0 /100 WBC
PH UR STRIP: 6 [PH] (ref 5–8)
PLATELET # BLD AUTO: 146 K/UL (ref 150–450)
PMV BLD AUTO: 11 FL (ref 9.2–12.9)
POTASSIUM SERPL-SCNC: 3.9 MMOL/L (ref 3.5–5.1)
PROT SERPL-MCNC: 6.8 G/DL (ref 6–8.4)
PROT UR QL STRIP: NEGATIVE
RBC # BLD AUTO: 5.21 M/UL (ref 4.6–6.2)
SODIUM SERPL-SCNC: 141 MMOL/L (ref 136–145)
SP GR UR STRIP: 1.02 (ref 1–1.03)
URN SPEC COLLECT METH UR: ABNORMAL
UROBILINOGEN UR STRIP-ACNC: NEGATIVE EU/DL
WBC # BLD AUTO: 7.18 K/UL (ref 3.9–12.7)

## 2023-04-15 PROCEDURE — 87389 HIV-1 AG W/HIV-1&-2 AB AG IA: CPT | Performed by: EMERGENCY MEDICINE

## 2023-04-15 PROCEDURE — 99285 EMERGENCY DEPT VISIT HI MDM: CPT | Mod: 25

## 2023-04-15 PROCEDURE — 81003 URINALYSIS AUTO W/O SCOPE: CPT | Performed by: FAMILY MEDICINE

## 2023-04-15 PROCEDURE — 80053 COMPREHEN METABOLIC PANEL: CPT | Performed by: FAMILY MEDICINE

## 2023-04-15 PROCEDURE — 74176 CT ABD & PELVIS W/O CONTRAST: CPT | Mod: TC

## 2023-04-15 PROCEDURE — 96361 HYDRATE IV INFUSION ADD-ON: CPT

## 2023-04-15 PROCEDURE — 25000003 PHARM REV CODE 250: Performed by: FAMILY MEDICINE

## 2023-04-15 PROCEDURE — 96374 THER/PROPH/DIAG INJ IV PUSH: CPT

## 2023-04-15 PROCEDURE — 86803 HEPATITIS C AB TEST: CPT | Performed by: EMERGENCY MEDICINE

## 2023-04-15 PROCEDURE — 74176 CT ABD & PELVIS W/O CONTRAST: CPT | Mod: 26,,, | Performed by: RADIOLOGY

## 2023-04-15 PROCEDURE — 83690 ASSAY OF LIPASE: CPT | Performed by: FAMILY MEDICINE

## 2023-04-15 PROCEDURE — 63600175 PHARM REV CODE 636 W HCPCS: Performed by: FAMILY MEDICINE

## 2023-04-15 PROCEDURE — 85025 COMPLETE CBC W/AUTO DIFF WBC: CPT | Performed by: FAMILY MEDICINE

## 2023-04-15 PROCEDURE — 74176 CT ABDOMEN PELVIS WITHOUT CONTRAST: ICD-10-PCS | Mod: 26,,, | Performed by: RADIOLOGY

## 2023-04-15 RX ORDER — TAMSULOSIN HYDROCHLORIDE 0.4 MG/1
0.4 CAPSULE ORAL DAILY
Qty: 10 CAPSULE | Refills: 0 | Status: ON HOLD | OUTPATIENT
Start: 2023-04-15 | End: 2023-04-25 | Stop reason: SDUPTHER

## 2023-04-15 RX ORDER — HYDROMORPHONE HYDROCHLORIDE 2 MG/1
2 TABLET ORAL EVERY 4 HOURS PRN
Qty: 10 TABLET | Refills: 0 | Status: SHIPPED | OUTPATIENT
Start: 2023-04-15 | End: 2023-06-12

## 2023-04-15 RX ORDER — KETOROLAC TROMETHAMINE 30 MG/ML
30 INJECTION, SOLUTION INTRAMUSCULAR; INTRAVENOUS
Status: COMPLETED | OUTPATIENT
Start: 2023-04-15 | End: 2023-04-15

## 2023-04-15 RX ADMIN — SODIUM CHLORIDE 1000 ML: 9 INJECTION, SOLUTION INTRAVENOUS at 05:04

## 2023-04-15 RX ADMIN — KETOROLAC TROMETHAMINE 30 MG: 30 INJECTION, SOLUTION INTRAMUSCULAR at 05:04

## 2023-04-15 NOTE — ED NOTES
"Patient much more comfortable now. States pain down from 9 to 4 on pain scale and "much more tolerable". Warm blanket provided. Will continue to monitor.   "

## 2023-04-15 NOTE — ED PROVIDER NOTES
Encounter Date: 4/15/2023       History     Chief Complaint   Patient presents with    Flank Pain     Flank pain and testicular pain. Onset yesterday am and pain resolved but returned again this am.      67-year-old male presents the ED ambulatory complaining of pain in the left flank area he states it began approximately 20 hours ago he has had some nausea he is very familiar with renal colic and kidney stones states he has had over 30 attacks usually managed by his urologist in [place] Dr. Boyer and Dr. Herrera,, the patient is in a camper at a local campground visiting from the [facility] area      Review of patient's allergies indicates:   Allergen Reactions    Iodinated contrast media Hives and Shortness Of Breath     Past Medical History:   Diagnosis Date    COVID-19 8/2/20021    GERD (gastroesophageal reflux disease)     Hard of hearing     Hiatal hernia     Hypertension     Kidney stones     Knee pain 06/2020    left     Past Surgical History:   Procedure Laterality Date    CHOLECYSTECTOMY      COLONOSCOPY  2012    Dr Silver- rtc 10 yr    HAND SURGERY      traumatic injury    HEMORRHOID SURGERY      KIDNEY STONE SURGERY      KNEE ARTHROSCOPY W/ MENISCECTOMY Left 11/4/2020    Procedure: ARTHROSCOPY, KNEE, WITH PARTIAL MEDIAL MENISCECTOMY;  Surgeon: Andres Blanco MD;  Location: [facility];  Service: Orthopedics;  Laterality: Left;    TONSILLECTOMY       Family History   Problem Relation Age of Onset    Heart disease Mother     Diabetes Mother     Cancer Father     Depression Sister     Cancer Sister         ovarian    Diabetes Brother      Social History     Tobacco Use    Smoking status: Never    Smokeless tobacco: Never   Substance Use Topics    Alcohol use: Yes     Comment: social    Drug use: Never     Review of Systems   Constitutional:  Negative for fever.   HENT:  Negative for sore throat.    Respiratory:  Negative for shortness of breath.    Cardiovascular:  Negative for chest pain.    Gastrointestinal:  Negative for nausea.   Genitourinary:  Positive for flank pain and frequency. Negative for dysuria, enuresis, penile pain and urgency.   Musculoskeletal:  Negative for back pain.   Skin:  Negative for rash.   Neurological:  Negative for weakness.   Hematological:  Does not bruise/bleed easily.     Physical Exam     Initial Vitals [04/15/23 0500]   BP Pulse Resp Temp SpO2   (!) 148/96 76 19 97.6 °F (36.4 °C) 96 %      MAP       --         Physical Exam    Nursing note and vitals reviewed.  Constitutional: He appears well-developed and well-nourished. He is not diaphoretic. No distress.   HENT:   Head: Normocephalic and atraumatic.   Nose: Nose normal.   Mouth/Throat: Oropharynx is clear and moist. No oropharyngeal exudate.   Eyes: EOM are normal.   Neck: Neck supple. No tracheal deviation present.   Normal range of motion.  Cardiovascular:  Normal rate and regular rhythm.           No murmur heard.  Pulmonary/Chest: Breath sounds normal. No stridor. No respiratory distress. He has no rales.   Abdominal: Abdomen is soft. He exhibits no distension and no mass. There is no abdominal tenderness. There is no rebound.   Musculoskeletal:         General: No tenderness or edema. Normal range of motion.      Cervical back: Normal range of motion and neck supple.     Lymphadenopathy:     He has no cervical adenopathy.   Neurological: He is alert and oriented to person, place, and time. He has normal strength.   Skin: Skin is warm and dry. Capillary refill takes less than 2 seconds. No pallor.   Psychiatric: He has a normal mood and affect.       ED Course   Procedures  Labs Reviewed   CBC W/ AUTO DIFFERENTIAL - Abnormal; Notable for the following components:       Result Value    Platelets 146 (*)     Immature Granulocytes 1.4 (*)     Immature Grans (Abs) 0.10 (*)     All other components within normal limits    Narrative:     Release to patient->Immediate   COMPREHENSIVE METABOLIC PANEL - Abnormal;  Notable for the following components:    Glucose 132 (*)     Total Bilirubin 1.2 (*)     All other components within normal limits    Narrative:     Release to patient->Immediate   URINALYSIS, REFLEX TO URINE CULTURE - Abnormal; Notable for the following components:    Occult Blood UA Trace (*)     All other components within normal limits    Narrative:     Preferred Collection Type->Urine, Clean Catch  Specimen Source->Urine   LIPASE    Narrative:     Release to patient->Immediate   HIV 1 / 2 ANTIBODY   HEPATITIS C ANTIBODY          Imaging Results              CT Abdomen Pelvis  Without Contrast (Final result)  Result time 04/15/23 06:36:22      Final result by Krissy Ramos MD (04/15/23 06:36:22)                   Impression:      There is a 9 mm proximal left ureteral calculus with associated mild left hydronephrosis.      Electronically signed by: Krissy Ramos MD  Date:    04/15/2023  Time:    06:36               Narrative:    EXAMINATION:  CT ABDOMEN PELVIS WITHOUT CONTRAST    CLINICAL HISTORY:  Flank pain, kidney stone suspected;    TECHNIQUE:  Low dose axial images, sagittal and coronal reformations were obtained from the lung bases to the pubic symphysis.  30 mL of oral Omnipaque 350 was administered.    COMPARISON:  08/26/2017    FINDINGS:  There is a cyst seen centrally within the liver measuring 3.5 cm.    The spleen, adrenal glands, and pancreas are unremarkable.  There are bilateral renal calculi.  There is mild left hydronephrosis.  There is a 9 mm mid left ureteral calculus new from prior exam.  The previously seen 5 mm left ureteral calculus is no longer visualized.    There is stool seen throughout the colon.  The appendix is within normal limits.    There is no evidence of abdominal nor pelvic lymphadenopathy.  There is atherosclerotic disease of the aorta.  There are degenerative changes of the spine.                                       Medications   sodium chloride 0.9% bolus 1,000 mL  1,000 mL (0 mLs Intravenous Stopped 4/15/23 0616)   ketorolac injection 30 mg (30 mg Intravenous Given 4/15/23 0515)     Medical Decision Making:   Initial Assessment:   Patient was initially evaluated  by Dr Cardona, and workup was initiated because of left flank pain.  Differential Diagnosis:   Kidney stone, bowel obstruction, bowel infection, electrolyte abnormalities  Clinical Tests:   Lab Tests: Ordered and Reviewed  The following lab test(s) were unremarkable: CBC, CMP, Lipase and Urinalysis       <> Summary of Lab: Urine shows blood, bilirubin 1.2 otherwise chemistries are normal, CBC is normal  Radiological Study: Ordered and Reviewed  ED Management:  CT scan shows a 9 mm kidney stone with mild hydronephrosis and it is proximal.  I discussed with the patient who says he is had multiple stones last 1 was 3 years ago treated by his urologist in [place].  The patient is pain-free at present, explained that is 9 mm which unlikely the past without lithotripsy.  He states he is had lithotripsy in the past.  Advised patient needs to follow with urologist on Monday for any further treatment recommendations that time.           ED Course as of 04/15/23 0710   Sat Apr 15, 2023   0604 Complete Blood Count (CBC)(!) [PW]   0604 Urinalysis, Reflex to Urine Culture Urine, Clean Catch(!) [PW]   0604 Occult Blood UA(!): Trace [PW]   0659 Lipase: 18 [PW]   0659 Comprehensive Metabolic Panel (CMP)(!) [PW]   0659 CT Abdomen Pelvis  Without Contrast [PW]      ED Course User Index  [PW] Macario Cullen MD                   Clinical Impression:   Final diagnoses:  [N13.2] Hydronephrosis concurrent with and due to calculi of kidney and ureter (Primary)  [R10.9] Left flank pain        ED Disposition Condition    Discharge Stable          ED Prescriptions       Medication Sig Dispense Start Date End Date Auth. Provider    tamsulosin (FLOMAX) 0.4 mg Cap Take 1 capsule (0.4 mg total) by mouth once daily. 10 capsule 4/15/2023 4/14/2024  Macario Cullen MD    HYDROmorphone (DILAUDID) 2 MG tablet Take 1 tablet (2 mg total) by mouth every 4 (four) hours as needed for Pain. 10 tablet 4/15/2023 -- Macario Cullen MD          Follow-up Information       Follow up With Specialties Details Why Contact Info    Ping Juarez NP Family Medicine In 2 days  [address]  [address]  [facility] [address]  [phone]      Follow with patient's urologist, Dr. Rubio in [place] Cullen MD  04/15/23 [id number]

## 2023-04-15 NOTE — ED NOTES
Assumed care of this pt. Pt resting comfortably. Respirations even and unlabored. Chest rising and falling. In no acute distress. Pt states his pain is better at this time. Denies any needs. Call light within reach

## 2023-04-17 ENCOUNTER — TELEPHONE (OUTPATIENT)
Dept: UROLOGY | Facility: CLINIC | Age: 68
End: 2023-04-17
Payer: MEDICARE

## 2023-04-17 NOTE — TELEPHONE ENCOUNTER
Spoke w pt who inquired about sooner regarding 9 mm kidney stone   Pt offered sooner with Dr Bee who had appts this week pt scheduled on wed pt agreed with appt

## 2023-04-17 NOTE — TELEPHONE ENCOUNTER
Spoke w pt who voiced with large stone and would like sooner appt or sooner for procedure pt informed md in clinic and will call back with further recs  Pt vu

## 2023-04-17 NOTE — TELEPHONE ENCOUNTER
----- Message from Mary Wallace sent at 4/17/2023  9:56 AM CDT -----  Type:  Needs Medical Advice    Who Called: pt  Symptoms (please be specific): pt made appt for 4/24 at 11 am needs to be seen earlier found out Saturday that he has a 9 ml kidney stone he needs getting rid of   Would the patient rather a call back or a response via [facility]? Call   Best Call Back Number: [phone]  Additional Information:  pt in a lot of pain please call today

## 2023-04-18 NOTE — TELEPHONE ENCOUNTER
Left message for pt to return call    Left detailed message to offer appt this am and if could not make am appt or call back can keep scheduled appt for tomm

## 2023-04-19 ENCOUNTER — OFFICE VISIT (OUTPATIENT)
Dept: UROLOGY | Facility: CLINIC | Age: 68
End: 2023-04-19
Payer: MEDICARE

## 2023-04-19 VITALS
HEIGHT: 68 IN | BODY MASS INDEX: 34.86 KG/M2 | WEIGHT: 230 LBS | HEART RATE: 73 BPM | SYSTOLIC BLOOD PRESSURE: 139 MMHG | DIASTOLIC BLOOD PRESSURE: 91 MMHG

## 2023-04-19 DIAGNOSIS — N20.0 NEPHROLITHIASIS: Primary | ICD-10-CM

## 2023-04-19 LAB
BILIRUBIN, UA POC OHS: NEGATIVE
BLOOD, UA POC OHS: NEGATIVE
CLARITY, UA POC OHS: CLEAR
COLOR, UA POC OHS: YELLOW
GLUCOSE, UA POC OHS: NEGATIVE
KETONES, UA POC OHS: NEGATIVE
LEUKOCYTES, UA POC OHS: NEGATIVE
NITRITE, UA POC OHS: NEGATIVE
PH, UA POC OHS: 5.5
PROTEIN, UA POC OHS: NEGATIVE
SPECIFIC GRAVITY, UA POC OHS: 1.02
UROBILINOGEN, UA POC OHS: 0.2

## 2023-04-19 PROCEDURE — 99204 PR OFFICE/OUTPT VISIT, NEW, LEVL IV, 45-59 MIN: ICD-10-PCS | Mod: S$GLB,,, | Performed by: UROLOGY

## 2023-04-19 PROCEDURE — 1126F PR PAIN SEVERITY QUANTIFIED, NO PAIN PRESENT: ICD-10-PCS | Mod: CPTII,S$GLB,, | Performed by: UROLOGY

## 2023-04-19 PROCEDURE — 3075F SYST BP GE 130 - 139MM HG: CPT | Mod: CPTII,S$GLB,, | Performed by: UROLOGY

## 2023-04-19 PROCEDURE — 1160F RVW MEDS BY RX/DR IN RCRD: CPT | Mod: CPTII,S$GLB,, | Performed by: UROLOGY

## 2023-04-19 PROCEDURE — 3075F PR MOST RECENT SYSTOLIC BLOOD PRESS GE 130-139MM HG: ICD-10-PCS | Mod: CPTII,S$GLB,, | Performed by: UROLOGY

## 2023-04-19 PROCEDURE — 3080F DIAST BP >= 90 MM HG: CPT | Mod: CPTII,S$GLB,, | Performed by: UROLOGY

## 2023-04-19 PROCEDURE — 99999 PR PBB SHADOW E&M-EST. PATIENT-LVL IV: ICD-10-PCS | Mod: PBBFAC,,, | Performed by: UROLOGY

## 2023-04-19 PROCEDURE — 4010F ACE/ARB THERAPY RXD/TAKEN: CPT | Mod: CPTII,S$GLB,, | Performed by: UROLOGY

## 2023-04-19 PROCEDURE — 1159F PR MEDICATION LIST DOCUMENTED IN MEDICAL RECORD: ICD-10-PCS | Mod: CPTII,S$GLB,, | Performed by: UROLOGY

## 2023-04-19 PROCEDURE — 3080F PR MOST RECENT DIASTOLIC BLOOD PRESSURE >= 90 MM HG: ICD-10-PCS | Mod: CPTII,S$GLB,, | Performed by: UROLOGY

## 2023-04-19 PROCEDURE — 1160F PR REVIEW ALL MEDS BY PRESCRIBER/CLIN PHARMACIST DOCUMENTED: ICD-10-PCS | Mod: CPTII,S$GLB,, | Performed by: UROLOGY

## 2023-04-19 PROCEDURE — 1126F AMNT PAIN NOTED NONE PRSNT: CPT | Mod: CPTII,S$GLB,, | Performed by: UROLOGY

## 2023-04-19 PROCEDURE — 1159F MED LIST DOCD IN RCRD: CPT | Mod: CPTII,S$GLB,, | Performed by: UROLOGY

## 2023-04-19 PROCEDURE — 81003 URINALYSIS AUTO W/O SCOPE: CPT | Mod: QW,S$GLB,, | Performed by: UROLOGY

## 2023-04-19 PROCEDURE — 99999 PR PBB SHADOW E&M-EST. PATIENT-LVL IV: CPT | Mod: PBBFAC,,, | Performed by: UROLOGY

## 2023-04-19 PROCEDURE — 99204 OFFICE O/P NEW MOD 45 MIN: CPT | Mod: S$GLB,,, | Performed by: UROLOGY

## 2023-04-19 PROCEDURE — 4010F PR ACE/ARB THEARPY RXD/TAKEN: ICD-10-PCS | Mod: CPTII,S$GLB,, | Performed by: UROLOGY

## 2023-04-19 PROCEDURE — 81003 POCT URINALYSIS(INSTRUMENT): ICD-10-PCS | Mod: QW,S$GLB,, | Performed by: UROLOGY

## 2023-04-19 PROCEDURE — 3008F PR BODY MASS INDEX (BMI) DOCUMENTED: ICD-10-PCS | Mod: CPTII,S$GLB,, | Performed by: UROLOGY

## 2023-04-19 PROCEDURE — 3008F BODY MASS INDEX DOCD: CPT | Mod: CPTII,S$GLB,, | Performed by: UROLOGY

## 2023-04-19 PROCEDURE — 87086 URINE CULTURE/COLONY COUNT: CPT | Performed by: UROLOGY

## 2023-04-19 NOTE — H&P (VIEW-ONLY)
[facility] New Patient/H&P:    Lewis Matias is a 67 y.o. male who presents for left urolithiasis.    Patient presented to the [place] emergency department on 4/15/23 with severe left flank pain beginning one day prior with associated nausea. CT abdomen pelvis wo contrast with a 9 mm proximal left ureteral stone with mild left hydronephrosis. UA with trace blood.     Discharged with Flomax and Dilaudid. Here for follow up. Pain has improved. Has not noticed passage of any stones.     Urine dipstick negative today.     States he had a several year history of kidney stones previously managed by Dr. Boyer and Dr. Herrera. States he has had ureteroscopy and ESWL in the past.     Works as a .     Denies any fever, chills, gross hematuria, bone pain, unintentional weight loss,  trauma or history of   malignancy.       PSA  1.30  1/24/22    Past Medical History:   Diagnosis Date    COVID-19 8/2/20021    GERD (gastroesophageal reflux disease)     Hard of hearing     Hiatal hernia     Hypertension     Kidney stones     Knee pain 06/2020    left       Past Surgical History:   Procedure Laterality Date    CHOLECYSTECTOMY      COLONOSCOPY  2012    Dr Silver- rtc 10 yr    HAND SURGERY      traumatic injury    HEMORRHOID SURGERY      KIDNEY STONE SURGERY      KNEE ARTHROSCOPY W/ MENISCECTOMY Left 11/4/2020    Procedure: ARTHROSCOPY, KNEE, WITH PARTIAL MEDIAL MENISCECTOMY;  Surgeon: Andres Blanco MD;  Location: [facility];  Service: Orthopedics;  Laterality: Left;    TONSILLECTOMY         Family History   Problem Relation Age of Onset    Heart disease Mother     Diabetes Mother     Cancer Father     Depression Sister     Cancer Sister         ovarian    Diabetes Brother        Review of patient's allergies indicates:   Allergen Reactions    Iodinated contrast media Hives and Shortness Of Breath       Medications Reviewed: see MAR      FOCUSED PHYSICAL EXAM:    Vitals:    04/19/23 1125  "  BP: (!) 139/91   Pulse: 73     Body mass index is 34.97 kg/m². Weight: 104.3 kg (230 lb) Height: 5' 8" (172.7 cm)       General: Alert, cooperative, no distress, appears stated age  Abdomen: Soft, non-tender, no CVA tenderness, non-distended      LABS:    Recent Results (from the past 336 hour(s))   HIV 1/2 Ag/Ab (4th Gen)    Collection Time: 04/15/23  5:09 AM   Result Value Ref Range    HIV 1/2 Ag/Ab Non-reactive Non-reactive   Hepatitis C Antibody    Collection Time: 04/15/23  5:09 AM   Result Value Ref Range    Hepatitis C Ab Non-reactive Non-reactive   Complete Blood Count (CBC)    Collection Time: 04/15/23  5:09 AM   Result Value Ref Range    WBC 7.18 3.90 - 12.70 K/uL    RBC 5.21 4.60 - 6.20 M/uL    Hemoglobin 16.1 14.0 - 18.0 g/dL    Hematocrit 47.3 40.0 - 54.0 %    MCV 91 82 - 98 fL    MCH 30.9 27.0 - 31.0 pg    MCHC 34.0 32.0 - 36.0 g/dL    RDW 14.0 11.5 - 14.5 %    Platelets 146 (L) 150 - 450 K/uL    MPV 11.0 9.2 - 12.9 fL    Immature Granulocytes 1.4 (H) 0.0 - 0.5 %    Gran # (ANC) 4.4 1.8 - 7.7 K/uL    Immature Grans (Abs) 0.10 (H) 0.00 - 0.04 K/uL    Lymph # 1.5 1.0 - 4.8 K/uL    Mono # 0.9 0.3 - 1.0 K/uL    Eos # 0.3 0.0 - 0.5 K/uL    Baso # 0.04 0.00 - 0.20 K/uL    nRBC 0 0 /100 WBC    Gran % 61.1 38.0 - 73.0 %    Lymph % 20.3 18.0 - 48.0 %    Mono % 12.4 4.0 - 15.0 %    Eosinophil % 4.2 0.0 - 8.0 %    Basophil % 0.6 0.0 - 1.9 %    Differential Method Automated    Comprehensive Metabolic Panel (CMP)    Collection Time: 04/15/23  5:09 AM   Result Value Ref Range    Sodium 141 136 - 145 mmol/L    Potassium 3.9 3.5 - 5.1 mmol/L    Chloride 104 95 - 110 mmol/L    CO2 25 23 - 29 mmol/L    Glucose 132 (H) 70 - 110 mg/dL    BUN 14 8 - 23 mg/dL    Creatinine 0.9 0.5 - 1.4 mg/dL    Calcium 9.2 8.7 - 10.5 mg/dL    Total Protein 6.8 6.0 - 8.4 g/dL    Albumin 3.9 3.5 - 5.2 g/dL    Total Bilirubin 1.2 (H) 0.1 - 1.0 mg/dL    Alkaline Phosphatase 80 55 - 135 U/L    AST 17 10 - 40 U/L    ALT 27 10 - 44 U/L    Anion " Gap 12 8 - 16 mmol/L    eGFR >60.0 >60 mL/min/1.73 m^2   Lipase    Collection Time: 04/15/23  5:09 AM   Result Value Ref Range    Lipase 18 4 - 60 U/L   Urinalysis, Reflex to Urine Culture Urine, Clean Catch    Collection Time: 04/15/23  5:09 AM    Specimen: Urine, Clean Catch   Result Value Ref Range    Specimen UA Urine, Unspecified     Color, UA Yellow Yellow, Straw, Bee    Appearance, UA Clear Clear    pH, UA 6.0 5.0 - 8.0    Specific Gravity, UA 1.025 1.005 - 1.030    Protein, UA Negative Negative    Glucose, UA Negative Negative    Ketones, UA Negative Negative    Bilirubin (UA) Negative Negative    Occult Blood UA Trace (A) Negative    Nitrite, UA Negative Negative    Urobilinogen, UA Negative Negative EU/dL    Leukocytes, UA Negative Negative   POCT Urinalysis(Instrument)    Collection Time: 04/19/23 11:43 AM   Result Value Ref Range    Color, POC UA Yellow Yellow, Straw, Colorless    Clarity, POC UA Clear Clear    Glucose, POC UA Negative Negative    Bilirubin, POC UA Negative Negative    Ketones, POC UA Negative Negative    Spec Grav POC UA 1.025 1.005 - 1.030    Blood, POC UA Negative Negative    pH, POC UA 5.5 5.0 - 8.0    Protein, POC UA Negative Negative    Urobilinogen, POC UA 0.2 <=1.0    Nitrite, POC UA Negative Negative    WBC, POC UA Negative Negative           Assessment/Diagnosis:    1. Nephrolithiasis  POCT Urinalysis(Instrument)    Place in Outpatient    Case Request Operating Room: REMOVAL, CALCULUS, URETER, URETEROSCOPIC    Vital Signs     Diet NPO    Place sequential compression device    EKG 12-lead    Diet NPO    Urine culture          Plans:    - I spent 45 minutes of the day of this encounter preparing for, treating and managing this patient. Discussed the etiology and management of the patient's nephrolithiasis. Explained that stone disease is multifactorial and can be secondary to urinary obstruction, urine composition, low urine volume, diet, hypokalemia, DM, hypertension, gout,  RTA, UTI and medications. We discussed that stones can be managed with observation, trial of passage, ureteroscopy with laser lithotripsy, ESWL and PCNL. After extensive discussion, patient has decided to proceed with left ureteroscopy with laser lithotripsy and stent placement on 4/25/23. Urine culture today. Phone preop prior. All risks, benefits and alternatives discussed at length with patient.

## 2023-04-19 NOTE — PROGRESS NOTES
[facility] New Patient/H&P:    Lewis Matias is a 67 y.o. male who presents for left urolithiasis.    Patient presented to the [place] emergency department on 4/15/23 with severe left flank pain beginning one day prior with associated nausea. CT abdomen pelvis wo contrast with a 9 mm proximal left ureteral stone with mild left hydronephrosis. UA with trace blood.     Discharged with Flomax and Dilaudid. Here for follow up. Pain has improved. Has not noticed passage of any stones.     Urine dipstick negative today.     States he had a several year history of kidney stones previously managed by Dr. Boyer and Dr. Herrera. States he has had ureteroscopy and ESWL in the past.     Works as a .     Denies any fever, chills, gross hematuria, bone pain, unintentional weight loss,  trauma or history of   malignancy.       PSA  1.30  1/24/22    Past Medical History:   Diagnosis Date    COVID-19 8/2/20021    GERD (gastroesophageal reflux disease)     Hard of hearing     Hiatal hernia     Hypertension     Kidney stones     Knee pain 06/2020    left       Past Surgical History:   Procedure Laterality Date    CHOLECYSTECTOMY      COLONOSCOPY  2012    Dr Silver- rtc 10 yr    HAND SURGERY      traumatic injury    HEMORRHOID SURGERY      KIDNEY STONE SURGERY      KNEE ARTHROSCOPY W/ MENISCECTOMY Left 11/4/2020    Procedure: ARTHROSCOPY, KNEE, WITH PARTIAL MEDIAL MENISCECTOMY;  Surgeon: Andres Blanco MD;  Location: [facility];  Service: Orthopedics;  Laterality: Left;    TONSILLECTOMY         Family History   Problem Relation Age of Onset    Heart disease Mother     Diabetes Mother     Cancer Father     Depression Sister     Cancer Sister         ovarian    Diabetes Brother        Review of patient's allergies indicates:   Allergen Reactions    Iodinated contrast media Hives and Shortness Of Breath       Medications Reviewed: see MAR      FOCUSED PHYSICAL EXAM:    Vitals:    04/19/23 1125  "  BP: (!) 139/91   Pulse: 73     Body mass index is 34.97 kg/m². Weight: 104.3 kg (230 lb) Height: 5' 8" (172.7 cm)       General: Alert, cooperative, no distress, appears stated age  Abdomen: Soft, non-tender, no CVA tenderness, non-distended      LABS:    Recent Results (from the past 336 hour(s))   HIV 1/2 Ag/Ab (4th Gen)    Collection Time: 04/15/23  5:09 AM   Result Value Ref Range    HIV 1/2 Ag/Ab Non-reactive Non-reactive   Hepatitis C Antibody    Collection Time: 04/15/23  5:09 AM   Result Value Ref Range    Hepatitis C Ab Non-reactive Non-reactive   Complete Blood Count (CBC)    Collection Time: 04/15/23  5:09 AM   Result Value Ref Range    WBC 7.18 3.90 - 12.70 K/uL    RBC 5.21 4.60 - 6.20 M/uL    Hemoglobin 16.1 14.0 - 18.0 g/dL    Hematocrit 47.3 40.0 - 54.0 %    MCV 91 82 - 98 fL    MCH 30.9 27.0 - 31.0 pg    MCHC 34.0 32.0 - 36.0 g/dL    RDW 14.0 11.5 - 14.5 %    Platelets 146 (L) 150 - 450 K/uL    MPV 11.0 9.2 - 12.9 fL    Immature Granulocytes 1.4 (H) 0.0 - 0.5 %    Gran # (ANC) 4.4 1.8 - 7.7 K/uL    Immature Grans (Abs) 0.10 (H) 0.00 - 0.04 K/uL    Lymph # 1.5 1.0 - 4.8 K/uL    Mono # 0.9 0.3 - 1.0 K/uL    Eos # 0.3 0.0 - 0.5 K/uL    Baso # 0.04 0.00 - 0.20 K/uL    nRBC 0 0 /100 WBC    Gran % 61.1 38.0 - 73.0 %    Lymph % 20.3 18.0 - 48.0 %    Mono % 12.4 4.0 - 15.0 %    Eosinophil % 4.2 0.0 - 8.0 %    Basophil % 0.6 0.0 - 1.9 %    Differential Method Automated    Comprehensive Metabolic Panel (CMP)    Collection Time: 04/15/23  5:09 AM   Result Value Ref Range    Sodium 141 136 - 145 mmol/L    Potassium 3.9 3.5 - 5.1 mmol/L    Chloride 104 95 - 110 mmol/L    CO2 25 23 - 29 mmol/L    Glucose 132 (H) 70 - 110 mg/dL    BUN 14 8 - 23 mg/dL    Creatinine 0.9 0.5 - 1.4 mg/dL    Calcium 9.2 8.7 - 10.5 mg/dL    Total Protein 6.8 6.0 - 8.4 g/dL    Albumin 3.9 3.5 - 5.2 g/dL    Total Bilirubin 1.2 (H) 0.1 - 1.0 mg/dL    Alkaline Phosphatase 80 55 - 135 U/L    AST 17 10 - 40 U/L    ALT 27 10 - 44 U/L    Anion " Gap 12 8 - 16 mmol/L    eGFR >60.0 >60 mL/min/1.73 m^2   Lipase    Collection Time: 04/15/23  5:09 AM   Result Value Ref Range    Lipase 18 4 - 60 U/L   Urinalysis, Reflex to Urine Culture Urine, Clean Catch    Collection Time: 04/15/23  5:09 AM    Specimen: Urine, Clean Catch   Result Value Ref Range    Specimen UA Urine, Unspecified     Color, UA Yellow Yellow, Straw, Bee    Appearance, UA Clear Clear    pH, UA 6.0 5.0 - 8.0    Specific Gravity, UA 1.025 1.005 - 1.030    Protein, UA Negative Negative    Glucose, UA Negative Negative    Ketones, UA Negative Negative    Bilirubin (UA) Negative Negative    Occult Blood UA Trace (A) Negative    Nitrite, UA Negative Negative    Urobilinogen, UA Negative Negative EU/dL    Leukocytes, UA Negative Negative   POCT Urinalysis(Instrument)    Collection Time: 04/19/23 11:43 AM   Result Value Ref Range    Color, POC UA Yellow Yellow, Straw, Colorless    Clarity, POC UA Clear Clear    Glucose, POC UA Negative Negative    Bilirubin, POC UA Negative Negative    Ketones, POC UA Negative Negative    Spec Grav POC UA 1.025 1.005 - 1.030    Blood, POC UA Negative Negative    pH, POC UA 5.5 5.0 - 8.0    Protein, POC UA Negative Negative    Urobilinogen, POC UA 0.2 <=1.0    Nitrite, POC UA Negative Negative    WBC, POC UA Negative Negative           Assessment/Diagnosis:    1. Nephrolithiasis  POCT Urinalysis(Instrument)    Place in Outpatient    Case Request Operating Room: REMOVAL, CALCULUS, URETER, URETEROSCOPIC    Vital Signs     Diet NPO    Place sequential compression device    EKG 12-lead    Diet NPO    Urine culture          Plans:    - I spent 45 minutes of the day of this encounter preparing for, treating and managing this patient. Discussed the etiology and management of the patient's nephrolithiasis. Explained that stone disease is multifactorial and can be secondary to urinary obstruction, urine composition, low urine volume, diet, hypokalemia, DM, hypertension, gout,  RTA, UTI and medications. We discussed that stones can be managed with observation, trial of passage, ureteroscopy with laser lithotripsy, ESWL and PCNL. After extensive discussion, patient has decided to proceed with left ureteroscopy with laser lithotripsy and stent placement on 4/25/23. Urine culture today. Phone preop prior. All risks, benefits and alternatives discussed at length with patient.

## 2023-04-21 LAB — BACTERIA UR CULT: NORMAL

## 2023-04-24 ENCOUNTER — ANESTHESIA EVENT (OUTPATIENT)
Dept: SURGERY | Facility: HOSPITAL | Age: 68
End: 2023-04-24
Payer: MEDICARE

## 2023-04-25 ENCOUNTER — HOSPITAL ENCOUNTER (OUTPATIENT)
Facility: HOSPITAL | Age: 68
Discharge: HOME | End: 2023-04-25
Attending: UROLOGY | Admitting: UROLOGY
Payer: MEDICARE

## 2023-04-25 ENCOUNTER — ANESTHESIA (OUTPATIENT)
Dept: SURGERY | Facility: HOSPITAL | Age: 68
End: 2023-04-25
Payer: MEDICARE

## 2023-04-25 DIAGNOSIS — N20.0 NEPHROLITHIASIS: ICD-10-CM

## 2023-04-25 DIAGNOSIS — N13.2 HYDRONEPHROSIS CONCURRENT WITH AND DUE TO CALCULI OF KIDNEY AND URETER: ICD-10-CM

## 2023-04-25 DIAGNOSIS — N20.0 KIDNEY STONE: ICD-10-CM

## 2023-04-25 DIAGNOSIS — N20.0 NEPHROLITHIASIS: Primary | ICD-10-CM

## 2023-04-25 PROCEDURE — 63600175 PHARM REV CODE 636 W HCPCS: Performed by: UROLOGY

## 2023-04-25 PROCEDURE — 37000009 HC ANESTHESIA EA ADD 15 MINS: Performed by: UROLOGY

## 2023-04-25 PROCEDURE — C1769 GUIDE WIRE: HCPCS | Performed by: UROLOGY

## 2023-04-25 PROCEDURE — D9220A PRA ANESTHESIA: ICD-10-PCS | Mod: CRNA,,, | Performed by: NURSE ANESTHETIST, CERTIFIED REGISTERED

## 2023-04-25 PROCEDURE — 93010 ELECTROCARDIOGRAM REPORT: CPT | Mod: ,,, | Performed by: INTERNAL MEDICINE

## 2023-04-25 PROCEDURE — 25000003 PHARM REV CODE 250: Performed by: NURSE ANESTHETIST, CERTIFIED REGISTERED

## 2023-04-25 PROCEDURE — 63600175 PHARM REV CODE 636 W HCPCS: Performed by: NURSE ANESTHETIST, CERTIFIED REGISTERED

## 2023-04-25 PROCEDURE — 74420 UROGRAPHY RTRGR +-KUB: CPT | Mod: 26,,, | Performed by: UROLOGY

## 2023-04-25 PROCEDURE — 25000003 PHARM REV CODE 250: Performed by: ANESTHESIOLOGY

## 2023-04-25 PROCEDURE — D9220A PRA ANESTHESIA: ICD-10-PCS | Mod: ANES,,, | Performed by: ANESTHESIOLOGY

## 2023-04-25 PROCEDURE — 99900103 DSU ONLY-NO CHARGE-INITIAL HR (STAT): Performed by: UROLOGY

## 2023-04-25 PROCEDURE — 71000015 HC POSTOP RECOV 1ST HR: Performed by: UROLOGY

## 2023-04-25 PROCEDURE — 71000016 HC POSTOP RECOV ADDL HR: Performed by: UROLOGY

## 2023-04-25 PROCEDURE — 74420 PR  X-RAY RETROGRADE PYELOGRAM: ICD-10-PCS | Mod: 26,,, | Performed by: UROLOGY

## 2023-04-25 PROCEDURE — D9220A PRA ANESTHESIA: Mod: CRNA,,, | Performed by: NURSE ANESTHETIST, CERTIFIED REGISTERED

## 2023-04-25 PROCEDURE — C1758 CATHETER, URETERAL: HCPCS | Performed by: UROLOGY

## 2023-04-25 PROCEDURE — 36000706: Performed by: UROLOGY

## 2023-04-25 PROCEDURE — 63600175 PHARM REV CODE 636 W HCPCS: Performed by: ANESTHESIOLOGY

## 2023-04-25 PROCEDURE — 52356 CYSTO/URETERO W/LITHOTRIPSY: CPT | Mod: LT,,, | Performed by: UROLOGY

## 2023-04-25 PROCEDURE — D9220A PRA ANESTHESIA: Mod: ANES,,, | Performed by: ANESTHESIOLOGY

## 2023-04-25 PROCEDURE — 25500020 PHARM REV CODE 255: Performed by: UROLOGY

## 2023-04-25 PROCEDURE — 82365 CALCULUS SPECTROSCOPY: CPT | Performed by: UROLOGY

## 2023-04-25 PROCEDURE — 94799 UNLISTED PULMONARY SVC/PX: CPT

## 2023-04-25 PROCEDURE — 27201423 OPTIME MED/SURG SUP & DEVICES STERILE SUPPLY: Performed by: UROLOGY

## 2023-04-25 PROCEDURE — 25000003 PHARM REV CODE 250: Performed by: UROLOGY

## 2023-04-25 PROCEDURE — 52356 PR CYSTO/URETERO W/LITHOTRIPSY: ICD-10-PCS | Mod: LT,,, | Performed by: UROLOGY

## 2023-04-25 PROCEDURE — 71000039 HC RECOVERY, EACH ADD'L HOUR: Performed by: UROLOGY

## 2023-04-25 PROCEDURE — 71000033 HC RECOVERY, INTIAL HOUR: Performed by: UROLOGY

## 2023-04-25 PROCEDURE — 37000008 HC ANESTHESIA 1ST 15 MINUTES: Performed by: UROLOGY

## 2023-04-25 PROCEDURE — 93005 ELECTROCARDIOGRAM TRACING: CPT | Mod: 59

## 2023-04-25 PROCEDURE — 99900104 DSU ONLY-NO CHARGE-EA ADD'L HR (STAT): Performed by: UROLOGY

## 2023-04-25 PROCEDURE — C1773 RET DEV, INSERTABLE: HCPCS | Performed by: UROLOGY

## 2023-04-25 PROCEDURE — 25500020 PHARM REV CODE 255: Mod: 59 | Performed by: UROLOGY

## 2023-04-25 PROCEDURE — C1894 INTRO/SHEATH, NON-LASER: HCPCS | Performed by: UROLOGY

## 2023-04-25 PROCEDURE — C2617 STENT, NON-COR, TEM W/O DEL: HCPCS | Performed by: UROLOGY

## 2023-04-25 PROCEDURE — 36000707: Performed by: UROLOGY

## 2023-04-25 PROCEDURE — 93010 EKG 12-LEAD: ICD-10-PCS | Mod: ,,, | Performed by: INTERNAL MEDICINE

## 2023-04-25 DEVICE — STENT SET URETERAL 6X26CM: Type: IMPLANTABLE DEVICE | Site: URETER | Status: FUNCTIONAL

## 2023-04-25 RX ORDER — CEPHALEXIN 500 MG/1
500 CAPSULE ORAL EVERY 8 HOURS
Qty: 9 CAPSULE | Refills: 0 | Status: SHIPPED | OUTPATIENT
Start: 2023-04-25 | End: 2023-05-10

## 2023-04-25 RX ORDER — MIDAZOLAM HYDROCHLORIDE 1 MG/ML
INJECTION INTRAMUSCULAR; INTRAVENOUS
Status: DISCONTINUED | OUTPATIENT
Start: 2023-04-25 | End: 2023-04-25

## 2023-04-25 RX ORDER — FENTANYL CITRATE 50 UG/ML
25 INJECTION, SOLUTION INTRAMUSCULAR; INTRAVENOUS EVERY 5 MIN PRN
Status: COMPLETED | OUTPATIENT
Start: 2023-04-25 | End: 2023-04-25

## 2023-04-25 RX ORDER — EPHEDRINE SULFATE 50 MG/ML
INJECTION, SOLUTION INTRAVENOUS
Status: DISCONTINUED | OUTPATIENT
Start: 2023-04-25 | End: 2023-04-25

## 2023-04-25 RX ORDER — HYDROCODONE BITARTRATE AND ACETAMINOPHEN 5; 325 MG/1; MG/1
1 TABLET ORAL EVERY 6 HOURS PRN
Qty: 10 TABLET | Refills: 0 | Status: SHIPPED | OUTPATIENT
Start: 2023-04-25 | End: 2023-04-30

## 2023-04-25 RX ORDER — OXYCODONE HYDROCHLORIDE 5 MG/1
5 TABLET ORAL ONCE AS NEEDED
Status: COMPLETED | OUTPATIENT
Start: 2023-04-25 | End: 2023-04-25

## 2023-04-25 RX ORDER — TAMSULOSIN HYDROCHLORIDE 0.4 MG/1
0.4 CAPSULE ORAL DAILY
Qty: 21 CAPSULE | Refills: 0 | Status: SHIPPED | OUTPATIENT
Start: 2023-04-25 | End: 2023-06-12

## 2023-04-25 RX ORDER — SUCCINYLCHOLINE CHLORIDE 20 MG/ML
INJECTION INTRAMUSCULAR; INTRAVENOUS
Status: DISCONTINUED | OUTPATIENT
Start: 2023-04-25 | End: 2023-04-25

## 2023-04-25 RX ORDER — FENTANYL CITRATE 50 UG/ML
INJECTION, SOLUTION INTRAMUSCULAR; INTRAVENOUS
Status: DISCONTINUED | OUTPATIENT
Start: 2023-04-25 | End: 2023-04-25

## 2023-04-25 RX ORDER — CEFAZOLIN SODIUM 2 G/50ML
2 SOLUTION INTRAVENOUS
Status: COMPLETED | OUTPATIENT
Start: 2023-04-25 | End: 2023-04-25

## 2023-04-25 RX ORDER — PHENYLEPHRINE HYDROCHLORIDE 10 MG/ML
INJECTION INTRAVENOUS
Status: DISCONTINUED | OUTPATIENT
Start: 2023-04-25 | End: 2023-04-25

## 2023-04-25 RX ORDER — ROCURONIUM BROMIDE 10 MG/ML
INJECTION, SOLUTION INTRAVENOUS
Status: DISCONTINUED | OUTPATIENT
Start: 2023-04-25 | End: 2023-04-25

## 2023-04-25 RX ORDER — PROPOFOL 10 MG/ML
VIAL (ML) INTRAVENOUS
Status: DISCONTINUED | OUTPATIENT
Start: 2023-04-25 | End: 2023-04-25

## 2023-04-25 RX ORDER — METOCLOPRAMIDE HYDROCHLORIDE 5 MG/ML
10 INJECTION INTRAMUSCULAR; INTRAVENOUS EVERY 10 MIN PRN
Status: DISCONTINUED | OUTPATIENT
Start: 2023-04-25 | End: 2023-04-25 | Stop reason: HOSPADM

## 2023-04-25 RX ORDER — DEXAMETHASONE SODIUM PHOSPHATE 4 MG/ML
INJECTION, SOLUTION INTRA-ARTICULAR; INTRALESIONAL; INTRAMUSCULAR; INTRAVENOUS; SOFT TISSUE
Status: DISCONTINUED | OUTPATIENT
Start: 2023-04-25 | End: 2023-04-25

## 2023-04-25 RX ORDER — ONDANSETRON HYDROCHLORIDE 2 MG/ML
INJECTION, SOLUTION INTRAMUSCULAR; INTRAVENOUS
Status: DISCONTINUED | OUTPATIENT
Start: 2023-04-25 | End: 2023-04-25

## 2023-04-25 RX ORDER — SODIUM CHLORIDE, SODIUM LACTATE, POTASSIUM CHLORIDE, CALCIUM CHLORIDE 600; 310; 30; 20 MG/100ML; MG/100ML; MG/100ML; MG/100ML
INJECTION, SOLUTION INTRAVENOUS CONTINUOUS
Status: DISCONTINUED | OUTPATIENT
Start: 2023-04-25 | End: 2023-04-25 | Stop reason: HOSPADM

## 2023-04-25 RX ORDER — PHENAZOPYRIDINE HYDROCHLORIDE 200 MG/1
200 TABLET, FILM COATED ORAL ONCE
Status: COMPLETED | OUTPATIENT
Start: 2023-04-25 | End: 2023-04-25

## 2023-04-25 RX ORDER — LIDOCAINE HYDROCHLORIDE 10 MG/ML
1 INJECTION, SOLUTION EPIDURAL; INFILTRATION; INTRACAUDAL; PERINEURAL ONCE
Status: DISCONTINUED | OUTPATIENT
Start: 2023-04-25 | End: 2023-04-25 | Stop reason: HOSPADM

## 2023-04-25 RX ORDER — LIDOCAINE HYDROCHLORIDE 20 MG/ML
INJECTION INTRAVENOUS
Status: DISCONTINUED | OUTPATIENT
Start: 2023-04-25 | End: 2023-04-25

## 2023-04-25 RX ORDER — PHENAZOPYRIDINE HYDROCHLORIDE 200 MG/1
200 TABLET, FILM COATED ORAL
Qty: 9 TABLET | Refills: 0 | Status: SHIPPED | OUTPATIENT
Start: 2023-04-25 | End: 2023-04-28

## 2023-04-25 RX ORDER — OXYBUTYNIN CHLORIDE 5 MG/1
5 TABLET ORAL 2 TIMES DAILY PRN
Qty: 10 TABLET | Refills: 0 | Status: SHIPPED | OUTPATIENT
Start: 2023-04-25 | End: 2023-06-12

## 2023-04-25 RX ORDER — KETOROLAC TROMETHAMINE 10 MG/1
10 TABLET, FILM COATED ORAL EVERY 6 HOURS
Qty: 20 TABLET | Refills: 0 | Status: SHIPPED | OUTPATIENT
Start: 2023-04-25 | End: 2023-04-30

## 2023-04-25 RX ADMIN — ROCURONIUM BROMIDE 5 MG: 10 INJECTION, SOLUTION INTRAVENOUS at 01:04

## 2023-04-25 RX ADMIN — FENTANYL CITRATE 25 MCG: 50 INJECTION, SOLUTION INTRAMUSCULAR; INTRAVENOUS at 03:04

## 2023-04-25 RX ADMIN — FENTANYL CITRATE 50 MCG: 50 INJECTION, SOLUTION INTRAMUSCULAR; INTRAVENOUS at 01:04

## 2023-04-25 RX ADMIN — ONDANSETRON 4 MG: 2 INJECTION INTRAMUSCULAR; INTRAVENOUS at 01:04

## 2023-04-25 RX ADMIN — DIATRIZOATE MEGLUMINE 50 ML: 300 INJECTION, SOLUTION INTRAVENOUS at 02:04

## 2023-04-25 RX ADMIN — PHENYLEPHRINE HYDROCHLORIDE 200 MCG: 10 INJECTION INTRAVENOUS at 01:04

## 2023-04-25 RX ADMIN — EPHEDRINE SULFATE 25 MG: 50 INJECTION, SOLUTION INTRAMUSCULAR; INTRAVENOUS; SUBCUTANEOUS at 02:04

## 2023-04-25 RX ADMIN — PROPOFOL 150 MG: 10 INJECTION, EMULSION INTRAVENOUS at 01:04

## 2023-04-25 RX ADMIN — PHENAZOPYRIDINE 200 MG: 200 TABLET ORAL at 03:04

## 2023-04-25 RX ADMIN — DEXAMETHASONE SODIUM PHOSPHATE 4 MG: 4 INJECTION, SOLUTION INTRA-ARTICULAR; INTRALESIONAL; INTRAMUSCULAR; INTRAVENOUS; SOFT TISSUE at 01:04

## 2023-04-25 RX ADMIN — MIDAZOLAM HYDROCHLORIDE 2 MG: 1 INJECTION, SOLUTION INTRAMUSCULAR; INTRAVENOUS at 01:04

## 2023-04-25 RX ADMIN — SODIUM CHLORIDE, SODIUM GLUCONATE, SODIUM ACETATE, POTASSIUM CHLORIDE AND MAGNESIUM CHLORIDE: 526; 502; 368; 37; 30 INJECTION, SOLUTION INTRAVENOUS at 11:04

## 2023-04-25 RX ADMIN — SUCCINYLCHOLINE CHLORIDE 120 MG: 20 INJECTION, SOLUTION INTRAMUSCULAR; INTRAVENOUS at 01:04

## 2023-04-25 RX ADMIN — LIDOCAINE HYDROCHLORIDE 75 MG: 20 INJECTION, SOLUTION INTRAVENOUS at 01:04

## 2023-04-25 RX ADMIN — SODIUM CHLORIDE, SODIUM GLUCONATE, SODIUM ACETATE, POTASSIUM CHLORIDE AND MAGNESIUM CHLORIDE: 526; 502; 368; 37; 30 INJECTION, SOLUTION INTRAVENOUS at 02:04

## 2023-04-25 RX ADMIN — OXYCODONE HYDROCHLORIDE 5 MG: 5 TABLET ORAL at 03:04

## 2023-04-25 RX ADMIN — CEFAZOLIN SODIUM 2 G: 2 SOLUTION INTRAVENOUS at 01:04

## 2023-04-25 RX ADMIN — GLYCOPYRROLATE 0.2 MG: 0.2 INJECTION, SOLUTION INTRAMUSCULAR; INTRAVITREAL at 02:04

## 2023-04-25 NOTE — PLAN OF CARE
Patient has voided in PACU and twice per urinal in post op. Handouts with instructions and follow up information reviewed. Medications ready at [facility], all valuables were returned and IV removed. Patient verbalized burning during urination. Instructed to follow the prescription plan per MD ordered and call the MD.

## 2023-04-25 NOTE — DISCHARGE INSTRUCTIONS
General Information:    1.  Do not drink alcoholic beverages including beer for 24 hours or as long as you are on pain medication..  2.  Do not drive a motor vehicle, operate machinery or power tools, or signs legal papers for 24 hours or as long as you are on pain medication.   3.  You may experience light-headedness, dizziness, and sleepiness following surgery. Please do not stay alone. A responsible adult should be with you for this 24 hour period.  4.  Go home and rest.    5. Progress slowly to a normal diet unless instructed.  Otherwise, begin with liquids such as soft drinks, then soup and crackers working up to solid foods. Drink plenty of nonalcoholic fluids.  6.  Certain anesthetics and pain medications produce nausea and vomiting in certain       individuals. If nausea becomes a problem at home, call you doctor.    7. A nurse will be calling you sometime after surgery. Do not be alarmed. This is our way of finding out how you are doing.    8. Several times every hour while you are awake, take 2-3 deep breaths and cough. If you had stomach surgery hold a pillow or rolled towel firmly against your stomach before you cough. This will help with any pain the cough might cause.  9. Several times every hour while you are awake, pump and flex your feet 5-6 times and do foot circles. This will help prevent blood clots.    10.Call your doctor for severe pain, bleeding, fever, or signs or symptoms of infection (pain, swelling, redness, foul odor, drainage).   Post op instructions for prevention of DVT  What is deep vein thrombosis?  Deep vein thrombosis (DVT) is the medical term for blood clots in the deep veins of the leg.  These blood clots can be dangerous.  A DVT can block a blood vessel and keep blood from getting where it needs to go.  Another problem is that the clot can travel to other parts of the body such as the lungs.  A clot that travels to the lungs is called a pulmonary embolus (PE) and can cause  serious problems with breathing which can lead to death.  Am I at risk for DVT/PE?  If you are not very active, you are at risk of DVT.  Anyone confined to bed, sitting for long periods of time, recovering from surgery, etc. increases the risk of DVT.  Other risk factors are cancer diagnosis, certain medications, estrogen replacement in any form,older age, obesity, pregnancy, smoking, history of clotting disorders, and dehydration.  How will I know if I have a DVT?  Swelling in the lower leg  Pain  Warmth, redness, hardness or bulging of the vein  If you have any of these symptoms, call your doctors office right away.  Some people will not have any symptoms until the clot moves to the lungs.  What are the symptoms of a PE?  Panting, shortness of breath, or trouble breathing  Sharp, knife-like chest pain when you breathe  Coughing or coughing up blood  Rapid heartbeat  If you have any of these symptoms or get worse quickly, call 911 for emergency treatment.  How can I prevent a DVT?  Avoid long periods of inactivity and dont cross your legs--get up and walk around every hour or so.  Stay active--walking after surgery is highly encouraged.  This means you should get out of the house and walk in the neighborhood.  Going up and down stairs will not impair healing (unless advised against such activity by your doctor).    Drink plenty of noncaffeinated, nonalcoholic fluids each day to prevent dehydration.  Wear special support stockings, if they have been advised by your doctor.  If you travel, stop at least once an hour and walk around.  Avoid smoking (assistance with stopping is available through your healthcare provider)  Always notify your doctor if you are not able to follow the post operative instructions that are given to you at the time of discharge.  It may be necessary to prescribe one of the medications available to prevent DVT. Using an Incentive Spirometer    An incentive spirometer is a device that helps  you do deep breathing exercises. These exercises expand your lungs, aid in circulation, and help prevent pneumonia. Deep breathing exercises also help you breathe better and improve the function of your lungs by:  Keeping your lungs clear  Strengthening your breathing muscles  Helping prevent respiratory complications or problems  The incentive spirometer gives you a way to take an active part in recover. A nurse or therapist will teach you breathing exercises. To do these exercises, you will breathe in through your mouth and not your nose. The incentive spirometer only works correctly if you breathe in through your mouth.  Steps to clear lungs  Step 1. Exhale normally. Then, inhale normally.  Relax and breathe out.  Step 2. Place your lips tightly around the mouthpiece.  Make sure the device is upright and not tilted.  Step 3. Inhale as much air as you can through the mouthpiece (don't breath through your nose).  Inhale slowly and deeply.  Hold your breath long enough to keep the balls or disk raised for at least 3 to 5 seconds, or as instructed by your healthcare provider.  Some spirometers have an indicator to let you know that you are breathing in too fast. If the indicator goes off, breathe in more slowly.  Step 4. Repeat the exercise regularly.  Do this exercise every hour while you're awake, or as instructed by your healthcare provider.  If you were taught deep breathing and coughing exercises, do them regularly as instructed by your healthcare provider.      Discharge Instructions: After Your Surgery/Procedure  Youve just had surgery. During surgery you were given medicine called anesthesia to keep you relaxed and free of pain. After surgery you may have some pain or nausea. This is common. Here are some tips for feeling better and getting well after surgery.     Stay on schedule with your medication.   Going home  Your doctor or nurse will show you how to take care of yourself when you go home. He or she  "will also answer your questions. Have an adult family member or friend drive you home.      For your safety we recommend these precaution for the first 24 hours after your procedure:  Do not drive or use heavy equipment.  Do not make important decisions or sign legal papers.  Do not drink alcohol.  Have someone stay with you, if needed. He or she can watch for problems and help keep you safe.  Your concentration, balance, coordination, and judgement may be impaired for many hours after anesthesia.  Use caution when ambulating or standing up.     You may feel weak and "washed out" after anesthesia and surgery.      Subtle residual effects of general anesthesia or sedation with regional / local anesthesia can last more than 24 hours.  Rest for the remainder of the day or longer if your Doctor/Surgeon has advised you to do so.  Although you may feel normal within the first 24 hours, your reflexes and mental ability may be impaired without you realizing it.  You may feel dizzy, lightheaded or sleepy for 24 hours or longer.      Be sure to go to all follow-up visits with your doctor. And rest after your surgery for as long as your doctor tells you to.  Coping with pain  If you have pain after surgery, pain medicine will help you feel better. Take it as told, before pain becomes severe. Also, ask your doctor or pharmacist about other ways to control pain. This might be with heat, ice, or relaxation. And follow any other instructions your surgeon or nurse gives you.  Tips for taking pain medicine  To get the best relief possible, remember these points:  Pain medicines can upset your stomach. Taking them with a little food may help.  Most pain relievers taken by mouth need at least 20 to 30 minutes to start to work.  Taking medicine on a schedule can help you remember to take it. Try to time your medicine so that you can take it before starting an activity. This might be before you get dressed, go for a walk, or sit down " for dinner.  Constipation is a common side effect of pain medicines. Call your doctor before taking any medicines such as laxatives or stool softeners to help ease constipation. Also ask if you should skip any foods. Drinking lots of fluids and eating foods such as fruits and vegetables that are high in fiber can also help. Remember, do not take laxatives unless your surgeon has prescribed them.  Drinking alcohol and taking pain medicine can cause dizziness and slow your breathing. It can even be deadly. Do not drink alcohol while taking pain medicine.  Pain medicine can make you react more slowly to things. Do not drive or run machinery while taking pain medicine.  Your health care provider may tell you to take acetaminophen to help ease your pain. Ask him or her how much you are supposed to take each day. Acetaminophen or other pain relievers may interact with your prescription medicines or other over-the-counter (OTC) drugs. Some prescription medicines have acetaminophen and other ingredients. Using both prescription and OTC acetaminophen for pain can cause you to overdose. Read the labels on your OTC medicines with care. This will help you to clearly know the list of ingredients, how much to take, and any warnings. It may also help you not take too much acetaminophen. If you have questions or do not understand the information, ask your pharmacist or health care provider to explain it to you before you take the OTC medicine.  Managing nausea  Some people have an upset stomach after surgery. This is often because of anesthesia, pain, or pain medicine, or the stress of surgery. These tips will help you handle nausea and eat healthy foods as you get better. If you were on a special food plan before surgery, ask your doctor if you should follow it while you get better. These tips may help:  Do not push yourself to eat. Your body will tell you when to eat and how much.  Start off with clear liquids and soup. They are  easier to digest.  Next try semi-solid foods, such as mashed potatoes, applesauce, and gelatin, as you feel ready.  Slowly move to solid foods. Dont eat fatty, rich, or spicy foods at first.  Do not force yourself to have 3 large meals a day. Instead eat smaller amounts more often.  Take pain medicines with a small amount of solid food, such as crackers or toast, to avoid nausea.     Call your surgeon if  You still have pain an hour after taking medicine. The medicine may not be strong enough.  You feel too sleepy, dizzy, or groggy. The medicine may be too strong.  You have side effects like nausea, vomiting, or skin changes, such as rash, itching, or hives.       If you have obstructive sleep apnea  You were given anesthesia medicine during surgery to keep you comfortable and free of pain. After surgery, you may have more apnea spells because of this medicine and other medicines you were given. The spells may last longer than usual.   At home:  Keep using the continuous positive airway pressure (CPAP) device when you sleep. Unless your health care provider tells you not to, use it when you sleep, day or night. CPAP is a common device used to treat obstructive sleep apnea.  Talk with your provider before taking any pain medicine, muscle relaxants, or sedatives. Your provider will tell you about the possible dangers of taking these medicines.  © 5055-4720 [employer], [employer]. [address] [place] [address]. All rights reserved. This information is not intended as a substitute for professional medical care. Always follow your healthcare professional's instructions.     Using Opioids for Pain Management     Your doctor has given instructions for you to take an opioid.  This is a drug for bad pain.  It helps control pain without causing bleeding and kidney problems.  Common opioid names are morphine, hydromorphone, oxycodone, and methadone. These drugs are called narcotics.    There are several safety  concerns you need to know.     It is against the law to give or sell this drug to another person.  You must keep this medicine safely locked.    You may have side effects from taking this medication.  These include nausea, itching, sweating, sleepiness, a change in your ability to breathe, and depression.  Do not take alcohol or sleeping pills opioids.    Long-term opoid use may no longer giver you relief from pain.  It can cause you stomach pain, mental anxiety, and headaches.  Long-term opoid use can potentially lead to unlawful street drug abuse and reduce your ability to stay employed.    Your body may become opioid tolerant if you need to take more to get relief.    You must stop taking opioids if you begin having more pain as a result of the medicine.    Opioid withdrawal occurs when you have to stop taking the drug.  It can cause you to have nausea, vomiting, diarrhea, stomach pain, anxiety, and dilated pupils in your eyes. This condition means you are opioid dependent.    Addiction is a drug induced brain disease. It means there are changes in how your brain is working.  Children, teens, and young adults under 25 years old are more likely to get addicted to opioids.      Addiction can happen with repeated opioid use.  It does not happen with short-term use of two weeks or less.       For more information, please speak with your doctor or pharmacist.       We hope your stay was comfortable as you heal now, mend and rest.    For we have enjoyed taking care of you by giving your our best.    And as you get better, by regaining your health and strength;   We count it as a privilege to have served you and hope your time at [facility] was well spent.      Thank  You!!!

## 2023-04-25 NOTE — TRANSFER OF CARE
"Anesthesia Transfer of Care Note    Patient: Lewis Matias    Procedure(s) Performed: Procedure(s) (LRB):  REMOVAL, CALCULUS, URETER, URETEROSCOPIC (Left)  CYSTOSCOPY, WITH RETROGRADE PYELOGRAM (Left)  CYSTOURETEROSCOPY,WITH HOLMIUM LASER LITHOTRIPSY OF URETERAL CALCULUS (Left)    Patient location: PACU    Anesthesia Type: general    Transport from OR: Transported from OR on 6-10 L/min O2 by face mask with adequate spontaneous ventilation    Post pain: adequate analgesia    Post assessment: no apparent anesthetic complications and tolerated procedure well    Post vital signs: stable    Level of consciousness: sedated and responds to stimulation    Nausea/Vomiting: no nausea/vomiting    Complications: none    Transfer of care protocol was followed      Last vitals:   Visit Vitals  /71 (BP Location: Left arm, Patient Position: Lying)   Pulse 79   Temp 36.5 °C (97.7 °F) (Oral)   Resp 16   Ht 5' 9" (1.753 m)   Wt 108.9 kg (240 lb)   SpO2 97%   BMI 35.44 kg/m²     "

## 2023-04-25 NOTE — PLAN OF CARE
Patient prepared for surgery. Performed incentive spirometer teaching. Belongings in pre-op. Text message alerts set up with wife.

## 2023-04-25 NOTE — ANESTHESIA PREPROCEDURE EVALUATION
04/25/2023  Lewis Matias is a 67 y.o., male.      Pre-op Assessment    I have reviewed the Patient Summary Reports.     I have reviewed the Nursing Notes. I have reviewed the NPO Status.   I have reviewed the Medications.     Review of Systems  Anesthesia Hx:  Denies Family Hx of Anesthesia complications.  Personal Hx of Anesthesia complications  Difficult Intubation Pulmonary/Ventilatory Issues, Aspiration of Stomach Contents, aspiration pneumonia, required mechanical ventilation   Cardiovascular:   Hypertension ECG has been reviewed.    Pulmonary:   Pneumonia Sleep Apnea, CPAP    Renal/:   renal calculi    Hepatic/GI:   Hiatal Hernia, GERD, poorly controlled    Endocrine:  Obesity / BMI > 30      Physical Exam  General: Cooperative, Alert and Oriented    Airway:  Mallampati: IV   Mouth Opening: Small, but > 3cm  TM Distance: < 4 cm  Tongue: Large  Neck ROM: Extension Decreased, Flexion Decreased  Neck: Girth Increased    Chest/Lungs:  Normal Respiratory Rate    Heart:  Rate: Normal  Rhythm: Regular Rhythm        Anesthesia Plan  Type of Anesthesia, risks & benefits discussed:    Anesthesia Type: Gen ETT  Intra-op Monitoring Plan: Standard ASA Monitors  Post Op Pain Control Plan: multimodal analgesia  Induction:  IV  Airway Plan: Video, Post-Induction  Informed Consent: Informed consent signed with the Patient and all parties understand the risks and agree with anesthesia plan.  All questions answered.   ASA Score: 3    Ready For Surgery From Anesthesia Perspective.     .

## 2023-04-25 NOTE — PATIENT INSTRUCTIONS
VERY IMPORTANT - PLEASE READ    Your urologist is Dr. Pepe Bee  Office number: [phone] ([facility])  Address: [address] [place] [address],  (2nd office building on left); [address] (located on 2nd floor).   What Dr. Bee did today: Cysto, left ureteral stent placement, left ureteroscopy with laser lithotripsy     If you do not hear from us please call clinic to make a post-op appointment.   The following are specific instructions for you, so please read:    The ureter is the tube that drains the kidney (where urine is made). It connects the kidney to the bladder (where urine is stored).     A ureteral stent is a is a soft plastic tube with holes in tube that bypasses anything that obstructs (stone, tumor, scar tissue) the urine from flow from the kidney to the bladder. It is placed by going through the urethra and into the bladder. No incisions are made. Its temporarily inserted into a ureter to help drain urine into the bladder. One end goes in the kidney. The other end goes in the bladder. A coil on each end holds the stent in place. The stent cant be seen from outside the body.          You have a ureteral stent in your LEFT kidney that was placed on 04/25/2023  -the stent can only remain for a maximum of 3 to 6 months. If the stent remains longer than this you can get recurrent urinary tract infections, the stent can have more stones form along it and urine will not be able to drain and you will lose all function of your kidney requiring a major surgery and a big incision to remove the kidney.       You have a string attached to the stent:  -you can remove the stent on ____5/1/23_____ at 8 am  -it will be attached to the outside of your vagina (if you are a female) or to the penis (if you are male)  -if the stent is partially or accidentally pulled out you will leak urine until the stent is removed because urine is now coming directly from your kidney and bypassing the urethra. Go ahead and remove it  if this happens but expect pain for 24 to 48 hours or more after stent removed, especially if it was removed earlier than expected.  -If you have not heard from anyone about when to pull the string and remove the stent, remove it at two weeks after your surgery by pulling the string. DO NOT cut the string.   -When you remove the string, you should see a small plastic tube about the size of a cocktail straw and about 12 inches long attached to a string that looks like dental floss. If you do not see this, please let call the office and let us know.         If you do not receive a follow-up date or further instructions on what is to be done next about the stent, it is your responsibility to make sure that you have follow-up to have your stone or stent removed.     A stent CANNOT REMAIN indefinitely in the kidney. It should not remain if possible more than 3 to 6 months maximum (rarely 1 year if it was placed for cancer).     If you do not follow-up to have your stent removed then you can LOSE YOUR KIDNEY FUNCTION ON THAT SIDE, get RECURRENT URINARY TRACT INFECTIONS,and MORE STONES requiring SURGICAL OPEN removal of your kidney.    You can call our office ([facility] at [phone] and let them know a stent was placed and you need further instructions for follow-up). If there are issues with insurance we will work with you to help you arrange follow-up where it can be removed. Again,  A STENT CANNOT remain indefinitely.       A ureteral stent is left in place for many reasons:  -to keep the ureter open after a procedure as there will be much inflammation and if no stent is left you will experience the same pain as having the stone that caused the obstruction.   -to drain the kidney of infection.  -if the stone is up high, stuck, or you have an infection, the stent will stretch open the small ureter (the tube that drains the kidney) for a few weeks (usually 2 to 4 weeks) so that we can return and place  instruments into this tube to break up and remove the stone.      Ureteral Stent Symptoms can include but are not limited to   - pain in the kidney or bladder with urination during or especially at the end of voiding.   - constant urge to urinate, frequency of urination, severe bladder spasms and severe pain the kidney, especially during urination.  - blood in the urine, especially with increased activity. This can last the entire time the stent is in, it can sometimes clear and return or you may never have any at all. I recommend increasing fluid intake to prevent large clots that may be difficult to urinate out.      When to go to ER:   -fever >101.5 or inability to urinate (no urination for >4 hours and bladder pain).   -If you go to the ER with pain, they may check to make sure the stent is in good position with an x-ray or ultrasound but unlikely to do anything else.   -I will let you know when we will plan to have the stent either removed at the ambulatory surgical center as an outpatient procedure while you are awake, which is uncomfortable briefly, but not painful or you will remove it yourself by pulling the strings.

## 2023-04-25 NOTE — OP NOTE
[facility]  Operative/Discharge Note    Date: 04/25/2023    Pre-Op Diagnosis: Left kidney stones    Post-Op Diagnosis: Same    Procedure(s) Performed:   Cystourethroscopy  Left ureteroscopy with laser lithotripsy and basket extraction of stone fragments  Left retrograde pyelogram  Left ureteral stent placement, 6 x 26 JJ stent    Flouro <1 hour    Specimen(s): Left kidney stones    Staff Surgeon: Pepe Bee Jr, MD    Anesthesia: General    Indications: Lewis Matias is a 67 y.o. male with a proximal ureteral and non-obstructing renal stones.     Findings:   Successful laser lithotripsy of several kidney stones. 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 Bladder neck high so only able to scope bladder with flexible cystoscope. Stent placed with strings.     Estimated Blood Loss: Minimal    Drains: 6 x 26 cm JJ stent    Complications: None    Implants:   Implant Name Type Inv. Item Serial No.  Lot No. LRB No. Used Action   STENT DBL PGTL URET 6FR 26CM - [id number]  STENT DBL PGTL URET 6FR 26CM  [employer] [id number] Left 1 Wasted   STENT SET URETERAL 6X26CM - [id number]  STENT SET URETERAL 6X26CM  [employer] [id number] Left 1 Implanted       Procedure in detail:  After informed consent was obtained, the patient was brought the the cystoscopy suite and placed in the supine position.  SCDs were applied and working.  GETA was administered.  The patient was then placed in the dorsal lithotomy position and prepped and draped in the usual sterile fashion.      A rigid cystoscope in a 22 Fr sheath was introduced into the patient's urethra.  This passed easily.  The entire urethra was visualized which showed a 10 Gambian bulbar stricture easily passed with the scope.  Formal cystoscopy was performed with the flexible cystoscope as the rigid would not pass due to a high bladder neck which revealed no masses or lesions suspicious for malignancy, no bladder stones,  no bladder diverticula, mild trabeculations.  The UOs were visualized in the normal anatomic position bilaterally and clear efflux was visualized.      A motion wire was passed up the leftUO and up into the kidney.  This passed easily and placement was confirmed using fluoro.  The flexible cystoscope was removed keeping the guidewire in place.      An 8 Fr rigid ureteroscope was passed into the patient's bladder alongside the wire under direct vision.  It was then passed through the left UO alongside the wire.  A wire was advanced up the ureteroscope and ureteroscope was removed leaving wires in place. A 45 cm ureteral access sheath was placed over the second wire under fluoroscopic guidance. A flexible ureteroscope was passed into the kidney. The flexible was able to be maneuvered proximally.  Stones were encountered in the upper, mid and lower pole. A 275 micron laser fiber was passed through the ureteroscope.  Each stone was fragmented using the laser.  The laser fiber was removed and an NGage basket was introduced through the ureteroscope.  Stone fragments were removed.    The ureteroscope was advanced into the kidney.  A retrograde was performed. The entire kidney was examined for residual stones and removed if encountered. The ureteroscope was removed keeping the guide wire in place.      A cystoscope was reinserted and the bladder was irrigated to remove the stone fragments.  The bladder was drained the cystoscope removed keeping the wire in place.  The wire was backloaded through the cystoscope using a pusher.    A 6x 26 JJ ureteral stent with strings was passed over the wire and up into the renal pelvis using fluoro.  When the coil appeared to be in good position in the kidney and the radio-opaque marker of the pusher was at the inferior pubis, the wire was removed under continuous fluoro.  Good coils were seen in the kidney and the bladder using fluoro.      The patient tolerated the procedure well and was  transferred to the recovery room in stable condition.      Disposition: Home    Discharge home today status post uncomplicated procedure as above  Diet - resume home diet  Follow up: RTC in 6 weeks with renal ultrasound prior.   Instructions: Remove stent at home on 5/1/23. Home with Keflex, Norco, Toradol, Ditropan and Pyridium.   Meds:     Medication List        START taking these medications      cephALEXin 500 MG capsule  Commonly known as: KEFLEX  Take 1 capsule (500 mg total) by mouth every 8 (eight) hours. for 3 days     HYDROcodone-acetaminophen 5-325 mg per tablet  Commonly known as: NORCO  Take 1 tablet by mouth every 6 (six) hours as needed for Pain.     ketorolac 10 mg tablet  Commonly known as: TORADOL  Take 1 tablet (10 mg total) by mouth every 6 (six) hours. for 5 days     oxybutynin 5 MG Tab  Commonly known as: DITROPAN  Take 1 tablet (5 mg total) by mouth 2 (two) times daily as needed (Bladder spasms).     phenazopyridine 200 MG tablet  Commonly known as: PYRIDIUM  Take 1 tablet (200 mg total) by mouth 3 (three) times daily with meals. for 3 days            CONTINUE taking these medications      esomeprazole 40 MG capsule  Commonly known as: NEXIUM  Take 1 capsule (40 mg total) by mouth once daily.     HYDROmorphone 2 MG tablet  Commonly known as: DILAUDID  Take 1 tablet (2 mg total) by mouth every 4 (four) hours as needed for Pain.     lisinopriL 10 MG tablet  Take 1 tablet (10 mg total) by mouth once daily.     sildenafiL 100 MG tablet  Commonly known as: VIAGRA  Take 1 tablet (100 mg total) by mouth daily as needed for Erectile Dysfunction (prn sex).     tamsulosin 0.4 mg Cap  Commonly known as: FLOMAX  Take 1 capsule (0.4 mg total) by mouth once daily. for 21 days               Where to Get Your Medications        These medications were sent to [id number] - [place] - [address] [facility] [address]  [facility] [place] [address]      Phone: [phone]   cephALEXin 500 MG  capsule  HYDROcodone-acetaminophen 5-325 mg per tablet  ketorolac 10 mg tablet  oxybutynin 5 MG Tab  phenazopyridine 200 MG tablet  tamsulosin 0.4 mg Alexys Bee Jr, MD

## 2023-04-25 NOTE — ANESTHESIA POSTPROCEDURE EVALUATION
Anesthesia Post Evaluation    Patient: Lewis Matias    Procedure(s) Performed: Procedure(s) (LRB):  REMOVAL, CALCULUS, URETER, URETEROSCOPIC (Left)  CYSTOSCOPY, WITH RETROGRADE PYELOGRAM (Left)  CYSTOURETEROSCOPY,WITH HOLMIUM LASER LITHOTRIPSY OF URETERAL CALCULUS (Left)    Final Anesthesia Type: general      Patient location during evaluation: PACU  Patient participation: Yes- Able to Participate  Level of consciousness: awake and alert  Post-procedure vital signs: reviewed and stable  Pain management: adequate  Airway patency: patent    PONV status at discharge: No PONV  Anesthetic complications: no      Cardiovascular status: hemodynamically stable  Respiratory status: unassisted and room air  Hydration status: euvolemic  Follow-up not needed.          Vitals Value Taken Time   /71 04/25/23 1645   Temp 36.6 °C (97.9 °F) 04/25/23 1515   Pulse 97 04/25/23 1645   Resp 19 04/25/23 1645   SpO2 100 % 04/25/23 1645         Event Time   Out of Recovery 16:35:00         Pain/Shar Score: Pain Rating Prior to Med Admin: 6 (4/25/2023  3:40 PM)  Pain Rating Post Med Admin: 4 (4/25/2023  4:15 PM)  Shar Score: 9 (4/25/2023  4:15 PM)  Modified Shar Score: 19 (4/25/2023  5:03 PM)

## 2023-04-26 ENCOUNTER — PATIENT OUTREACH (OUTPATIENT)
Dept: FAMILY MEDICINE | Facility: CLINIC | Age: 68
End: 2023-04-26

## 2023-04-26 ENCOUNTER — TELEPHONE (OUTPATIENT)
Dept: FAMILY MEDICINE | Facility: CLINIC | Age: 68
End: 2023-04-26

## 2023-04-26 VITALS
DIASTOLIC BLOOD PRESSURE: 71 MMHG | HEART RATE: 97 BPM | SYSTOLIC BLOOD PRESSURE: 138 MMHG | RESPIRATION RATE: 19 BRPM | TEMPERATURE: 98 F | OXYGEN SATURATION: 100 % | HEIGHT: 69 IN | BODY MASS INDEX: 35.55 KG/M2 | WEIGHT: 240 LBS

## 2023-04-26 NOTE — PROGRESS NOTES
Discharge Information     Discharge Date:   4/24/23   Primary Discharge //Diagnosis:  Kidney stones       Discharge Summary:  Unavailable      Medication & Order Review     Were medication changes made or new medications added?   Yes    If so, has the patient filled the prescriptions?  Yes     Was Home Health ordered? No    If so, has Home Health contacted patient and/or initiated services?  No    Name of Home Health Agency? N/A    Durable Medical Equipment ordered?  No     If so, has the DME provider contacted patient and delivered equipment?  N/A    Follow Up               Any problems since discharge? No    How is the patient feeling since returning home?  See other encounter.     Have you set up recommended follow up appointments?  Yes    Schedule Hospital Follow-up appointment within 7-14 days (preferably 7).      Notes:  Pt wife states pt had a rough night with the pain from the stent but other than that he is doing ok. Wife is not sure  when nephrology appt is scheduled. States they are going on a [place] on may 15 appt needs to be canceled. She would like for pt to come in before then to make sure Ping thinks it is ok for them to go on vacation.       Kylee Conrad        1. CPE- pap due in 2yr. Will change her OCP to lo loestrin. Pros and cons of continued OCP discussed. Screening labs today.  2. PSYCH- ADHD- stable. RF today and can call for 2 additional until recheck in 3mo.  3. RECHECK- 3mo

## 2023-04-26 NOTE — TELEPHONE ENCOUNTER
----- Message from Lupe Zhu sent at 4/26/2023 12:50 PM CDT -----  Patient wife called and stated that she need to reschedule the patient HFU appointment because they will be on a cruise please give her a call back at [phone]

## 2023-04-26 NOTE — PROGRESS NOTES
Very pleased with care given.  All staff were very kind and helpful. Wife states they understand all discharge instructions.

## 2023-04-27 NOTE — TELEPHONE ENCOUNTER
Spoke with pt's spouse ramandeep, in regards to message sent. Verbalized per Ping that okay to move pt's appt to 05/10/2023 @ 12:40 pm. Ramandeep acknowledged understanding.

## 2023-04-28 ENCOUNTER — TELEPHONE (OUTPATIENT)
Dept: FAMILY MEDICINE | Facility: CLINIC | Age: 68
End: 2023-04-28
Payer: MEDICARE

## 2023-04-28 ENCOUNTER — TELEPHONE (OUTPATIENT)
Dept: UROLOGY | Facility: CLINIC | Age: 68
End: 2023-04-28
Payer: MEDICARE

## 2023-04-28 NOTE — TELEPHONE ENCOUNTER
----- Message from Sofie Gomez, Patient Care Assistant sent at 4/28/2023 11:26 AM CDT -----  Regarding: advice  Contact: Felisha pt's wife  Type: Needs Medical Advice  Who Called:  Felisha irwin's wife    Symptoms (please be specific):  right side kidney pain   Pharmacy name and phone #:    [id number] - [place], [place] - [address] [address]  Phone: [phone] Fax: [phone]    Best Call Back Number: [phone] (home)     Additional Information: please call Felisha irwin's wife to advise. Thanks!

## 2023-04-28 NOTE — TELEPHONE ENCOUNTER
----- Message from Renay Paulino sent at 4/28/2023 10:06 AM CDT -----  Contact: SIDNEY FIGUEROA [[id number]] [phone]  Type:  Needs Medical Advice    Who Called: SIDNEY FIGUEROA [[id number]]  Symptoms (please be specific): Pain on right side abdomen 4/25/23  How long has patient had these symptoms: Ever since procedure on Tues   Pharmacy name and phone #: [id number] - [place], [place] - [address] [facility] [facility], [phone]  Would the patient rather a call back or a response via [facility]? Phone call   Best Call Back Number: [phone]

## 2023-04-28 NOTE — TELEPHONE ENCOUNTER
I see that you all were attempting to reach the patient. He called back but was forwarded to us instead.

## 2023-04-28 NOTE — TELEPHONE ENCOUNTER
----- Message from Sienna Wilkinson sent at 4/28/2023 12:35 PM CDT -----  Regarding: return call  Contact: Patient  Type:  Patient Returning Call    Who Called:  Patient   Who Left Message for Patient:    Does the patient know what this is regarding?:    Best Call Back Number:  [phone] (home)     Additional Information:  Patient stated that he missed a call from doctor's office. Please contact patient to advise. Thanks!

## 2023-05-01 ENCOUNTER — CLINICAL SUPPORT (OUTPATIENT)
Dept: UROLOGY | Facility: CLINIC | Age: 68
End: 2023-05-01
Payer: MEDICARE

## 2023-05-01 DIAGNOSIS — N20.0 NEPHROLITHIASIS: Primary | ICD-10-CM

## 2023-05-01 PROCEDURE — 99499 NO LOS: ICD-10-PCS | Mod: S$GLB,,, | Performed by: UROLOGY

## 2023-05-01 PROCEDURE — 99499 UNLISTED E&M SERVICE: CPT | Mod: S$GLB,,, | Performed by: UROLOGY

## 2023-05-01 NOTE — LETTER
May 1, 2023    Lewis Matias  [address]  [facility] [facility] [address]             [facility]  [address] [place] [address] [address]  [facility] [address]  Phone: [phone]  Fax: [phone]   Dear Cruise Representative,    I am writing on behalf of my patient, Lewis Matias, who had booked a cruise with your company for May 15,2023. Unfortunately, due to unforeseen medical circumstances, my patient is unable to attend the cruise as planned.    Lewis Matias recently underwent a medical procedure on 04/25/2023, and as a result, has been advised against any travel for the next few weeks. The procedure has left them in a weakened state, and the risks associated with traveling, especially on a cruise, are not advisable at this time.    I understand that the cruise was eagerly anticipated, and my patient is disappointed to have to cancel their trip. However, their health must come first, and they must prioritize their recovery before resuming any leisure activities.    I would like to request that you kindly refund any deposits or fees that my patient may have paid for the cruise. We understand that your company has a policy regarding cancellations, but I hope you will make an exception in this case, given the medical circumstances.    I appreciate your understanding and cooperation in this matter. If you require any further information or documentation, please do not hesitate to contact me.    Thank you for your attention to this matter.  If you have any questions or concerns, please don't hesitate to call.    Sincerely,    Dr. Pepe Bee

## 2023-05-01 NOTE — PROGRESS NOTES
Patient arrive to clinic stating that he did not feel comfortable pulling stent out at home.  Patient added to the Nurse Visit schedule for stent removal.    2 patient identifiers verified.  Tape removed from tip of penis.  String pulled to remove stent.  Stent removed without difficulty.   Stent intact.  Patient educated that pain is normal and expected for up to 72 hours after removal.  Advised to take ibuprofen at onset of pain and take AZO otc to help with burning.  Educated to drink plenty of water.    Patient voiced understanding of all instructions given.  Patient left office in satisfactory condition.

## 2023-05-03 LAB
COMPN STONE: NORMAL
SPECIMEN SOURCE: NORMAL
STONE ANALYSIS IR-IMP: NORMAL

## 2023-05-10 ENCOUNTER — OFFICE VISIT (OUTPATIENT)
Dept: FAMILY MEDICINE | Facility: CLINIC | Age: 68
End: 2023-05-10
Payer: MEDICARE

## 2023-05-10 VITALS
DIASTOLIC BLOOD PRESSURE: 70 MMHG | HEART RATE: 68 BPM | SYSTOLIC BLOOD PRESSURE: 106 MMHG | BODY MASS INDEX: 39.25 KG/M2 | HEIGHT: 69 IN | WEIGHT: 265 LBS

## 2023-05-10 DIAGNOSIS — E66.01 SEVERE OBESITY (BMI 35.0-39.9) WITH COMORBIDITY: ICD-10-CM

## 2023-05-10 DIAGNOSIS — G89.29 CHRONIC PAIN OF RIGHT KNEE: ICD-10-CM

## 2023-05-10 DIAGNOSIS — E03.9 HYPOTHYROIDISM, UNSPECIFIED TYPE: ICD-10-CM

## 2023-05-10 DIAGNOSIS — K21.9 GASTROESOPHAGEAL REFLUX DISEASE WITHOUT ESOPHAGITIS: ICD-10-CM

## 2023-05-10 DIAGNOSIS — I25.10 ATHEROSCLEROSIS OF CORONARY ARTERY, UNSPECIFIED VESSEL OR LESION TYPE, UNSPECIFIED WHETHER ANGINA PRESENT, UNSPECIFIED WHETHER NATIVE OR TRANSPLANTED HEART: ICD-10-CM

## 2023-05-10 DIAGNOSIS — I10 ESSENTIAL HYPERTENSION: Primary | ICD-10-CM

## 2023-05-10 DIAGNOSIS — S83.232S COMPLEX TEAR OF MEDIAL MENISCUS OF LEFT KNEE AS CURRENT INJURY, SEQUELA: ICD-10-CM

## 2023-05-10 DIAGNOSIS — M25.561 CHRONIC PAIN OF RIGHT KNEE: ICD-10-CM

## 2023-05-10 DIAGNOSIS — S83.249A ACUTE TEAR OF POSTERIOR HORN OF MEDIAL MENISCUS: ICD-10-CM

## 2023-05-10 PROCEDURE — 3288F PR FALLS RISK ASSESSMENT DOCUMENTED: ICD-10-PCS | Mod: CPTII,S$GLB,, | Performed by: NURSE PRACTITIONER

## 2023-05-10 PROCEDURE — 3074F PR MOST RECENT SYSTOLIC BLOOD PRESSURE < 130 MM HG: ICD-10-PCS | Mod: CPTII,S$GLB,, | Performed by: NURSE PRACTITIONER

## 2023-05-10 PROCEDURE — 4010F ACE/ARB THERAPY RXD/TAKEN: CPT | Mod: CPTII,S$GLB,, | Performed by: NURSE PRACTITIONER

## 2023-05-10 PROCEDURE — 3078F PR MOST RECENT DIASTOLIC BLOOD PRESSURE < 80 MM HG: ICD-10-PCS | Mod: CPTII,S$GLB,, | Performed by: NURSE PRACTITIONER

## 2023-05-10 PROCEDURE — 3288F FALL RISK ASSESSMENT DOCD: CPT | Mod: CPTII,S$GLB,, | Performed by: NURSE PRACTITIONER

## 2023-05-10 PROCEDURE — 1160F PR REVIEW ALL MEDS BY PRESCRIBER/CLIN PHARMACIST DOCUMENTED: ICD-10-PCS | Mod: CPTII,S$GLB,, | Performed by: NURSE PRACTITIONER

## 2023-05-10 PROCEDURE — 3008F BODY MASS INDEX DOCD: CPT | Mod: CPTII,S$GLB,, | Performed by: NURSE PRACTITIONER

## 2023-05-10 PROCEDURE — 3008F PR BODY MASS INDEX (BMI) DOCUMENTED: ICD-10-PCS | Mod: CPTII,S$GLB,, | Performed by: NURSE PRACTITIONER

## 2023-05-10 PROCEDURE — 3074F SYST BP LT 130 MM HG: CPT | Mod: CPTII,S$GLB,, | Performed by: NURSE PRACTITIONER

## 2023-05-10 PROCEDURE — 1160F RVW MEDS BY RX/DR IN RCRD: CPT | Mod: CPTII,S$GLB,, | Performed by: NURSE PRACTITIONER

## 2023-05-10 PROCEDURE — 1101F PR PT FALLS ASSESS DOC 0-1 FALLS W/OUT INJ PAST YR: ICD-10-PCS | Mod: CPTII,S$GLB,, | Performed by: NURSE PRACTITIONER

## 2023-05-10 PROCEDURE — 1101F PT FALLS ASSESS-DOCD LE1/YR: CPT | Mod: CPTII,S$GLB,, | Performed by: NURSE PRACTITIONER

## 2023-05-10 PROCEDURE — 4010F PR ACE/ARB THEARPY RXD/TAKEN: ICD-10-PCS | Mod: CPTII,S$GLB,, | Performed by: NURSE PRACTITIONER

## 2023-05-10 PROCEDURE — 3078F DIAST BP <80 MM HG: CPT | Mod: CPTII,S$GLB,, | Performed by: NURSE PRACTITIONER

## 2023-05-10 PROCEDURE — 99214 OFFICE O/P EST MOD 30 MIN: CPT | Mod: S$GLB,,, | Performed by: NURSE PRACTITIONER

## 2023-05-10 PROCEDURE — 1159F PR MEDICATION LIST DOCUMENTED IN MEDICAL RECORD: ICD-10-PCS | Mod: CPTII,S$GLB,, | Performed by: NURSE PRACTITIONER

## 2023-05-10 PROCEDURE — 1159F MED LIST DOCD IN RCRD: CPT | Mod: CPTII,S$GLB,, | Performed by: NURSE PRACTITIONER

## 2023-05-10 PROCEDURE — 99214 PR OFFICE/OUTPT VISIT, EST, LEVL IV, 30-39 MIN: ICD-10-PCS | Mod: S$GLB,,, | Performed by: NURSE PRACTITIONER

## 2023-05-10 RX ORDER — LISINOPRIL 10 MG/1
10 TABLET ORAL DAILY
Qty: 90 TABLET | Refills: 1 | Status: SHIPPED | OUTPATIENT
Start: 2023-05-10 | End: 2024-02-14 | Stop reason: SDUPTHER

## 2023-05-10 RX ORDER — AMOXICILLIN 500 MG/1
500 CAPSULE ORAL 3 TIMES DAILY
COMMUNITY
Start: 2023-05-08 | End: 2023-05-10

## 2023-05-10 RX ORDER — ESOMEPRAZOLE MAGNESIUM 40 MG/1
40 CAPSULE, DELAYED RELEASE ORAL DAILY
Qty: 90 CAPSULE | Refills: 1 | Status: SHIPPED | OUTPATIENT
Start: 2023-05-10

## 2023-05-10 NOTE — PROGRESS NOTES
SUBJECTIVE:    Patient ID: Lewis Matias is a 67 y.o. male.    Chief Complaint: Hypertension (Went over meds verbally, C-scope pt will sched, PNA 20 declined// SW)    67 year old male presents for check up. Recently was with complaints of knee pain. Recently seen in er for back pain.diagnosed with kideny stone. Stent was placed by . has gotten relief. Would like to discuss knee/ pain has  lessoned. Would like to discuss mri. Bp in office is well controlled. Due for labs      Office Visit on 05/10/2023   Component Date Value Ref Range Status    Cholesterol 05/10/2023 170  <200 mg/dL Final    HDL 05/10/2023 41  > OR = 40 mg/dL Final    Triglycerides 05/10/2023 132  <150 mg/dL Final    LDL Cholesterol 05/10/2023 106 (H)  mg/dL (calc) Final    HDL/Cholesterol Ratio 05/10/2023 4.1  <5.0 (calc) Final    Non HDL Chol. (LDL+VLDL) 05/10/2023 129  <130 mg/dL (calc) Final    TSH w/reflex to FT4 05/10/2023 5.30 (H)  0.40 - 4.50 mIU/L Final    T4, Free 05/10/2023 1.1  0.8 - 1.8 ng/dL Final   Admission on 04/25/2023, Discharged on 04/25/2023   Component Date Value Ref Range Status    Stone Source 04/25/2023 Kidney   Final    Stone Analysis 04/25/2023 Test Not Performed   Final    Stone Analysis Comment 04/25/2023 100% Calcium oxalate monohydrate.   Final   Office Visit on 04/19/2023   Component Date Value Ref Range Status    Color, POC UA 04/19/2023 Yellow  Yellow, Straw, Colorless Final    Clarity, POC UA 04/19/2023 Clear  Clear Final    Glucose, POC UA 04/19/2023 Negative  Negative Final    Bilirubin, POC UA 04/19/2023 Negative  Negative Final    Ketones, POC UA 04/19/2023 Negative  Negative Final    Spec Grav POC UA 04/19/2023 1.025  1.005 - 1.030 Final    Blood, POC UA 04/19/2023 Negative  Negative Final    pH, POC UA 04/19/2023 5.5  5.0 - 8.0 Final    Protein, POC UA 04/19/2023 Negative  Negative Final    Urobilinogen, POC UA 04/19/2023 0.2  <=1.0 Final    Nitrite, POC UA 04/19/2023 Negative  Negative Final     WBC, POC UA 04/19/2023 Negative  Negative Final    Urine Culture, Routine 04/19/2023 No significant growth   Final   Admission on 04/15/2023, Discharged on 04/15/2023   Component Date Value Ref Range Status    HIV 1/2 Ag/Ab 04/15/2023 Non-reactive  Non-reactive Final    Hepatitis C Ab 04/15/2023 Non-reactive  Non-reactive Final    WBC 04/15/2023 7.18  3.90 - 12.70 K/uL Final    RBC 04/15/2023 5.21  4.60 - 6.20 M/uL Final    Hemoglobin 04/15/2023 16.1  14.0 - 18.0 g/dL Final    Hematocrit 04/15/2023 47.3  40.0 - 54.0 % Final    MCV 04/15/2023 91  82 - 98 fL Final    MCH 04/15/2023 30.9  27.0 - 31.0 pg Final    MCHC 04/15/2023 34.0  32.0 - 36.0 g/dL Final    RDW 04/15/2023 14.0  11.5 - 14.5 % Final    Platelets 04/15/2023 146 (L)  150 - 450 K/uL Final    MPV 04/15/2023 11.0  9.2 - 12.9 fL Final    Immature Granulocytes 04/15/2023 1.4 (H)  0.0 - 0.5 % Final    Gran # (ANC) 04/15/2023 4.4  1.8 - 7.7 K/uL Final    Immature Grans (Abs) 04/15/2023 0.10 (H)  0.00 - 0.04 K/uL Final    Lymph # 04/15/2023 1.5  1.0 - 4.8 K/uL Final    Mono # 04/15/2023 0.9  0.3 - 1.0 K/uL Final    Eos # 04/15/2023 0.3  0.0 - 0.5 K/uL Final    Baso # 04/15/2023 0.04  0.00 - 0.20 K/uL Final    nRBC 04/15/2023 0  0 /100 WBC Final    Gran % 04/15/2023 61.1  38.0 - 73.0 % Final    Lymph % 04/15/2023 20.3  18.0 - 48.0 % Final    Mono % 04/15/2023 12.4  4.0 - 15.0 % Final    Eosinophil % 04/15/2023 4.2  0.0 - 8.0 % Final    Basophil % 04/15/2023 0.6  0.0 - 1.9 % Final    Differential Method 04/15/2023 Automated   Final    Sodium 04/15/2023 141  136 - 145 mmol/L Final    Potassium 04/15/2023 3.9  3.5 - 5.1 mmol/L Final    Chloride 04/15/2023 104  95 - 110 mmol/L Final    CO2 04/15/2023 25  23 - 29 mmol/L Final    Glucose 04/15/2023 132 (H)  70 - 110 mg/dL Final    BUN 04/15/2023 14  8 - 23 mg/dL Final    Creatinine 04/15/2023 0.9  0.5 - 1.4 mg/dL Final    Calcium 04/15/2023 9.2  8.7 - 10.5 mg/dL Final    Total Protein 04/15/2023 6.8  6.0 - 8.4 g/dL  Final    Albumin 04/15/2023 3.9  3.5 - 5.2 g/dL Final    Total Bilirubin 04/15/2023 1.2 (H)  0.1 - 1.0 mg/dL Final    Alkaline Phosphatase 04/15/2023 80  55 - 135 U/L Final    AST 04/15/2023 17  10 - 40 U/L Final    ALT 04/15/2023 27  10 - 44 U/L Final    Anion Gap 04/15/2023 12  8 - 16 mmol/L Final    eGFR 04/15/2023 >60.0  >60 mL/min/1.73 m^2 Final    Lipase 04/15/2023 18  4 - 60 U/L Final    Specimen UA 04/15/2023 Urine, Unspecified   Final    Color, UA 04/15/2023 Yellow  Yellow, Straw, Bee Final    Appearance, UA 04/15/2023 Clear  Clear Final    pH, UA 04/15/2023 6.0  5.0 - 8.0 Final    Specific Gravity, UA 04/15/2023 1.025  1.005 - 1.030 Final    Protein, UA 04/15/2023 Negative  Negative Final    Glucose, UA 04/15/2023 Negative  Negative Final    Ketones, UA 04/15/2023 Negative  Negative Final    Bilirubin (UA) 04/15/2023 Negative  Negative Final    Occult Blood UA 04/15/2023 Trace (A)  Negative Final    Nitrite, UA 04/15/2023 Negative  Negative Final    Urobilinogen, UA 04/15/2023 Negative  Negative EU/dL Final    Leukocytes, UA 04/15/2023 Negative  Negative Final       Past Medical History:   Diagnosis Date    Aspiration pneumonia     aspirated after anesthesia due to hiatal hernia per patient, spent 6 days in ICU    COVID-19 8/2/20021    GERD (gastroesophageal reflux disease)     Hard of hearing     Hiatal hernia     Hypertension     Kidney stones     Knee pain 06/2020    left     Social History     Socioeconomic History    Marital status:    Tobacco Use    Smoking status: Never     Passive exposure: Never    Smokeless tobacco: Never   Substance and Sexual Activity    Alcohol use: Yes     Comment: social    Drug use: Never    Sexual activity: Yes     Partners: Female     Social Determinants of Health     Financial Resource Strain: Low Risk     Difficulty of Paying Living Expenses: Not hard at all   Food Insecurity: No Food Insecurity    Worried About Running Out of Food in the Last Year: Never true     Ran Out of Food in the Last Year: Never true   Transportation Needs: No Transportation Needs    Lack of Transportation (Medical): No    Lack of Transportation (Non-Medical): No   Physical Activity: Sufficiently Active    Days of Exercise per Week: 7 days    Minutes of Exercise per Session: 30 min   Stress: No Stress Concern Present    Feeling of Stress : Only a little   Social Connections: Socially Integrated    Frequency of Communication with Friends and Family: More than three times a week    Frequency of Social Gatherings with Friends and Family: More than three times a week    Attends [employer] Services: More than 4 times per year    Active Member of Clubs or Organizations: Yes    Attends Club or Organization Meetings: More than 4 times per year    Marital Status:    Housing Stability: Low Risk     Unable to Pay for Housing in the Last Year: No    Number of Places Lived in the Last Year: 1    Unstable Housing in the Last Year: No     Past Surgical History:   Procedure Laterality Date    CHOLECYSTECTOMY      COLONOSCOPY  2012    Dr Peteror- rtc 10 yr    CYSTOSCOPY W/ RETROGRADES Left 4/25/2023    Procedure: CYSTOSCOPY, WITH RETROGRADE PYELOGRAM;  Surgeon: Pepe Bee Jr., MD;  Location: [facility];  Service: Urology;  Laterality: Left;    CYSTOURETEROSCOPY,WITH HOLMIUM LASER LITHOTRIPSY OF URETERAL CALCULUS Left 4/25/2023    Procedure: CYSTOURETEROSCOPY,WITH HOLMIUM LASER LITHOTRIPSY OF URETERAL CALCULUS;  Surgeon: Pepe Bee Jr., MD;  Location: [facility];  Service: Urology;  Laterality: Left;    HAND SURGERY      traumatic injury    HEMORRHOID SURGERY      KIDNEY STONE SURGERY      KNEE ARTHROSCOPY W/ MENISCECTOMY Left 11/4/2020    Procedure: ARTHROSCOPY, KNEE, WITH PARTIAL MEDIAL MENISCECTOMY;  Surgeon: Andres Blanco MD;  Location: [facility] OR;  Service: Orthopedics;  Laterality: Left;    TONSILLECTOMY      URETEROSCOPIC REMOVAL OF URETERIC CALCULUS Left 4/25/2023    Procedure: REMOVAL, CALCULUS,  URETER, URETEROSCOPIC;  Surgeon: Pepe Bee Jr., MD;  Location: [facility];  Service: Urology;  Laterality: Left;     Family History   Problem Relation Age of Onset    Heart disease Mother     Diabetes Mother     Cancer Father     Depression Sister     Cancer Sister         ovarian    Diabetes Brother        Review of patient's allergies indicates:   Allergen Reactions    Iodinated contrast media Hives and Shortness Of Breath       Current Outpatient Medications:     HYDROmorphone (DILAUDID) 2 MG tablet, Take 1 tablet (2 mg total) by mouth every 4 (four) hours as needed for Pain., Disp: 10 tablet, Rfl: 0    oxybutynin (DITROPAN) 5 MG Tab, Take 1 tablet (5 mg total) by mouth 2 (two) times daily as needed (Bladder spasms)., Disp: 10 tablet, Rfl: 0    sildenafiL (VIAGRA) 100 MG tablet, Take 1 tablet (100 mg total) by mouth daily as needed for Erectile Dysfunction (prn sex)., Disp: 20 tablet, Rfl: 0    tamsulosin (FLOMAX) 0.4 mg Cap, Take 1 capsule (0.4 mg total) by mouth once daily. for 21 days, Disp: 21 capsule, Rfl: 0    esomeprazole (NEXIUM) 40 MG capsule, Take 1 capsule (40 mg total) by mouth once daily., Disp: 90 capsule, Rfl: 1    lisinopriL 10 MG tablet, Take 1 tablet (10 mg total) by mouth once daily., Disp: 90 tablet, Rfl: 1    Review of Systems   Constitutional:  Negative for fatigue, fever and unexpected weight change.   HENT:  Negative for ear pain, sinus pressure and sore throat.    Eyes:  Negative for pain.   Respiratory:  Negative for cough and shortness of breath.    Cardiovascular:  Negative for chest pain and leg swelling.   Gastrointestinal:  Negative for abdominal pain, constipation, nausea and vomiting.   Genitourinary:  Negative for dysuria, frequency and urgency.   Musculoskeletal:  Positive for back pain. Negative for arthralgias.   Skin:  Negative for rash.   Neurological:  Negative for dizziness, weakness and headaches.   Psychiatric/Behavioral:  Negative for sleep disturbance.      "    Objective:      Vitals:    05/10/23 1250   BP: 106/70   Pulse: 68   Weight: 120.2 kg (265 lb)   Height: 5' 9" (1.753 m)     Physical Exam  Vitals and nursing note reviewed.   Constitutional:       Appearance: Normal appearance. He is well-developed. He is obese.   HENT:      Head: Normocephalic and atraumatic.      Right Ear: External ear normal.      Left Ear: External ear normal.   Eyes:      Pupils: Pupils are equal, round, and reactive to light.   Neck:      Trachea: No tracheal deviation.   Cardiovascular:      Rate and Rhythm: Normal rate and regular rhythm.      Heart sounds: No murmur heard.    No friction rub. No gallop.   Pulmonary:      Breath sounds: Normal breath sounds. No stridor. No wheezing or rales.   Abdominal:      Palpations: Abdomen is soft. There is no mass.      Tenderness: There is no abdominal tenderness. There is no right CVA tenderness or left CVA tenderness.   Musculoskeletal:         General: No tenderness or deformity.      Cervical back: Neck supple.      Right knee: Crepitus present. Decreased range of motion.      Left knee: Crepitus present. Decreased range of motion.   Lymphadenopathy:      Cervical: No cervical adenopathy.   Skin:     General: Skin is warm and dry.   Neurological:      Mental Status: He is alert and oriented to person, place, and time.      Coordination: Coordination normal.   Psychiatric:         Thought Content: Thought content normal.         Assessment:       1. Essential hypertension    2. Gastroesophageal reflux disease without esophagitis    3. Chronic pain of right knee    4. Acute tear of posterior horn of medial meniscus    5. Atherosclerosis of coronary artery, unspecified vessel or lesion type, unspecified whether angina present, unspecified whether native or transplanted heart    6. Complex tear of medial meniscus of left knee as current injury, sequela    7. Severe obesity (BMI 35.0-39.9) with comorbidity         Plan:       Essential " hypertension  -     lisinopriL 10 MG tablet; Take 1 tablet (10 mg total) by mouth once daily.  Dispense: 90 tablet; Refill: 1    Gastroesophageal reflux disease without esophagitis  -     esomeprazole (NEXIUM) 40 MG capsule; Take 1 capsule (40 mg total) by mouth once daily.  Dispense: 90 capsule; Refill: 1    Chronic pain of right knee  -     Ambulatory referral/consult to Orthopedics; Future; Expected date: 05/17/2023    Acute tear of posterior horn of medial meniscus  -     Ambulatory referral/consult to Orthopedics; Future; Expected date: 05/17/2023    Atherosclerosis of coronary artery, unspecified vessel or lesion type, unspecified whether angina present, unspecified whether native or transplanted heart  -     Lipid Panel; Future; Expected date: 05/10/2023  -     TSH w/reflex to FT4; Future; Expected date: 05/10/2023  -     Ambulatory referral/consult to Cardiology; Future; Expected date: 05/17/2023    Complex tear of medial meniscus of left knee as current injury, sequela    Severe obesity (BMI 35.0-39.9) with comorbidity      Follow up in about 3 months (around 8/10/2023), or if symptoms worsen or fail to improve, for medication management.        5/18/2023 Ping Juarez

## 2023-05-11 LAB
CHOLEST SERPL-MCNC: 170 MG/DL
CHOLEST/HDLC SERPL: 4.1 (CALC)
HDLC SERPL-MCNC: 41 MG/DL
LDLC SERPL CALC-MCNC: 106 MG/DL (CALC)
NONHDLC SERPL-MCNC: 129 MG/DL (CALC)
T4 FREE SERPL-MCNC: 1.1 NG/DL (ref 0.8–1.8)
TRIGL SERPL-MCNC: 132 MG/DL
TSH SERPL-ACNC: 5.3 MIU/L (ref 0.4–4.5)

## 2023-05-18 RX ORDER — LEVOTHYROXINE SODIUM 25 UG/1
25 TABLET ORAL
Qty: 30 TABLET | Refills: 11 | Status: SHIPPED | OUTPATIENT
Start: 2023-05-18 | End: 2023-07-24

## 2023-05-19 ENCOUNTER — TELEPHONE (OUTPATIENT)
Dept: FAMILY MEDICINE | Facility: CLINIC | Age: 68
End: 2023-05-19

## 2023-05-19 NOTE — TELEPHONE ENCOUNTER
Spoke with patient and let him know the above per Ping. Agrees to start thyroid medication. Wants sent to [facility]     States he saw Dr. Angel and he is scheduled on June 1st for a stress test.

## 2023-05-19 NOTE — TELEPHONE ENCOUNTER
----- Message from Ping Juarez NP sent at 5/18/2023 12:10 AM CDT -----  Cholesterol has increased some but remains ok. Thyroid is overactive would recommend starting

## 2023-05-21 ENCOUNTER — PATIENT MESSAGE (OUTPATIENT)
Dept: FAMILY MEDICINE | Facility: CLINIC | Age: 68
End: 2023-05-21

## 2023-05-22 ENCOUNTER — PATIENT MESSAGE (OUTPATIENT)
Dept: FAMILY MEDICINE | Facility: CLINIC | Age: 68
End: 2023-05-22

## 2023-05-22 ENCOUNTER — TELEPHONE (OUTPATIENT)
Dept: FAMILY MEDICINE | Facility: CLINIC | Age: 68
End: 2023-05-22

## 2023-05-22 NOTE — TELEPHONE ENCOUNTER
----- Message from Yung Davidson MA sent at 5/22/2023 10:05 AM CDT -----  Regarding: call back request  Pt wife calling to ask for a call back from laith herself because she has a major concern regarding the pt behavior. [phone]

## 2023-05-23 ENCOUNTER — OFFICE VISIT (OUTPATIENT)
Dept: ORTHOPEDICS | Facility: CLINIC | Age: 68
End: 2023-05-23
Payer: MEDICARE

## 2023-05-23 VITALS
DIASTOLIC BLOOD PRESSURE: 86 MMHG | SYSTOLIC BLOOD PRESSURE: 132 MMHG | HEIGHT: 69 IN | BODY MASS INDEX: 39.25 KG/M2 | WEIGHT: 265 LBS

## 2023-05-23 DIAGNOSIS — M23.203 DEGENERATIVE TEAR OF MEDIAL MENISCUS OF RIGHT KNEE: Primary | ICD-10-CM

## 2023-05-23 PROCEDURE — 3008F BODY MASS INDEX DOCD: CPT | Mod: CPTII,S$GLB,, | Performed by: ORTHOPAEDIC SURGERY

## 2023-05-23 PROCEDURE — 1160F PR REVIEW ALL MEDS BY PRESCRIBER/CLIN PHARMACIST DOCUMENTED: ICD-10-PCS | Mod: CPTII,S$GLB,, | Performed by: ORTHOPAEDIC SURGERY

## 2023-05-23 PROCEDURE — 3075F SYST BP GE 130 - 139MM HG: CPT | Mod: CPTII,S$GLB,, | Performed by: ORTHOPAEDIC SURGERY

## 2023-05-23 PROCEDURE — 1101F PR PT FALLS ASSESS DOC 0-1 FALLS W/OUT INJ PAST YR: ICD-10-PCS | Mod: CPTII,S$GLB,, | Performed by: ORTHOPAEDIC SURGERY

## 2023-05-23 PROCEDURE — 3288F PR FALLS RISK ASSESSMENT DOCUMENTED: ICD-10-PCS | Mod: CPTII,S$GLB,, | Performed by: ORTHOPAEDIC SURGERY

## 2023-05-23 PROCEDURE — 99213 OFFICE O/P EST LOW 20 MIN: CPT | Mod: S$GLB,,, | Performed by: ORTHOPAEDIC SURGERY

## 2023-05-23 PROCEDURE — 1125F AMNT PAIN NOTED PAIN PRSNT: CPT | Mod: CPTII,S$GLB,, | Performed by: ORTHOPAEDIC SURGERY

## 2023-05-23 PROCEDURE — 3075F PR MOST RECENT SYSTOLIC BLOOD PRESS GE 130-139MM HG: ICD-10-PCS | Mod: CPTII,S$GLB,, | Performed by: ORTHOPAEDIC SURGERY

## 2023-05-23 PROCEDURE — 1101F PT FALLS ASSESS-DOCD LE1/YR: CPT | Mod: CPTII,S$GLB,, | Performed by: ORTHOPAEDIC SURGERY

## 2023-05-23 PROCEDURE — 4010F PR ACE/ARB THEARPY RXD/TAKEN: ICD-10-PCS | Mod: CPTII,S$GLB,, | Performed by: ORTHOPAEDIC SURGERY

## 2023-05-23 PROCEDURE — 4010F ACE/ARB THERAPY RXD/TAKEN: CPT | Mod: CPTII,S$GLB,, | Performed by: ORTHOPAEDIC SURGERY

## 2023-05-23 PROCEDURE — 3079F PR MOST RECENT DIASTOLIC BLOOD PRESSURE 80-89 MM HG: ICD-10-PCS | Mod: CPTII,S$GLB,, | Performed by: ORTHOPAEDIC SURGERY

## 2023-05-23 PROCEDURE — 99213 PR OFFICE/OUTPT VISIT, EST, LEVL III, 20-29 MIN: ICD-10-PCS | Mod: S$GLB,,, | Performed by: ORTHOPAEDIC SURGERY

## 2023-05-23 PROCEDURE — 1159F MED LIST DOCD IN RCRD: CPT | Mod: CPTII,S$GLB,, | Performed by: ORTHOPAEDIC SURGERY

## 2023-05-23 PROCEDURE — 3008F PR BODY MASS INDEX (BMI) DOCUMENTED: ICD-10-PCS | Mod: CPTII,S$GLB,, | Performed by: ORTHOPAEDIC SURGERY

## 2023-05-23 PROCEDURE — 3079F DIAST BP 80-89 MM HG: CPT | Mod: CPTII,S$GLB,, | Performed by: ORTHOPAEDIC SURGERY

## 2023-05-23 PROCEDURE — 3288F FALL RISK ASSESSMENT DOCD: CPT | Mod: CPTII,S$GLB,, | Performed by: ORTHOPAEDIC SURGERY

## 2023-05-23 PROCEDURE — 1125F PR PAIN SEVERITY QUANTIFIED, PAIN PRESENT: ICD-10-PCS | Mod: CPTII,S$GLB,, | Performed by: ORTHOPAEDIC SURGERY

## 2023-05-23 PROCEDURE — 1159F PR MEDICATION LIST DOCUMENTED IN MEDICAL RECORD: ICD-10-PCS | Mod: CPTII,S$GLB,, | Performed by: ORTHOPAEDIC SURGERY

## 2023-05-23 PROCEDURE — 1160F RVW MEDS BY RX/DR IN RCRD: CPT | Mod: CPTII,S$GLB,, | Performed by: ORTHOPAEDIC SURGERY

## 2023-05-23 NOTE — PROGRESS NOTES
[facility]    Subjective:     Chief Complaint:   Chief Complaint   Patient presents with    Right Knee - Pain     R knee pain, 3 months ago pain started, pain going up and down stairs and chairs, does not wake from sleep, no numbness or tingiling       Past Medical History:   Diagnosis Date    Aspiration pneumonia     aspirated after anesthesia due to hiatal hernia per patient, spent 6 days in ICU    COVID-19 8/2/20021    GERD (gastroesophageal reflux disease)     Hard of hearing     Hiatal hernia     Hypertension     Kidney stones     Knee pain 06/2020    left       Past Surgical History:   Procedure Laterality Date    CHOLECYSTECTOMY      COLONOSCOPY  2012    Dr Silver- rtc 10 yr    CYSTOSCOPY W/ RETROGRADES Left 4/25/2023    Procedure: CYSTOSCOPY, WITH RETROGRADE PYELOGRAM;  Surgeon: Pepe Bee Jr., MD;  Location: [facility];  Service: Urology;  Laterality: Left;    CYSTOURETEROSCOPY,WITH HOLMIUM LASER LITHOTRIPSY OF URETERAL CALCULUS Left 4/25/2023    Procedure: CYSTOURETEROSCOPY,WITH HOLMIUM LASER LITHOTRIPSY OF URETERAL CALCULUS;  Surgeon: Pepe Bee Jr., MD;  Location: [facility];  Service: Urology;  Laterality: Left;    HAND SURGERY      traumatic injury    HEMORRHOID SURGERY      KIDNEY STONE SURGERY      KNEE ARTHROSCOPY W/ MENISCECTOMY Left 11/4/2020    Procedure: ARTHROSCOPY, KNEE, WITH PARTIAL MEDIAL MENISCECTOMY;  Surgeon: Andres Blanco MD;  Location: [facility] OR;  Service: Orthopedics;  Laterality: Left;    TONSILLECTOMY      URETEROSCOPIC REMOVAL OF URETERIC CALCULUS Left 4/25/2023    Procedure: REMOVAL, CALCULUS, URETER, URETEROSCOPIC;  Surgeon: Pepe Bee Jr., MD;  Location: [facility];  Service: Urology;  Laterality: Left;       Current Outpatient Medications   Medication Sig    esomeprazole (NEXIUM) 40 MG capsule Take 1 capsule (40 mg total) by mouth once daily.    HYDROmorphone (DILAUDID) 2 MG tablet Take 1 tablet (2 mg total) by mouth every 4 (four) hours as needed for Pain.     levothyroxine (SYNTHROID) 25 MCG tablet Take 1 tablet (25 mcg total) by mouth before breakfast.    lisinopriL 10 MG tablet Take 1 tablet (10 mg total) by mouth once daily.    sildenafiL (VIAGRA) 100 MG tablet Take 1 tablet (100 mg total) by mouth daily as needed for Erectile Dysfunction (prn sex).    oxybutynin (DITROPAN) 5 MG Tab Take 1 tablet (5 mg total) by mouth 2 (two) times daily as needed (Bladder spasms). (Patient not taking: Reported on 5/23/2023)    tamsulosin (FLOMAX) 0.4 mg Cap Take 1 capsule (0.4 mg total) by mouth once daily. for 21 days (Patient not taking: Reported on 5/23/2023)     No current facility-administered medications for this visit.       Review of patient's allergies indicates:   Allergen Reactions    Iodinated contrast media Hives and Shortness Of Breath       Family History   Problem Relation Age of Onset    Heart disease Mother     Diabetes Mother     Cancer Father     Depression Sister     Cancer Sister         ovarian    Diabetes Brother        Social History     Socioeconomic History    Marital status:    Tobacco Use    Smoking status: Never     Passive exposure: Never    Smokeless tobacco: Never   Substance and Sexual Activity    Alcohol use: Yes     Comment: social    Drug use: Never    Sexual activity: Yes     Partners: Female     Social Determinants of Health     Financial Resource Strain: Low Risk     Difficulty of Paying Living Expenses: Not hard at all   Food Insecurity: No Food Insecurity    Worried About Running Out of Food in the Last Year: Never true    Ran Out of Food in the Last Year: Never true   Transportation Needs: No Transportation Needs    Lack of Transportation (Medical): No    Lack of Transportation (Non-Medical): No   Physical Activity: Sufficiently Active    Days of Exercise per Week: 7 days    Minutes of Exercise per Session: 30 min   Stress: No Stress Concern Present    Feeling of Stress : Only a little   Social Connections: Socially Integrated     Frequency of Communication with Friends and Family: More than three times a week    Frequency of Social Gatherings with Friends and Family: More than three times a week    Attends [employer] Services: More than 4 times per year    Active Member of Clubs or Organizations: Yes    Attends Club or Organization Meetings: More than 4 times per year    Marital Status:    Housing Stability: Low Risk     Unable to Pay for Housing in the Last Year: No    Number of Places Lived in the Last Year: 1    Unstable Housing in the Last Year: No       History of present illness:  Patient comes in today she the right knee.  He was at [facility] were all when he is knee became very problematic and painful and unstable.  Now the knees much better it is really not bothering him.  We do have an MRI of this right knee.  This happened once before and at that time he was known to have a medial meniscal tear.      Review of Systems:    Constitution: Negative for chills, fever, and sweats.  Negative for unexplained weight loss.    HENT:  Negative for headaches and blurry vision.    Cardiovascular:Negative for chest pain or irregular heart beat. Negative for hypertension.    Respiratory:  Negative for cough and shortness of breath.    Gastrointestinal: Negative for abdominal pain, heartburn, melena, nausea, and vomitting.    Genitourinary:  Negative bladder incontinence and dysuria.    Musculoskeletal:  See HPI for details.     Neurological: Negative for numbness.    Psychiatric/Behavioral: Negative for depression.  The patient is not nervous/anxious.      Endocrine: Negative for polyuria    Hematologic/Lymphatic: Negative for bleeding problem.  Does not bruise/bleed easily.    Skin: Negative for poor would healing and rash    Objective:      Physical Examination:    Vital Signs:    Vitals:    05/23/23 1237   BP: 132/86       Body mass index is 39.13 kg/m².    This a well-developed, well nourished patient in no acute distress.  They are alert  and oriented and cooperative to examination.        Patient has medial joint line tenderness.  He has no effusion.  Range of motion is 0-115 degrees.  The knee is stable to varus valgus stresses.  He has an equivocal Nisha's.  Pertinent New Results:    XRAY Report / Interpretation:       Assessment/Plan:      Medial meniscal tear.  At this point he is not symptomatic in so he really does want to do anything about this.  If he develops symptoms again will offer him an arthroscopy and partial medial meniscectomy      This note was created using Dragon voice recognition software that occasionally misinterpreted phrases or words.

## 2023-05-30 ENCOUNTER — TELEPHONE (OUTPATIENT)
Dept: FAMILY MEDICINE | Facility: CLINIC | Age: 68
End: 2023-05-30

## 2023-05-30 ENCOUNTER — OFFICE VISIT (OUTPATIENT)
Dept: FAMILY MEDICINE | Facility: CLINIC | Age: 68
End: 2023-05-30
Payer: MEDICARE

## 2023-05-30 VITALS
SYSTOLIC BLOOD PRESSURE: 128 MMHG | DIASTOLIC BLOOD PRESSURE: 70 MMHG | BODY MASS INDEX: 38.83 KG/M2 | WEIGHT: 262.19 LBS | OXYGEN SATURATION: 97 % | HEIGHT: 69 IN | HEART RATE: 94 BPM

## 2023-05-30 DIAGNOSIS — E66.01 MORBID OBESITY WITH BMI OF 40.0-44.9, ADULT: ICD-10-CM

## 2023-05-30 DIAGNOSIS — R45.86 MOOD CHANGES: ICD-10-CM

## 2023-05-30 DIAGNOSIS — E04.1 THYROID NODULE: ICD-10-CM

## 2023-05-30 DIAGNOSIS — E03.9 HYPOTHYROIDISM, UNSPECIFIED TYPE: ICD-10-CM

## 2023-05-30 DIAGNOSIS — I10 ESSENTIAL HYPERTENSION: ICD-10-CM

## 2023-05-30 DIAGNOSIS — Z12.11 SCREENING FOR COLON CANCER: Primary | ICD-10-CM

## 2023-05-30 DIAGNOSIS — G47.30 SLEEP APNEA, UNSPECIFIED TYPE: ICD-10-CM

## 2023-05-30 DIAGNOSIS — K21.9 GASTROESOPHAGEAL REFLUX DISEASE WITHOUT ESOPHAGITIS: ICD-10-CM

## 2023-05-30 PROCEDURE — 3008F BODY MASS INDEX DOCD: CPT | Mod: CPTII,S$GLB,, | Performed by: NURSE PRACTITIONER

## 2023-05-30 PROCEDURE — 99214 OFFICE O/P EST MOD 30 MIN: CPT | Mod: S$GLB,,, | Performed by: NURSE PRACTITIONER

## 2023-05-30 PROCEDURE — 1159F MED LIST DOCD IN RCRD: CPT | Mod: CPTII,S$GLB,, | Performed by: NURSE PRACTITIONER

## 2023-05-30 PROCEDURE — 3078F DIAST BP <80 MM HG: CPT | Mod: CPTII,S$GLB,, | Performed by: NURSE PRACTITIONER

## 2023-05-30 PROCEDURE — 1159F PR MEDICATION LIST DOCUMENTED IN MEDICAL RECORD: ICD-10-PCS | Mod: CPTII,S$GLB,, | Performed by: NURSE PRACTITIONER

## 2023-05-30 PROCEDURE — 1101F PT FALLS ASSESS-DOCD LE1/YR: CPT | Mod: CPTII,S$GLB,, | Performed by: NURSE PRACTITIONER

## 2023-05-30 PROCEDURE — 3074F PR MOST RECENT SYSTOLIC BLOOD PRESSURE < 130 MM HG: ICD-10-PCS | Mod: CPTII,S$GLB,, | Performed by: NURSE PRACTITIONER

## 2023-05-30 PROCEDURE — 3074F SYST BP LT 130 MM HG: CPT | Mod: CPTII,S$GLB,, | Performed by: NURSE PRACTITIONER

## 2023-05-30 PROCEDURE — 4010F PR ACE/ARB THEARPY RXD/TAKEN: ICD-10-PCS | Mod: CPTII,S$GLB,, | Performed by: NURSE PRACTITIONER

## 2023-05-30 PROCEDURE — 3288F FALL RISK ASSESSMENT DOCD: CPT | Mod: CPTII,S$GLB,, | Performed by: NURSE PRACTITIONER

## 2023-05-30 PROCEDURE — 4010F ACE/ARB THERAPY RXD/TAKEN: CPT | Mod: CPTII,S$GLB,, | Performed by: NURSE PRACTITIONER

## 2023-05-30 PROCEDURE — 1160F PR REVIEW ALL MEDS BY PRESCRIBER/CLIN PHARMACIST DOCUMENTED: ICD-10-PCS | Mod: CPTII,S$GLB,, | Performed by: NURSE PRACTITIONER

## 2023-05-30 PROCEDURE — 1160F RVW MEDS BY RX/DR IN RCRD: CPT | Mod: CPTII,S$GLB,, | Performed by: NURSE PRACTITIONER

## 2023-05-30 PROCEDURE — 3078F PR MOST RECENT DIASTOLIC BLOOD PRESSURE < 80 MM HG: ICD-10-PCS | Mod: CPTII,S$GLB,, | Performed by: NURSE PRACTITIONER

## 2023-05-30 PROCEDURE — 3008F PR BODY MASS INDEX (BMI) DOCUMENTED: ICD-10-PCS | Mod: CPTII,S$GLB,, | Performed by: NURSE PRACTITIONER

## 2023-05-30 PROCEDURE — 99214 PR OFFICE/OUTPT VISIT, EST, LEVL IV, 30-39 MIN: ICD-10-PCS | Mod: S$GLB,,, | Performed by: NURSE PRACTITIONER

## 2023-05-30 PROCEDURE — 1101F PR PT FALLS ASSESS DOC 0-1 FALLS W/OUT INJ PAST YR: ICD-10-PCS | Mod: CPTII,S$GLB,, | Performed by: NURSE PRACTITIONER

## 2023-05-30 PROCEDURE — 3288F PR FALLS RISK ASSESSMENT DOCUMENTED: ICD-10-PCS | Mod: CPTII,S$GLB,, | Performed by: NURSE PRACTITIONER

## 2023-05-30 NOTE — TELEPHONE ENCOUNTER
----- Message from Mary Farooq sent at 5/30/2023  9:20 AM CDT -----  Patient's wife is dropping off forms for appointment at [facility]- [phone]

## 2023-05-31 ENCOUNTER — PATIENT MESSAGE (OUTPATIENT)
Dept: FAMILY MEDICINE | Facility: CLINIC | Age: 68
End: 2023-05-31

## 2023-05-31 LAB
T3 SERPL-MCNC: 110 NG/DL (ref 76–181)
T4 FREE SERPL-MCNC: 1.1 NG/DL (ref 0.8–1.8)
TSH SERPL-ACNC: 3.79 MIU/L (ref 0.4–4.5)

## 2023-06-01 ENCOUNTER — TELEPHONE (OUTPATIENT)
Dept: FAMILY MEDICINE | Facility: CLINIC | Age: 68
End: 2023-06-01

## 2023-06-01 ENCOUNTER — PATIENT MESSAGE (OUTPATIENT)
Dept: FAMILY MEDICINE | Facility: CLINIC | Age: 68
End: 2023-06-01

## 2023-06-01 DIAGNOSIS — M79.676 PAIN OF TOE, UNSPECIFIED LATERALITY: Primary | ICD-10-CM

## 2023-06-01 DIAGNOSIS — Z79.899 HIGH RISK MEDICATION USE: ICD-10-CM

## 2023-06-01 NOTE — TELEPHONE ENCOUNTER
Patients wife calling, states he has been having problems with his toe and he forgot to mention to Ping in the office. He is thinking it is gout - he has never been diagnosed with this but wants to see if this could be the cause. Uric acid level pended if okay to sign - Ping out of the office

## 2023-06-02 ENCOUNTER — PATIENT MESSAGE (OUTPATIENT)
Dept: FAMILY MEDICINE | Facility: CLINIC | Age: 68
End: 2023-06-02

## 2023-06-12 NOTE — PROGRESS NOTES
SUBJECTIVE:    Patient ID: Lewis Matias is a 67 y.o. male.    Chief Complaint: Follow-up (No bottles / follow up/ colonguard order/bg)    67 year old male presents for check up.wife is present during the exam. Patient was seen recently in office and tsh was elevated. Wants to repeat prior to starting meds. Recently  diagnosed with kidney stone. Stent was placed by . wife reports mood has changed. She is concerned. Feels tired most days lately.       Office Visit on 05/30/2023   Component Date Value Ref Range Status    TSH 05/30/2023 3.79  0.40 - 4.50 mIU/L Final    T4, Free 05/30/2023 1.1  0.8 - 1.8 ng/dL Final    T3, Total 05/30/2023 110  76 - 181 ng/dL Final   Office Visit on 05/10/2023   Component Date Value Ref Range Status    Cholesterol 05/10/2023 170  <200 mg/dL Final    HDL 05/10/2023 41  > OR = 40 mg/dL Final    Triglycerides 05/10/2023 132  <150 mg/dL Final    LDL Cholesterol 05/10/2023 106 (H)  mg/dL (calc) Final    HDL/Cholesterol Ratio 05/10/2023 4.1  <5.0 (calc) Final    Non HDL Chol. (LDL+VLDL) 05/10/2023 129  <130 mg/dL (calc) Final    TSH w/reflex to FT4 05/10/2023 5.30 (H)  0.40 - 4.50 mIU/L Final    T4, Free 05/10/2023 1.1  0.8 - 1.8 ng/dL Final   Admission on 04/25/2023, Discharged on 04/25/2023   Component Date Value Ref Range Status    Stone Source 04/25/2023 Kidney   Final    Stone Analysis 04/25/2023 Test Not Performed   Final    Stone Analysis Comment 04/25/2023 100% Calcium oxalate monohydrate.   Final   Office Visit on 04/19/2023   Component Date Value Ref Range Status    Color, POC UA 04/19/2023 Yellow  Yellow, Straw, Colorless Final    Clarity, POC UA 04/19/2023 Clear  Clear Final    Glucose, POC UA 04/19/2023 Negative  Negative Final    Bilirubin, POC UA 04/19/2023 Negative  Negative Final    Ketones, POC UA 04/19/2023 Negative  Negative Final    Spec Grav POC UA 04/19/2023 1.025  1.005 - 1.030 Final    Blood, POC UA 04/19/2023 Negative  Negative Final    pH, POC UA  04/19/2023 5.5  5.0 - 8.0 Final    Protein, POC UA 04/19/2023 Negative  Negative Final    Urobilinogen, POC UA 04/19/2023 0.2  <=1.0 Final    Nitrite, POC UA 04/19/2023 Negative  Negative Final    WBC, POC UA 04/19/2023 Negative  Negative Final    Urine Culture, Routine 04/19/2023 No significant growth   Final   Admission on 04/15/2023, Discharged on 04/15/2023   Component Date Value Ref Range Status    HIV 1/2 Ag/Ab 04/15/2023 Non-reactive  Non-reactive Final    Hepatitis C Ab 04/15/2023 Non-reactive  Non-reactive Final    WBC 04/15/2023 7.18  3.90 - 12.70 K/uL Final    RBC 04/15/2023 5.21  4.60 - 6.20 M/uL Final    Hemoglobin 04/15/2023 16.1  14.0 - 18.0 g/dL Final    Hematocrit 04/15/2023 47.3  40.0 - 54.0 % Final    MCV 04/15/2023 91  82 - 98 fL Final    MCH 04/15/2023 30.9  27.0 - 31.0 pg Final    MCHC 04/15/2023 34.0  32.0 - 36.0 g/dL Final    RDW 04/15/2023 14.0  11.5 - 14.5 % Final    Platelets 04/15/2023 146 (L)  150 - 450 K/uL Final    MPV 04/15/2023 11.0  9.2 - 12.9 fL Final    Immature Granulocytes 04/15/2023 1.4 (H)  0.0 - 0.5 % Final    Gran # (ANC) 04/15/2023 4.4  1.8 - 7.7 K/uL Final    Immature Grans (Abs) 04/15/2023 0.10 (H)  0.00 - 0.04 K/uL Final    Lymph # 04/15/2023 1.5  1.0 - 4.8 K/uL Final    Mono # 04/15/2023 0.9  0.3 - 1.0 K/uL Final    Eos # 04/15/2023 0.3  0.0 - 0.5 K/uL Final    Baso # 04/15/2023 0.04  0.00 - 0.20 K/uL Final    nRBC 04/15/2023 0  0 /100 WBC Final    Gran % 04/15/2023 61.1  38.0 - 73.0 % Final    Lymph % 04/15/2023 20.3  18.0 - 48.0 % Final    Mono % 04/15/2023 12.4  4.0 - 15.0 % Final    Eosinophil % 04/15/2023 4.2  0.0 - 8.0 % Final    Basophil % 04/15/2023 0.6  0.0 - 1.9 % Final    Differential Method 04/15/2023 Automated   Final    Sodium 04/15/2023 141  136 - 145 mmol/L Final    Potassium 04/15/2023 3.9  3.5 - 5.1 mmol/L Final    Chloride 04/15/2023 104  95 - 110 mmol/L Final    CO2 04/15/2023 25  23 - 29 mmol/L Final    Glucose 04/15/2023 132 (H)  70 - 110 mg/dL  Final    BUN 04/15/2023 14  8 - 23 mg/dL Final    Creatinine 04/15/2023 0.9  0.5 - 1.4 mg/dL Final    Calcium 04/15/2023 9.2  8.7 - 10.5 mg/dL Final    Total Protein 04/15/2023 6.8  6.0 - 8.4 g/dL Final    Albumin 04/15/2023 3.9  3.5 - 5.2 g/dL Final    Total Bilirubin 04/15/2023 1.2 (H)  0.1 - 1.0 mg/dL Final    Alkaline Phosphatase 04/15/2023 80  55 - 135 U/L Final    AST 04/15/2023 17  10 - 40 U/L Final    ALT 04/15/2023 27  10 - 44 U/L Final    Anion Gap 04/15/2023 12  8 - 16 mmol/L Final    eGFR 04/15/2023 >60.0  >60 mL/min/1.73 m^2 Final    Lipase 04/15/2023 18  4 - 60 U/L Final    Specimen UA 04/15/2023 Urine, Unspecified   Final    Color, UA 04/15/2023 Yellow  Yellow, Straw, Bee Final    Appearance, UA 04/15/2023 Clear  Clear Final    pH, UA 04/15/2023 6.0  5.0 - 8.0 Final    Specific Gravity, UA 04/15/2023 1.025  1.005 - 1.030 Final    Protein, UA 04/15/2023 Negative  Negative Final    Glucose, UA 04/15/2023 Negative  Negative Final    Ketones, UA 04/15/2023 Negative  Negative Final    Bilirubin (UA) 04/15/2023 Negative  Negative Final    Occult Blood UA 04/15/2023 Trace (A)  Negative Final    Nitrite, UA 04/15/2023 Negative  Negative Final    Urobilinogen, UA 04/15/2023 Negative  Negative EU/dL Final    Leukocytes, UA 04/15/2023 Negative  Negative Final       Past Medical History:   Diagnosis Date    Aspiration pneumonia     aspirated after anesthesia due to hiatal hernia per patient, spent 6 days in ICU    COVID-19 8/2/20021    GERD (gastroesophageal reflux disease)     Hard of hearing     Hiatal hernia     Hypertension     Kidney stones     Knee pain 06/2020    left     Social History     Socioeconomic History    Marital status:    Tobacco Use    Smoking status: Never     Passive exposure: Never    Smokeless tobacco: Never   Substance and Sexual Activity    Alcohol use: Yes     Comment: social    Drug use: Never    Sexual activity: Yes     Partners: Female     Social Determinants of Health      Financial Resource Strain: Low Risk     Difficulty of Paying Living Expenses: Not hard at all   Food Insecurity: No Food Insecurity    Worried About Running Out of Food in the Last Year: Never true    Ran Out of Food in the Last Year: Never true   Transportation Needs: No Transportation Needs    Lack of Transportation (Medical): No    Lack of Transportation (Non-Medical): No   Physical Activity: Sufficiently Active    Days of Exercise per Week: 7 days    Minutes of Exercise per Session: 30 min   Stress: No Stress Concern Present    Feeling of Stress : Only a little   Social Connections: Socially Integrated    Frequency of Communication with Friends and Family: More than three times a week    Frequency of Social Gatherings with Friends and Family: More than three times a week    Attends Holiness Services: More than 4 times per year    Active Member of Clubs or Organizations: Yes    Attends Club or Organization Meetings: More than 4 times per year    Marital Status:    Housing Stability: Low Risk     Unable to Pay for Housing in the Last Year: No    Number of Places Lived in the Last Year: 1    Unstable Housing in the Last Year: No     Past Surgical History:   Procedure Laterality Date    CHOLECYSTECTOMY      COLONOSCOPY  2012    Dr Peteror- rtc 10 yr    CYSTOSCOPY W/ RETROGRADES Left 4/25/2023    Procedure: CYSTOSCOPY, WITH RETROGRADE PYELOGRAM;  Surgeon: Pepe Bee Jr., MD;  Location: [facility];  Service: Urology;  Laterality: Left;    CYSTOURETEROSCOPY,WITH HOLMIUM LASER LITHOTRIPSY OF URETERAL CALCULUS Left 4/25/2023    Procedure: CYSTOURETEROSCOPY,WITH HOLMIUM LASER LITHOTRIPSY OF URETERAL CALCULUS;  Surgeon: Pepe Bee Jr., MD;  Location: [facility] OR;  Service: Urology;  Laterality: Left;    HAND SURGERY      traumatic injury    HEMORRHOID SURGERY      KIDNEY STONE SURGERY      KNEE ARTHROSCOPY W/ MENISCECTOMY Left 11/4/2020    Procedure: ARTHROSCOPY, KNEE, WITH PARTIAL MEDIAL MENISCECTOMY;   Surgeon: Andres Blanco MD;  Location: [facility] OR;  Service: Orthopedics;  Laterality: Left;    TONSILLECTOMY      URETEROSCOPIC REMOVAL OF URETERIC CALCULUS Left 4/25/2023    Procedure: REMOVAL, CALCULUS, URETER, URETEROSCOPIC;  Surgeon: Pepe Bee Jr., MD;  Location: [facility] OR;  Service: Urology;  Laterality: Left;     Family History   Problem Relation Age of Onset    Heart disease Mother     Diabetes Mother     Cancer Father     Depression Sister     Cancer Sister         ovarian    Diabetes Brother        Review of patient's allergies indicates:   Allergen Reactions    Iodinated contrast media Hives and Shortness Of Breath       Current Outpatient Medications:     esomeprazole (NEXIUM) 40 MG capsule, Take 1 capsule (40 mg total) by mouth once daily., Disp: 90 capsule, Rfl: 1    levothyroxine (SYNTHROID) 25 MCG tablet, Take 1 tablet (25 mcg total) by mouth before breakfast., Disp: 30 tablet, Rfl: 11    lisinopriL 10 MG tablet, Take 1 tablet (10 mg total) by mouth once daily., Disp: 90 tablet, Rfl: 1    sildenafiL (VIAGRA) 100 MG tablet, Take 1 tablet (100 mg total) by mouth daily as needed for Erectile Dysfunction (prn sex)., Disp: 20 tablet, Rfl: 0    Review of Systems   Constitutional:  Negative for fatigue, fever and unexpected weight change.   HENT:  Negative for ear pain, sinus pressure and sore throat.    Eyes:  Negative for pain.   Respiratory:  Negative for cough and shortness of breath.    Cardiovascular:  Negative for chest pain and leg swelling.   Gastrointestinal:  Negative for abdominal pain, constipation, nausea and vomiting.   Genitourinary:  Negative for dysuria, frequency and urgency.   Musculoskeletal:  Negative for arthralgias.   Skin:  Negative for rash.   Neurological:  Negative for dizziness, weakness and headaches.   Psychiatric/Behavioral:  Negative for sleep disturbance.         Objective:      Vitals:    05/30/23 1249   BP: 128/70   Pulse: 94   SpO2: 97%   Weight: 118.9 kg (262 lb 3.2 oz)  "  Height: 5' 9" (1.753 m)     Physical Exam  Vitals and nursing note reviewed.   Constitutional:       General: He is not in acute distress.     Appearance: Normal appearance. He is well-developed. He is obese.   HENT:      Head: Normocephalic and atraumatic.   Eyes:      Pupils: Pupils are equal, round, and reactive to light.   Neck:      Trachea: No tracheal deviation.   Cardiovascular:      Rate and Rhythm: Normal rate and regular rhythm.      Heart sounds: No murmur heard.    No friction rub. No gallop.   Pulmonary:      Breath sounds: Normal breath sounds. No stridor. No wheezing or rales.   Abdominal:      Palpations: Abdomen is soft. There is no mass.      Tenderness: There is no abdominal tenderness.   Musculoskeletal:         General: No tenderness or deformity.      Cervical back: Neck supple.   Lymphadenopathy:      Cervical: No cervical adenopathy.   Skin:     General: Skin is warm and dry.   Neurological:      Mental Status: He is alert and oriented to person, place, and time.      Cranial Nerves: Cranial nerves 2-12 are intact.      Coordination: Coordination normal.      Comments: Mmse completed 29 was score   Psychiatric:         Attention and Perception: Attention normal.         Mood and Affect: Mood and affect normal.         Speech: Speech normal.         Thought Content: Thought content normal.         Assessment:       1. Screening for colon cancer    2. Hypothyroidism, unspecified type    3. Thyroid nodule    4. Morbid obesity with BMI of 40.0-44.9, adult    5. Essential hypertension    6. Gastroesophageal reflux disease without esophagitis    7. Mood changes    8. Sleep apnea, unspecified type         Plan:       Screening for colon cancer  -     Cologuard Screening (Multitarget Stool DNA); Future; Expected date: 05/30/2023    Hypothyroidism, unspecified type  Comments:  repeat labs  Orders:  -     TSH; Future; Expected date: 05/30/2023  -     T4, Free; Future; Expected date: 05/30/2023  -    "  T3; Future; Expected date: 05/30/2023    Thyroid nodule  -     US Thyroid; Future; Expected date: 05/30/2023    Morbid obesity with BMI of 40.0-44.9, adult    Essential hypertension  Comments:  bp is well controlled    Gastroesophageal reflux disease without esophagitis  Comments:  nexium    Mood changes  Comments:  will rule out reasons aside from depression for now    Sleep apnea, unspecified type  Comments:  needs sleep study      Follow up in about 4 weeks (around 6/27/2023), or if symptoms worsen or fail to improve, for medication management.        6/12/2023 Ping Juarez

## 2023-06-13 ENCOUNTER — TELEPHONE (OUTPATIENT)
Dept: FAMILY MEDICINE | Facility: CLINIC | Age: 68
End: 2023-06-13

## 2023-06-13 ENCOUNTER — HOSPITAL ENCOUNTER (OUTPATIENT)
Dept: RADIOLOGY | Facility: HOSPITAL | Age: 68
Discharge: HOME | End: 2023-06-13
Attending: UROLOGY
Payer: MEDICARE

## 2023-06-13 DIAGNOSIS — N20.0 NEPHROLITHIASIS: ICD-10-CM

## 2023-06-13 PROCEDURE — 76770 US EXAM ABDO BACK WALL COMP: CPT | Mod: 26,,, | Performed by: RADIOLOGY

## 2023-06-13 PROCEDURE — 76770 US RETROPERITONEAL COMPLETE: ICD-10-PCS | Mod: 26,,, | Performed by: RADIOLOGY

## 2023-06-13 PROCEDURE — 76770 US EXAM ABDO BACK WALL COMP: CPT | Mod: TC

## 2023-06-13 NOTE — TELEPHONE ENCOUNTER
----- Message from Mary Farooq sent at 6/13/2023  1:17 PM CDT -----  Vm- 12:29-minh with [facility] is calling and they received the order but they need signed office visit notes.   [phone] fax   [phone] ext [phone]

## 2023-06-16 ENCOUNTER — HOSPITAL ENCOUNTER (OUTPATIENT)
Dept: RADIOLOGY | Facility: HOSPITAL | Age: 68
Discharge: HOME | End: 2023-06-16
Attending: NURSE PRACTITIONER
Payer: MEDICARE

## 2023-06-16 DIAGNOSIS — E04.1 THYROID NODULE: ICD-10-CM

## 2023-06-16 PROCEDURE — 76536 US EXAM OF HEAD AND NECK: CPT | Mod: TC,PO

## 2023-06-19 ENCOUNTER — PATIENT MESSAGE (OUTPATIENT)
Dept: UROLOGY | Facility: CLINIC | Age: 68
End: 2023-06-19
Payer: MEDICARE

## 2023-07-13 ENCOUNTER — OFFICE VISIT (OUTPATIENT)
Dept: FAMILY MEDICINE | Facility: CLINIC | Age: 68
End: 2023-07-13
Payer: MEDICARE

## 2023-07-13 VITALS
SYSTOLIC BLOOD PRESSURE: 126 MMHG | BODY MASS INDEX: 39.32 KG/M2 | HEIGHT: 69 IN | HEART RATE: 80 BPM | OXYGEN SATURATION: 95 % | DIASTOLIC BLOOD PRESSURE: 70 MMHG | WEIGHT: 265.5 LBS

## 2023-07-13 DIAGNOSIS — E55.9 VITAMIN D DEFICIENCY: ICD-10-CM

## 2023-07-13 DIAGNOSIS — I10 ESSENTIAL HYPERTENSION: ICD-10-CM

## 2023-07-13 DIAGNOSIS — G47.30 SLEEP APNEA, UNSPECIFIED TYPE: ICD-10-CM

## 2023-07-13 DIAGNOSIS — Z79.899 OTHER LONG TERM (CURRENT) DRUG THERAPY: ICD-10-CM

## 2023-07-13 DIAGNOSIS — E03.9 HYPOTHYROIDISM, UNSPECIFIED TYPE: ICD-10-CM

## 2023-07-13 DIAGNOSIS — K21.9 GASTROESOPHAGEAL REFLUX DISEASE WITHOUT ESOPHAGITIS: Primary | ICD-10-CM

## 2023-07-13 DIAGNOSIS — R53.83 FATIGUE, UNSPECIFIED TYPE: ICD-10-CM

## 2023-07-13 DIAGNOSIS — E29.1 HYPOGONADISM MALE: ICD-10-CM

## 2023-07-13 PROCEDURE — 3078F DIAST BP <80 MM HG: CPT | Mod: CPTII,S$GLB,, | Performed by: NURSE PRACTITIONER

## 2023-07-13 PROCEDURE — 3008F PR BODY MASS INDEX (BMI) DOCUMENTED: ICD-10-PCS | Mod: CPTII,S$GLB,, | Performed by: NURSE PRACTITIONER

## 2023-07-13 PROCEDURE — 3074F SYST BP LT 130 MM HG: CPT | Mod: CPTII,S$GLB,, | Performed by: NURSE PRACTITIONER

## 2023-07-13 PROCEDURE — 1101F PR PT FALLS ASSESS DOC 0-1 FALLS W/OUT INJ PAST YR: ICD-10-PCS | Mod: CPTII,S$GLB,, | Performed by: NURSE PRACTITIONER

## 2023-07-13 PROCEDURE — 3078F PR MOST RECENT DIASTOLIC BLOOD PRESSURE < 80 MM HG: ICD-10-PCS | Mod: CPTII,S$GLB,, | Performed by: NURSE PRACTITIONER

## 2023-07-13 PROCEDURE — 4010F ACE/ARB THERAPY RXD/TAKEN: CPT | Mod: CPTII,S$GLB,, | Performed by: NURSE PRACTITIONER

## 2023-07-13 PROCEDURE — 99214 PR OFFICE/OUTPT VISIT, EST, LEVL IV, 30-39 MIN: ICD-10-PCS | Mod: S$GLB,,, | Performed by: NURSE PRACTITIONER

## 2023-07-13 PROCEDURE — 3008F BODY MASS INDEX DOCD: CPT | Mod: CPTII,S$GLB,, | Performed by: NURSE PRACTITIONER

## 2023-07-13 PROCEDURE — 3074F PR MOST RECENT SYSTOLIC BLOOD PRESSURE < 130 MM HG: ICD-10-PCS | Mod: CPTII,S$GLB,, | Performed by: NURSE PRACTITIONER

## 2023-07-13 PROCEDURE — 99214 OFFICE O/P EST MOD 30 MIN: CPT | Mod: S$GLB,,, | Performed by: NURSE PRACTITIONER

## 2023-07-13 PROCEDURE — 4010F PR ACE/ARB THEARPY RXD/TAKEN: ICD-10-PCS | Mod: CPTII,S$GLB,, | Performed by: NURSE PRACTITIONER

## 2023-07-13 PROCEDURE — 1160F RVW MEDS BY RX/DR IN RCRD: CPT | Mod: CPTII,S$GLB,, | Performed by: NURSE PRACTITIONER

## 2023-07-13 PROCEDURE — 1160F PR REVIEW ALL MEDS BY PRESCRIBER/CLIN PHARMACIST DOCUMENTED: ICD-10-PCS | Mod: CPTII,S$GLB,, | Performed by: NURSE PRACTITIONER

## 2023-07-13 PROCEDURE — 1159F PR MEDICATION LIST DOCUMENTED IN MEDICAL RECORD: ICD-10-PCS | Mod: CPTII,S$GLB,, | Performed by: NURSE PRACTITIONER

## 2023-07-13 PROCEDURE — 3288F FALL RISK ASSESSMENT DOCD: CPT | Mod: CPTII,S$GLB,, | Performed by: NURSE PRACTITIONER

## 2023-07-13 PROCEDURE — 1159F MED LIST DOCD IN RCRD: CPT | Mod: CPTII,S$GLB,, | Performed by: NURSE PRACTITIONER

## 2023-07-13 PROCEDURE — 3288F PR FALLS RISK ASSESSMENT DOCUMENTED: ICD-10-PCS | Mod: CPTII,S$GLB,, | Performed by: NURSE PRACTITIONER

## 2023-07-13 PROCEDURE — 1101F PT FALLS ASSESS-DOCD LE1/YR: CPT | Mod: CPTII,S$GLB,, | Performed by: NURSE PRACTITIONER

## 2023-07-16 LAB
25(OH)D3 SERPL-MCNC: 35 NG/ML (ref 30–100)
ALBUMIN SERPL-MCNC: 4.5 G/DL (ref 3.6–5.1)
ALBUMIN/GLOB SERPL: 2 (CALC) (ref 1–2.5)
ALP SERPL-CCNC: 75 U/L (ref 35–144)
ALT SERPL-CCNC: 26 U/L (ref 9–46)
AST SERPL-CCNC: 18 U/L (ref 10–35)
BASOPHILS # BLD AUTO: 39 CELLS/UL (ref 0–200)
BASOPHILS NFR BLD AUTO: 0.7 %
BILIRUB SERPL-MCNC: 1.4 MG/DL (ref 0.2–1.2)
BUN SERPL-MCNC: 14 MG/DL (ref 7–25)
BUN/CREAT SERPL: ABNORMAL (CALC) (ref 6–22)
CALCIUM SERPL-MCNC: 9.6 MG/DL (ref 8.6–10.3)
CHLORIDE SERPL-SCNC: 102 MMOL/L (ref 98–110)
CO2 SERPL-SCNC: 29 MMOL/L (ref 20–32)
CREAT SERPL-MCNC: 0.93 MG/DL (ref 0.7–1.35)
EGFR: 89 ML/MIN/1.73M2
EOSINOPHIL # BLD AUTO: 202 CELLS/UL (ref 15–500)
EOSINOPHIL NFR BLD AUTO: 3.6 %
ERYTHROCYTE [DISTWIDTH] IN BLOOD BY AUTOMATED COUNT: 13.1 % (ref 11–15)
GLOBULIN SER CALC-MCNC: 2.3 G/DL (CALC) (ref 1.9–3.7)
GLUCOSE SERPL-MCNC: 118 MG/DL (ref 65–99)
HCT VFR BLD AUTO: 49.7 % (ref 38.5–50)
HGB BLD-MCNC: 17.2 G/DL (ref 13.2–17.1)
LYMPHOCYTES # BLD AUTO: 1372 CELLS/UL (ref 850–3900)
LYMPHOCYTES NFR BLD AUTO: 24.5 %
MCH RBC QN AUTO: 31.3 PG (ref 27–33)
MCHC RBC AUTO-ENTMCNC: 34.6 G/DL (ref 32–36)
MCV RBC AUTO: 90.4 FL (ref 80–100)
MONOCYTES # BLD AUTO: 644 CELLS/UL (ref 200–950)
MONOCYTES NFR BLD AUTO: 11.5 %
NEUTROPHILS # BLD AUTO: 3343 CELLS/UL (ref 1500–7800)
NEUTROPHILS NFR BLD AUTO: 59.7 %
PLATELET # BLD AUTO: 153 THOUSAND/UL (ref 140–400)
PMV BLD REES-ECKER: 11.8 FL (ref 7.5–12.5)
POTASSIUM SERPL-SCNC: 4.8 MMOL/L (ref 3.5–5.3)
PROT SERPL-MCNC: 6.8 G/DL (ref 6.1–8.1)
RBC # BLD AUTO: 5.5 MILLION/UL (ref 4.2–5.8)
SODIUM SERPL-SCNC: 139 MMOL/L (ref 135–146)
TESTOST FREE SERPL-MCNC: 40.8 PG/ML (ref 46–224)
TESTOST SERPL-MCNC: 297 NG/DL (ref 250–827)
VIT B12 SERPL-MCNC: 496 PG/ML (ref 200–1100)
WBC # BLD AUTO: 5.6 THOUSAND/UL (ref 3.8–10.8)

## 2023-07-20 ENCOUNTER — PATIENT MESSAGE (OUTPATIENT)
Dept: FAMILY MEDICINE | Facility: CLINIC | Age: 68
End: 2023-07-20

## 2023-07-24 NOTE — PROGRESS NOTES
SUBJECTIVE:    Patient ID: Lewis Matias is a 67 y.o. male.    Chief Complaint: Follow-up (No bottles//Pt is here for a f/u appt//KAI )    67 year old male presents for check up.. Patient is treated for htn and gerd. Being followed by dr. Bee for kidney stones. Bp in office is well controlled. Taking protonix daily for gerd. Feels tired still was seen in may with elevated tsh. Did not start levothyroxine. Labs repeated and tsh was normal. Had stress test with dr. Angel which was normal.       Office Visit on 07/13/2023   Component Date Value Ref Range Status    WBC 07/13/2023 5.6  3.8 - 10.8 Thousand/uL Final    RBC 07/13/2023 5.50  4.20 - 5.80 Million/uL Final    Hemoglobin 07/13/2023 17.2 (H)  13.2 - 17.1 g/dL Final    Hematocrit 07/13/2023 49.7  38.5 - 50.0 % Final    MCV 07/13/2023 90.4  80.0 - 100.0 fL Final    MCH 07/13/2023 31.3  27.0 - 33.0 pg Final    MCHC 07/13/2023 34.6  32.0 - 36.0 g/dL Final    RDW 07/13/2023 13.1  11.0 - 15.0 % Final    Platelets 07/13/2023 153  140 - 400 Thousand/uL Final    MPV 07/13/2023 11.8  7.5 - 12.5 fL Final    Neutrophils, Abs 07/13/2023 3,343  1,500 - 7,800 cells/uL Final    Lymph # 07/13/2023 1,372  850 - 3,900 cells/uL Final    Mono # 07/13/2023 644  200 - 950 cells/uL Final    Eos # 07/13/2023 202  15 - 500 cells/uL Final    Baso # 07/13/2023 39  0 - 200 cells/uL Final    Neutrophils Relative 07/13/2023 59.7  % Final    Lymph % 07/13/2023 24.5  % Final    Mono % 07/13/2023 11.5  % Final    Eosinophil % 07/13/2023 3.6  % Final    Basophil % 07/13/2023 0.7  % Final    Vitamin D, 25-OH, Total 07/13/2023 35  30 - 100 ng/mL Final    Vitamin B-12 07/13/2023 496  200 - 1,100 pg/mL Final    TESTOSTERONE, TOTAL, MALE 07/13/2023 297  250 - 827 ng/dL Final    Testosterone, Free 07/13/2023 40.8 (L)  46.0 - 224.0 pg/mL Final    Glucose 07/13/2023 118 (H)  65 - 99 mg/dL Final    BUN 07/13/2023 14  7 - 25 mg/dL Final    Creatinine 07/13/2023 0.93  0.70 - 1.35 mg/dL Final    eGFR  07/13/2023 89  > OR = 60 mL/min/1.73m2 Final    BUN/Creatinine Ratio 07/13/2023 NOT APPLICABLE  6 - 22 (calc) Final    Sodium 07/13/2023 139  135 - 146 mmol/L Final    Potassium 07/13/2023 4.8  3.5 - 5.3 mmol/L Final    Chloride 07/13/2023 102  98 - 110 mmol/L Final    CO2 07/13/2023 29  20 - 32 mmol/L Final    Calcium 07/13/2023 9.6  8.6 - 10.3 mg/dL Final    Total Protein 07/13/2023 6.8  6.1 - 8.1 g/dL Final    Albumin 07/13/2023 4.5  3.6 - 5.1 g/dL Final    Globulin, Total 07/13/2023 2.3  1.9 - 3.7 g/dL (calc) Final    Albumin/Globulin Ratio 07/13/2023 2.0  1.0 - 2.5 (calc) Final    Total Bilirubin 07/13/2023 1.4 (H)  0.2 - 1.2 mg/dL Final    Alkaline Phosphatase 07/13/2023 75  35 - 144 U/L Final    AST 07/13/2023 18  10 - 35 U/L Final    ALT 07/13/2023 26  9 - 46 U/L Final   Office Visit on 05/30/2023   Component Date Value Ref Range Status    TSH 05/30/2023 3.79  0.40 - 4.50 mIU/L Final    T4, Free 05/30/2023 1.1  0.8 - 1.8 ng/dL Final    T3, Total 05/30/2023 110  76 - 181 ng/dL Final   Office Visit on 05/10/2023   Component Date Value Ref Range Status    Cholesterol 05/10/2023 170  <200 mg/dL Final    HDL 05/10/2023 41  > OR = 40 mg/dL Final    Triglycerides 05/10/2023 132  <150 mg/dL Final    LDL Cholesterol 05/10/2023 106 (H)  mg/dL (calc) Final    HDL/Cholesterol Ratio 05/10/2023 4.1  <5.0 (calc) Final    Non HDL Chol. (LDL+VLDL) 05/10/2023 129  <130 mg/dL (calc) Final    TSH w/reflex to FT4 05/10/2023 5.30 (H)  0.40 - 4.50 mIU/L Final    T4, Free 05/10/2023 1.1  0.8 - 1.8 ng/dL Final   Admission on 04/25/2023, Discharged on 04/25/2023   Component Date Value Ref Range Status    Stone Source 04/25/2023 Kidney   Final    Stone Analysis 04/25/2023 Test Not Performed   Final    Stone Analysis Comment 04/25/2023 100% Calcium oxalate monohydrate.   Final   Office Visit on 04/19/2023   Component Date Value Ref Range Status    Color, POC UA 04/19/2023 Yellow  Yellow, Straw, Colorless Final    Clarity, POC UA  04/19/2023 Clear  Clear Final    Glucose, POC UA 04/19/2023 Negative  Negative Final    Bilirubin, POC UA 04/19/2023 Negative  Negative Final    Ketones, POC UA 04/19/2023 Negative  Negative Final    Spec Grav POC UA 04/19/2023 1.025  1.005 - 1.030 Final    Blood, POC UA 04/19/2023 Negative  Negative Final    pH, POC UA 04/19/2023 5.5  5.0 - 8.0 Final    Protein, POC UA 04/19/2023 Negative  Negative Final    Urobilinogen, POC UA 04/19/2023 0.2  <=1.0 Final    Nitrite, POC UA 04/19/2023 Negative  Negative Final    WBC, POC UA 04/19/2023 Negative  Negative Final    Urine Culture, Routine 04/19/2023 No significant growth   Final   Admission on 04/15/2023, Discharged on 04/15/2023   Component Date Value Ref Range Status    HIV 1/2 Ag/Ab 04/15/2023 Non-reactive  Non-reactive Final    Hepatitis C Ab 04/15/2023 Non-reactive  Non-reactive Final    WBC 04/15/2023 7.18  3.90 - 12.70 K/uL Final    RBC 04/15/2023 5.21  4.60 - 6.20 M/uL Final    Hemoglobin 04/15/2023 16.1  14.0 - 18.0 g/dL Final    Hematocrit 04/15/2023 47.3  40.0 - 54.0 % Final    MCV 04/15/2023 91  82 - 98 fL Final    MCH 04/15/2023 30.9  27.0 - 31.0 pg Final    MCHC 04/15/2023 34.0  32.0 - 36.0 g/dL Final    RDW 04/15/2023 14.0  11.5 - 14.5 % Final    Platelets 04/15/2023 146 (L)  150 - 450 K/uL Final    MPV 04/15/2023 11.0  9.2 - 12.9 fL Final    Immature Granulocytes 04/15/2023 1.4 (H)  0.0 - 0.5 % Final    Gran # (ANC) 04/15/2023 4.4  1.8 - 7.7 K/uL Final    Immature Grans (Abs) 04/15/2023 0.10 (H)  0.00 - 0.04 K/uL Final    Lymph # 04/15/2023 1.5  1.0 - 4.8 K/uL Final    Mono # 04/15/2023 0.9  0.3 - 1.0 K/uL Final    Eos # 04/15/2023 0.3  0.0 - 0.5 K/uL Final    Baso # 04/15/2023 0.04  0.00 - 0.20 K/uL Final    nRBC 04/15/2023 0  0 /100 WBC Final    Gran % 04/15/2023 61.1  38.0 - 73.0 % Final    Lymph % 04/15/2023 20.3  18.0 - 48.0 % Final    Mono % 04/15/2023 12.4  4.0 - 15.0 % Final    Eosinophil % 04/15/2023 4.2  0.0 - 8.0 % Final    Basophil %  04/15/2023 0.6  0.0 - 1.9 % Final    Differential Method 04/15/2023 Automated   Final    Sodium 04/15/2023 141  136 - 145 mmol/L Final    Potassium 04/15/2023 3.9  3.5 - 5.1 mmol/L Final    Chloride 04/15/2023 104  95 - 110 mmol/L Final    CO2 04/15/2023 25  23 - 29 mmol/L Final    Glucose 04/15/2023 132 (H)  70 - 110 mg/dL Final    BUN 04/15/2023 14  8 - 23 mg/dL Final    Creatinine 04/15/2023 0.9  0.5 - 1.4 mg/dL Final    Calcium 04/15/2023 9.2  8.7 - 10.5 mg/dL Final    Total Protein 04/15/2023 6.8  6.0 - 8.4 g/dL Final    Albumin 04/15/2023 3.9  3.5 - 5.2 g/dL Final    Total Bilirubin 04/15/2023 1.2 (H)  0.1 - 1.0 mg/dL Final    Alkaline Phosphatase 04/15/2023 80  55 - 135 U/L Final    AST 04/15/2023 17  10 - 40 U/L Final    ALT 04/15/2023 27  10 - 44 U/L Final    Anion Gap 04/15/2023 12  8 - 16 mmol/L Final    eGFR 04/15/2023 >60.0  >60 mL/min/1.73 m^2 Final    Lipase 04/15/2023 18  4 - 60 U/L Final    Specimen UA 04/15/2023 Urine, Unspecified   Final    Color, UA 04/15/2023 Yellow  Yellow, Straw, Bee Final    Appearance, UA 04/15/2023 Clear  Clear Final    pH, UA 04/15/2023 6.0  5.0 - 8.0 Final    Specific Gravity, UA 04/15/2023 1.025  1.005 - 1.030 Final    Protein, UA 04/15/2023 Negative  Negative Final    Glucose, UA 04/15/2023 Negative  Negative Final    Ketones, UA 04/15/2023 Negative  Negative Final    Bilirubin (UA) 04/15/2023 Negative  Negative Final    Occult Blood UA 04/15/2023 Trace (A)  Negative Final    Nitrite, UA 04/15/2023 Negative  Negative Final    Urobilinogen, UA 04/15/2023 Negative  Negative EU/dL Final    Leukocytes, UA 04/15/2023 Negative  Negative Final       Past Medical History:   Diagnosis Date    Aspiration pneumonia     aspirated after anesthesia due to hiatal hernia per patient, spent 6 days in ICU    COVID-19 8/2/20021    GERD (gastroesophageal reflux disease)     Hard of hearing     Hiatal hernia     Hypertension     Kidney stones     Knee pain 06/2020    left     Social  History     Socioeconomic History    Marital status:    Tobacco Use    Smoking status: Never     Passive exposure: Never    Smokeless tobacco: Never   Substance and Sexual Activity    Alcohol use: Yes     Comment: social    Drug use: Never    Sexual activity: Yes     Partners: Female     Social Determinants of Health     Financial Resource Strain: Low Risk     Difficulty of Paying Living Expenses: Not hard at all   Food Insecurity: No Food Insecurity    Worried About Running Out of Food in the Last Year: Never true    Ran Out of Food in the Last Year: Never true   Transportation Needs: No Transportation Needs    Lack of Transportation (Medical): No    Lack of Transportation (Non-Medical): No   Physical Activity: Sufficiently Active    Days of Exercise per Week: 7 days    Minutes of Exercise per Session: 30 min   Stress: No Stress Concern Present    Feeling of Stress : Only a little   Social Connections: Socially Integrated    Frequency of Communication with Friends and Family: More than three times a week    Frequency of Social Gatherings with Friends and Family: More than three times a week    Attends Congregation Services: More than 4 times per year    Active Member of Clubs or Organizations: Yes    Attends Club or Organization Meetings: More than 4 times per year    Marital Status:    Housing Stability: Low Risk     Unable to Pay for Housing in the Last Year: No    Number of Places Lived in the Last Year: 1    Unstable Housing in the Last Year: No     Past Surgical History:   Procedure Laterality Date    CHOLECYSTECTOMY      COLONOSCOPY  2012    Dr Silver- rtc 10 yr    CYSTOSCOPY W/ RETROGRADES Left 4/25/2023    Procedure: CYSTOSCOPY, WITH RETROGRADE PYELOGRAM;  Surgeon: Pepe Bee Jr., MD;  Location: [facility];  Service: Urology;  Laterality: Left;    CYSTOURETEROSCOPY,WITH HOLMIUM LASER LITHOTRIPSY OF URETERAL CALCULUS Left 4/25/2023    Procedure: CYSTOURETEROSCOPY,WITH HOLMIUM LASER LITHOTRIPSY  OF URETERAL CALCULUS;  Surgeon: Pepe Bee Jr., MD;  Location: [facility] OR;  Service: Urology;  Laterality: Left;    HAND SURGERY      traumatic injury    HEMORRHOID SURGERY      KIDNEY STONE SURGERY      KNEE ARTHROSCOPY W/ MENISCECTOMY Left 11/4/2020    Procedure: ARTHROSCOPY, KNEE, WITH PARTIAL MEDIAL MENISCECTOMY;  Surgeon: Andres Blanco MD;  Location: [facility] OR;  Service: Orthopedics;  Laterality: Left;    TONSILLECTOMY      URETEROSCOPIC REMOVAL OF URETERIC CALCULUS Left 4/25/2023    Procedure: REMOVAL, CALCULUS, URETER, URETEROSCOPIC;  Surgeon: Pepe Bee Jr., MD;  Location: [facility] OR;  Service: Urology;  Laterality: Left;     Family History   Problem Relation Age of Onset    Heart disease Mother     Diabetes Mother     Cancer Father     Depression Sister     Cancer Sister         ovarian    Diabetes Brother        Review of patient's allergies indicates:   Allergen Reactions    Iodinated contrast media Hives and Shortness Of Breath       Current Outpatient Medications:     esomeprazole (NEXIUM) 40 MG capsule, Take 1 capsule (40 mg total) by mouth once daily., Disp: 90 capsule, Rfl: 1    lisinopriL 10 MG tablet, Take 1 tablet (10 mg total) by mouth once daily., Disp: 90 tablet, Rfl: 1    Review of Systems   Constitutional:  Negative for fatigue, fever and unexpected weight change.   HENT:  Negative for ear pain, sinus pressure and sore throat.    Eyes:  Negative for pain.   Respiratory:  Negative for cough and shortness of breath.    Cardiovascular:  Negative for chest pain and leg swelling.   Gastrointestinal:  Negative for abdominal pain, constipation, nausea and vomiting.   Genitourinary:  Negative for dysuria, frequency and urgency.   Musculoskeletal:  Negative for arthralgias.   Skin:  Negative for rash.   Neurological:  Negative for dizziness, weakness and headaches.   Psychiatric/Behavioral:  Negative for sleep disturbance.         Objective:      Vitals:    07/13/23 0958   BP: 126/70   Pulse: 80  "  SpO2: 95%   Weight: 120.4 kg (265 lb 8 oz)   Height: 5' 9" (1.753 m)     Physical Exam  Vitals and nursing note reviewed.   Constitutional:       General: He is not in acute distress.     Appearance: Normal appearance. He is well-developed. He is obese.   Neck:      Trachea: No tracheal deviation.   Cardiovascular:      Rate and Rhythm: Normal rate and regular rhythm.      Heart sounds: No murmur heard.    No friction rub. No gallop.   Pulmonary:      Breath sounds: Normal breath sounds. No stridor. No wheezing or rales.   Abdominal:      Palpations: Abdomen is soft. There is no mass.      Tenderness: There is no abdominal tenderness.   Musculoskeletal:         General: No tenderness or deformity.      Cervical back: Neck supple.   Lymphadenopathy:      Cervical: No cervical adenopathy.   Skin:     General: Skin is warm and dry.   Neurological:      Mental Status: He is alert and oriented to person, place, and time.      Coordination: Coordination normal.   Psychiatric:         Mood and Affect: Mood is depressed.         Thought Content: Thought content normal.         Assessment:       1. Gastroesophageal reflux disease without esophagitis    2. Essential hypertension    3. Hypothyroidism, unspecified type    4. Sleep apnea, unspecified type    5. Hypogonadism male    6. Vitamin D deficiency    7. Fatigue, unspecified type    8. Other long term (current) drug therapy         Plan:       Gastroesophageal reflux disease without esophagitis  Comments:  nexium  Orders:  -     CBC Auto Differential; Future; Expected date: 07/13/2023  -     Vitamin D; Future; Expected date: 07/13/2023  -     Vitamin B12; Future; Expected date: 07/13/2023  -     Testosterone, Total, Males; Future; Expected date: 07/13/2023  -     Testosterone, Free; Future; Expected date: 07/13/2023  -     Comprehensive Metabolic Panel; Future; Expected date: 07/13/2023    Essential hypertension  Comments:  lisinopril daily.   Orders:  -     CBC Auto " Differential; Future; Expected date: 07/13/2023  -     Vitamin D; Future; Expected date: 07/13/2023  -     Vitamin B12; Future; Expected date: 07/13/2023  -     Testosterone, Total, Males; Future; Expected date: 07/13/2023  -     Testosterone, Free; Future; Expected date: 07/13/2023  -     Comprehensive Metabolic Panel; Future; Expected date: 07/13/2023    Hypothyroidism, unspecified type    Sleep apnea, unspecified type  Comments:  awaiting sleep study  Orders:  -     CBC Auto Differential; Future; Expected date: 07/13/2023  -     Vitamin D; Future; Expected date: 07/13/2023  -     Vitamin B12; Future; Expected date: 07/13/2023  -     Testosterone, Total, Males; Future; Expected date: 07/13/2023  -     Testosterone, Free; Future; Expected date: 07/13/2023  -     Comprehensive Metabolic Panel; Future; Expected date: 07/13/2023    Hypogonadism male  -     Testosterone, Total, Males; Future; Expected date: 07/13/2023  -     Testosterone, Free; Future; Expected date: 07/13/2023    Vitamin D deficiency  Comments:  labs  Orders:  -     Vitamin D; Future; Expected date: 07/13/2023    Fatigue, unspecified type  Comments:  labs  Orders:  -     Vitamin B12; Future; Expected date: 07/13/2023    Other long term (current) drug therapy  -     Vitamin B12; Future; Expected date: 07/13/2023      Follow up in about 3 months (around 10/13/2023), or if symptoms worsen or fail to improve, for Follow up with DR. CORRALES.        7/24/2023 Ping Juarez

## 2023-07-27 ENCOUNTER — PATIENT MESSAGE (OUTPATIENT)
Dept: FAMILY MEDICINE | Facility: CLINIC | Age: 68
End: 2023-07-27

## 2023-07-28 ENCOUNTER — PATIENT MESSAGE (OUTPATIENT)
Dept: FAMILY MEDICINE | Facility: CLINIC | Age: 68
End: 2023-07-28

## 2023-07-31 ENCOUNTER — OFFICE VISIT (OUTPATIENT)
Dept: FAMILY MEDICINE | Facility: CLINIC | Age: 68
End: 2023-07-31
Payer: MEDICARE

## 2023-07-31 VITALS
BODY MASS INDEX: 39.55 KG/M2 | HEART RATE: 70 BPM | SYSTOLIC BLOOD PRESSURE: 114 MMHG | WEIGHT: 267 LBS | HEIGHT: 69 IN | DIASTOLIC BLOOD PRESSURE: 70 MMHG

## 2023-07-31 DIAGNOSIS — K21.9 GASTROESOPHAGEAL REFLUX DISEASE WITHOUT ESOPHAGITIS: ICD-10-CM

## 2023-07-31 DIAGNOSIS — E66.01 MORBID OBESITY WITH BMI OF 40.0-44.9, ADULT: ICD-10-CM

## 2023-07-31 DIAGNOSIS — Z79.899 HIGH RISK MEDICATION USE: ICD-10-CM

## 2023-07-31 DIAGNOSIS — E29.1 HYPOGONADISM MALE: ICD-10-CM

## 2023-07-31 DIAGNOSIS — I10 ESSENTIAL HYPERTENSION: Primary | ICD-10-CM

## 2023-07-31 DIAGNOSIS — I12.9 HYPERTENSIVE CHRONIC KIDNEY DISEASE WITH STAGE 1 THROUGH STAGE 4 CHRONIC KIDNEY DISEASE, OR UNSPECIFIED CHRONIC KIDNEY DISEASE: ICD-10-CM

## 2023-07-31 DIAGNOSIS — G47.30 SLEEP APNEA, UNSPECIFIED TYPE: ICD-10-CM

## 2023-07-31 DIAGNOSIS — R53.83 FATIGUE, UNSPECIFIED TYPE: ICD-10-CM

## 2023-07-31 PROCEDURE — 3008F BODY MASS INDEX DOCD: CPT | Mod: CPTII,S$GLB,, | Performed by: NURSE PRACTITIONER

## 2023-07-31 PROCEDURE — 3074F PR MOST RECENT SYSTOLIC BLOOD PRESSURE < 130 MM HG: ICD-10-PCS | Mod: CPTII,S$GLB,, | Performed by: NURSE PRACTITIONER

## 2023-07-31 PROCEDURE — 1160F PR REVIEW ALL MEDS BY PRESCRIBER/CLIN PHARMACIST DOCUMENTED: ICD-10-PCS | Mod: CPTII,S$GLB,, | Performed by: NURSE PRACTITIONER

## 2023-07-31 PROCEDURE — 99214 PR OFFICE/OUTPT VISIT, EST, LEVL IV, 30-39 MIN: ICD-10-PCS | Mod: S$GLB,,, | Performed by: NURSE PRACTITIONER

## 2023-07-31 PROCEDURE — 4010F ACE/ARB THERAPY RXD/TAKEN: CPT | Mod: CPTII,S$GLB,, | Performed by: NURSE PRACTITIONER

## 2023-07-31 PROCEDURE — 99214 OFFICE O/P EST MOD 30 MIN: CPT | Mod: S$GLB,,, | Performed by: NURSE PRACTITIONER

## 2023-07-31 PROCEDURE — 1101F PR PT FALLS ASSESS DOC 0-1 FALLS W/OUT INJ PAST YR: ICD-10-PCS | Mod: CPTII,S$GLB,, | Performed by: NURSE PRACTITIONER

## 2023-07-31 PROCEDURE — 1159F MED LIST DOCD IN RCRD: CPT | Mod: CPTII,S$GLB,, | Performed by: NURSE PRACTITIONER

## 2023-07-31 PROCEDURE — 1160F RVW MEDS BY RX/DR IN RCRD: CPT | Mod: CPTII,S$GLB,, | Performed by: NURSE PRACTITIONER

## 2023-07-31 PROCEDURE — 3074F SYST BP LT 130 MM HG: CPT | Mod: CPTII,S$GLB,, | Performed by: NURSE PRACTITIONER

## 2023-07-31 PROCEDURE — 3288F PR FALLS RISK ASSESSMENT DOCUMENTED: ICD-10-PCS | Mod: CPTII,S$GLB,, | Performed by: NURSE PRACTITIONER

## 2023-07-31 PROCEDURE — 1159F PR MEDICATION LIST DOCUMENTED IN MEDICAL RECORD: ICD-10-PCS | Mod: CPTII,S$GLB,, | Performed by: NURSE PRACTITIONER

## 2023-07-31 PROCEDURE — 3078F DIAST BP <80 MM HG: CPT | Mod: CPTII,S$GLB,, | Performed by: NURSE PRACTITIONER

## 2023-07-31 PROCEDURE — 3288F FALL RISK ASSESSMENT DOCD: CPT | Mod: CPTII,S$GLB,, | Performed by: NURSE PRACTITIONER

## 2023-07-31 PROCEDURE — 1101F PT FALLS ASSESS-DOCD LE1/YR: CPT | Mod: CPTII,S$GLB,, | Performed by: NURSE PRACTITIONER

## 2023-07-31 PROCEDURE — 3008F PR BODY MASS INDEX (BMI) DOCUMENTED: ICD-10-PCS | Mod: CPTII,S$GLB,, | Performed by: NURSE PRACTITIONER

## 2023-07-31 PROCEDURE — 4010F PR ACE/ARB THEARPY RXD/TAKEN: ICD-10-PCS | Mod: CPTII,S$GLB,, | Performed by: NURSE PRACTITIONER

## 2023-07-31 PROCEDURE — 3078F PR MOST RECENT DIASTOLIC BLOOD PRESSURE < 80 MM HG: ICD-10-PCS | Mod: CPTII,S$GLB,, | Performed by: NURSE PRACTITIONER

## 2023-07-31 RX ORDER — TESTOSTERONE 50 MG/5G
5 GEL TRANSDERMAL DAILY
Qty: 30 EACH | Refills: 2 | Status: SHIPPED | OUTPATIENT
Start: 2023-07-31 | End: 2024-01-23

## 2023-07-31 NOTE — PROGRESS NOTES
SUBJECTIVE:    Patient ID: Lewis Matias is a 67 y.o. male.    Chief Complaint: Follow-up (Follow up to discuss lab results and treatment//discuss sleep study order//no med bottles//awaiting cologuard results//tc)    67 year old male presents for check up. Wife is present during the exam. Patient is treated for htn and gerd. Has been having episodes of fatigue. Needs titration study. Has not yet been contacted. Would like to discuss recent labs.         Office Visit on 07/13/2023   Component Date Value Ref Range Status    WBC 07/13/2023 5.6  3.8 - 10.8 Thousand/uL Final    RBC 07/13/2023 5.50  4.20 - 5.80 Million/uL Final    Hemoglobin 07/13/2023 17.2 (H)  13.2 - 17.1 g/dL Final    Hematocrit 07/13/2023 49.7  38.5 - 50.0 % Final    MCV 07/13/2023 90.4  80.0 - 100.0 fL Final    MCH 07/13/2023 31.3  27.0 - 33.0 pg Final    MCHC 07/13/2023 34.6  32.0 - 36.0 g/dL Final    RDW 07/13/2023 13.1  11.0 - 15.0 % Final    Platelets 07/13/2023 153  140 - 400 Thousand/uL Final    MPV 07/13/2023 11.8  7.5 - 12.5 fL Final    Neutrophils, Abs 07/13/2023 3,343  1,500 - 7,800 cells/uL Final    Lymph # 07/13/2023 1,372  850 - 3,900 cells/uL Final    Mono # 07/13/2023 644  200 - 950 cells/uL Final    Eos # 07/13/2023 202  15 - 500 cells/uL Final    Baso # 07/13/2023 39  0 - 200 cells/uL Final    Neutrophils Relative 07/13/2023 59.7  % Final    Lymph % 07/13/2023 24.5  % Final    Mono % 07/13/2023 11.5  % Final    Eosinophil % 07/13/2023 3.6  % Final    Basophil % 07/13/2023 0.7  % Final    Vitamin D, 25-OH, Total 07/13/2023 35  30 - 100 ng/mL Final    Vitamin B-12 07/13/2023 496  200 - 1,100 pg/mL Final    TESTOSTERONE, TOTAL, MALE 07/13/2023 297  250 - 827 ng/dL Final    Testosterone, Free 07/13/2023 40.8 (L)  46.0 - 224.0 pg/mL Final    Glucose 07/13/2023 118 (H)  65 - 99 mg/dL Final    BUN 07/13/2023 14  7 - 25 mg/dL Final    Creatinine 07/13/2023 0.93  0.70 - 1.35 mg/dL Final    eGFR 07/13/2023 89  > OR = 60 mL/min/1.73m2  Final    BUN/Creatinine Ratio 07/13/2023 NOT APPLICABLE  6 - 22 (calc) Final    Sodium 07/13/2023 139  135 - 146 mmol/L Final    Potassium 07/13/2023 4.8  3.5 - 5.3 mmol/L Final    Chloride 07/13/2023 102  98 - 110 mmol/L Final    CO2 07/13/2023 29  20 - 32 mmol/L Final    Calcium 07/13/2023 9.6  8.6 - 10.3 mg/dL Final    Total Protein 07/13/2023 6.8  6.1 - 8.1 g/dL Final    Albumin 07/13/2023 4.5  3.6 - 5.1 g/dL Final    Globulin, Total 07/13/2023 2.3  1.9 - 3.7 g/dL (calc) Final    Albumin/Globulin Ratio 07/13/2023 2.0  1.0 - 2.5 (calc) Final    Total Bilirubin 07/13/2023 1.4 (H)  0.2 - 1.2 mg/dL Final    Alkaline Phosphatase 07/13/2023 75  35 - 144 U/L Final    AST 07/13/2023 18  10 - 35 U/L Final    ALT 07/13/2023 26  9 - 46 U/L Final   Office Visit on 05/30/2023   Component Date Value Ref Range Status    TSH 05/30/2023 3.79  0.40 - 4.50 mIU/L Final    T4, Free 05/30/2023 1.1  0.8 - 1.8 ng/dL Final    T3, Total 05/30/2023 110  76 - 181 ng/dL Final   Office Visit on 05/10/2023   Component Date Value Ref Range Status    Cholesterol 05/10/2023 170  <200 mg/dL Final    HDL 05/10/2023 41  > OR = 40 mg/dL Final    Triglycerides 05/10/2023 132  <150 mg/dL Final    LDL Cholesterol 05/10/2023 106 (H)  mg/dL (calc) Final    HDL/Cholesterol Ratio 05/10/2023 4.1  <5.0 (calc) Final    Non HDL Chol. (LDL+VLDL) 05/10/2023 129  <130 mg/dL (calc) Final    TSH w/reflex to FT4 05/10/2023 5.30 (H)  0.40 - 4.50 mIU/L Final    T4, Free 05/10/2023 1.1  0.8 - 1.8 ng/dL Final   Admission on 04/25/2023, Discharged on 04/25/2023   Component Date Value Ref Range Status    Stone Source 04/25/2023 Kidney   Final    Stone Analysis 04/25/2023 Test Not Performed   Final    Stone Analysis Comment 04/25/2023 100% Calcium oxalate monohydrate.   Final   Office Visit on 04/19/2023   Component Date Value Ref Range Status    Color, POC UA 04/19/2023 Yellow  Yellow, Straw, Colorless Final    Clarity, POC UA 04/19/2023 Clear  Clear Final    Glucose, POC  UA 04/19/2023 Negative  Negative Final    Bilirubin, POC UA 04/19/2023 Negative  Negative Final    Ketones, POC UA 04/19/2023 Negative  Negative Final    Spec Grav POC UA 04/19/2023 1.025  1.005 - 1.030 Final    Blood, POC UA 04/19/2023 Negative  Negative Final    pH, POC UA 04/19/2023 5.5  5.0 - 8.0 Final    Protein, POC UA 04/19/2023 Negative  Negative Final    Urobilinogen, POC UA 04/19/2023 0.2  <=1.0 Final    Nitrite, POC UA 04/19/2023 Negative  Negative Final    WBC, POC UA 04/19/2023 Negative  Negative Final    Urine Culture, Routine 04/19/2023 No significant growth   Final   Admission on 04/15/2023, Discharged on 04/15/2023   Component Date Value Ref Range Status    HIV 1/2 Ag/Ab 04/15/2023 Non-reactive  Non-reactive Final    Hepatitis C Ab 04/15/2023 Non-reactive  Non-reactive Final    WBC 04/15/2023 7.18  3.90 - 12.70 K/uL Final    RBC 04/15/2023 5.21  4.60 - 6.20 M/uL Final    Hemoglobin 04/15/2023 16.1  14.0 - 18.0 g/dL Final    Hematocrit 04/15/2023 47.3  40.0 - 54.0 % Final    MCV 04/15/2023 91  82 - 98 fL Final    MCH 04/15/2023 30.9  27.0 - 31.0 pg Final    MCHC 04/15/2023 34.0  32.0 - 36.0 g/dL Final    RDW 04/15/2023 14.0  11.5 - 14.5 % Final    Platelets 04/15/2023 146 (L)  150 - 450 K/uL Final    MPV 04/15/2023 11.0  9.2 - 12.9 fL Final    Immature Granulocytes 04/15/2023 1.4 (H)  0.0 - 0.5 % Final    Gran # (ANC) 04/15/2023 4.4  1.8 - 7.7 K/uL Final    Immature Grans (Abs) 04/15/2023 0.10 (H)  0.00 - 0.04 K/uL Final    Lymph # 04/15/2023 1.5  1.0 - 4.8 K/uL Final    Mono # 04/15/2023 0.9  0.3 - 1.0 K/uL Final    Eos # 04/15/2023 0.3  0.0 - 0.5 K/uL Final    Baso # 04/15/2023 0.04  0.00 - 0.20 K/uL Final    nRBC 04/15/2023 0  0 /100 WBC Final    Gran % 04/15/2023 61.1  38.0 - 73.0 % Final    Lymph % 04/15/2023 20.3  18.0 - 48.0 % Final    Mono % 04/15/2023 12.4  4.0 - 15.0 % Final    Eosinophil % 04/15/2023 4.2  0.0 - 8.0 % Final    Basophil % 04/15/2023 0.6  0.0 - 1.9 % Final    Differential  Method 04/15/2023 Automated   Final    Sodium 04/15/2023 141  136 - 145 mmol/L Final    Potassium 04/15/2023 3.9  3.5 - 5.1 mmol/L Final    Chloride 04/15/2023 104  95 - 110 mmol/L Final    CO2 04/15/2023 25  23 - 29 mmol/L Final    Glucose 04/15/2023 132 (H)  70 - 110 mg/dL Final    BUN 04/15/2023 14  8 - 23 mg/dL Final    Creatinine 04/15/2023 0.9  0.5 - 1.4 mg/dL Final    Calcium 04/15/2023 9.2  8.7 - 10.5 mg/dL Final    Total Protein 04/15/2023 6.8  6.0 - 8.4 g/dL Final    Albumin 04/15/2023 3.9  3.5 - 5.2 g/dL Final    Total Bilirubin 04/15/2023 1.2 (H)  0.1 - 1.0 mg/dL Final    Alkaline Phosphatase 04/15/2023 80  55 - 135 U/L Final    AST 04/15/2023 17  10 - 40 U/L Final    ALT 04/15/2023 27  10 - 44 U/L Final    Anion Gap 04/15/2023 12  8 - 16 mmol/L Final    eGFR 04/15/2023 >60.0  >60 mL/min/1.73 m^2 Final    Lipase 04/15/2023 18  4 - 60 U/L Final    Specimen UA 04/15/2023 Urine, Unspecified   Final    Color, UA 04/15/2023 Yellow  Yellow, Straw, Bee Final    Appearance, UA 04/15/2023 Clear  Clear Final    pH, UA 04/15/2023 6.0  5.0 - 8.0 Final    Specific Gravity, UA 04/15/2023 1.025  1.005 - 1.030 Final    Protein, UA 04/15/2023 Negative  Negative Final    Glucose, UA 04/15/2023 Negative  Negative Final    Ketones, UA 04/15/2023 Negative  Negative Final    Bilirubin (UA) 04/15/2023 Negative  Negative Final    Occult Blood UA 04/15/2023 Trace (A)  Negative Final    Nitrite, UA 04/15/2023 Negative  Negative Final    Urobilinogen, UA 04/15/2023 Negative  Negative EU/dL Final    Leukocytes, UA 04/15/2023 Negative  Negative Final       Past Medical History:   Diagnosis Date    Aspiration pneumonia     aspirated after anesthesia due to hiatal hernia per patient, spent 6 days in ICU    COVID-19 8/2/20021    GERD (gastroesophageal reflux disease)     Hard of hearing     Hiatal hernia     Hypertension     Kidney stones     Knee pain 06/2020    left     Social History     Socioeconomic History    Marital status:     Tobacco Use    Smoking status: Never     Passive exposure: Never    Smokeless tobacco: Never   Substance and Sexual Activity    Alcohol use: Yes     Comment: social    Drug use: Never    Sexual activity: Yes     Partners: Female     Social Determinants of Health     Financial Resource Strain: Low Risk  (3/17/2023)    Overall Financial Resource Strain (CARDIA)     Difficulty of Paying Living Expenses: Not hard at all   Food Insecurity: No Food Insecurity (3/17/2023)    Hunger Vital Sign     Worried About Running Out of Food in the Last Year: Never true     Ran Out of Food in the Last Year: Never true   Transportation Needs: No Transportation Needs (3/17/2023)    PRAPARE - Transportation     Lack of Transportation (Medical): No     Lack of Transportation (Non-Medical): No   Physical Activity: Sufficiently Active (3/17/2023)    Exercise Vital Sign     Days of Exercise per Week: 7 days     Minutes of Exercise per Session: 30 min   Stress: No Stress Concern Present (3/17/2023)    [employer] - Occupational Stress Questionnaire     Feeling of Stress : Only a little   Social Connections: Socially Integrated (3/17/2023)    Social Connection and Isolation Panel [NHANES]     Frequency of Communication with Friends and Family: More than three times a week     Frequency of Social Gatherings with Friends and Family: More than three times a week     Attends Gnosticist Services: More than 4 times per year     Active Member of Clubs or Organizations: Yes     Attends Club or Organization Meetings: More than 4 times per year     Marital Status:    Housing Stability: Low Risk  (3/17/2023)    Housing Stability Vital Sign     Unable to Pay for Housing in the Last Year: No     Number of Places Lived in the Last Year: 1     Unstable Housing in the Last Year: No     Past Surgical History:   Procedure Laterality Date    CHOLECYSTECTOMY      COLONOSCOPY  2012     Nadeem- rtc 10 yr    CYSTOSCOPY  W/ RETROGRADES Left 4/25/2023    Procedure: CYSTOSCOPY, WITH RETROGRADE PYELOGRAM;  Surgeon: Pepe Bee Jr., MD;  Location: [facility] OR;  Service: Urology;  Laterality: Left;    CYSTOURETEROSCOPY,WITH HOLMIUM LASER LITHOTRIPSY OF URETERAL CALCULUS Left 4/25/2023    Procedure: CYSTOURETEROSCOPY,WITH HOLMIUM LASER LITHOTRIPSY OF URETERAL CALCULUS;  Surgeon: Pepe Bee Jr., MD;  Location: [facility] OR;  Service: Urology;  Laterality: Left;    HAND SURGERY      traumatic injury    HEMORRHOID SURGERY      KIDNEY STONE SURGERY      KNEE ARTHROSCOPY W/ MENISCECTOMY Left 11/4/2020    Procedure: ARTHROSCOPY, KNEE, WITH PARTIAL MEDIAL MENISCECTOMY;  Surgeon: Andres Blanco MD;  Location: [facility] OR;  Service: Orthopedics;  Laterality: Left;    TONSILLECTOMY      URETEROSCOPIC REMOVAL OF URETERIC CALCULUS Left 4/25/2023    Procedure: REMOVAL, CALCULUS, URETER, URETEROSCOPIC;  Surgeon: Pepe Bee Jr., MD;  Location: [facility] OR;  Service: Urology;  Laterality: Left;     Family History   Problem Relation Age of Onset    Heart disease Mother     Diabetes Mother     Cancer Father     Depression Sister     Cancer Sister         ovarian    Diabetes Brother        Review of patient's allergies indicates:   Allergen Reactions    Iodinated contrast media Hives and Shortness Of Breath       Current Outpatient Medications:     esomeprazole (NEXIUM) 40 MG capsule, Take 1 capsule (40 mg total) by mouth once daily., Disp: 90 capsule, Rfl: 1    lisinopriL 10 MG tablet, Take 1 tablet (10 mg total) by mouth once daily., Disp: 90 tablet, Rfl: 1    testosterone (TESTIM) 50 mg/5 gram (1 %) Gel, Apply 5 g topically once daily., Disp: 30 each, Rfl: 2    Review of Systems   Constitutional:  Negative for fatigue, fever and unexpected weight change.   HENT:  Negative for ear pain, sinus pressure and sore throat.    Eyes:  Negative for pain.   Respiratory:  Negative for cough and shortness of breath.    Cardiovascular:  Negative for chest pain and leg  "swelling.   Gastrointestinal:  Negative for abdominal pain, constipation, nausea and vomiting.   Genitourinary:  Negative for dysuria, frequency and urgency.   Musculoskeletal:  Negative for arthralgias.   Skin:  Negative for rash.   Neurological:  Negative for dizziness, weakness and headaches.   Psychiatric/Behavioral:  Negative for sleep disturbance.           Objective:      Vitals:    07/31/23 0802   BP: 114/70   Pulse: 70   Weight: 121.1 kg (267 lb)   Height: 5' 9" (1.753 m)     Physical Exam  Vitals and nursing note reviewed.   Constitutional:       General: He is not in acute distress.     Appearance: Normal appearance. He is well-developed. He is obese.   HENT:      Head: Normocephalic and atraumatic.      Right Ear: External ear normal.      Left Ear: External ear normal.   Eyes:      Pupils: Pupils are equal, round, and reactive to light.   Neck:      Trachea: No tracheal deviation.   Cardiovascular:      Rate and Rhythm: Normal rate and regular rhythm.      Heart sounds: No murmur heard.     No friction rub. No gallop.   Pulmonary:      Breath sounds: Normal breath sounds. No stridor. No wheezing or rales.   Abdominal:      Palpations: Abdomen is soft. There is no mass.      Tenderness: There is no abdominal tenderness.   Musculoskeletal:         General: No tenderness or deformity.      Cervical back: Neck supple.   Lymphadenopathy:      Cervical: No cervical adenopathy.   Skin:     General: Skin is warm and dry.   Neurological:      Mental Status: He is alert and oriented to person, place, and time.      Coordination: Coordination normal.   Psychiatric:         Thought Content: Thought content normal.           Assessment:       1. Essential hypertension    2. Gastroesophageal reflux disease without esophagitis    3. Morbid obesity with BMI of 40.0-44.9, adult    4. High risk medication use    5. Fatigue, unspecified type    6. Sleep apnea, unspecified type    7. Hypogonadism male    8. Hypertensive " chronic kidney disease with stage 1 through stage 4 chronic kidney disease, or unspecified chronic kidney disease         Plan:       Essential hypertension  Comments:  well controlled  Orders:  -     Ambulatory referral/consult to Nutrition Services; Future; Expected date: 08/07/2023    Gastroesophageal reflux disease without esophagitis  Comments:  nexium    Morbid obesity with BMI of 40.0-44.9, adult  Comments:  will refer to nutritionist  Orders:  -     Ambulatory referral/consult to Nutrition Services; Future; Expected date: 08/07/2023    High risk medication use    Fatigue, unspecified type    Sleep apnea, unspecified type  Comments:  needs titration study  Orders:  -     Ambulatory referral/consult to Pulmonology; Future; Expected date: 08/07/2023    Hypogonadism male  Comments:  will start testosterone  Orders:  -     testosterone (TESTIM) 50 mg/5 gram (1 %) Gel; Apply 5 g topically once daily.  Dispense: 30 each; Refill: 2    Hypertensive chronic kidney disease with stage 1 through stage 4 chronic kidney disease, or unspecified chronic kidney disease  -     Ambulatory referral/consult to Nutrition Services; Future; Expected date: 08/07/2023      Follow up in 6 weeks (on 9/11/2023), or if symptoms worsen or fail to improve, for medication management.        7/31/2023 Ping Juarez

## 2023-08-03 ENCOUNTER — HOSPITAL ENCOUNTER (OUTPATIENT)
Dept: RADIOLOGY | Facility: HOSPITAL | Age: 68
Discharge: HOME | End: 2023-08-03
Attending: NURSE PRACTITIONER
Payer: MEDICARE

## 2023-08-03 ENCOUNTER — TELEPHONE (OUTPATIENT)
Dept: PULMONOLOGY | Facility: CLINIC | Age: 68
End: 2023-08-03

## 2023-08-03 ENCOUNTER — OFFICE VISIT (OUTPATIENT)
Dept: PULMONOLOGY | Facility: CLINIC | Age: 68
End: 2023-08-03
Payer: MEDICARE

## 2023-08-03 VITALS
BODY MASS INDEX: 39.46 KG/M2 | OXYGEN SATURATION: 98 % | HEART RATE: 73 BPM | SYSTOLIC BLOOD PRESSURE: 122 MMHG | DIASTOLIC BLOOD PRESSURE: 76 MMHG | WEIGHT: 267.19 LBS

## 2023-08-03 DIAGNOSIS — R53.82 CHRONIC FATIGUE: ICD-10-CM

## 2023-08-03 DIAGNOSIS — G47.30 SLEEP APNEA, UNSPECIFIED TYPE: ICD-10-CM

## 2023-08-03 DIAGNOSIS — R06.00 DYSPNEA, UNSPECIFIED TYPE: ICD-10-CM

## 2023-08-03 DIAGNOSIS — R06.00 DYSPNEA, UNSPECIFIED TYPE: Primary | ICD-10-CM

## 2023-08-03 LAB — NONINV COLON CA DNA+OCC BLD SCRN STL QL: NEGATIVE

## 2023-08-03 PROCEDURE — 99214 OFFICE O/P EST MOD 30 MIN: CPT | Mod: S$GLB,,, | Performed by: NURSE PRACTITIONER

## 2023-08-03 PROCEDURE — 4010F PR ACE/ARB THEARPY RXD/TAKEN: ICD-10-PCS | Mod: CPTII,S$GLB,, | Performed by: NURSE PRACTITIONER

## 2023-08-03 PROCEDURE — 71046 X-RAY EXAM CHEST 2 VIEWS: CPT | Mod: TC

## 2023-08-03 PROCEDURE — 1125F PR PAIN SEVERITY QUANTIFIED, PAIN PRESENT: ICD-10-PCS | Mod: CPTII,S$GLB,, | Performed by: NURSE PRACTITIONER

## 2023-08-03 PROCEDURE — 3074F SYST BP LT 130 MM HG: CPT | Mod: CPTII,S$GLB,, | Performed by: NURSE PRACTITIONER

## 2023-08-03 PROCEDURE — 3008F PR BODY MASS INDEX (BMI) DOCUMENTED: ICD-10-PCS | Mod: CPTII,S$GLB,, | Performed by: NURSE PRACTITIONER

## 2023-08-03 PROCEDURE — 3008F BODY MASS INDEX DOCD: CPT | Mod: CPTII,S$GLB,, | Performed by: NURSE PRACTITIONER

## 2023-08-03 PROCEDURE — 1159F PR MEDICATION LIST DOCUMENTED IN MEDICAL RECORD: ICD-10-PCS | Mod: CPTII,S$GLB,, | Performed by: NURSE PRACTITIONER

## 2023-08-03 PROCEDURE — 3078F PR MOST RECENT DIASTOLIC BLOOD PRESSURE < 80 MM HG: ICD-10-PCS | Mod: CPTII,S$GLB,, | Performed by: NURSE PRACTITIONER

## 2023-08-03 PROCEDURE — 1125F AMNT PAIN NOTED PAIN PRSNT: CPT | Mod: CPTII,S$GLB,, | Performed by: NURSE PRACTITIONER

## 2023-08-03 PROCEDURE — 4010F ACE/ARB THERAPY RXD/TAKEN: CPT | Mod: CPTII,S$GLB,, | Performed by: NURSE PRACTITIONER

## 2023-08-03 PROCEDURE — 99214 PR OFFICE/OUTPT VISIT, EST, LEVL IV, 30-39 MIN: ICD-10-PCS | Mod: S$GLB,,, | Performed by: NURSE PRACTITIONER

## 2023-08-03 PROCEDURE — 3078F DIAST BP <80 MM HG: CPT | Mod: CPTII,S$GLB,, | Performed by: NURSE PRACTITIONER

## 2023-08-03 PROCEDURE — 1159F MED LIST DOCD IN RCRD: CPT | Mod: CPTII,S$GLB,, | Performed by: NURSE PRACTITIONER

## 2023-08-03 PROCEDURE — 3074F PR MOST RECENT SYSTOLIC BLOOD PRESSURE < 130 MM HG: ICD-10-PCS | Mod: CPTII,S$GLB,, | Performed by: NURSE PRACTITIONER

## 2023-08-03 NOTE — TELEPHONE ENCOUNTER
Left message informing patient his Chest xray was normal.  Any questions please call [phone].

## 2023-08-03 NOTE — PROGRESS NOTES
SUBJECTIVE:    Patient ID: Lewis Maitas is a 68 y.o. male.    Chief Complaint: New Patient (New Patient/Referred to Ping Juarez for Sleep Apnea)    Patient here today for first time since 2021. He has complaints of fatigue over the last several months. He received a new machine in 2021 after a CPAP titration. His compliance report shows that he is 100% compliant sleeps an average of 8 hours and 58 minutes with an AHI of 0.8.  He states he has never changed the mask or supplies since getting device, he has to pull straps tight for seal. He has CPAP strap marks still visible on cheek from last night.  He is short of breath with exertion in the heat. He has not had a follow up chest xray since pneumonia in 2021.     Past Medical History:   Diagnosis Date    Aspiration pneumonia     aspirated after anesthesia due to hiatal hernia per patient, spent 6 days in ICU    COVID-19 8/2/20021    GERD (gastroesophageal reflux disease)     Hard of hearing     Hiatal hernia     Hypertension     Kidney stones     Knee pain 06/2020    left     Past Surgical History:   Procedure Laterality Date    CHOLECYSTECTOMY      COLONOSCOPY  2012    Dr Silver- rtc 10 yr    CYSTOSCOPY W/ RETROGRADES Left 4/25/2023    Procedure: CYSTOSCOPY, WITH RETROGRADE PYELOGRAM;  Surgeon: Pepe Bee Jr., MD;  Location: [facility] OR;  Service: Urology;  Laterality: Left;    CYSTOURETEROSCOPY,WITH HOLMIUM LASER LITHOTRIPSY OF URETERAL CALCULUS Left 4/25/2023    Procedure: CYSTOURETEROSCOPY,WITH HOLMIUM LASER LITHOTRIPSY OF URETERAL CALCULUS;  Surgeon: Pepe Bee Jr., MD;  Location: [facility] OR;  Service: Urology;  Laterality: Left;    HAND SURGERY      traumatic injury    HEMORRHOID SURGERY      KIDNEY STONE SURGERY      KNEE ARTHROSCOPY W/ MENISCECTOMY Left 11/4/2020    Procedure: ARTHROSCOPY, KNEE, WITH PARTIAL MEDIAL MENISCECTOMY;  Surgeon: Andres Blanco MD;  Location: [facility] OR;  Service: Orthopedics;  Laterality: Left;    TONSILLECTOMY       URETEROSCOPIC REMOVAL OF URETERIC CALCULUS Left 4/25/2023    Procedure: REMOVAL, CALCULUS, URETER, URETEROSCOPIC;  Surgeon: Pepe Bee Jr., MD;  Location: [facility];  Service: Urology;  Laterality: Left;     Family History   Problem Relation Age of Onset    Heart disease Mother     Diabetes Mother     Cancer Father     Depression Sister     Cancer Sister         ovarian    Diabetes Brother         Social History:   Marital Status:   Occupation: Data Unavailable  Alcohol History:  reports current alcohol use.  Tobacco History:  reports that he has never smoked. He has never been exposed to tobacco smoke. He has never used smokeless tobacco.  Drug History:  reports no history of drug use.    Review of patient's allergies indicates:   Allergen Reactions    Iodinated contrast media Hives and Shortness Of Breath       Current Outpatient Medications   Medication Sig Dispense Refill    esomeprazole (NEXIUM) 40 MG capsule Take 1 capsule (40 mg total) by mouth once daily. 90 capsule 1    lisinopriL 10 MG tablet Take 1 tablet (10 mg total) by mouth once daily. 90 tablet 1    testosterone (TESTIM) 50 mg/5 gram (1 %) Gel Apply 5 g topically once daily. (Patient not taking: Reported on 8/3/2023) 30 each 2     No current facility-administered medications for this visit.     CT chest 10/2021  IMPRESSION:  1. Mild, central hilar, mid-lower lung zone predominant groundglass and reticular opacities suggesting mild atypical infection/viral pneumonia given the clinical history    Review of Systems  General: Feeling Well.  fatigued  Eyes: Vision is good.  ENT:  hard of hearing  Heart:: No chest pain or palpitations.  Lungs: shortness of breath with the heat   GI: No Nausea, vomiting, constipation, diarrhea, or reflux.  : No dysuria, hesitancy, or nocturia.  Musculoskeletal: No joint pain or myalgias.  Skin: No lesions or rashes.  Neuro: headaches occasionally  Lymph: swelling to legs   Psych: No anxiety or depression.  Endo:  weight gain .    OBJECTIVE:      /76 (BP Location: Right arm, Patient Position: Sitting, BP Method: Large (Manual))   Pulse 73   Wt 121.2 kg (267 lb 3.2 oz)   SpO2 98%   BMI 39.46 kg/m²     Physical Exam  GENERAL: Older patient in no distress.  HEENT: Pupils equal and reactive. Extraocular movements intact. Nose intact.      Pharynx moist.   NECK: Supple.   HEART: Regular rate and rhythm. No murmur or gallop auscultated.  LUNGS: Clear to auscultation and percussion. Lung excursion symmetrical. No change in fremitus. No adventitial noises.  ABDOMEN: Bowel sounds present. Non-tender, no masses palpated.  EXTREMITIES: Normal muscle tone and joint movement, no cyanosis or clubbing.   LYMPHATICS: 1+ pitting to legs   SKIN: Dry, intact, no lesions.   NEURO: Cranial nerves II-XII intact. Motor strength 5/5 bilaterally, upper and lower extremities.  PSYCH: Appropriate affect.    Assessment:       1. Dyspnea, unspecified type    2. Sleep apnea, unspecified type    3. Chronic fatigue        Tiger score 11  Plan:      Chest xray  Needs new supplies   Need to lose weight     Follow up in about 6 months (around 2/3/2024).

## 2023-09-27 ENCOUNTER — OFFICE VISIT (OUTPATIENT)
Dept: FAMILY MEDICINE | Facility: CLINIC | Age: 68
End: 2023-09-27
Payer: MEDICARE

## 2023-09-27 ENCOUNTER — TELEPHONE (OUTPATIENT)
Dept: FAMILY MEDICINE | Facility: CLINIC | Age: 68
End: 2023-09-27

## 2023-09-27 VITALS
BODY MASS INDEX: 37.68 KG/M2 | RESPIRATION RATE: 18 BRPM | HEART RATE: 101 BPM | DIASTOLIC BLOOD PRESSURE: 78 MMHG | HEIGHT: 69 IN | OXYGEN SATURATION: 96 % | SYSTOLIC BLOOD PRESSURE: 120 MMHG | TEMPERATURE: 99 F | WEIGHT: 254.38 LBS

## 2023-09-27 DIAGNOSIS — U07.1 COVID-19 VIRUS DETECTED: ICD-10-CM

## 2023-09-27 DIAGNOSIS — R50.9 FEVER, UNSPECIFIED FEVER CAUSE: Primary | ICD-10-CM

## 2023-09-27 DIAGNOSIS — I10 ESSENTIAL HYPERTENSION: ICD-10-CM

## 2023-09-27 DIAGNOSIS — U07.1 COVID-19: ICD-10-CM

## 2023-09-27 LAB
CTP QC/QA: YES
CTP QC/QA: YES
POC MOLECULAR INFLUENZA A AGN: NEGATIVE
POC MOLECULAR INFLUENZA B AGN: NEGATIVE
SARS-COV-2 RDRP RESP QL NAA+PROBE: POSITIVE

## 2023-09-27 PROCEDURE — 87635: ICD-10-PCS | Mod: QW,S$GLB,, | Performed by: NURSE PRACTITIONER

## 2023-09-27 PROCEDURE — 3074F SYST BP LT 130 MM HG: CPT | Mod: CPTII,S$GLB,, | Performed by: NURSE PRACTITIONER

## 2023-09-27 PROCEDURE — 1160F PR REVIEW ALL MEDS BY PRESCRIBER/CLIN PHARMACIST DOCUMENTED: ICD-10-PCS | Mod: CPTII,S$GLB,, | Performed by: NURSE PRACTITIONER

## 2023-09-27 PROCEDURE — 3008F PR BODY MASS INDEX (BMI) DOCUMENTED: ICD-10-PCS | Mod: CPTII,S$GLB,, | Performed by: NURSE PRACTITIONER

## 2023-09-27 PROCEDURE — 1101F PT FALLS ASSESS-DOCD LE1/YR: CPT | Mod: CPTII,S$GLB,, | Performed by: NURSE PRACTITIONER

## 2023-09-27 PROCEDURE — 99213 PR OFFICE/OUTPT VISIT, EST, LEVL III, 20-29 MIN: ICD-10-PCS | Mod: S$GLB,,, | Performed by: NURSE PRACTITIONER

## 2023-09-27 PROCEDURE — 87635 SARS-COV-2 COVID-19 AMP PRB: CPT | Mod: QW,S$GLB,, | Performed by: NURSE PRACTITIONER

## 2023-09-27 PROCEDURE — 3074F PR MOST RECENT SYSTOLIC BLOOD PRESSURE < 130 MM HG: ICD-10-PCS | Mod: CPTII,S$GLB,, | Performed by: NURSE PRACTITIONER

## 2023-09-27 PROCEDURE — 1159F PR MEDICATION LIST DOCUMENTED IN MEDICAL RECORD: ICD-10-PCS | Mod: CPTII,S$GLB,, | Performed by: NURSE PRACTITIONER

## 2023-09-27 PROCEDURE — 3288F FALL RISK ASSESSMENT DOCD: CPT | Mod: CPTII,S$GLB,, | Performed by: NURSE PRACTITIONER

## 2023-09-27 PROCEDURE — 3288F PR FALLS RISK ASSESSMENT DOCUMENTED: ICD-10-PCS | Mod: CPTII,S$GLB,, | Performed by: NURSE PRACTITIONER

## 2023-09-27 PROCEDURE — 3078F PR MOST RECENT DIASTOLIC BLOOD PRESSURE < 80 MM HG: ICD-10-PCS | Mod: CPTII,S$GLB,, | Performed by: NURSE PRACTITIONER

## 2023-09-27 PROCEDURE — 1159F MED LIST DOCD IN RCRD: CPT | Mod: CPTII,S$GLB,, | Performed by: NURSE PRACTITIONER

## 2023-09-27 PROCEDURE — 4010F PR ACE/ARB THEARPY RXD/TAKEN: ICD-10-PCS | Mod: CPTII,S$GLB,, | Performed by: NURSE PRACTITIONER

## 2023-09-27 PROCEDURE — 1101F PR PT FALLS ASSESS DOC 0-1 FALLS W/OUT INJ PAST YR: ICD-10-PCS | Mod: CPTII,S$GLB,, | Performed by: NURSE PRACTITIONER

## 2023-09-27 PROCEDURE — 3008F BODY MASS INDEX DOCD: CPT | Mod: CPTII,S$GLB,, | Performed by: NURSE PRACTITIONER

## 2023-09-27 PROCEDURE — 4010F ACE/ARB THERAPY RXD/TAKEN: CPT | Mod: CPTII,S$GLB,, | Performed by: NURSE PRACTITIONER

## 2023-09-27 PROCEDURE — 99213 OFFICE O/P EST LOW 20 MIN: CPT | Mod: S$GLB,,, | Performed by: NURSE PRACTITIONER

## 2023-09-27 PROCEDURE — 87502 POCT INFLUENZA A/B MOLECULAR: ICD-10-PCS | Mod: QW,,, | Performed by: NURSE PRACTITIONER

## 2023-09-27 PROCEDURE — 1160F RVW MEDS BY RX/DR IN RCRD: CPT | Mod: CPTII,S$GLB,, | Performed by: NURSE PRACTITIONER

## 2023-09-27 PROCEDURE — 87502 INFLUENZA DNA AMP PROBE: CPT | Mod: QW,,, | Performed by: NURSE PRACTITIONER

## 2023-09-27 PROCEDURE — 3078F DIAST BP <80 MM HG: CPT | Mod: CPTII,S$GLB,, | Performed by: NURSE PRACTITIONER

## 2023-09-27 RX ORDER — NIRMATRELVIR AND RITONAVIR 300-100 MG
KIT ORAL
Qty: 20 TABLET | Refills: 0 | Status: SHIPPED | OUTPATIENT
Start: 2023-09-27 | End: 2024-01-23

## 2023-09-27 NOTE — TELEPHONE ENCOUNTER
----- Message from Elin Gan sent at 9/27/2023  8:18 AM CDT -----  Pt needs to be seen asap. Pt has a bad sinus infection. Pt #[phone]

## 2023-10-02 NOTE — PROGRESS NOTES
SUBJECTIVE:    Patient ID: Lewis Matias is a 68 y.o. male.    Chief Complaint: Cough and Nasal Congestion (Pt states he started with a sinus congestion , headaches , and cough  x 1 day; took a home covid test last night and it was negative . )    67 year old male presents for urgent visit. Patient is treated for htn and gerd. Reports that sinus symptoms started yesterday. Has some post nasal congestion. Dry cough. No fever. Mild headache. Has tried otc meds with no relief.         Office Visit on 09/27/2023   Component Date Value Ref Range Status    POC Rapid COVID 09/27/2023 Positive (A)  Negative Final     Acceptable 09/27/2023 Yes   Final    POC Molecular Influenza A Ag 09/27/2023 Negative  Negative, Not Reported Final    POC Molecular Influenza B Ag 09/27/2023 Negative  Negative, Not Reported Final     Acceptable 09/27/2023 Yes   Final   Office Visit on 07/13/2023   Component Date Value Ref Range Status    WBC 07/13/2023 5.6  3.8 - 10.8 Thousand/uL Final    RBC 07/13/2023 5.50  4.20 - 5.80 Million/uL Final    Hemoglobin 07/13/2023 17.2 (H)  13.2 - 17.1 g/dL Final    Hematocrit 07/13/2023 49.7  38.5 - 50.0 % Final    MCV 07/13/2023 90.4  80.0 - 100.0 fL Final    MCH 07/13/2023 31.3  27.0 - 33.0 pg Final    MCHC 07/13/2023 34.6  32.0 - 36.0 g/dL Final    RDW 07/13/2023 13.1  11.0 - 15.0 % Final    Platelets 07/13/2023 153  140 - 400 Thousand/uL Final    MPV 07/13/2023 11.8  7.5 - 12.5 fL Final    Neutrophils, Abs 07/13/2023 3,343  1,500 - 7,800 cells/uL Final    Lymph # 07/13/2023 1,372  850 - 3,900 cells/uL Final    Mono # 07/13/2023 644  200 - 950 cells/uL Final    Eos # 07/13/2023 202  15 - 500 cells/uL Final    Baso # 07/13/2023 39  0 - 200 cells/uL Final    Neutrophils Relative 07/13/2023 59.7  % Final    Lymph % 07/13/2023 24.5  % Final    Mono % 07/13/2023 11.5  % Final    Eosinophil % 07/13/2023 3.6  % Final    Basophil % 07/13/2023 0.7  % Final    Vitamin D, 25-OH,  Total 07/13/2023 35  30 - 100 ng/mL Final    Vitamin B-12 07/13/2023 496  200 - 1,100 pg/mL Final    TESTOSTERONE, TOTAL, MALE 07/13/2023 297  250 - 827 ng/dL Final    Testosterone, Free 07/13/2023 40.8 (L)  46.0 - 224.0 pg/mL Final    Glucose 07/13/2023 118 (H)  65 - 99 mg/dL Final    BUN 07/13/2023 14  7 - 25 mg/dL Final    Creatinine 07/13/2023 0.93  0.70 - 1.35 mg/dL Final    eGFR 07/13/2023 89  > OR = 60 mL/min/1.73m2 Final    BUN/Creatinine Ratio 07/13/2023 NOT APPLICABLE  6 - 22 (calc) Final    Sodium 07/13/2023 139  135 - 146 mmol/L Final    Potassium 07/13/2023 4.8  3.5 - 5.3 mmol/L Final    Chloride 07/13/2023 102  98 - 110 mmol/L Final    CO2 07/13/2023 29  20 - 32 mmol/L Final    Calcium 07/13/2023 9.6  8.6 - 10.3 mg/dL Final    Total Protein 07/13/2023 6.8  6.1 - 8.1 g/dL Final    Albumin 07/13/2023 4.5  3.6 - 5.1 g/dL Final    Globulin, Total 07/13/2023 2.3  1.9 - 3.7 g/dL (calc) Final    Albumin/Globulin Ratio 07/13/2023 2.0  1.0 - 2.5 (calc) Final    Total Bilirubin 07/13/2023 1.4 (H)  0.2 - 1.2 mg/dL Final    Alkaline Phosphatase 07/13/2023 75  35 - 144 U/L Final    AST 07/13/2023 18  10 - 35 U/L Final    ALT 07/13/2023 26  9 - 46 U/L Final   Office Visit on 05/30/2023   Component Date Value Ref Range Status    Cologuard Result 07/24/2023 Negative  Negative Final    TSH 05/30/2023 3.79  0.40 - 4.50 mIU/L Final    T4, Free 05/30/2023 1.1  0.8 - 1.8 ng/dL Final    T3, Total 05/30/2023 110  76 - 181 ng/dL Final   Office Visit on 05/10/2023   Component Date Value Ref Range Status    Cholesterol 05/10/2023 170  <200 mg/dL Final    HDL 05/10/2023 41  > OR = 40 mg/dL Final    Triglycerides 05/10/2023 132  <150 mg/dL Final    LDL Cholesterol 05/10/2023 106 (H)  mg/dL (calc) Final    HDL/Cholesterol Ratio 05/10/2023 4.1  <5.0 (calc) Final    Non HDL Chol. (LDL+VLDL) 05/10/2023 129  <130 mg/dL (calc) Final    TSH w/reflex to FT4 05/10/2023 5.30 (H)  0.40 - 4.50 mIU/L Final    T4, Free 05/10/2023 1.1  0.8 -  1.8 ng/dL Final   Admission on 04/25/2023, Discharged on 04/25/2023   Component Date Value Ref Range Status    Stone Source 04/25/2023 Kidney   Final    Stone Analysis 04/25/2023 Test Not Performed   Final    Stone Analysis Comment 04/25/2023 100% Calcium oxalate monohydrate.   Final   Office Visit on 04/19/2023   Component Date Value Ref Range Status    Color, POC UA 04/19/2023 Yellow  Yellow, Straw, Colorless Final    Clarity, POC UA 04/19/2023 Clear  Clear Final    Glucose, POC UA 04/19/2023 Negative  Negative Final    Bilirubin, POC UA 04/19/2023 Negative  Negative Final    Ketones, POC UA 04/19/2023 Negative  Negative Final    Spec Grav POC UA 04/19/2023 1.025  1.005 - 1.030 Final    Blood, POC UA 04/19/2023 Negative  Negative Final    pH, POC UA 04/19/2023 5.5  5.0 - 8.0 Final    Protein, POC UA 04/19/2023 Negative  Negative Final    Urobilinogen, POC UA 04/19/2023 0.2  <=1.0 Final    Nitrite, POC UA 04/19/2023 Negative  Negative Final    WBC, POC UA 04/19/2023 Negative  Negative Final    Urine Culture, Routine 04/19/2023 No significant growth   Final   Admission on 04/15/2023, Discharged on 04/15/2023   Component Date Value Ref Range Status    HIV 1/2 Ag/Ab 04/15/2023 Non-reactive  Non-reactive Final    Hepatitis C Ab 04/15/2023 Non-reactive  Non-reactive Final    WBC 04/15/2023 7.18  3.90 - 12.70 K/uL Final    RBC 04/15/2023 5.21  4.60 - 6.20 M/uL Final    Hemoglobin 04/15/2023 16.1  14.0 - 18.0 g/dL Final    Hematocrit 04/15/2023 47.3  40.0 - 54.0 % Final    MCV 04/15/2023 91  82 - 98 fL Final    MCH 04/15/2023 30.9  27.0 - 31.0 pg Final    MCHC 04/15/2023 34.0  32.0 - 36.0 g/dL Final    RDW 04/15/2023 14.0  11.5 - 14.5 % Final    Platelets 04/15/2023 146 (L)  150 - 450 K/uL Final    MPV 04/15/2023 11.0  9.2 - 12.9 fL Final    Immature Granulocytes 04/15/2023 1.4 (H)  0.0 - 0.5 % Final    Gran # (ANC) 04/15/2023 4.4  1.8 - 7.7 K/uL Final    Immature Grans (Abs) 04/15/2023 0.10 (H)  0.00 - 0.04 K/uL Final     Lymph # 04/15/2023 1.5  1.0 - 4.8 K/uL Final    Mono # 04/15/2023 0.9  0.3 - 1.0 K/uL Final    Eos # 04/15/2023 0.3  0.0 - 0.5 K/uL Final    Baso # 04/15/2023 0.04  0.00 - 0.20 K/uL Final    nRBC 04/15/2023 0  0 /100 WBC Final    Gran % 04/15/2023 61.1  38.0 - 73.0 % Final    Lymph % 04/15/2023 20.3  18.0 - 48.0 % Final    Mono % 04/15/2023 12.4  4.0 - 15.0 % Final    Eosinophil % 04/15/2023 4.2  0.0 - 8.0 % Final    Basophil % 04/15/2023 0.6  0.0 - 1.9 % Final    Differential Method 04/15/2023 Automated   Final    Sodium 04/15/2023 141  136 - 145 mmol/L Final    Potassium 04/15/2023 3.9  3.5 - 5.1 mmol/L Final    Chloride 04/15/2023 104  95 - 110 mmol/L Final    CO2 04/15/2023 25  23 - 29 mmol/L Final    Glucose 04/15/2023 132 (H)  70 - 110 mg/dL Final    BUN 04/15/2023 14  8 - 23 mg/dL Final    Creatinine 04/15/2023 0.9  0.5 - 1.4 mg/dL Final    Calcium 04/15/2023 9.2  8.7 - 10.5 mg/dL Final    Total Protein 04/15/2023 6.8  6.0 - 8.4 g/dL Final    Albumin 04/15/2023 3.9  3.5 - 5.2 g/dL Final    Total Bilirubin 04/15/2023 1.2 (H)  0.1 - 1.0 mg/dL Final    Alkaline Phosphatase 04/15/2023 80  55 - 135 U/L Final    AST 04/15/2023 17  10 - 40 U/L Final    ALT 04/15/2023 27  10 - 44 U/L Final    Anion Gap 04/15/2023 12  8 - 16 mmol/L Final    eGFR 04/15/2023 >60.0  >60 mL/min/1.73 m^2 Final    Lipase 04/15/2023 18  4 - 60 U/L Final    Specimen UA 04/15/2023 Urine, Unspecified   Final    Color, UA 04/15/2023 Yellow  Yellow, Straw, Bee Final    Appearance, UA 04/15/2023 Clear  Clear Final    pH, UA 04/15/2023 6.0  5.0 - 8.0 Final    Specific Gravity, UA 04/15/2023 1.025  1.005 - 1.030 Final    Protein, UA 04/15/2023 Negative  Negative Final    Glucose, UA 04/15/2023 Negative  Negative Final    Ketones, UA 04/15/2023 Negative  Negative Final    Bilirubin (UA) 04/15/2023 Negative  Negative Final    Occult Blood UA 04/15/2023 Trace (A)  Negative Final    Nitrite, UA 04/15/2023 Negative  Negative Final    Urobilinogen, UA  04/15/2023 Negative  Negative EU/dL Final    Leukocytes, UA 04/15/2023 Negative  Negative Final       Past Medical History:   Diagnosis Date    Aspiration pneumonia     aspirated after anesthesia due to hiatal hernia per patient, spent 6 days in ICU    COVID-19 8/2/20021    GERD (gastroesophageal reflux disease)     Hard of hearing     Hiatal hernia     Hypertension     Kidney stones     Knee pain 06/2020    left     Social History     Socioeconomic History    Marital status:    Tobacco Use    Smoking status: Never     Passive exposure: Never    Smokeless tobacco: Never   Substance and Sexual Activity    Alcohol use: Yes     Comment: social    Drug use: Never    Sexual activity: Yes     Partners: Female     Social Determinants of Health     Financial Resource Strain: Low Risk  (3/17/2023)    Overall Financial Resource Strain (CARDIA)     Difficulty of Paying Living Expenses: Not hard at all   Food Insecurity: No Food Insecurity (3/17/2023)    Hunger Vital Sign     Worried About Running Out of Food in the Last Year: Never true     Ran Out of Food in the Last Year: Never true   Transportation Needs: No Transportation Needs (3/17/2023)    PRAPARE - Transportation     Lack of Transportation (Medical): No     Lack of Transportation (Non-Medical): No   Physical Activity: Sufficiently Active (3/17/2023)    Exercise Vital Sign     Days of Exercise per Week: 7 days     Minutes of Exercise per Session: 30 min   Stress: No Stress Concern Present (3/17/2023)    [employer] [place] [employer] - Occupational Stress Questionnaire     Feeling of Stress : Only a little   Social Connections: Socially Integrated (3/17/2023)    Social Connection and Isolation Panel [NHANES]     Frequency of Communication with Friends and Family: More than three times a week     Frequency of Social Gatherings with Friends and Family: More than three times a week     Attends Anabaptism Services: More than 4 times per year     Active  Member of Clubs or Organizations: Yes     Attends Club or Organization Meetings: More than 4 times per year     Marital Status:    Housing Stability: Low Risk  (3/17/2023)    Housing Stability Vital Sign     Unable to Pay for Housing in the Last Year: No     Number of Places Lived in the Last Year: 1     Unstable Housing in the Last Year: No     Past Surgical History:   Procedure Laterality Date    CHOLECYSTECTOMY      COLONOSCOPY  2012    Dr Silver- rtc 10 yr    CYSTOSCOPY W/ RETROGRADES Left 4/25/2023    Procedure: CYSTOSCOPY, WITH RETROGRADE PYELOGRAM;  Surgeon: Pepe Bee Jr., MD;  Location: [facility];  Service: Urology;  Laterality: Left;    CYSTOURETEROSCOPY,WITH HOLMIUM LASER LITHOTRIPSY OF URETERAL CALCULUS Left 4/25/2023    Procedure: CYSTOURETEROSCOPY,WITH HOLMIUM LASER LITHOTRIPSY OF URETERAL CALCULUS;  Surgeon: Pepe Bee Jr., MD;  Location: [facility];  Service: Urology;  Laterality: Left;    HAND SURGERY      traumatic injury    HEMORRHOID SURGERY      KIDNEY STONE SURGERY      KNEE ARTHROSCOPY W/ MENISCECTOMY Left 11/4/2020    Procedure: ARTHROSCOPY, KNEE, WITH PARTIAL MEDIAL MENISCECTOMY;  Surgeon: Andres Blanco MD;  [facility];  Service: Orthopedics;  Laterality: Left;    TONSILLECTOMY      URETEROSCOPIC REMOVAL OF URETERIC CALCULUS Left 4/25/2023    Procedure: REMOVAL, CALCULUS, URETER, URETEROSCOPIC;  Surgeon: Pepe Bee Jr., MD;  Location: [facility];  Service: Urology;  Laterality: Left;     Family History   Problem Relation Age of Onset    Heart disease Mother     Diabetes Mother     Cancer Father     Depression Sister     Cancer Sister         ovarian    Diabetes Brother        All of your core healthy metrics are met.      Review of patient's allergies indicates:   Allergen Reactions    Iodinated contrast media Hives and Shortness Of Breath       Current Outpatient Medications:     esomeprazole (NEXIUM) 40 MG capsule, Take 1 capsule (40 mg total) by mouth once daily., Disp:  "90 capsule, Rfl: 1    lisinopriL 10 MG tablet, Take 1 tablet (10 mg total) by mouth once daily., Disp: 90 tablet, Rfl: 1    nirmatrelvir-ritonavir (PAXLOVID) 300 mg (150 mg x 2)-100 mg copackaged tablets (EUA), Take 3 tablets by mouth 2 (two) times daily. Each dose contains 2 nirmatrelvir (pink tablets) and 1 ritonavir (white tablet). Take all 3 tablets together, Disp: 20 tablet, Rfl: 0    testosterone (TESTIM) 50 mg/5 gram (1 %) Gel, Apply 5 g topically once daily. (Patient not taking: Reported on 8/3/2023), Disp: 30 each, Rfl: 2    Review of Systems   Constitutional:  Negative for chills and fever.   HENT:  Positive for congestion and sinus pressure. Negative for ear discharge, ear pain and sore throat.    Eyes:  Negative for discharge.   Respiratory:  Positive for cough. Negative for shortness of breath.    Cardiovascular:  Negative for chest pain and leg swelling.          Objective:      Vitals:    09/27/23 0927   BP: 120/78   Pulse: 101   Resp: 18   Temp: 98.5 °F (36.9 °C)   TempSrc: Oral   SpO2: 96%   Weight: 115.4 kg (254 lb 6.4 oz)   Height: 5' 9" (1.753 m)     Physical Exam  Vitals and nursing note reviewed.   Constitutional:       General: He is not in acute distress.     Appearance: Normal appearance. He is well-developed. He is not ill-appearing.   HENT:      Right Ear: External ear normal.      Left Ear: External ear normal.      Nose: Congestion and rhinorrhea present.      Right Sinus: Frontal sinus tenderness present.      Left Sinus: Frontal sinus tenderness present.      Mouth/Throat:      Pharynx: Posterior oropharyngeal erythema present.      Tonsils: No tonsillar exudate.   Cardiovascular:      Rate and Rhythm: Normal rate and regular rhythm.      Heart sounds: Normal heart sounds.   Pulmonary:      Breath sounds: Normal breath sounds.   Lymphadenopathy:      Cervical: No cervical adenopathy.   Neurological:      Mental Status: He is alert.           Assessment:       1. Fever, unspecified " fever cause    2. Essential hypertension    3. COVID-19         Plan:       Fever, unspecified fever cause  -     POCT COVID-19 Rapid Screening  -     POCT Influenza A/B Molecular    Essential hypertension  Comments:  bp is well controlled    COVID-19  Comments:  treat symptoms call if no improvement    Other orders  -     nirmatrelvir-ritonavir (PAXLOVID) 300 mg (150 mg x 2)-100 mg copackaged tablets (EUA); Take 3 tablets by mouth 2 (two) times daily. Each dose contains 2 nirmatrelvir (pink tablets) and 1 ritonavir (white tablet). Take all 3 tablets together  Dispense: 20 tablet; Refill: 0      Follow up if symptoms worsen or fail to improve, for medication management.        10/2/2023 Ping Juarez

## 2023-10-24 ENCOUNTER — TELEPHONE (OUTPATIENT)
Dept: FAMILY MEDICINE | Facility: CLINIC | Age: 68
End: 2023-10-24

## 2023-10-24 NOTE — TELEPHONE ENCOUNTER
Spoke with patient who refused appointment today - states he's in [place] and not available. Will come in tomorrow.

## 2023-10-24 NOTE — TELEPHONE ENCOUNTER
----- Message from Mary Farooq sent at 10/24/2023  8:26 AM CDT -----  Pt woke up yesterday with a stiff neck and can not kick it. Would like to be seen today or tomorrow   [phone]

## 2023-10-25 ENCOUNTER — OFFICE VISIT (OUTPATIENT)
Dept: FAMILY MEDICINE | Facility: CLINIC | Age: 68
End: 2023-10-25
Payer: MEDICARE

## 2023-10-25 ENCOUNTER — HOSPITAL ENCOUNTER (OUTPATIENT)
Dept: RADIOLOGY | Facility: HOSPITAL | Age: 68
Discharge: HOME | End: 2023-10-25
Attending: NURSE PRACTITIONER
Payer: MEDICARE

## 2023-10-25 VITALS
HEIGHT: 69 IN | HEART RATE: 60 BPM | SYSTOLIC BLOOD PRESSURE: 118 MMHG | WEIGHT: 258 LBS | DIASTOLIC BLOOD PRESSURE: 76 MMHG | BODY MASS INDEX: 38.21 KG/M2

## 2023-10-25 DIAGNOSIS — M62.838 MUSCLE SPASM: ICD-10-CM

## 2023-10-25 DIAGNOSIS — M54.2 NECK PAIN: Primary | ICD-10-CM

## 2023-10-25 DIAGNOSIS — M54.2 NECK PAIN: ICD-10-CM

## 2023-10-25 DIAGNOSIS — I10 ESSENTIAL HYPERTENSION: ICD-10-CM

## 2023-10-25 PROCEDURE — 1160F PR REVIEW ALL MEDS BY PRESCRIBER/CLIN PHARMACIST DOCUMENTED: ICD-10-PCS | Mod: CPTII,S$GLB,, | Performed by: NURSE PRACTITIONER

## 2023-10-25 PROCEDURE — 1101F PR PT FALLS ASSESS DOC 0-1 FALLS W/OUT INJ PAST YR: ICD-10-PCS | Mod: CPTII,S$GLB,, | Performed by: NURSE PRACTITIONER

## 2023-10-25 PROCEDURE — 1160F RVW MEDS BY RX/DR IN RCRD: CPT | Mod: CPTII,S$GLB,, | Performed by: NURSE PRACTITIONER

## 2023-10-25 PROCEDURE — 3074F PR MOST RECENT SYSTOLIC BLOOD PRESSURE < 130 MM HG: ICD-10-PCS | Mod: CPTII,S$GLB,, | Performed by: NURSE PRACTITIONER

## 2023-10-25 PROCEDURE — 3078F DIAST BP <80 MM HG: CPT | Mod: CPTII,S$GLB,, | Performed by: NURSE PRACTITIONER

## 2023-10-25 PROCEDURE — 1159F MED LIST DOCD IN RCRD: CPT | Mod: CPTII,S$GLB,, | Performed by: NURSE PRACTITIONER

## 2023-10-25 PROCEDURE — 1159F PR MEDICATION LIST DOCUMENTED IN MEDICAL RECORD: ICD-10-PCS | Mod: CPTII,S$GLB,, | Performed by: NURSE PRACTITIONER

## 2023-10-25 PROCEDURE — 72050 X-RAY EXAM NECK SPINE 4/5VWS: CPT | Mod: TC,PO

## 2023-10-25 PROCEDURE — 96372 THER/PROPH/DIAG INJ SC/IM: CPT | Mod: S$GLB,,, | Performed by: NURSE PRACTITIONER

## 2023-10-25 PROCEDURE — 99214 PR OFFICE/OUTPT VISIT, EST, LEVL IV, 30-39 MIN: ICD-10-PCS | Mod: 25,S$GLB,, | Performed by: NURSE PRACTITIONER

## 2023-10-25 PROCEDURE — 99214 OFFICE O/P EST MOD 30 MIN: CPT | Mod: 25,S$GLB,, | Performed by: NURSE PRACTITIONER

## 2023-10-25 PROCEDURE — 4010F PR ACE/ARB THEARPY RXD/TAKEN: ICD-10-PCS | Mod: CPTII,S$GLB,, | Performed by: NURSE PRACTITIONER

## 2023-10-25 PROCEDURE — 3074F SYST BP LT 130 MM HG: CPT | Mod: CPTII,S$GLB,, | Performed by: NURSE PRACTITIONER

## 2023-10-25 PROCEDURE — 3078F PR MOST RECENT DIASTOLIC BLOOD PRESSURE < 80 MM HG: ICD-10-PCS | Mod: CPTII,S$GLB,, | Performed by: NURSE PRACTITIONER

## 2023-10-25 PROCEDURE — 4010F ACE/ARB THERAPY RXD/TAKEN: CPT | Mod: CPTII,S$GLB,, | Performed by: NURSE PRACTITIONER

## 2023-10-25 PROCEDURE — 1101F PT FALLS ASSESS-DOCD LE1/YR: CPT | Mod: CPTII,S$GLB,, | Performed by: NURSE PRACTITIONER

## 2023-10-25 PROCEDURE — 3008F BODY MASS INDEX DOCD: CPT | Mod: CPTII,S$GLB,, | Performed by: NURSE PRACTITIONER

## 2023-10-25 PROCEDURE — 3008F PR BODY MASS INDEX (BMI) DOCUMENTED: ICD-10-PCS | Mod: CPTII,S$GLB,, | Performed by: NURSE PRACTITIONER

## 2023-10-25 PROCEDURE — 3288F FALL RISK ASSESSMENT DOCD: CPT | Mod: CPTII,S$GLB,, | Performed by: NURSE PRACTITIONER

## 2023-10-25 PROCEDURE — 96372 PR INJECTION,THERAP/PROPH/DIAG2ST, IM OR SUBCUT: ICD-10-PCS | Mod: S$GLB,,, | Performed by: NURSE PRACTITIONER

## 2023-10-25 PROCEDURE — 3288F PR FALLS RISK ASSESSMENT DOCUMENTED: ICD-10-PCS | Mod: CPTII,S$GLB,, | Performed by: NURSE PRACTITIONER

## 2023-10-25 RX ORDER — TIZANIDINE 4 MG/1
4 TABLET ORAL EVERY 6 HOURS PRN
Qty: 30 TABLET | Refills: 0 | Status: SHIPPED | OUTPATIENT
Start: 2023-10-25 | End: 2023-11-04

## 2023-10-25 RX ORDER — METHYLPREDNISOLONE 4 MG/1
TABLET ORAL
Qty: 21 EACH | Refills: 0 | Status: SHIPPED | OUTPATIENT
Start: 2023-10-25 | End: 2023-11-15

## 2023-10-25 RX ORDER — KETOROLAC TROMETHAMINE 30 MG/ML
30 INJECTION, SOLUTION INTRAMUSCULAR; INTRAVENOUS
Status: COMPLETED | OUTPATIENT
Start: 2023-10-25 | End: 2023-10-25

## 2023-10-25 RX ADMIN — KETOROLAC TROMETHAMINE 30 MG: 30 INJECTION, SOLUTION INTRAMUSCULAR; INTRAVENOUS at 01:10

## 2023-10-25 NOTE — PROGRESS NOTES
SUBJECTIVE:    Patient ID: Lewis Matias is a 68 y.o. male.    Chief Complaint: Neck Pain (No bottles// pt c/o of stiff neck/ neck pain when laying down x 2 weeks. Pain scale at night is a 10. // pt declines flu or pna vaccine-MJ)    68 year old male presents for urgent visit. . Patient is treated for htn and gerd. He is reporting neck pain 2 weeks. Seems to be getting worse. Denies injury or trauma. Has not taken anything. Pain is worse when turning head. Does not radiate. No previous neck injury        Office Visit on 09/27/2023   Component Date Value Ref Range Status    POC Rapid COVID 09/27/2023 Positive (A)  Negative Final     Acceptable 09/27/2023 Yes   Final    POC Molecular Influenza A Ag 09/27/2023 Negative  Negative, Not Reported Final    POC Molecular Influenza B Ag 09/27/2023 Negative  Negative, Not Reported Final     Acceptable 09/27/2023 Yes   Final   Office Visit on 07/13/2023   Component Date Value Ref Range Status    WBC 07/13/2023 5.6  3.8 - 10.8 Thousand/uL Final    RBC 07/13/2023 5.50  4.20 - 5.80 Million/uL Final    Hemoglobin 07/13/2023 17.2 (H)  13.2 - 17.1 g/dL Final    Hematocrit 07/13/2023 49.7  38.5 - 50.0 % Final    MCV 07/13/2023 90.4  80.0 - 100.0 fL Final    MCH 07/13/2023 31.3  27.0 - 33.0 pg Final    MCHC 07/13/2023 34.6  32.0 - 36.0 g/dL Final    RDW 07/13/2023 13.1  11.0 - 15.0 % Final    Platelets 07/13/2023 153  140 - 400 Thousand/uL Final    MPV 07/13/2023 11.8  7.5 - 12.5 fL Final    Neutrophils, Abs 07/13/2023 3,343  1,500 - 7,800 cells/uL Final    Lymph # 07/13/2023 1,372  850 - 3,900 cells/uL Final    Mono # 07/13/2023 644  200 - 950 cells/uL Final    Eos # 07/13/2023 202  15 - 500 cells/uL Final    Baso # 07/13/2023 39  0 - 200 cells/uL Final    Neutrophils Relative 07/13/2023 59.7  % Final    Lymph % 07/13/2023 24.5  % Final    Mono % 07/13/2023 11.5  % Final    Eosinophil % 07/13/2023 3.6  % Final    Basophil % 07/13/2023 0.7  % Final     Vitamin D, 25-OH, Total 07/13/2023 35  30 - 100 ng/mL Final    Vitamin B-12 07/13/2023 496  200 - 1,100 pg/mL Final    TESTOSTERONE, TOTAL, MALE 07/13/2023 297  250 - 827 ng/dL Final    Testosterone, Free 07/13/2023 40.8 (L)  46.0 - 224.0 pg/mL Final    Glucose 07/13/2023 118 (H)  65 - 99 mg/dL Final    BUN 07/13/2023 14  7 - 25 mg/dL Final    Creatinine 07/13/2023 0.93  0.70 - 1.35 mg/dL Final    eGFR 07/13/2023 89  > OR = 60 mL/min/1.73m2 Final    BUN/Creatinine Ratio 07/13/2023 NOT APPLICABLE  6 - 22 (calc) Final    Sodium 07/13/2023 139  135 - 146 mmol/L Final    Potassium 07/13/2023 4.8  3.5 - 5.3 mmol/L Final    Chloride 07/13/2023 102  98 - 110 mmol/L Final    CO2 07/13/2023 29  20 - 32 mmol/L Final    Calcium 07/13/2023 9.6  8.6 - 10.3 mg/dL Final    Total Protein 07/13/2023 6.8  6.1 - 8.1 g/dL Final    Albumin 07/13/2023 4.5  3.6 - 5.1 g/dL Final    Globulin, Total 07/13/2023 2.3  1.9 - 3.7 g/dL (calc) Final    Albumin/Globulin Ratio 07/13/2023 2.0  1.0 - 2.5 (calc) Final    Total Bilirubin 07/13/2023 1.4 (H)  0.2 - 1.2 mg/dL Final    Alkaline Phosphatase 07/13/2023 75  35 - 144 U/L Final    AST 07/13/2023 18  10 - 35 U/L Final    ALT 07/13/2023 26  9 - 46 U/L Final   Office Visit on 05/30/2023   Component Date Value Ref Range Status    Cologuard Result 07/24/2023 Negative  Negative Final    TSH 05/30/2023 3.79  0.40 - 4.50 mIU/L Final    T4, Free 05/30/2023 1.1  0.8 - 1.8 ng/dL Final    T3, Total 05/30/2023 110  76 - 181 ng/dL Final   Office Visit on 05/10/2023   Component Date Value Ref Range Status    Cholesterol 05/10/2023 170  <200 mg/dL Final    HDL 05/10/2023 41  > OR = 40 mg/dL Final    Triglycerides 05/10/2023 132  <150 mg/dL Final    LDL Cholesterol 05/10/2023 106 (H)  mg/dL (calc) Final    HDL/Cholesterol Ratio 05/10/2023 4.1  <5.0 (calc) Final    Non HDL Chol. (LDL+VLDL) 05/10/2023 129  <130 mg/dL (calc) Final    TSH w/reflex to FT4 05/10/2023 5.30 (H)  0.40 - 4.50 mIU/L Final    T4, Free  05/10/2023 1.1  0.8 - 1.8 ng/dL Final       Past Medical History:   Diagnosis Date    Aspiration pneumonia     aspirated after anesthesia due to hiatal hernia per patient, spent 6 days in ICU    COVID-19 8/2/20021    GERD (gastroesophageal reflux disease)     Hard of hearing     Hiatal hernia     Hypertension     Kidney stones     Knee pain 06/2020    left     Social History     Socioeconomic History    Marital status:    Tobacco Use    Smoking status: Never     Passive exposure: Never    Smokeless tobacco: Never   Substance and Sexual Activity    Alcohol use: Yes     Comment: social    Drug use: Never    Sexual activity: Yes     Partners: Female     Social Determinants of Health     Financial Resource Strain: Low Risk  (3/17/2023)    Overall Financial Resource Strain (CARDIA)     Difficulty of Paying Living Expenses: Not hard at all   Food Insecurity: No Food Insecurity (3/17/2023)    Hunger Vital Sign     Worried About Running Out of Food in the Last Year: Never true     Ran Out of Food in the Last Year: Never true   Transportation Needs: No Transportation Needs (3/17/2023)    PRAPARE - Transportation     Lack of Transportation (Medical): No     Lack of Transportation (Non-Medical): No   Physical Activity: Sufficiently Active (3/17/2023)    Exercise Vital Sign     Days of Exercise per Week: 7 days     Minutes of Exercise per Session: 30 min   Stress: No Stress Concern Present (3/17/2023)    [employer] [place] of [employer]ational [employer] - Occupational Stress Questionnaire     Feeling of Stress : Only a little   Social Connections: Socially Integrated (3/17/2023)    Social Connection and Isolation Panel [NHANES]     Frequency of Communication with Friends and Family: More than three times a week     Frequency of Social Gatherings with Friends and Family: More than three times a week     Attends [employer] Services: More than 4 times per year     Active Member of Clubs or Organizations: Yes     Attends Club or  Organization Meetings: More than 4 times per year     Marital Status:    Housing Stability: Low Risk  (3/17/2023)    Housing Stability Vital Sign     Unable to Pay for Housing in the Last Year: No     Number of Places Lived in the Last Year: 1     Unstable Housing in the Last Year: No     Past Surgical History:   Procedure Laterality Date    CHOLECYSTECTOMY      COLONOSCOPY  2012    Dr Silver- rtc 10 yr    CYSTOSCOPY W/ RETROGRADES Left 4/25/2023    Procedure: CYSTOSCOPY, WITH RETROGRADE PYELOGRAM;  Surgeon: Pepe Bee Jr., MD;  Location: [facility];  Service: Urology;  Laterality: Left;    CYSTOURETEROSCOPY,WITH HOLMIUM LASER LITHOTRIPSY OF URETERAL CALCULUS Left 4/25/2023    Procedure: CYSTOURETEROSCOPY,WITH HOLMIUM LASER LITHOTRIPSY OF URETERAL CALCULUS;  Surgeon: Pepe Bee Jr., MD;  Location: [facility];  Service: Urology;  Laterality: Left;    HAND SURGERY      traumatic injury    HEMORRHOID SURGERY      KIDNEY STONE SURGERY      KNEE ARTHROSCOPY W/ MENISCECTOMY Left 11/4/2020    Procedure: ARTHROSCOPY, KNEE, WITH PARTIAL MEDIAL MENISCECTOMY;  Surgeon: Andres Blanco MD;  Location: [facility] OR;  Service: Orthopedics;  Laterality: Left;    TONSILLECTOMY      URETEROSCOPIC REMOVAL OF URETERIC CALCULUS Left 4/25/2023    Procedure: REMOVAL, CALCULUS, URETER, URETEROSCOPIC;  Surgeon: Pepe Bee Jr., MD;  Location: [facility];  Service: Urology;  Laterality: Left;     Family History   Problem Relation Age of Onset    Heart disease Mother     Diabetes Mother     Cancer Father     Depression Sister     Cancer Sister         ovarian    Diabetes Brother        All of your core healthy metrics are met.      Review of patient's allergies indicates:   Allergen Reactions    Iodinated contrast media Hives and Shortness Of Breath       Current Outpatient Medications:     esomeprazole (NEXIUM) 40 MG capsule, Take 1 capsule (40 mg total) by mouth once daily., Disp: 90 capsule, Rfl: 1    lisinopriL 10 MG tablet, Take 1  "tablet (10 mg total) by mouth once daily., Disp: 90 tablet, Rfl: 1    methylPREDNISolone (MEDROL DOSEPACK) 4 mg tablet, use as directed, Disp: 21 each, Rfl: 0    nirmatrelvir-ritonavir (PAXLOVID) 300 mg (150 mg x 2)-100 mg copackaged tablets (EUA), Take 3 tablets by mouth 2 (two) times daily. Each dose contains 2 nirmatrelvir (pink tablets) and 1 ritonavir (white tablet). Take all 3 tablets together, Disp: 20 tablet, Rfl: 0    testosterone (TESTIM) 50 mg/5 gram (1 %) Gel, Apply 5 g topically once daily. (Patient not taking: Reported on 8/3/2023), Disp: 30 each, Rfl: 2    tiZANidine (ZANAFLEX) 4 MG tablet, Take 1 tablet (4 mg total) by mouth every 6 (six) hours as needed., Disp: 30 tablet, Rfl: 0    Review of Systems   Constitutional:  Negative for activity change, chills and fever.   Respiratory:  Negative for cough and shortness of breath.    Cardiovascular:  Negative for chest pain.   Gastrointestinal:  Negative for abdominal pain, diarrhea, nausea and vomiting.   Musculoskeletal:  Positive for neck pain. Negative for back pain and gait problem.   Neurological:  Negative for dizziness and weakness.          Objective:      Vitals:    10/25/23 1303   BP: 118/76   Pulse: 60   Weight: 117 kg (258 lb)   Height: 5' 8.5" (1.74 m)     Physical Exam  Vitals and nursing note reviewed.   Constitutional:       General: He is not in acute distress.     Appearance: Normal appearance. He is well-developed.   Cardiovascular:      Rate and Rhythm: Normal rate and regular rhythm.      Heart sounds: No murmur heard.     No friction rub. No gallop.   Pulmonary:      Breath sounds: Normal breath sounds. No wheezing or rales.   Musculoskeletal:      Cervical back: Spasms and tenderness present. Decreased range of motion.      Lumbar back: No spasms, tenderness or bony tenderness. Normal range of motion.      Comments: Negative bilat SLR   Skin:     Findings: No rash.   Neurological:      Mental Status: He is alert and oriented to " person, place, and time.      Sensory: No sensory deficit.      Gait: Gait normal.           Assessment:       1. Neck pain    2. Essential hypertension    3. Muscle spasm         Plan:       Neck pain  Comments:  xray. toradol today  medrol tomorrow. zanaflex  call if persist  Orders:  -     X-Ray Cervical Spine Complete 5 view; Future; Expected date: 10/25/2023    Essential hypertension  Comments:  bp is well controlled    Muscle spasm  Comments:  zanaflex    Other orders  -     ketorolac injection 30 mg  -     tiZANidine (ZANAFLEX) 4 MG tablet; Take 1 tablet (4 mg total) by mouth every 6 (six) hours as needed.  Dispense: 30 tablet; Refill: 0  -     methylPREDNISolone (MEDROL DOSEPACK) 4 mg tablet; use as directed  Dispense: 21 each; Refill: 0      Follow up in about 4 weeks (around 11/22/2023), or if symptoms worsen or fail to improve, for medication management.      The 10-year CVD risk score (D'Agostino, et al., 2008) is: 23.5%    Values used to calculate the score:      Age: 68 years      Sex: Male      Diabetic: No      Tobacco smoker: No      Systolic Blood Pressure: 118 mmHg      Is BP treated: Yes      HDL Cholesterol: 41 mg/dL      Total Cholesterol: 170 mg/dL     10/29/2023 Ping Juarez

## 2024-01-22 ENCOUNTER — TELEPHONE (OUTPATIENT)
Dept: FAMILY MEDICINE | Facility: CLINIC | Age: 69
End: 2024-01-22
Payer: MEDICARE

## 2024-01-22 NOTE — TELEPHONE ENCOUNTER
===View-only below this line===      ----- Message -----       From:Lewis Matias       Sent:1/21/2024 10:29 PM CST         To:Patient Appointment Schedule Request Message List    Subject:Appointment Request    My neck hurts and this started 2 months ago, i woke up with a stiff neck and i've try biofreze, ti nancy balm, tiranole, advire, aleave, cold packs , heat pads. The shot and musle relaxers Ping gave me didn' t help much.      ----- Message -----       From:Nurse Banegas       Sent:1/17/2024  4:57 PM CST         To:Lewis Matias    Subject:Appointment Request    Good Morning, Are you having pain? When did it start? Any over the counter medications?       ----- Message -----       From:Lewis Matias       Sent:1/17/2024  4:54 PM CST         To:Ping Juarez NP    Subject:Appointment Request    Appointment Request From: Lewis Matias    With Provider: Ping Juarez NP [[facility]]    Preferred Date Range: Any date 1/22/2024 or later    Preferred Times: Any Time    Reason for visit: Neck is still stuff    Comments:  Neck is stuff 2 months now

## 2024-01-23 ENCOUNTER — OFFICE VISIT (OUTPATIENT)
Dept: FAMILY MEDICINE | Facility: CLINIC | Age: 69
End: 2024-01-23
Payer: MEDICARE

## 2024-01-23 VITALS
SYSTOLIC BLOOD PRESSURE: 112 MMHG | DIASTOLIC BLOOD PRESSURE: 70 MMHG | BODY MASS INDEX: 39.1 KG/M2 | OXYGEN SATURATION: 97 % | HEIGHT: 69 IN | HEART RATE: 79 BPM | WEIGHT: 264 LBS

## 2024-01-23 DIAGNOSIS — K21.9 GASTROESOPHAGEAL REFLUX DISEASE WITHOUT ESOPHAGITIS: ICD-10-CM

## 2024-01-23 DIAGNOSIS — M48.02 SPINAL STENOSIS, CERVICAL REGION: Primary | ICD-10-CM

## 2024-01-23 DIAGNOSIS — M54.12 CERVICAL RADICULOPATHY: ICD-10-CM

## 2024-01-23 DIAGNOSIS — I10 ESSENTIAL HYPERTENSION: ICD-10-CM

## 2024-01-23 PROCEDURE — 99213 OFFICE O/P EST LOW 20 MIN: CPT | Mod: S$GLB,,, | Performed by: NURSE PRACTITIONER

## 2024-02-06 ENCOUNTER — OFFICE VISIT (OUTPATIENT)
Dept: PULMONOLOGY | Facility: CLINIC | Age: 69
End: 2024-02-06
Payer: MEDICARE

## 2024-02-06 VITALS
OXYGEN SATURATION: 95 % | WEIGHT: 266 LBS | HEART RATE: 70 BPM | BODY MASS INDEX: 39.86 KG/M2 | DIASTOLIC BLOOD PRESSURE: 78 MMHG | SYSTOLIC BLOOD PRESSURE: 120 MMHG

## 2024-02-06 DIAGNOSIS — G47.33 OSA (OBSTRUCTIVE SLEEP APNEA): Primary | ICD-10-CM

## 2024-02-06 PROCEDURE — 99212 OFFICE O/P EST SF 10 MIN: CPT | Mod: S$GLB,,, | Performed by: NURSE PRACTITIONER

## 2024-02-06 NOTE — PROGRESS NOTES
SUBJECTIVE:    Patient ID: Lewis Matias is a 68 y.o. male.    Chief Complaint: Follow-up (6 month follow up ROSE)     Patient here today feeling well. He is sleeping on his CPAP machine every night. He does feel benefit from it. He denies headaches and brain fog now.  His compliance report shows he is 100% compliant sleeps an average of 8 hours and 13 minutes. His AHI is 1.    Past Medical History:   Diagnosis Date    Aspiration pneumonia     aspirated after anesthesia due to hiatal hernia per patient, spent 6 days in ICU    COVID-19 8/2/20021    GERD (gastroesophageal reflux disease)     Hard of hearing     Hiatal hernia     Hypertension     Kidney stones     Knee pain 06/2020    left     Past Surgical History:   Procedure Laterality Date    CHOLECYSTECTOMY      COLONOSCOPY  2012    Dr Silver- rtc 10 yr    CYSTOSCOPY W/ RETROGRADES Left 4/25/2023    Procedure: CYSTOSCOPY, WITH RETROGRADE PYELOGRAM;  Surgeon: Pepe Bee Jr., MD;  Location: [facility];  Service: Urology;  Laterality: Left;    CYSTOURETEROSCOPY,WITH HOLMIUM LASER LITHOTRIPSY OF URETERAL CALCULUS Left 4/25/2023    Procedure: CYSTOURETEROSCOPY,WITH HOLMIUM LASER LITHOTRIPSY OF URETERAL CALCULUS;  Surgeon: Pepe Bee Jr., MD;  Location: [facility] OR;  Service: Urology;  Laterality: Left;    HAND SURGERY      traumatic injury    HEMORRHOID SURGERY      KIDNEY STONE SURGERY      KNEE ARTHROSCOPY W/ MENISCECTOMY Left 11/4/2020    Procedure: ARTHROSCOPY, KNEE, WITH PARTIAL MEDIAL MENISCECTOMY;  Surgeon: Andres Blanco MD;  Location: [facility] OR;  Service: Orthopedics;  Laterality: Left;    TONSILLECTOMY      URETEROSCOPIC REMOVAL OF URETERIC CALCULUS Left 4/25/2023    Procedure: REMOVAL, CALCULUS, URETER, URETEROSCOPIC;  Surgeon: Pepe Bee Jr., MD;  Location: [facility] OR;  Service: Urology;  Laterality: Left;     Family History   Problem Relation Age of Onset    Heart disease Mother     Diabetes Mother     Cancer Father     Depression Sister     Cancer  Sister         ovarian    Diabetes Brother         Social History:   Marital Status:   Occupation: Data Unavailable  Alcohol History:  reports current alcohol use.  Tobacco History:  reports that he has never smoked. He has never been exposed to tobacco smoke. He has never used smokeless tobacco.  Drug History:  reports no history of drug use.    Review of patient's allergies indicates:   Allergen Reactions    Iodinated contrast media Hives and Shortness Of Breath       Current Outpatient Medications   Medication Sig Dispense Refill    esomeprazole (NEXIUM) 40 MG capsule Take 1 capsule (40 mg total) by mouth once daily. 90 capsule 1    lisinopriL 10 MG tablet Take 1 tablet (10 mg total) by mouth once daily. 90 tablet 1     No current facility-administered medications for this visit.     Last chest xray 08/2023  MPRESSION:  Normal chest.    Review of Systems  General: Feeling Well.  Feels well   Eyes: Vision is good.  ENT:  hard of hearing  Heart:: No chest pain or palpitations.  Lungs: no complaints   GI: No Nausea, vomiting, constipation, diarrhea, or reflux.  : No dysuria, hesitancy, or nocturia.  Musculoskeletal: No joint pain or myalgias.  Skin: No lesions or rashes.  Neuro:no  headaches, no brain fog    Lymph: swelling to legs   Psych: No anxiety or depression.  Endo: weight stable .    OBJECTIVE:      /78 (BP Location: Right arm, Patient Position: Sitting, BP Method: Medium (Manual))   Pulse 70   Wt 120.7 kg (266 lb)   SpO2 95%   BMI 39.86 kg/m²     Physical Exam  GENERAL: Older patient in no distress.  HEENT: Pupils equal and reactive. Extraocular movements intact. Nose intact.      Pharynx moist.   NECK: Supple.   HEART: Regular rate and rhythm. No murmur or gallop auscultated.  LUNGS: Clear to auscultation and percussion. Lung excursion symmetrical. No change in fremitus. No adventitial noises.  ABDOMEN: Bowel sounds present. Non-tender, no masses palpated.  EXTREMITIES: Normal muscle  tone and joint movement, no cyanosis or clubbing.   LYMPHATICS:trace edema to legs    SKIN: Dry, intact, no lesions.   NEURO: Cranial nerves II-XII intact. Motor strength 5/5 bilaterally, upper and lower extremities.  PSYCH: Appropriate affect.    Assessment:       1. ROSE (obstructive sleep apnea)            Plan:        Keep sleeping on your CPAP  Try to lose weight   Follow up in about 1 year (around 2/6/2025).

## 2024-02-13 PROBLEM — E66.01 MORBID OBESITY WITH BMI OF 40.0-44.9, ADULT: Status: RESOLVED | Noted: 2023-02-16 | Resolved: 2024-02-13

## 2024-02-13 NOTE — PROGRESS NOTES
SUBJECTIVE:    Patient ID: Lewis Matias is a 68 y.o. male.    Chief Complaint: Follow-up (No bottles/ no refills/ x 3 months stiff neck pain / hurts to turn / pt states nothing over the counter is working/ xray was done 10/25/ pt refused flu shot)    68 year old male presents for urgent visit. . Patient is treated for htn and gerd. He is reporting neck pain 2 weeks. Seems to be getting worse. Denies injury or trauma. Has not taken anything. Pain is worse when turning head. Does not radiate. No previous neck injury. Treated in October and did get better. But has since returned. Pain radiates down to hands at times.         Office Visit on 09/27/2023   Component Date Value Ref Range Status    POC Rapid COVID 09/27/2023 Positive (A)  Negative Final     Acceptable 09/27/2023 Yes   Final    POC Molecular Influenza A Ag 09/27/2023 Negative  Negative, Not Reported Final    POC Molecular Influenza B Ag 09/27/2023 Negative  Negative, Not Reported Final     Acceptable 09/27/2023 Yes   Final       Past Medical History:   Diagnosis Date    Aspiration pneumonia     aspirated after anesthesia due to hiatal hernia per patient, spent 6 days in ICU    COVID-19 8/2/20021    GERD (gastroesophageal reflux disease)     Hard of hearing     Hiatal hernia     Hypertension     Kidney stones     Knee pain 06/2020    left     Social History     Socioeconomic History    Marital status:    Tobacco Use    Smoking status: Never     Passive exposure: Never    Smokeless tobacco: Never   Substance and Sexual Activity    Alcohol use: Yes     Comment: social    Drug use: Never    Sexual activity: Yes     Partners: Female     Social Determinants of Health     Financial Resource Strain: Low Risk  (3/17/2023)    Overall Financial Resource Strain (CARDIA)     Difficulty of Paying Living Expenses: Not hard at all   Food Insecurity: No Food Insecurity (3/17/2023)    Hunger Vital Sign     Worried About Running Out  of Food in the Last Year: Never true     Ran Out of Food in the Last Year: Never true   Transportation Needs: No Transportation Needs (3/17/2023)    PRAPARE - Transportation     Lack of Transportation (Medical): No     Lack of Transportation (Non-Medical): No   Physical Activity: Sufficiently Active (3/17/2023)    Exercise Vital Sign     Days of Exercise per Week: 7 days     Minutes of Exercise per Session: 30 min   Stress: No Stress Concern Present (3/17/2023)    [facility] of Occupational Health - Occupational Stress Questionnaire     Feeling of Stress : Only a little   Social Connections: Socially Integrated (3/17/2023)    Social Connection and Isolation Panel [NHANES]     Frequency of Communication with Friends and Family: More than three times a week     Frequency of Social Gatherings with Friends and Family: More than three times a week     Attends Buddhism Services: More than 4 times per year     Active Member of Clubs or Organizations: Yes     Attends Club or Organization Meetings: More than 4 times per year     Marital Status:    Housing Stability: Low Risk  (3/17/2023)    Housing Stability Vital Sign     Unable to Pay for Housing in the Last Year: No     Number of Places Lived in the Last Year: 1     Unstable Housing in the Last Year: No     Past Surgical History:   Procedure Laterality Date    CHOLECYSTECTOMY      COLONOSCOPY  2012    Dr Silver- rtc 10 yr    CYSTOSCOPY W/ RETROGRADES Left 4/25/2023    Procedure: CYSTOSCOPY, WITH RETROGRADE PYELOGRAM;  Surgeon: Pepe Bee Jr., MD;  Location: [facility];  Service: Urology;  Laterality: Left;    CYSTOURETEROSCOPY,WITH HOLMIUM LASER LITHOTRIPSY OF URETERAL CALCULUS Left 4/25/2023    Procedure: CYSTOURETEROSCOPY,WITH HOLMIUM LASER LITHOTRIPSY OF URETERAL CALCULUS;  Surgeon: Pepe Bee Jr., MD;  Location: [facility] OR;  Service: Urology;  Laterality: Left;    HAND SURGERY      traumatic injury    HEMORRHOID SURGERY      KIDNEY STONE SURGERY       "KNEE ARTHROSCOPY W/ MENISCECTOMY Left 11/4/2020    Procedure: ARTHROSCOPY, KNEE, WITH PARTIAL MEDIAL MENISCECTOMY;  Surgeon: Andres Blanco MD;  Location: [facility] OR;  Service: Orthopedics;  Laterality: Left;    TONSILLECTOMY      URETEROSCOPIC REMOVAL OF URETERIC CALCULUS Left 4/25/2023    Procedure: REMOVAL, CALCULUS, URETER, URETEROSCOPIC;  Surgeon: Pepe Bee Jr., MD;  Location: [facility] OR;  Service: Urology;  Laterality: Left;     Family History   Problem Relation Age of Onset    Heart disease Mother     Diabetes Mother     Cancer Father     Depression Sister     Cancer Sister         ovarian    Diabetes Brother        All of your core healthy metrics are met.      Review of patient's allergies indicates:   Allergen Reactions    Iodinated contrast media Hives and Shortness Of Breath       Current Outpatient Medications:     esomeprazole (NEXIUM) 40 MG capsule, Take 1 capsule (40 mg total) by mouth once daily., Disp: 90 capsule, Rfl: 1    lisinopriL 10 MG tablet, Take 1 tablet (10 mg total) by mouth once daily., Disp: 90 tablet, Rfl: 1    Review of Systems   Constitutional:  Negative for activity change, chills and fever.   Respiratory:  Negative for cough and shortness of breath.    Cardiovascular:  Negative for chest pain.   Gastrointestinal:  Negative for abdominal pain and diarrhea.   Musculoskeletal:  Positive for neck pain. Negative for back pain and gait problem.   Neurological:  Negative for dizziness and weakness.          Objective:      Vitals:    01/23/24 1100   BP: 112/70   Pulse: 79   SpO2: 97%   Weight: 119.7 kg (264 lb)   Height: 5' 8.5" (1.74 m)     Physical Exam  Vitals and nursing note reviewed.   Constitutional:       General: He is not in acute distress.     Appearance: Normal appearance. He is well-developed. He is obese.   Cardiovascular:      Rate and Rhythm: Normal rate and regular rhythm.      Heart sounds: No murmur heard.     No friction rub. No gallop.   Pulmonary:      Breath sounds: " Normal breath sounds. No wheezing or rales.   Musculoskeletal:      Cervical back: Spasms and tenderness present. Decreased range of motion.      Lumbar back: No spasms, tenderness or bony tenderness. Normal range of motion.      Comments: Negative bilat SLR   Skin:     Findings: No rash.   Neurological:      Mental Status: He is alert and oriented to person, place, and time.      Sensory: No sensory deficit.      Gait: Gait normal.           Assessment:       1. Spinal stenosis, cervical region    2. Cervical radiculopathy    3. Essential hypertension    4. Gastroesophageal reflux disease without esophagitis         Plan:       Spinal stenosis, cervical region  -     MRI Cervical Spine Without Contrast; Future; Expected date: 01/23/2024    Cervical radiculopathy  Comments:  mri  Orders:  -     MRI Cervical Spine Without Contrast; Future; Expected date: 01/23/2024    Essential hypertension  Comments:  bp is well controlled    Gastroesophageal reflux disease without esophagitis  Comments:  nexium      Follow up if symptoms worsen or fail to improve, for medication management.        2/13/2024 Ping Juarez

## 2024-02-14 DIAGNOSIS — I10 ESSENTIAL HYPERTENSION: ICD-10-CM

## 2024-02-14 RX ORDER — LISINOPRIL 10 MG/1
10 TABLET ORAL DAILY
Qty: 90 TABLET | Refills: 1 | Status: SHIPPED | OUTPATIENT
Start: 2024-02-14

## 2024-02-14 NOTE — TELEPHONE ENCOUNTER
----- Message from Madalyn Jasmine sent at 2/14/2024  3:37 PM CST -----  Pt needs to get scheduled for a mri for his neck. Pt also needs a refill for his bp medication sent to [facility].    [phone]

## 2024-02-16 ENCOUNTER — TELEPHONE (OUTPATIENT)
Dept: FAMILY MEDICINE | Facility: CLINIC | Age: 69
End: 2024-02-16
Payer: MEDICARE

## 2024-02-16 NOTE — TELEPHONE ENCOUNTER
"Per Dr. Watkins, "Is patient's c spine MRI approved."    MRI has been approved.     Left voice message.   "

## 2024-02-16 NOTE — TELEPHONE ENCOUNTER
Spoke with patient and informed him that his MRI has been approved. Patient will be calling [facility] to get scheduled.

## 2024-02-22 ENCOUNTER — HOSPITAL ENCOUNTER (OUTPATIENT)
Dept: RADIOLOGY | Facility: HOSPITAL | Age: 69
Discharge: HOME | End: 2024-02-22
Attending: NURSE PRACTITIONER
Payer: MEDICARE

## 2024-02-22 DIAGNOSIS — M48.02 SPINAL STENOSIS, CERVICAL REGION: ICD-10-CM

## 2024-02-22 DIAGNOSIS — M54.12 CERVICAL RADICULOPATHY: ICD-10-CM

## 2024-02-22 PROCEDURE — 72141 MRI NECK SPINE W/O DYE: CPT | Mod: TC,PO

## 2024-02-29 ENCOUNTER — OFFICE VISIT (OUTPATIENT)
Dept: UROLOGY | Facility: CLINIC | Age: 69
End: 2024-02-29
Payer: MEDICARE

## 2024-02-29 DIAGNOSIS — E29.1 HYPOGONADISM IN MALE: ICD-10-CM

## 2024-02-29 DIAGNOSIS — N52.9 ERECTILE DYSFUNCTION, UNSPECIFIED ERECTILE DYSFUNCTION TYPE: Primary | ICD-10-CM

## 2024-02-29 PROCEDURE — 99214 OFFICE O/P EST MOD 30 MIN: CPT | Mod: S$GLB,,, | Performed by: NURSE PRACTITIONER

## 2024-02-29 PROCEDURE — 99999 PR PBB SHADOW E&M-EST. PATIENT-LVL III: CPT | Mod: PBBFAC,,, | Performed by: NURSE PRACTITIONER

## 2024-02-29 NOTE — PROGRESS NOTES
"[facility] Note  Staff: ANDREW Phillips    PCP: DAVION Juarez  Urologist:  CHRISTINA Bee    Chief Complaint: Erectile Dysfunction    Subjective:        HPI: Lewis Matias is a 68 y.o. male presents today for evaluation of erectile dysfunction at this time.    Pt is an established pt of Dr. Bee with hx of Kidney stones.  Pt states today he is able to obtain an erection with penetration but not sustainable.  Pt tried Viagra in the past which caused chest pains, headaches, and other side affects.  Has never tried injections and is not interested in injections at this time.    Pt previously was taking Testosterone "cream" or gel from a PCP office with no improvement.     HX:  The pt was last evaluated April 2023 by Dr. Bee with Hx of Kidney stones.  Last procedure was performed on: 04/25/2023  Procedure(s) Performed:   Cystourethroscopy  Left ureteroscopy with laser lithotripsy and basket extraction of stone fragments  Left retrograde pyelogram  Left ureteral stent placement, 6 x 26 JJ stent     Findings:   Successful laser lithotripsy of several kidney stones. Also with a 10 Welsh bulbar urethral stricture easily passed with the scope. Bladder neck high so only able to scope bladder with flexible cystoscope. Stent placed with strings.      Last PSA Screening:   Lab Results   Component Value Date    PSA 0.75 03/22/2010    PSADIAG 1.30 01/24/2022    PSADIAG 1.68 09/14/2021    PSADIAG 1.4 08/10/2020     REVIEW OF SYSTEMS:  A comprehensive 10 system review was performed and is negative except as noted above in HPI    PMHx:  Past Medical History:   Diagnosis Date    Aspiration pneumonia     aspirated after anesthesia due to hiatal hernia per patient, spent 6 days in ICU    COVID-19 8/2/20021    GERD (gastroesophageal reflux disease)     Hard of hearing     Hiatal hernia     Hypertension     Kidney stones     Knee pain 06/2020    left     PSHx:  Past Surgical History:   Procedure Laterality " Date    CHOLECYSTECTOMY      COLONOSCOPY  2012     Nadeem- rtc 10 yr    CYSTOSCOPY W/ RETROGRADES Left 4/25/2023    Procedure: CYSTOSCOPY, WITH RETROGRADE PYELOGRAM;  Surgeon: Pepe Bee Jr., MD;  Location: [facility];  Service: Urology;  Laterality: Left;    CYSTOURETEROSCOPY,WITH HOLMIUM LASER LITHOTRIPSY OF URETERAL CALCULUS Left 4/25/2023    Procedure: CYSTOURETEROSCOPY,WITH HOLMIUM LASER LITHOTRIPSY OF URETERAL CALCULUS;  Surgeon: Pepe Bee Jr., MD;  Location: [facility] OR;  Service: Urology;  Laterality: Left;    HAND SURGERY      traumatic injury    HEMORRHOID SURGERY      KIDNEY STONE SURGERY      KNEE ARTHROSCOPY W/ MENISCECTOMY Left 11/4/2020    Procedure: ARTHROSCOPY, KNEE, WITH PARTIAL MEDIAL MENISCECTOMY;  Surgeon: Andres Blanco MD;  Location: [facility] OR;  Service: Orthopedics;  Laterality: Left;    TONSILLECTOMY      URETEROSCOPIC REMOVAL OF URETERIC CALCULUS Left 4/25/2023    Procedure: REMOVAL, CALCULUS, URETER, URETEROSCOPIC;  Surgeon: Pepe Bee Jr., MD;  Location: [facility];  Service: Urology;  Laterality: Left;       Allergies:  Iodinated contrast media and Viagra [sildenafil]    Medications: reviewed     Objective:   There were no vitals filed for this visit.    General:WDWN in NAD  Eyes: PERRLA, normal conjunctiva  Respiratory: no increased work on breathing, clear to auscultation  Cardiovascular: regular rate and rhythm. No obvious extremity edema.  GI: palpation of masses. No tenderness. No hepatosplenomegaly to palpation.  Musculoskeletal: normal range of motion of bilateral upper extremities. Normal muscle strength and tone.  Skin: no obvious rashes or lesions. No tightening of skin noted.  Neurologic: CN grossly normal. Normal sensation.   Psychiatric: awake, alert and oriented x 3. Mood and affect normal. Cooperative.      Assessment:       1. Erectile dysfunction, unspecified erectile dysfunction type          Plan:     Labs to be completed fasting and no later than 9 am for further  evaluation at this time to evaluate for low testosterone issues.    Extensive discussion with patient regarding the etiology and management of erectile dysfunction. Explained that erectile dysfunction is multi-factorial and can be secondary to neurologic dysfunction, prolactinoma, advanced age, cardiac disease, hypertension,  trauma, DM, renal disease, substance abuse, hypogonadism, Peyronie's disease, post-surgery, medications, depression and anxiety. We discussed that treatment is contingent on finding the cause of his ED. Treatments include PDE - 5 inhibitors (e.g., Cialis, Viagra), vacuum erection device, tension rings, self-injections, transurethral suppositories and penile prosthesis.  Pt although unable to take PDE-5 inhibitors due to past side affects.    F/u with Dr. Bee after completion of lab work to discuss treatment options if indicated.  Pt asad.      Kathleen Ratliff, FNP-C

## 2024-03-06 ENCOUNTER — LAB VISIT (OUTPATIENT)
Dept: LAB | Facility: HOSPITAL | Age: 69
End: 2024-03-06
Attending: NURSE PRACTITIONER
Payer: MEDICARE

## 2024-03-06 DIAGNOSIS — E29.1 HYPOGONADISM IN MALE: ICD-10-CM

## 2024-03-06 DIAGNOSIS — N52.9 ERECTILE DYSFUNCTION, UNSPECIFIED ERECTILE DYSFUNCTION TYPE: ICD-10-CM

## 2024-03-06 LAB
ANION GAP SERPL CALC-SCNC: 9 MMOL/L (ref 8–16)
BUN SERPL-MCNC: 19 MG/DL (ref 8–23)
CALCIUM SERPL-MCNC: 9.4 MG/DL (ref 8.7–10.5)
CHLORIDE SERPL-SCNC: 103 MMOL/L (ref 95–110)
CO2 SERPL-SCNC: 25 MMOL/L (ref 23–29)
CREAT SERPL-MCNC: 1.1 MG/DL (ref 0.5–1.4)
ERYTHROCYTE [DISTWIDTH] IN BLOOD BY AUTOMATED COUNT: 13.1 % (ref 11.5–14.5)
EST. GFR  (NO RACE VARIABLE): >60 ML/MIN/1.73 M^2
GLUCOSE SERPL-MCNC: 120 MG/DL (ref 70–110)
HCT VFR BLD AUTO: 50.5 % (ref 40–54)
HGB BLD-MCNC: 17.4 G/DL (ref 14–18)
MCH RBC QN AUTO: 31.2 PG (ref 27–31)
MCHC RBC AUTO-ENTMCNC: 34.5 G/DL (ref 32–36)
MCV RBC AUTO: 91 FL (ref 82–98)
PLATELET # BLD AUTO: 167 K/UL (ref 150–450)
PMV BLD AUTO: 11.4 FL (ref 9.2–12.9)
POTASSIUM SERPL-SCNC: 4.5 MMOL/L (ref 3.5–5.1)
PROSTATE SPECIFIC ANTIGEN, TOTAL: 1.9 NG/ML (ref 0–4)
PSA FREE MFR SERPL: 41.58 %
PSA FREE SERPL-MCNC: 0.79 NG/ML (ref 0–1.5)
RBC # BLD AUTO: 5.58 M/UL (ref 4.6–6.2)
SODIUM SERPL-SCNC: 137 MMOL/L (ref 136–145)
WBC # BLD AUTO: 6.97 K/UL (ref 3.9–12.7)

## 2024-03-06 PROCEDURE — 85027 COMPLETE CBC AUTOMATED: CPT | Performed by: NURSE PRACTITIONER

## 2024-03-06 PROCEDURE — 83001 ASSAY OF GONADOTROPIN (FSH): CPT | Performed by: NURSE PRACTITIONER

## 2024-03-06 PROCEDURE — 82672 ASSAY OF ESTROGEN: CPT | Performed by: NURSE PRACTITIONER

## 2024-03-06 PROCEDURE — 80048 BASIC METABOLIC PNL TOTAL CA: CPT | Performed by: NURSE PRACTITIONER

## 2024-03-06 PROCEDURE — 84146 ASSAY OF PROLACTIN: CPT | Performed by: NURSE PRACTITIONER

## 2024-03-06 PROCEDURE — 84403 ASSAY OF TOTAL TESTOSTERONE: CPT | Performed by: NURSE PRACTITIONER

## 2024-03-06 PROCEDURE — 84154 ASSAY OF PSA FREE: CPT | Performed by: NURSE PRACTITIONER

## 2024-03-06 PROCEDURE — 36415 COLL VENOUS BLD VENIPUNCTURE: CPT | Performed by: NURSE PRACTITIONER

## 2024-03-06 PROCEDURE — 83002 ASSAY OF GONADOTROPIN (LH): CPT | Performed by: NURSE PRACTITIONER

## 2024-03-07 ENCOUNTER — PATIENT MESSAGE (OUTPATIENT)
Dept: UROLOGY | Facility: CLINIC | Age: 69
End: 2024-03-07
Payer: MEDICARE

## 2024-03-07 LAB
FSH SERPL-ACNC: 3.8 IU/L (ref 1.2–15.8)
LH SERPL-ACNC: 3.5 IU/L (ref 1.3–9.6)
PROLACTIN SERPL IA-MCNC: 13.6 NG/ML (ref 4–15.2)

## 2024-03-08 LAB
ESTROGEN SERPL-MCNC: 124 PG/ML
TESTOST SERPL-MCNC: 321 NG/DL (ref 264–916)

## 2024-05-28 DIAGNOSIS — Z00.00 ENCOUNTER FOR MEDICARE ANNUAL WELLNESS EXAM: ICD-10-CM

## 2024-06-03 ENCOUNTER — HOSPITAL ENCOUNTER (OUTPATIENT)
Dept: RADIOLOGY | Facility: HOSPITAL | Age: 69
Discharge: HOME | End: 2024-06-03
Attending: NURSE PRACTITIONER
Payer: MEDICARE

## 2024-06-03 ENCOUNTER — OFFICE VISIT (OUTPATIENT)
Dept: FAMILY MEDICINE | Facility: CLINIC | Age: 69
End: 2024-06-03
Payer: MEDICARE

## 2024-06-03 VITALS
BODY MASS INDEX: 37.69 KG/M2 | HEART RATE: 79 BPM | HEIGHT: 69 IN | OXYGEN SATURATION: 95 % | SYSTOLIC BLOOD PRESSURE: 118 MMHG | DIASTOLIC BLOOD PRESSURE: 84 MMHG | WEIGHT: 254.5 LBS

## 2024-06-03 DIAGNOSIS — R22.31 LOCALIZED SWELLING ON RIGHT HAND: Primary | ICD-10-CM

## 2024-06-03 DIAGNOSIS — I10 ESSENTIAL HYPERTENSION: ICD-10-CM

## 2024-06-03 DIAGNOSIS — R22.31 LOCALIZED SWELLING ON RIGHT HAND: ICD-10-CM

## 2024-06-03 PROCEDURE — 4010F ACE/ARB THERAPY RXD/TAKEN: CPT | Mod: CPTII,S$GLB,, | Performed by: NURSE PRACTITIONER

## 2024-06-03 PROCEDURE — 99213 OFFICE O/P EST LOW 20 MIN: CPT | Mod: S$GLB,,, | Performed by: NURSE PRACTITIONER

## 2024-06-03 PROCEDURE — 73130 X-RAY EXAM OF HAND: CPT | Mod: 26,RT,, | Performed by: RADIOLOGY

## 2024-06-03 PROCEDURE — 1160F RVW MEDS BY RX/DR IN RCRD: CPT | Mod: CPTII,S$GLB,, | Performed by: NURSE PRACTITIONER

## 2024-06-03 PROCEDURE — 1125F AMNT PAIN NOTED PAIN PRSNT: CPT | Mod: CPTII,S$GLB,, | Performed by: NURSE PRACTITIONER

## 2024-06-03 PROCEDURE — 3074F SYST BP LT 130 MM HG: CPT | Mod: CPTII,S$GLB,, | Performed by: NURSE PRACTITIONER

## 2024-06-03 PROCEDURE — 3008F BODY MASS INDEX DOCD: CPT | Mod: CPTII,S$GLB,, | Performed by: NURSE PRACTITIONER

## 2024-06-03 PROCEDURE — 3079F DIAST BP 80-89 MM HG: CPT | Mod: CPTII,S$GLB,, | Performed by: NURSE PRACTITIONER

## 2024-06-03 PROCEDURE — 73130 X-RAY EXAM OF HAND: CPT | Mod: TC,PO,RT

## 2024-06-03 PROCEDURE — 1159F MED LIST DOCD IN RCRD: CPT | Mod: CPTII,S$GLB,, | Performed by: NURSE PRACTITIONER

## 2024-06-05 ENCOUNTER — TELEPHONE (OUTPATIENT)
Dept: FAMILY MEDICINE | Facility: CLINIC | Age: 69
End: 2024-06-05
Payer: MEDICARE

## 2024-06-05 NOTE — PROGRESS NOTES
SUBJECTIVE:    Patient ID: Lewis Matias is a 68 y.o. male.    Chief Complaint: Hand Injury (Yesterday injury to right hand // been icing right hand // taking Tylenol //  -ERL)    68 year old male presents for urgent visit. He is treated for hypertension and gerd. Today is presenting with complaints of right hand pain. Reports that was working outside. Bent hand back pretty far . Has been having pain and swelling to hand since. No open abrasions or lacerations. Pain Is worse with movement. Does have evidence of surgical removal of distal phalanx from previous injury.     Hand Injury   Pertinent negatives include no chest pain.       Lab Visit on 03/06/2024   Component Date Value Ref Range Status    WBC 03/06/2024 6.97  3.90 - 12.70 K/uL Final    RBC 03/06/2024 5.58  4.60 - 6.20 M/uL Final    Hemoglobin 03/06/2024 17.4  14.0 - 18.0 g/dL Final    Hematocrit 03/06/2024 50.5  40.0 - 54.0 % Final    MCV 03/06/2024 91  82 - 98 fL Final    MCH 03/06/2024 31.2 (H)  27.0 - 31.0 pg Final    MCHC 03/06/2024 34.5  32.0 - 36.0 g/dL Final    RDW 03/06/2024 13.1  11.5 - 14.5 % Final    Platelets 03/06/2024 167  150 - 450 K/uL Final    MPV 03/06/2024 11.4  9.2 - 12.9 fL Final    Sodium 03/06/2024 137  136 - 145 mmol/L Final    Potassium 03/06/2024 4.5  3.5 - 5.1 mmol/L Final    Chloride 03/06/2024 103  95 - 110 mmol/L Final    CO2 03/06/2024 25  23 - 29 mmol/L Final    Glucose 03/06/2024 120 (H)  70 - 110 mg/dL Final    BUN 03/06/2024 19  8 - 23 mg/dL Final    Creatinine 03/06/2024 1.1  0.5 - 1.4 mg/dL Final    Calcium 03/06/2024 9.4  8.7 - 10.5 mg/dL Final    Anion Gap 03/06/2024 9  8 - 16 mmol/L Final    eGFR 03/06/2024 >60  >60 mL/min/1.73 m^2 Final    Testosterone 03/06/2024 321  264 - 916 ng/dL Final    Prolactin 03/06/2024 13.6  4.0 - 15.2 ng/mL Final    Follicle Stimulating Hormone 03/06/2024 3.8  1.2 - 15.8 IU/L Final    LH 03/06/2024 3.5  1.3 - 9.6 IU/L Final    Estrogen 03/06/2024 124  pg/mL Final    PSA Total  03/06/2024 1.9  0.00 - 4.00 ng/mL Final    PSA, Free 03/06/2024 0.79  0.00 - 1.50 ng/mL Final    PSA, Free % 03/06/2024 41.58  Not established % Final       Past Medical History:   Diagnosis Date    Aspiration pneumonia     aspirated after anesthesia due to hiatal hernia per patient, spent 6 days in ICU    COVID-19 8/2/20021    GERD (gastroesophageal reflux disease)     Hard of hearing     Hiatal hernia     Hypertension     Kidney stones     Knee pain 06/2020    left     Social History     Socioeconomic History    Marital status:    Tobacco Use    Smoking status: Never     Passive exposure: Never    Smokeless tobacco: Never   Substance and Sexual Activity    Alcohol use: Yes     Comment: social    Drug use: Never    Sexual activity: Yes     Partners: Female     Social Determinants of Health     Financial Resource Strain: Low Risk  (3/17/2023)    Overall Financial Resource Strain (CARDIA)     Difficulty of Paying Living Expenses: Not hard at all   Food Insecurity: No Food Insecurity (3/17/2023)    Hunger Vital Sign     Worried About Running Out of Food in the Last Year: Never true     Ran Out of Food in the Last Year: Never true   Transportation Needs: No Transportation Needs (3/17/2023)    PRAPARE - Transportation     Lack of Transportation (Medical): No     Lack of Transportation (Non-Medical): No   Physical Activity: Sufficiently Active (3/17/2023)    Exercise Vital Sign     Days of Exercise per Week: 7 days     Minutes of Exercise per Session: 30 min   Stress: No Stress Concern Present (3/17/2023)    [place] [employer] - Occupational Stress Questionnaire     Feeling of Stress : Only a little   Housing Stability: Low Risk  (3/17/2023)    Housing Stability Vital Sign     Unable to Pay for Housing in the Last Year: No     Number of Places Lived in the Last Year: 1     Unstable Housing in the Last Year: No     Past Surgical History:   Procedure Laterality Date    CHOLECYSTECTOMY       COLONOSCOPY  2012    Dr Silver- rtc 10 yr    CYSTOSCOPY W/ RETROGRADES Left 4/25/2023    Procedure: CYSTOSCOPY, WITH RETROGRADE PYELOGRAM;  Surgeon: Pepe Bee Jr., MD;  Location: [facility];  Service: Urology;  Laterality: Left;    CYSTOURETEROSCOPY,WITH HOLMIUM LASER LITHOTRIPSY OF URETERAL CALCULUS Left 4/25/2023    Procedure: CYSTOURETEROSCOPY,WITH HOLMIUM LASER LITHOTRIPSY OF URETERAL CALCULUS;  Surgeon: Pepe Bee Jr., MD;  Location: [facility] OR;  Service: Urology;  Laterality: Left;    HAND SURGERY      traumatic injury    HEMORRHOID SURGERY      KIDNEY STONE SURGERY      KNEE ARTHROSCOPY W/ MENISCECTOMY Left 11/4/2020    Procedure: ARTHROSCOPY, KNEE, WITH PARTIAL MEDIAL MENISCECTOMY;  Surgeon: Andres Blanco MD;  Location: [facility] OR;  Service: Orthopedics;  Laterality: Left;    TONSILLECTOMY      URETEROSCOPIC REMOVAL OF URETERIC CALCULUS Left 4/25/2023    Procedure: REMOVAL, CALCULUS, URETER, URETEROSCOPIC;  Surgeon: Pepe Bee Jr., MD;  Location: [facility];  Service: Urology;  Laterality: Left;     Family History   Problem Relation Name Age of Onset    Heart disease Mother      Diabetes Mother      Cancer Father      Depression Sister      Cancer Sister          ovarian    Diabetes Brother         All of your core healthy metrics are met.      Review of patient's allergies indicates:   Allergen Reactions    Iodinated contrast media Hives and Shortness Of Breath    Viagra [sildenafil] Other (See Comments)     Pounding in the head and chest  Headaches       Current Outpatient Medications:     lisinopriL 10 MG tablet, Take 1 tablet (10 mg total) by mouth once daily., Disp: 90 tablet, Rfl: 1    esomeprazole (NEXIUM) 40 MG capsule, Take 1 capsule (40 mg total) by mouth once daily. (Patient not taking: Reported on 2/29/2024), Disp: 90 capsule, Rfl: 1    Review of Systems   Constitutional:  Negative for fatigue, fever and unexpected weight change.   Respiratory:  Negative for cough and shortness of breath.   "  Cardiovascular:  Negative for chest pain and leg swelling.   Genitourinary:  Negative for urgency.   Musculoskeletal:  Negative for arthralgias.        Hand pain   Neurological:  Negative for dizziness, weakness and headaches.          Objective:      Vitals:    06/03/24 1053   BP: 118/84   Pulse: 79   SpO2: 95%   Weight: 115.4 kg (254 lb 8 oz)   Height: 5' 8.5" (1.74 m)     Physical Exam  Vitals and nursing note reviewed.   Constitutional:       Appearance: Normal appearance. He is well-developed. He is obese.   Neck:      Trachea: No tracheal deviation.   Cardiovascular:      Rate and Rhythm: Normal rate and regular rhythm.      Heart sounds: No murmur heard.     No friction rub. No gallop.   Pulmonary:      Breath sounds: Normal breath sounds. No stridor. No wheezing or rales.   Musculoskeletal:      Right hand: Swelling present. No bony tenderness. Decreased range of motion. Normal sensation. Normal capillary refill.   Skin:     General: Skin is warm and dry.   Neurological:      Mental Status: He is alert and oriented to person, place, and time.   Psychiatric:         Thought Content: Thought content normal.           Assessment:       1. Localized swelling on right hand    2. Essential hypertension         Plan:       Localized swelling on right hand  Comments:  xray  Orders:  -     X-Ray Hand Complete Right; Future; Expected date: 06/03/2024    Essential hypertension  Comments:  bp is well controlled      Follow up if symptoms worsen or fail to improve, for medication management.        6/5/2024 Ping Juarez      "

## 2024-06-07 ENCOUNTER — TELEPHONE (OUTPATIENT)
Dept: FAMILY MEDICINE | Facility: CLINIC | Age: 69
End: 2024-06-07
Payer: MEDICARE

## 2024-06-07 DIAGNOSIS — R22.31 LOCALIZED SWELLING ON RIGHT HAND: Primary | ICD-10-CM

## 2024-06-07 NOTE — TELEPHONE ENCOUNTER
Spoke with patient who states he is still in a lot of pain with his hand. Per Laith - needs to see ortho. States he has seen Dr. Blanco before. Per laith - please schedule.

## 2024-06-11 ENCOUNTER — OFFICE VISIT (OUTPATIENT)
Dept: ORTHOPEDICS | Facility: CLINIC | Age: 69
End: 2024-06-11
Payer: MEDICARE

## 2024-06-11 VITALS
DIASTOLIC BLOOD PRESSURE: 85 MMHG | BODY MASS INDEX: 37.68 KG/M2 | SYSTOLIC BLOOD PRESSURE: 116 MMHG | WEIGHT: 254.44 LBS | HEIGHT: 69 IN | HEART RATE: 68 BPM

## 2024-06-11 DIAGNOSIS — S63.639A VOLAR PLATE INJURY OF FINGER, INITIAL ENCOUNTER: Primary | ICD-10-CM

## 2024-06-11 PROCEDURE — 99213 OFFICE O/P EST LOW 20 MIN: CPT | Mod: S$GLB,,, | Performed by: ORTHOPAEDIC SURGERY

## 2024-06-11 PROCEDURE — 3074F SYST BP LT 130 MM HG: CPT | Mod: CPTII,S$GLB,, | Performed by: ORTHOPAEDIC SURGERY

## 2024-06-11 PROCEDURE — 3079F DIAST BP 80-89 MM HG: CPT | Mod: CPTII,S$GLB,, | Performed by: ORTHOPAEDIC SURGERY

## 2024-06-11 PROCEDURE — 3008F BODY MASS INDEX DOCD: CPT | Mod: CPTII,S$GLB,, | Performed by: ORTHOPAEDIC SURGERY

## 2024-06-11 PROCEDURE — 1125F AMNT PAIN NOTED PAIN PRSNT: CPT | Mod: CPTII,S$GLB,, | Performed by: ORTHOPAEDIC SURGERY

## 2024-06-11 PROCEDURE — 4010F ACE/ARB THERAPY RXD/TAKEN: CPT | Mod: CPTII,S$GLB,, | Performed by: ORTHOPAEDIC SURGERY

## 2024-06-11 PROCEDURE — 1101F PT FALLS ASSESS-DOCD LE1/YR: CPT | Mod: CPTII,S$GLB,, | Performed by: ORTHOPAEDIC SURGERY

## 2024-06-11 PROCEDURE — 1159F MED LIST DOCD IN RCRD: CPT | Mod: CPTII,S$GLB,, | Performed by: ORTHOPAEDIC SURGERY

## 2024-06-11 PROCEDURE — 3288F FALL RISK ASSESSMENT DOCD: CPT | Mod: CPTII,S$GLB,, | Performed by: ORTHOPAEDIC SURGERY

## 2024-06-11 PROCEDURE — 1160F RVW MEDS BY RX/DR IN RCRD: CPT | Mod: CPTII,S$GLB,, | Performed by: ORTHOPAEDIC SURGERY

## 2024-06-11 NOTE — PROGRESS NOTES
[facility]    Subjective:     Chief Complaint:   Chief Complaint   Patient presents with    Right Hand - Pain, Injury     Patient is here with complaints of Right Long & Ring Finger pain, states he was drilling a hole and the drill jammed and spun around and bent the 2 fingers back, hitting a wall.        Past Medical History:   Diagnosis Date    Aspiration pneumonia     aspirated after anesthesia due to hiatal hernia per patient, spent 6 days in ICU    COVID-19 8/2/20021    GERD (gastroesophageal reflux disease)     Hard of hearing     Hiatal hernia     Hypertension     Kidney stones     Knee pain 06/2020    left       Past Surgical History:   Procedure Laterality Date    CHOLECYSTECTOMY      COLONOSCOPY  2012     Nadeem- rtc 10 yr    CYSTOSCOPY W/ RETROGRADES Left 4/25/2023    Procedure: CYSTOSCOPY, WITH RETROGRADE PYELOGRAM;  Surgeon: Pepe Bee Jr., MD;  [facility];  Service: Urology;  Laterality: Left;    CYSTOURETEROSCOPY,WITH HOLMIUM LASER LITHOTRIPSY OF URETERAL CALCULUS Left 4/25/2023    Procedure: CYSTOURETEROSCOPY,WITH HOLMIUM LASER LITHOTRIPSY OF URETERAL CALCULUS;  Surgeon: Pepe Bee Jr., MD;  Location: [facility];  Service: Urology;  Laterality: Left;    HAND SURGERY      traumatic injury    HEMORRHOID SURGERY      KIDNEY STONE SURGERY      KNEE ARTHROSCOPY W/ MENISCECTOMY Left 11/4/2020    Procedure: ARTHROSCOPY, KNEE, WITH PARTIAL MEDIAL MENISCECTOMY;  Surgeon: Andres Blanco MD;  Location: [facility] OR;  Service: Orthopedics;  Laterality: Left;    TONSILLECTOMY      URETEROSCOPIC REMOVAL OF URETERIC CALCULUS Left 4/25/2023    Procedure: REMOVAL, CALCULUS, URETER, URETEROSCOPIC;  Surgeon: Pepe Bee Jr., MD;  Location: [facility];  Service: Urology;  Laterality: Left;       Current Outpatient Medications   Medication Sig    acetaminophen (TYLENOL EXTRA STRENGTH ORAL) Take by mouth.    lisinopriL 10 MG tablet Take 1 tablet (10 mg total) by mouth once daily.     No current  facility-administered medications for this visit.       Review of patient's allergies indicates:   Allergen Reactions    Iodinated contrast media Hives and Shortness Of Breath    Viagra [sildenafil] Other (See Comments)     Pounding in the head and chest  Headaches       Family History   Problem Relation Name Age of Onset    Heart disease Mother      Diabetes Mother      Cancer Father      Depression Sister      Cancer Sister          ovarian    Diabetes Brother         Social History     Socioeconomic History    Marital status:    Tobacco Use    Smoking status: Never     Passive exposure: Never    Smokeless tobacco: Never   Substance and Sexual Activity    Alcohol use: Yes     Comment: social    Drug use: Never    Sexual activity: Yes     Partners: Female     Social Determinants of Health     Financial Resource Strain: Low Risk  (3/17/2023)    Overall Financial Resource Strain (CARDIA)     Difficulty of Paying Living Expenses: Not hard at all   Food Insecurity: No Food Insecurity (3/17/2023)    Hunger Vital Sign     Worried About Running Out of Food in the Last Year: Never true     Ran Out of Food in the Last Year: Never true   Transportation Needs: No Transportation Needs (3/17/2023)    PRAPARE - Transportation     Lack of Transportation (Medical): No     Lack of Transportation (Non-Medical): No   Physical Activity: Sufficiently Active (3/17/2023)    Exercise Vital Sign     Days of Exercise per Week: 7 days     Minutes of Exercise per Session: 30 min   Stress: No Stress Concern Present (3/17/2023)    [employer]izen [place] of Occupational [employer] - Occupational Stress Questionnaire     Feeling of Stress : Only a little   Housing Stability: Low Risk  (3/17/2023)    Housing Stability Vital Sign     Unable to Pay for Housing in the Last Year: No     Number of Places Lived in the Last Year: 1     Unstable Housing in the Last Year: No       History of present illness:  Patient comes in today for the right long and  ring finger.  He was injured while using a drill.  The drill bend his fingers back and then his hand hit the wall.  He saw his primary care doctor who appropriately ordered x-rays noted that there were no fractures and referred him on to me for further care      Review of Systems:    Constitution: Negative for chills, fever, and sweats.  Negative for unexplained weight loss.    HENT:  Negative for headaches and blurry vision.    Cardiovascular:Negative for chest pain or irregular heart beat. Negative for hypertension.    Respiratory:  Negative for cough and shortness of breath.    Gastrointestinal: Negative for abdominal pain, heartburn, melena, nausea, and vomitting.    Genitourinary:  Negative bladder incontinence and dysuria.    Musculoskeletal:  See HPI for details.     Neurological: Negative for numbness.    Psychiatric/Behavioral: Negative for depression.  The patient is not nervous/anxious.      Endocrine: Negative for polyuria    Hematologic/Lymphatic: Negative for bleeding problem.  Does not bruise/bleed easily.    Skin: Negative for poor would healing and rash    Objective:      Physical Examination:    Vital Signs:  There were no vitals filed for this visit.    Body mass index is 38.12 kg/m².    This a well-developed, well nourished patient in no acute distress.  They are alert and oriented and cooperative to examination.        Patient is tender over the MP joints of the ring and long finger.  He does have full flexion and extension of the MP joint and can fully close his hand.  Sensation is preserved.  Pertinent New Results:    XRAY Report / Interpretation:   Three views of the hand are reviewed they demonstrate no obvious fractures or dislocations    Assessment/Plan:      Contusion right hand.  Injury to the volar plates of the long and ring finger metacarpophalangeal joint.  This condition should heal on its own without surgical intervention.  I provided him with instructions on range of motion and  strengthening.  I will check him back in a month      This note was created using Dragon voice recognition software that occasionally misinterpreted phrases or words.

## 2024-06-26 ENCOUNTER — OFFICE VISIT (OUTPATIENT)
Dept: UROLOGY | Facility: CLINIC | Age: 69
End: 2024-06-26
Payer: MEDICARE

## 2024-06-26 VITALS — DIASTOLIC BLOOD PRESSURE: 78 MMHG | HEART RATE: 80 BPM | SYSTOLIC BLOOD PRESSURE: 125 MMHG

## 2024-06-26 DIAGNOSIS — N20.0 NEPHROLITHIASIS: ICD-10-CM

## 2024-06-26 DIAGNOSIS — N52.8 OTHER MALE ERECTILE DYSFUNCTION: Primary | ICD-10-CM

## 2024-06-26 PROCEDURE — 99999 PR PBB SHADOW E&M-EST. PATIENT-LVL II: CPT | Mod: PBBFAC,,, | Performed by: UROLOGY

## 2024-06-26 PROCEDURE — G2211 COMPLEX E/M VISIT ADD ON: HCPCS | Mod: S$GLB,,, | Performed by: UROLOGY

## 2024-06-26 PROCEDURE — 4010F ACE/ARB THERAPY RXD/TAKEN: CPT | Mod: CPTII,S$GLB,, | Performed by: UROLOGY

## 2024-06-26 PROCEDURE — 1160F RVW MEDS BY RX/DR IN RCRD: CPT | Mod: CPTII,S$GLB,, | Performed by: UROLOGY

## 2024-06-26 PROCEDURE — 3074F SYST BP LT 130 MM HG: CPT | Mod: CPTII,S$GLB,, | Performed by: UROLOGY

## 2024-06-26 PROCEDURE — 1159F MED LIST DOCD IN RCRD: CPT | Mod: CPTII,S$GLB,, | Performed by: UROLOGY

## 2024-06-26 PROCEDURE — 1126F AMNT PAIN NOTED NONE PRSNT: CPT | Mod: CPTII,S$GLB,, | Performed by: UROLOGY

## 2024-06-26 PROCEDURE — 1101F PT FALLS ASSESS-DOCD LE1/YR: CPT | Mod: CPTII,S$GLB,, | Performed by: UROLOGY

## 2024-06-26 PROCEDURE — 99214 OFFICE O/P EST MOD 30 MIN: CPT | Mod: S$GLB,,, | Performed by: UROLOGY

## 2024-06-26 PROCEDURE — 3078F DIAST BP <80 MM HG: CPT | Mod: CPTII,S$GLB,, | Performed by: UROLOGY

## 2024-06-26 PROCEDURE — 3288F FALL RISK ASSESSMENT DOCD: CPT | Mod: CPTII,S$GLB,, | Performed by: UROLOGY

## 2024-06-26 RX ORDER — TADALAFIL 20 MG/1
20 TABLET ORAL DAILY
Qty: 30 TABLET | Refills: 11 | Status: SHIPPED | OUTPATIENT
Start: 2024-06-26 | End: 2025-06-26

## 2024-06-26 NOTE — PROGRESS NOTES
[facility] Established Patient/H&P:    Lewis Matias is a 68 y.o. male who presents for follow up for left urolithiasis and erectile dysfunction.     Patient presented to the [place] emergency department on 4/15/23 with severe left flank pain beginning one day prior with associated nausea. CT abdomen pelvis wo contrast with a 9 mm proximal left ureteral stone with mild left hydronephrosis. UA with trace blood.      Discharged with Flomax and Dilaudid. Here for follow up. Pain has improved. Has not noticed passage of any stones.      Urine dipstick negative today.      States he had a several year history of kidney stones previously managed by Dr. Boyer and Dr. Herrera. States he has had ureteroscopy and ESWL in the past.      Works as a .      Interval History    6/26/24: Patient s/p successful laser lithotripsy of several kidney stones on 4/25/23. Also with a 10 [place] bulbar urethral stricture easily passed with the scope. Bladder neck high so only able to scope bladder with flexible cystoscope. Stent placed with strings and removed post-operatively.     Here today for progressively worsening erectile dysfunction for over a year.  He is able to obtain 50% rigidity with early detumescence. Has tried Viagra, but discontinued as he had severe headaches. He has not tried Cialis.      Denies any fever, chills, gross hematuria, bone pain, unintentional weight loss,  trauma or history of   malignancy.         PSA  1.9  3/6/24  1.30                 1/24/22    Testosterone  321  3/6/24       Past Medical History:   Diagnosis Date    Aspiration pneumonia     aspirated after anesthesia due to hiatal hernia per patient, spent 6 days in ICU    COVID-19 8/2/20021    GERD (gastroesophageal reflux disease)     Hard of hearing     Hiatal hernia     Hypertension     Kidney stones     Knee pain 06/2020    left       Past Surgical History:   Procedure Laterality Date    CHOLECYSTECTOMY       COLONOSCOPY  2012    Dr Silver- rtc 10 yr    CYSTOSCOPY W/ RETROGRADES Left 4/25/2023    Procedure: CYSTOSCOPY, WITH RETROGRADE PYELOGRAM;  Surgeon: Pepe Bee Jr., MD;  Location: [facility];  Service: Urology;  Laterality: Left;    CYSTOURETEROSCOPY,WITH HOLMIUM LASER LITHOTRIPSY OF URETERAL CALCULUS Left 4/25/2023    Procedure: CYSTOURETEROSCOPY,WITH HOLMIUM LASER LITHOTRIPSY OF URETERAL CALCULUS;  Surgeon: Pepe Bee Jr., MD;  Location: [facility] OR;  Service: Urology;  Laterality: Left;    HAND SURGERY      traumatic injury    HEMORRHOID SURGERY      KIDNEY STONE SURGERY      KNEE ARTHROSCOPY W/ MENISCECTOMY Left 11/4/2020    Procedure: ARTHROSCOPY, KNEE, WITH PARTIAL MEDIAL MENISCECTOMY;  Surgeon: nAdres Blanco MD;  Location: [facility] OR;  Service: Orthopedics;  Laterality: Left;    TONSILLECTOMY      URETEROSCOPIC REMOVAL OF URETERIC CALCULUS Left 4/25/2023    Procedure: REMOVAL, CALCULUS, URETER, URETEROSCOPIC;  Surgeon: Pepe Bee Jr., MD;  Location: [facility];  Service: Urology;  Laterality: Left;       Review of patient's allergies indicates:   Allergen Reactions    Iodinated contrast media Hives and Shortness Of Breath    Viagra [sildenafil] Other (See Comments)     Pounding in the head and chest  Headaches       Medications Reviewed: see MAR    FOCUSED PHYSICAL EXAM:    Vitals:    06/26/24 1110   BP: 125/78   Pulse: 80     There is no height or weight on file to calculate BMI.           General: Alert, cooperative, no distress, appears stated age  Abdomen: Soft, non-tender, no CVA tenderness, non-distended        LABS:    No results found for this or any previous visit (from the past 336 hour(s)).        Assessment/Diagnosis:    1. Other male erectile dysfunction        2. Nephrolithiasis              Plans:    - I spent 30 minutes of the day of this encounter preparing for, treating and managing this patient. Visit today included increased complexity associated with the care of the episodic problem  addressed and managing the longitudinal care of the patient due to the serious and/or complex managed problem(s) erectile dysfunction and history of kidney stones. Extensive discussion with patient regarding the etiology and management of erectile dysfunction. Explained that erectile dysfunction is multi-factorial and can be secondary to neurologic dysfunction, prolactinoma, advanced age, cardiac disease, hypertension,  trauma, DM, renal disease, substance abuse, hypogonadism, Peyronie's disease, post-surgery, medications, depression and anxiety. We discussed that treatment is contingent on finding the cause of his ED. Treatments include PDE - 5 inhibitors (e.g., Cialis, Viagra), vacuum erection device, tension rings, self-injections, transurethral suppositories and penile prosthesis.   - RX for Cialis 20 mg sent to [facility].   - Stone diet discussed.   - RTC in 1 year with PSA.

## 2024-07-09 ENCOUNTER — HOSPITAL ENCOUNTER (EMERGENCY)
Facility: HOSPITAL | Age: 69
Discharge: HOME | End: 2024-07-09
Attending: EMERGENCY MEDICINE
Payer: MEDICARE

## 2024-07-09 VITALS
TEMPERATURE: 98 F | SYSTOLIC BLOOD PRESSURE: 128 MMHG | DIASTOLIC BLOOD PRESSURE: 90 MMHG | WEIGHT: 250 LBS | HEART RATE: 83 BPM | HEIGHT: 69 IN | RESPIRATION RATE: 20 BRPM | BODY MASS INDEX: 37.03 KG/M2 | OXYGEN SATURATION: 94 %

## 2024-07-09 DIAGNOSIS — R10.9 FLANK PAIN: ICD-10-CM

## 2024-07-09 DIAGNOSIS — M54.50 ACUTE RIGHT-SIDED LOW BACK PAIN WITHOUT SCIATICA: Primary | ICD-10-CM

## 2024-07-09 LAB
ALBUMIN SERPL BCP-MCNC: 4.1 G/DL (ref 3.5–5.2)
ALP SERPL-CCNC: 74 U/L (ref 55–135)
ALT SERPL W/O P-5'-P-CCNC: 22 U/L (ref 10–44)
ANION GAP SERPL CALC-SCNC: 9 MMOL/L (ref 8–16)
AST SERPL-CCNC: 21 U/L (ref 10–40)
BASOPHILS # BLD AUTO: 0.05 K/UL (ref 0–0.2)
BASOPHILS NFR BLD: 0.7 % (ref 0–1.9)
BILIRUB SERPL-MCNC: 1 MG/DL (ref 0.1–1)
BILIRUB UR QL STRIP: NEGATIVE
BUN SERPL-MCNC: 12 MG/DL (ref 8–23)
CALCIUM SERPL-MCNC: 8.9 MG/DL (ref 8.7–10.5)
CHLORIDE SERPL-SCNC: 103 MMOL/L (ref 95–110)
CLARITY UR: CLEAR
CO2 SERPL-SCNC: 24 MMOL/L (ref 23–29)
COLOR UR: YELLOW
CREAT SERPL-MCNC: 1 MG/DL (ref 0.5–1.4)
DIFFERENTIAL METHOD BLD: ABNORMAL
EOSINOPHIL # BLD AUTO: 0.3 K/UL (ref 0–0.5)
EOSINOPHIL NFR BLD: 4.2 % (ref 0–8)
ERYTHROCYTE [DISTWIDTH] IN BLOOD BY AUTOMATED COUNT: 13.2 % (ref 11.5–14.5)
EST. GFR  (NO RACE VARIABLE): >60 ML/MIN/1.73 M^2
GLUCOSE SERPL-MCNC: 145 MG/DL (ref 70–110)
GLUCOSE UR QL STRIP: NEGATIVE
HCT VFR BLD AUTO: 51.6 % (ref 40–54)
HGB BLD-MCNC: 17.6 G/DL (ref 14–18)
HGB UR QL STRIP: NEGATIVE
IMM GRANULOCYTES # BLD AUTO: 0.02 K/UL (ref 0–0.04)
IMM GRANULOCYTES NFR BLD AUTO: 0.3 % (ref 0–0.5)
KETONES UR QL STRIP: NEGATIVE
LEUKOCYTE ESTERASE UR QL STRIP: NEGATIVE
LYMPHOCYTES # BLD AUTO: 1.8 K/UL (ref 1–4.8)
LYMPHOCYTES NFR BLD: 23.5 % (ref 18–48)
MCH RBC QN AUTO: 31.1 PG (ref 27–31)
MCHC RBC AUTO-ENTMCNC: 34.1 G/DL (ref 32–36)
MCV RBC AUTO: 91 FL (ref 82–98)
MONOCYTES # BLD AUTO: 0.7 K/UL (ref 0.3–1)
MONOCYTES NFR BLD: 9.7 % (ref 4–15)
NEUTROPHILS # BLD AUTO: 4.7 K/UL (ref 1.8–7.7)
NEUTROPHILS NFR BLD: 61.6 % (ref 38–73)
NITRITE UR QL STRIP: NEGATIVE
NRBC BLD-RTO: 0 /100 WBC
PH UR STRIP: 6 [PH] (ref 5–8)
PLATELET # BLD AUTO: 174 K/UL (ref 150–450)
PMV BLD AUTO: 11 FL (ref 9.2–12.9)
POTASSIUM SERPL-SCNC: 4.1 MMOL/L (ref 3.5–5.1)
PROT SERPL-MCNC: 6.7 G/DL (ref 6–8.4)
PROT UR QL STRIP: NEGATIVE
RBC # BLD AUTO: 5.66 M/UL (ref 4.6–6.2)
SODIUM SERPL-SCNC: 136 MMOL/L (ref 136–145)
SP GR UR STRIP: 1.01 (ref 1–1.03)
URN SPEC COLLECT METH UR: NORMAL
UROBILINOGEN UR STRIP-ACNC: NEGATIVE EU/DL
WBC # BLD AUTO: 7.63 K/UL (ref 3.9–12.7)

## 2024-07-09 PROCEDURE — 81003 URINALYSIS AUTO W/O SCOPE: CPT | Performed by: NURSE PRACTITIONER

## 2024-07-09 PROCEDURE — 93010 ELECTROCARDIOGRAM REPORT: CPT | Mod: ,,, | Performed by: GENERAL PRACTICE

## 2024-07-09 PROCEDURE — 80053 COMPREHEN METABOLIC PANEL: CPT | Performed by: NURSE PRACTITIONER

## 2024-07-09 PROCEDURE — 96375 TX/PRO/DX INJ NEW DRUG ADDON: CPT

## 2024-07-09 PROCEDURE — 93005 ELECTROCARDIOGRAM TRACING: CPT | Performed by: GENERAL PRACTICE

## 2024-07-09 PROCEDURE — 96374 THER/PROPH/DIAG INJ IV PUSH: CPT

## 2024-07-09 PROCEDURE — 99285 EMERGENCY DEPT VISIT HI MDM: CPT | Mod: 25

## 2024-07-09 PROCEDURE — 85025 COMPLETE CBC W/AUTO DIFF WBC: CPT | Performed by: NURSE PRACTITIONER

## 2024-07-09 PROCEDURE — 96361 HYDRATE IV INFUSION ADD-ON: CPT

## 2024-07-09 PROCEDURE — 63600175 PHARM REV CODE 636 W HCPCS: Performed by: NURSE PRACTITIONER

## 2024-07-09 PROCEDURE — 25000003 PHARM REV CODE 250: Performed by: NURSE PRACTITIONER

## 2024-07-09 RX ORDER — HYDROMORPHONE HYDROCHLORIDE 1 MG/ML
1 INJECTION, SOLUTION INTRAMUSCULAR; INTRAVENOUS; SUBCUTANEOUS
Status: COMPLETED | OUTPATIENT
Start: 2024-07-09 | End: 2024-07-09

## 2024-07-09 RX ORDER — MELOXICAM 7.5 MG/1
7.5 TABLET ORAL DAILY
Qty: 7 TABLET | Refills: 0 | Status: SHIPPED | OUTPATIENT
Start: 2024-07-09

## 2024-07-09 RX ORDER — ONDANSETRON HYDROCHLORIDE 2 MG/ML
4 INJECTION, SOLUTION INTRAVENOUS
Status: COMPLETED | OUTPATIENT
Start: 2024-07-09 | End: 2024-07-09

## 2024-07-09 RX ORDER — METHOCARBAMOL 500 MG/1
1000 TABLET, FILM COATED ORAL 4 TIMES DAILY PRN
Qty: 30 TABLET | Refills: 0 | Status: SHIPPED | OUTPATIENT
Start: 2024-07-09 | End: 2024-07-14

## 2024-07-09 RX ADMIN — ONDANSETRON 4 MG: 2 INJECTION INTRAMUSCULAR; INTRAVENOUS at 07:07

## 2024-07-09 RX ADMIN — HYDROMORPHONE HYDROCHLORIDE 1 MG: 1 INJECTION, SOLUTION INTRAMUSCULAR; INTRAVENOUS; SUBCUTANEOUS at 07:07

## 2024-07-09 RX ADMIN — SODIUM CHLORIDE 1000 ML: 9 INJECTION, SOLUTION INTRAVENOUS at 07:07

## 2024-07-09 NOTE — FIRST PROVIDER EVALUATION
Emergency Department TeleTriage Encounter Note      CHIEF COMPLAINT    Chief Complaint   Patient presents with    Flank Pain     Right sided back pain radiating to right abd that started 2 hours ago. Hx of kidney stones.       VITAL SIGNS   Initial Vitals [07/09/24 1836]   BP Pulse Resp Temp SpO2   (!) 165/102 95 20 97.6 °F (36.4 °C) 96 %      MAP       --            ALLERGIES    Review of patient's allergies indicates:   Allergen Reactions    Iodinated contrast media Hives and Shortness Of Breath    Viagra [sildenafil] Other (See Comments)     Pounding in the head and chest  Headaches       PROVIDER TRIAGE NOTE  Verbal consent for the teletriage evaluation was given by the patient at the start of the evaluation.  All efforts will be made to maintain patient's privacy during the evaluation.      This is a teletriage evaluation of a 68 y.o. male presenting to the ED with c/o right flank pain that radiates to abdomen, h/o kidney stones.  Pain started 2 hours PTA. Limited physical exam via telehealth: The patient is awake, alert, answering questions appropriately and is not in respiratory distress.  As the Teletriage provider, I performed an initial assessment and ordered appropriate labs and imaging studies, if any, to facilitate the patient's care once placed in the ED. Once a room is available, care and a full evaluation will be completed by an alternate ED provider.  Any additional orders and the final disposition will be determined by that provider.  All imaging and labs will not be followed-up by the Teletriage Team, including myself.        ORDERS  Labs Reviewed - No data to display    ED Orders (720h ago, onward)      Start Ordered     Status Ordering Provider    07/09/24 1845 07/09/24 1844  Saline lock IV  Once         Ordered ZACH BLUM    07/09/24 1845 07/09/24 1844  CBC auto differential  STAT         Ordered ZACH BLUM    07/09/24 1845 07/09/24 1844  Comprehensive metabolic panel  STAT          Ordered ZACH BLUM    07/09/24 1845 07/09/24 1844  Urinalysis, Reflex to Urine Culture Urine, Clean Catch  STAT         Ordered ZACH BLUM    07/09/24 1845 07/09/24 1844  CT Renal Stone Study ABD Pelvis WO  1 time imaging         Ordered ZACH BLUM MICHELLE              Virtual Visit Note: The provider triage portion of this emergency department evaluation and documentation was performed via [employer], a HIPAA-compliant telemedicine application, in concert with a tele-presenter in the room. A face to face patient evaluation with one of my colleagues will occur once the patient is placed in an emergency department room.      DISCLAIMER: This note was prepared with [employer] voice recognition transcription software. Garbled syntax, mangled pronouns, and other bizarre constructions may be attributed to that software system.

## 2024-07-10 ENCOUNTER — PATIENT OUTREACH (OUTPATIENT)
Dept: EMERGENCY MEDICINE | Facility: HOSPITAL | Age: 69
End: 2024-07-10

## 2024-07-10 LAB
OHS QRS DURATION: 68 MS
OHS QTC CALCULATION: 408 MS

## 2024-07-10 NOTE — DISCHARGE INSTRUCTIONS
I have written prescriptions for Mobic and Robaxin to treat her back pain.  Contact your primary care provider and schedule follow-up.  Return to the emergency department as needed for worsening or new symptoms.

## 2024-07-10 NOTE — ED PROVIDER NOTES
Encounter Date: 7/9/2024       History     Chief Complaint   Patient presents with    Flank Pain     Right sided back pain radiating to right abd that started 2 hours ago. Hx of kidney stones.     68-year-old white male presents emergency department for evaluation of right flank pain.  Patient states pain began approximately 2 hours ago.  The pain radiates around from the right lower back around the right flank towards his groin.  The pain is constant, sharp, 9/10 on pain scale.  Nothing makes it worse or better.  Himself medicated with Tylenol prior to arrival without improvement.  He denies any other symptoms on review of systems.  Patient has an allergy to IV dye.  Medical history includes hiatal hernia, hypertension, GERD, multiple renal stones.    The history is provided by the patient. No  was used.     Review of patient's allergies indicates:   Allergen Reactions    Iodinated contrast media Hives and Shortness Of Breath    Viagra [sildenafil] Other (See Comments)     Pounding in the head and chest  Headaches     Past Medical History:   Diagnosis Date    Aspiration pneumonia     aspirated after anesthesia due to hiatal hernia per patient, spent 6 days in ICU    COVID-19 8/2/20021    GERD (gastroesophageal reflux disease)     Hard of hearing     Hiatal hernia     Hypertension     Kidney stones     Knee pain 06/2020    left     Past Surgical History:   Procedure Laterality Date    CHOLECYSTECTOMY      COLONOSCOPY  2012    Dr Silver- rtc 10 yr    CYSTOSCOPY W/ RETROGRADES Left 4/25/2023    Procedure: CYSTOSCOPY, WITH RETROGRADE PYELOGRAM;  Surgeon: Pepe Bee Jr., MD;  Location: [facility] OR;  Service: Urology;  Laterality: Left;    CYSTOURETEROSCOPY,WITH HOLMIUM LASER LITHOTRIPSY OF URETERAL CALCULUS Left 4/25/2023    Procedure: CYSTOURETEROSCOPY,WITH HOLMIUM LASER LITHOTRIPSY OF URETERAL CALCULUS;  Surgeon: Pepe Bee Jr., MD;  Location: [facility] OR;  Service: Urology;  Laterality: Left;     HAND SURGERY      traumatic injury    HEMORRHOID SURGERY      KIDNEY STONE SURGERY      KNEE ARTHROSCOPY W/ MENISCECTOMY Left 11/4/2020    Procedure: ARTHROSCOPY, KNEE, WITH PARTIAL MEDIAL MENISCECTOMY;  Surgeon: Andres Blanco MD;  Location: [facility] OR;  Service: Orthopedics;  Laterality: Left;    TONSILLECTOMY      URETEROSCOPIC REMOVAL OF URETERIC CALCULUS Left 4/25/2023    Procedure: REMOVAL, CALCULUS, URETER, URETEROSCOPIC;  Surgeon: Pepe Bee Jr., MD;  Location: [facility] OR;  Service: Urology;  Laterality: Left;     Family History   Problem Relation Name Age of Onset    Heart disease Mother      Diabetes Mother      Cancer Father      Depression Sister      Cancer Sister          ovarian    Diabetes Brother       Social History     Tobacco Use    Smoking status: Never     Passive exposure: Never    Smokeless tobacco: Never   Substance Use Topics    Alcohol use: Yes     Comment: social    Drug use: Never     Review of Systems   Constitutional:  Negative for chills and fever.   HENT:  Negative for rhinorrhea and sore throat.    Respiratory:  Negative for cough, shortness of breath and wheezing.    Cardiovascular:  Negative for chest pain.   Gastrointestinal:  Positive for abdominal pain. Negative for diarrhea, nausea and vomiting.   Genitourinary:  Positive for flank pain. Negative for difficulty urinating.   Musculoskeletal:  Negative for back pain and neck pain.   Skin:  Negative for rash.   Neurological:  Negative for dizziness, weakness and headaches.   All other systems reviewed and are negative.      Physical Exam     Initial Vitals [07/09/24 1836]   BP Pulse Resp Temp SpO2   (!) 165/102 95 20 97.6 °F (36.4 °C) 96 %      MAP       --         Physical Exam    Nursing note and vitals reviewed.  Constitutional: He appears well-developed and well-nourished. He is not diaphoretic. No distress.   HENT:   Head: Normocephalic and atraumatic.   Right Ear: External ear normal.   Left Ear: External ear normal.   Nose:  Nose normal.   Eyes: Conjunctivae and EOM are normal. Right eye exhibits no discharge. Left eye exhibits no discharge.   Neck: Neck supple.   Normal range of motion.  Cardiovascular:  Normal rate, regular rhythm, normal heart sounds and intact distal pulses.           Pulmonary/Chest: Breath sounds normal. No respiratory distress.   Abdominal: Abdomen is soft. Bowel sounds are normal. He exhibits no distension. There is no abdominal tenderness.   There is right CVA tenderness.  No left CVA tenderness.   Musculoskeletal:         General: No tenderness or edema. Normal range of motion.      Cervical back: Normal range of motion and neck supple.     Neurological: He is alert and oriented to person, place, and time. He has normal strength. No sensory deficit. GCS score is 15. GCS eye subscore is 4. GCS verbal subscore is 5. GCS motor subscore is 6.   Skin: Skin is warm and dry. No rash noted.   Psychiatric: He has a normal mood and affect. Thought content normal.         ED Course   Procedures  Labs Reviewed   CBC W/ AUTO DIFFERENTIAL - Abnormal; Notable for the following components:       Result Value    MCH 31.1 (*)     All other components within normal limits   COMPREHENSIVE METABOLIC PANEL - Abnormal; Notable for the following components:    Glucose 145 (*)     All other components within normal limits   URINALYSIS, REFLEX TO URINE CULTURE    Narrative:     Specimen Source->Urine          Imaging Results              CT Renal Stone Study ABD Pelvis WO (Final result)  Result time 07/09/24 19:18:18      Final result by eDnnis Zepeda MD (07/09/24 19:18:18)                   Impression:      Bilateral nonobstructing nephrolithiasis.  No evidence of hydronephrosis or obstructing ureteral calculus.    Cholecystectomy.    Scattered colonic diverticula without evidence of acute diverticulitis.    Additional findings as above.      Electronically signed by: Dennis Zepeda MD  Date:    07/09/2024  Time:    19:18                Narrative:    EXAMINATION:  CT RENAL STONE STUDY ABD PELVIS WO    CLINICAL HISTORY:  Flank pain, kidney stone suspected;    TECHNIQUE:  Low dose axial images, sagittal and coronal reformations were obtained from the lung bases to the pubic symphysis.  Contrast was not administered.    COMPARISON:  CT 04/15/2023    FINDINGS:  The lung bases are unremarkable.  There is no pleural fluid present.  The visualized portions of the heart appear normal.    The liver is not significantly enlarged.  There are several hepatic hypodensities, likely cysts, similar to prior examination.  Further assessment of the hepatic parenchyma is limited by lack of IV contrast.  The gallbladder is surgically absent.  There is no intra-or extrahepatic biliary ductal dilatation.    The stomach, spleen, pancreas, and adrenal glands appear within normal limits for non-contrast technique.    The kidneys are normal in size and location. There is mild specific bilateral perinephric edema.  There is a 1.3 cm right renal hypodensity suggestive of a cyst.  There is bilateral nonobstructing nephrolithiasis.  There is no significant hydronephrosis.  No definite obstructing ureteral calculus identified.  Urinary bladder appears within normal limits.  The prostate is mildly enlarged.  There are bilateral fat containing inguinal hernias.    The abdominal aorta is normal in course and caliber with minimal atherosclerotic calcification along its course.  There is no retroperitoneal hematoma.    The visualized loops of small and large bowel show no evidence of obstruction or inflammation. There is no CT evidence of acute appendicitis. There are a few scattered colonic diverticula without evidence of acute diverticulitis.  There is no ascites, portal venous gas, or free intraperitoneal air.  There are scattered shotty small mesenteric and retroperitoneal lymph nodes.  There is mild nonspecific mesenteric fat stranding epicentered about the mesenteric  root, similar to prior examination.  There is a fat containing umbilical hernia.    Osseous structures demonstrate degenerative changes of the visualized spine.  There is a remote left 11th rib deformity.  The extraperitoneal soft tissues are unremarkable.                                       Medications   sodium chloride 0.9% bolus 1,000 mL 1,000 mL (1,000 mLs Intravenous New Bag 7/9/24 1942)   HYDROmorphone injection 1 mg (1 mg Intravenous Given 7/9/24 1942)   ondansetron injection 4 mg (4 mg Intravenous Given 7/9/24 1942)     Medical Decision Making  I have reviewed the patient's diagnostic workup done here in the emergency department and discussed the case with the ED attending physician.  He is performed independent evaluation of this patient and agrees with my assessment.  The patient has no evidence of renal stones or infection at this time.  Patient will be discharged home with medications to treat lower back pain.    Risk  Prescription drug management.               ED Course as of 07/09/24 2042 Tue Jul 09, 2024 2035 EKG:  Normal sinus rhythm at a rate of 75.  Normal intervals.  Normal axis.  No significant ST or T wave changes suggesting acute ischemia or infarction.  (Independently interpreted by me)   [MR]      ED Course User Index  [MR] Dennis Ryder MD                           Clinical Impression:  Final diagnoses:  [R10.9] Flank pain  [M54.50] Acute right-sided low back pain without sciatica (Primary)          ED Disposition Condition    Discharge Stable          ED Prescriptions       Medication Sig Dispense Start Date End Date Auth. Provider    meloxicam (MOBIC) 7.5 MG tablet Take 1 tablet (7.5 mg total) by mouth once daily. 7 tablet 7/9/2024 -- Gama Underwood III, NP    methocarbamoL (ROBAXIN) 500 MG Tab Take 2 tablets (1,000 mg total) by mouth 4 (four) times daily as needed. 30 tablet 7/9/2024 7/14/2024 Gama Underwood III, NP          Follow-up Information       Follow up With  Specialties Details Why Contact Info Additional Information    Ping Juarez, [facility] Call in 1 day To schedule follow-up [address]  [address]  [place] [address]  [phone]       [facility] - Emergency Dept Emergency Medicine Go to  As needed [address] [address]  [phone] 1st floor             Gama Underwood III, NP  07/09/24 2041

## 2024-07-10 NOTE — ED NOTES
Wife called and family will be coming pick patient up    Message left for Jared Lezama at [phone] to call us concerning his fathers advance directives.

## 2024-07-11 ENCOUNTER — TELEPHONE (OUTPATIENT)
Dept: ORTHOPEDICS | Facility: CLINIC | Age: 69
End: 2024-07-11
Payer: MEDICARE

## 2024-07-11 NOTE — TELEPHONE ENCOUNTER
----- Message from Anu Radford sent at 7/11/2024 12:29 PM CDT -----  Regarding: Appointment  Hello,    This patient is being followed through the ED Navigation program.  I just spoke with him, and he stated he had an appointment with Dr. Blanco today tried to call multiple times to reschedule the appointment but was unable to talk with someone.  I attempted to schedule an appointment in [facility] but could not do so.  Could you please reach out to him for rescheduling.  Thank you!     Anu Radford  ED Navigator

## 2024-07-18 ENCOUNTER — TELEPHONE (OUTPATIENT)
Dept: UROLOGY | Facility: CLINIC | Age: 69
End: 2024-07-18
Payer: MEDICARE

## 2024-07-18 ENCOUNTER — OFFICE VISIT (OUTPATIENT)
Dept: UROLOGY | Facility: CLINIC | Age: 69
End: 2024-07-18
Payer: MEDICARE

## 2024-07-18 DIAGNOSIS — N20.0 NEPHROLITHIASIS: Primary | ICD-10-CM

## 2024-07-18 DIAGNOSIS — R10.9 FLANK PAIN: ICD-10-CM

## 2024-07-18 LAB
BILIRUBIN, UA POC OHS: NEGATIVE
BLOOD, UA POC OHS: NEGATIVE
CLARITY, UA POC OHS: CLEAR
COLOR, UA POC OHS: YELLOW
GLUCOSE, UA POC OHS: NEGATIVE
KETONES, UA POC OHS: NEGATIVE
LEUKOCYTES, UA POC OHS: NEGATIVE
NITRITE, UA POC OHS: NEGATIVE
PH, UA POC OHS: 7
PROTEIN, UA POC OHS: NEGATIVE
SPECIFIC GRAVITY, UA POC OHS: 1.01
UROBILINOGEN, UA POC OHS: 1

## 2024-07-18 PROCEDURE — 1101F PT FALLS ASSESS-DOCD LE1/YR: CPT | Mod: CPTII,S$GLB,, | Performed by: NURSE PRACTITIONER

## 2024-07-18 PROCEDURE — 1160F RVW MEDS BY RX/DR IN RCRD: CPT | Mod: CPTII,S$GLB,, | Performed by: NURSE PRACTITIONER

## 2024-07-18 PROCEDURE — 1159F MED LIST DOCD IN RCRD: CPT | Mod: CPTII,S$GLB,, | Performed by: NURSE PRACTITIONER

## 2024-07-18 PROCEDURE — 4010F ACE/ARB THERAPY RXD/TAKEN: CPT | Mod: CPTII,S$GLB,, | Performed by: NURSE PRACTITIONER

## 2024-07-18 PROCEDURE — 99214 OFFICE O/P EST MOD 30 MIN: CPT | Mod: S$GLB,,, | Performed by: NURSE PRACTITIONER

## 2024-07-18 PROCEDURE — 3288F FALL RISK ASSESSMENT DOCD: CPT | Mod: CPTII,S$GLB,, | Performed by: NURSE PRACTITIONER

## 2024-07-18 PROCEDURE — 81003 URINALYSIS AUTO W/O SCOPE: CPT | Mod: QW,S$GLB,, | Performed by: NURSE PRACTITIONER

## 2024-07-18 PROCEDURE — 99999 PR PBB SHADOW E&M-EST. PATIENT-LVL III: CPT | Mod: PBBFAC,,, | Performed by: NURSE PRACTITIONER

## 2024-07-18 RX ORDER — CIPROFLOXACIN 250 MG/1
250 TABLET, FILM COATED ORAL 2 TIMES DAILY
Qty: 28 TABLET | Refills: 0 | Status: SHIPPED | OUTPATIENT
Start: 2024-07-18 | End: 2024-08-01

## 2024-07-18 RX ORDER — TAMSULOSIN HYDROCHLORIDE 0.4 MG/1
0.4 CAPSULE ORAL DAILY
Qty: 90 CAPSULE | Refills: 4 | Status: SHIPPED | OUTPATIENT
Start: 2024-07-18 | End: 2024-10-16

## 2024-07-18 NOTE — PROGRESS NOTES
[facility] Note  Staff: ANDREW Phillips    PCP: DAVION Juarez  Urologist:  CHRISTINA Bee    Chief Complaint: Flank pain    Subjective:        HPI: Lewis Matias is a 68 y.o. male presents today for evaluation of back pain at this time.  UA performed in office visit today showed normal findings.  Pt has hx of kidney stones.  Pt denies gross hematuria at this time.  No problems with urination today.    Recent [facility] ED Encounter on 7/9/24:  68-year-old white male presents emergency department for evaluation of right flank pain. Patient states pain began approximately 2 hours ago. The pain radiates around from the right lower back around the right flank towards his groin. The pain is constant, sharp, 9/10 on pain scale. Nothing makes it worse or better. Himself medicated with Tylenol prior to arrival without improvement. He denies any other symptoms on review of systems. Patient has an allergy to IV dye. Medical history includes hiatal hernia, hypertension, GERD, multiple renal stones.     CT RSS done on 7/9/24:  IMPRESSION:  Bilateral nonobstructing nephrolithiasis.  No evidence of hydronephrosis or obstructing ureteral calculus.  Cholecystectomy.  Scattered colonic diverticula without evidence of acute diverticulitis.  Additional findings as above.     HX:  Pt was last evaluated by Dr. Bee on 6/26/24 for left urolithiasis and erectile dysfunction.     Patient presented to the [place] emergency department on 4/15/23 with severe left flank pain beginning one day prior with associated nausea. CT abdomen pelvis wo contrast with a 9 mm proximal left ureteral stone with mild left hydronephrosis. UA with trace blood.      Discharged with Flomax and Dilaudid. Here for follow up. Pain has improved. Has not noticed passage of any stones.      Urine dipstick negative today.      States he had a several year history of kidney stones previously managed by Dr. Boyer and Dr. Herrera. States he has had  ureteroscopy and ESWL in the past.      Works as a .        Interval History  6/26/24: Patient s/p successful laser lithotripsy of several kidney stones on 4/25/23. Also with a 10 [place] of [place] bulbar urethral stricture easily passed with the scope. Bladder neck high so only able to scope bladder with flexible cystoscope. Stent placed with strings and removed post-operatively.      Here today for progressively worsening erectile dysfunction for over a year.  He is able to obtain 50% rigidity with early detumescence. Has tried Viagra, but discontinued as he had severe headaches. He has not tried Cialis.      Denies any fever, chills, gross hematuria, bone pain, unintentional weight loss,  trauma or history of   malignancy.      PSA  1.9                   3/6/24  1.30                 1/24/22     Testosterone  321                  3/6/24       REVIEW OF SYSTEMS:  A comprehensive 10 system review was performed and is negative except as noted above in HPI    PMHx:  Past Medical History:   Diagnosis Date    Aspiration pneumonia     aspirated after anesthesia due to hiatal hernia per patient, spent 6 days in ICU    COVID-19 8/2/20021    GERD (gastroesophageal reflux disease)     Hard of hearing     Hiatal hernia     Hypertension     Kidney stones     Knee pain 06/2020    left       PSHx:  Past Surgical History:   Procedure Laterality Date    CHOLECYSTECTOMY      COLONOSCOPY  2012    Dr Silver- rtc 10 yr    CYSTOSCOPY W/ RETROGRADES Left 4/25/2023    Procedure: CYSTOSCOPY, WITH RETROGRADE PYELOGRAM;  Surgeon: Pepe Bee Jr., MD;  Location: [facility] OR;  Service: Urology;  Laterality: Left;    CYSTOURETEROSCOPY,WITH HOLMIUM LASER LITHOTRIPSY OF URETERAL CALCULUS Left 4/25/2023    Procedure: CYSTOURETEROSCOPY,WITH HOLMIUM LASER LITHOTRIPSY OF URETERAL CALCULUS;  Surgeon: Pepe Bee Jr., MD;  Location: [facility] OR;  Service: Urology;  Laterality: Left;    HAND SURGERY      traumatic injury    HEMORRHOID  SURGERY      KIDNEY STONE SURGERY      KNEE ARTHROSCOPY W/ MENISCECTOMY Left 11/4/2020    Procedure: ARTHROSCOPY, KNEE, WITH PARTIAL MEDIAL MENISCECTOMY;  Surgeon: Andres Blanco MD;  Location: [facility] OR;  Service: Orthopedics;  Laterality: Left;    TONSILLECTOMY      URETEROSCOPIC REMOVAL OF URETERIC CALCULUS Left 4/25/2023    Procedure: REMOVAL, CALCULUS, URETER, URETEROSCOPIC;  Surgeon: Pepe Bee Jr., MD;  Location: [facility] OR;  Service: Urology;  Laterality: Left;       Allergies:  Iodinated contrast media and Viagra [sildenafil]    Medications: reviewed     Objective:   There were no vitals filed for this visit.    General:WDWN in NAD  Eyes: PERRLA, normal conjunctiva  Respiratory: no increased work on breathing, clear to auscultation  Cardiovascular: regular rate and rhythm. No obvious extremity edema.  GI: palpation of masses. No tenderness. No hepatosplenomegaly to palpation.  Musculoskeletal: normal range of motion of bilateral upper extremities. Normal muscle strength and tone.  Skin: no obvious rashes or lesions. No tightening of skin noted.  Neurologic: CN grossly normal. Normal sensation.   Psychiatric: awake, alert and oriented x 3. Mood and affect normal. Cooperative.      Assessment:       1. Nephrolithiasis    2. Flank pain          Plan:   Prostatitis vs. Kidney stone passing:    CT RSS to be scheduled for further evaluation at this time.  Cipro RX'd today to rule out possible prostatitis symptoms.    I have spent over 30 min with this patient regarding discussion of the following:    -Discussed conservative measures to control urgency and frequency including   1. Avoiding/minimizing bladder irritants (see below), especially in afternoon and evening hours     Discussed bladder irritants include coffee (even decaf), tea, alcohol, soda, spicy foods, acidic juices (orange, tomato), vinegar, and artificial sweeteners/sugary beverages.     2. Do Timed Voiding - empty on a schedule (approx ~2-3 hours) in  spite of need to urinate, to get ahead of urge     3. Do not postponing voiding - dont hold it on purpose      4. Bowel regimen as distended bowel has extrinsic compressive effect on bladder.   - any or all of the following in any combination, titrate to soft daily bowel movement without pushing or straining  - colace/stool softener capsule - once to twice daily  - miralax - 1 capful daily to start, can increase to 2x daily (or decrease to 1/2 cap daily)  - increase dietary fibery  - fiber supplements, such as metamucil  - prunes, prune juice     5. INCREASE water intake     6. Stop fluids 2 hours before bed, and urinate just before bed      F/u--We will contact pt after we receive and review pending imaging results.      Kathleen Ratliff, ENDY-C

## 2024-07-29 ENCOUNTER — HOSPITAL ENCOUNTER (OUTPATIENT)
Dept: RADIOLOGY | Facility: HOSPITAL | Age: 69
Discharge: HOME | End: 2024-07-29
Attending: NURSE PRACTITIONER
Payer: MEDICARE

## 2024-07-29 ENCOUNTER — TELEPHONE (OUTPATIENT)
Dept: UROLOGY | Facility: CLINIC | Age: 69
End: 2024-07-29
Payer: MEDICARE

## 2024-07-29 DIAGNOSIS — R10.9 FLANK PAIN: ICD-10-CM

## 2024-07-29 DIAGNOSIS — R10.9 ABDOMINAL PAIN, UNSPECIFIED ABDOMINAL LOCATION: ICD-10-CM

## 2024-07-29 DIAGNOSIS — R10.9 ABDOMINAL PAIN, UNSPECIFIED ABDOMINAL LOCATION: Primary | ICD-10-CM

## 2024-07-29 PROCEDURE — 74176 CT ABD & PELVIS W/O CONTRAST: CPT | Mod: TC

## 2024-07-29 PROCEDURE — 74176 CT ABD & PELVIS W/O CONTRAST: CPT | Mod: 26,,, | Performed by: RADIOLOGY

## 2024-07-29 NOTE — TELEPHONE ENCOUNTER
Urology Telephone Encounter:    Urology office contacted pt today regarding his appointment scheduled for 12:30 pm today.    Pt was recently evaluated by me on 7/18/24 for evaluation of back pain/flank pain-hx of kidney stones.  Recent CT RSS 7/9/24 showed no obstructing stones.    When we contacted pt this am, pt stating he feels as though stones are now passing.    Therefore appt. Cancelled for today, we will just order a repeat CT Renal Stone Study at this time to see if any difference and compare to other imaging performed earlier this month.    CT Renal Stone Study ordered STAT to be done today for review.  Pt verbalized understanding.

## 2024-08-20 ENCOUNTER — HOSPITAL ENCOUNTER (INPATIENT)
Facility: HOSPITAL | Age: 69
LOS: 1 days | Discharge: HOME | DRG: 439 | End: 2024-08-22
Attending: EMERGENCY MEDICINE | Admitting: INTERNAL MEDICINE
Payer: MEDICARE

## 2024-08-20 DIAGNOSIS — R10.30 LOWER ABDOMINAL PAIN: Primary | ICD-10-CM

## 2024-08-20 DIAGNOSIS — R07.9 CHEST PAIN: ICD-10-CM

## 2024-08-20 PROBLEM — N20.0 NEPHROLITHIASIS: Status: ACTIVE | Noted: 2024-08-20

## 2024-08-20 PROBLEM — K85.90 ACUTE PANCREATITIS: Status: ACTIVE | Noted: 2024-08-20

## 2024-08-20 PROBLEM — N50.819 TESTICULAR PAIN: Status: ACTIVE | Noted: 2024-08-20

## 2024-08-20 PROBLEM — E80.6 HYPERBILIRUBINEMIA: Status: ACTIVE | Noted: 2024-08-20

## 2024-08-20 LAB
ALBUMIN SERPL BCP-MCNC: 4.3 G/DL (ref 3.5–5.2)
ALP SERPL-CCNC: 65 U/L (ref 55–135)
ALT SERPL W/O P-5'-P-CCNC: 19 U/L (ref 10–44)
AMYLASE SERPL-CCNC: 65 U/L (ref 20–110)
ANION GAP SERPL CALC-SCNC: 13 MMOL/L (ref 8–16)
AST SERPL-CCNC: 18 U/L (ref 10–40)
BASOPHILS # BLD AUTO: 0.07 K/UL (ref 0–0.2)
BASOPHILS NFR BLD: 0.8 % (ref 0–1.9)
BILIRUB SERPL-MCNC: 1.9 MG/DL (ref 0.1–1)
BILIRUB UR QL STRIP: NEGATIVE
BUN SERPL-MCNC: 13 MG/DL (ref 8–23)
CALCIUM SERPL-MCNC: 9.8 MG/DL (ref 8.7–10.5)
CHLORIDE SERPL-SCNC: 102 MMOL/L (ref 95–110)
CLARITY UR: CLEAR
CO2 SERPL-SCNC: 21 MMOL/L (ref 23–29)
COLOR UR: YELLOW
CREAT SERPL-MCNC: 0.9 MG/DL (ref 0.5–1.4)
DIFFERENTIAL METHOD BLD: ABNORMAL
EOSINOPHIL # BLD AUTO: 0.3 K/UL (ref 0–0.5)
EOSINOPHIL NFR BLD: 3.3 % (ref 0–8)
ERYTHROCYTE [DISTWIDTH] IN BLOOD BY AUTOMATED COUNT: 13.6 % (ref 11.5–14.5)
EST. GFR  (NO RACE VARIABLE): >60 ML/MIN/1.73 M^2
GLUCOSE SERPL-MCNC: 101 MG/DL (ref 70–110)
GLUCOSE UR QL STRIP: NEGATIVE
HCT VFR BLD AUTO: 52.7 % (ref 40–54)
HGB BLD-MCNC: 18.5 G/DL (ref 14–18)
HGB UR QL STRIP: NEGATIVE
IMM GRANULOCYTES # BLD AUTO: 0.03 K/UL (ref 0–0.04)
IMM GRANULOCYTES NFR BLD AUTO: 0.4 % (ref 0–0.5)
KETONES UR QL STRIP: NEGATIVE
LEUKOCYTE ESTERASE UR QL STRIP: NEGATIVE
LIPASE SERPL-CCNC: 137 U/L (ref 4–60)
LYMPHOCYTES # BLD AUTO: 1.9 K/UL (ref 1–4.8)
LYMPHOCYTES NFR BLD: 22.1 % (ref 18–48)
MCH RBC QN AUTO: 31.1 PG (ref 27–31)
MCHC RBC AUTO-ENTMCNC: 35.1 G/DL (ref 32–36)
MCV RBC AUTO: 89 FL (ref 82–98)
MONOCYTES # BLD AUTO: 1.1 K/UL (ref 0.3–1)
MONOCYTES NFR BLD: 12.5 % (ref 4–15)
NEUTROPHILS # BLD AUTO: 5.2 K/UL (ref 1.8–7.7)
NEUTROPHILS NFR BLD: 60.9 % (ref 38–73)
NITRITE UR QL STRIP: NEGATIVE
NRBC BLD-RTO: 0 /100 WBC
PH UR STRIP: 8 [PH] (ref 5–8)
PLATELET # BLD AUTO: 164 K/UL (ref 150–450)
PMV BLD AUTO: 11.5 FL (ref 9.2–12.9)
POTASSIUM SERPL-SCNC: 4.4 MMOL/L (ref 3.5–5.1)
PROCALCITONIN SERPL IA-MCNC: 0.05 NG/ML (ref 0–0.5)
PROT SERPL-MCNC: 7.3 G/DL (ref 6–8.4)
PROT UR QL STRIP: NEGATIVE
RBC # BLD AUTO: 5.94 M/UL (ref 4.6–6.2)
SODIUM SERPL-SCNC: 136 MMOL/L (ref 136–145)
SP GR UR STRIP: 1.01 (ref 1–1.03)
URN SPEC COLLECT METH UR: NORMAL
UROBILINOGEN UR STRIP-ACNC: NEGATIVE EU/DL
WBC # BLD AUTO: 8.47 K/UL (ref 3.9–12.7)

## 2024-08-20 PROCEDURE — 27100171 HC OXYGEN HIGH FLOW UP TO 24 HOURS

## 2024-08-20 PROCEDURE — 94660 CPAP INITIATION&MGMT: CPT

## 2024-08-20 PROCEDURE — 96361 HYDRATE IV INFUSION ADD-ON: CPT

## 2024-08-20 PROCEDURE — 96366 THER/PROPH/DIAG IV INF ADDON: CPT

## 2024-08-20 PROCEDURE — 96375 TX/PRO/DX INJ NEW DRUG ADDON: CPT

## 2024-08-20 PROCEDURE — 80053 COMPREHEN METABOLIC PANEL: CPT | Performed by: EMERGENCY MEDICINE

## 2024-08-20 PROCEDURE — 83690 ASSAY OF LIPASE: CPT | Performed by: EMERGENCY MEDICINE

## 2024-08-20 PROCEDURE — 96372 THER/PROPH/DIAG INJ SC/IM: CPT

## 2024-08-20 PROCEDURE — G0378 HOSPITAL OBSERVATION PER HR: HCPCS

## 2024-08-20 PROCEDURE — 96365 THER/PROPH/DIAG IV INF INIT: CPT

## 2024-08-20 PROCEDURE — 99406 BEHAV CHNG SMOKING 3-10 MIN: CPT

## 2024-08-20 PROCEDURE — 25000003 PHARM REV CODE 250

## 2024-08-20 PROCEDURE — 36415 COLL VENOUS BLD VENIPUNCTURE: CPT | Performed by: EMERGENCY MEDICINE

## 2024-08-20 PROCEDURE — 99900035 HC TECH TIME PER 15 MIN (STAT)

## 2024-08-20 PROCEDURE — 63600175 PHARM REV CODE 636 W HCPCS

## 2024-08-20 PROCEDURE — 25000003 PHARM REV CODE 250: Performed by: INTERNAL MEDICINE

## 2024-08-20 PROCEDURE — 94799 UNLISTED PULMONARY SVC/PX: CPT

## 2024-08-20 PROCEDURE — 99900031 HC PATIENT EDUCATION (STAT)

## 2024-08-20 PROCEDURE — 63600175 PHARM REV CODE 636 W HCPCS: Performed by: EMERGENCY MEDICINE

## 2024-08-20 PROCEDURE — 84145 PROCALCITONIN (PCT): CPT | Performed by: EMERGENCY MEDICINE

## 2024-08-20 PROCEDURE — 85025 COMPLETE CBC W/AUTO DIFF WBC: CPT | Performed by: EMERGENCY MEDICINE

## 2024-08-20 PROCEDURE — 81003 URINALYSIS AUTO W/O SCOPE: CPT | Performed by: EMERGENCY MEDICINE

## 2024-08-20 PROCEDURE — 25000003 PHARM REV CODE 250: Performed by: EMERGENCY MEDICINE

## 2024-08-20 PROCEDURE — 94761 N-INVAS EAR/PLS OXIMETRY MLT: CPT

## 2024-08-20 PROCEDURE — 82150 ASSAY OF AMYLASE: CPT | Performed by: EMERGENCY MEDICINE

## 2024-08-20 PROCEDURE — 99285 EMERGENCY DEPT VISIT HI MDM: CPT | Mod: 25

## 2024-08-20 RX ORDER — MORPHINE SULFATE 4 MG/ML
4 INJECTION, SOLUTION INTRAMUSCULAR; INTRAVENOUS EVERY 6 HOURS PRN
Status: DISCONTINUED | OUTPATIENT
Start: 2024-08-20 | End: 2024-08-22 | Stop reason: HOSPADM

## 2024-08-20 RX ORDER — ONDANSETRON HYDROCHLORIDE 2 MG/ML
4 INJECTION, SOLUTION INTRAVENOUS
Status: COMPLETED | OUTPATIENT
Start: 2024-08-20 | End: 2024-08-20

## 2024-08-20 RX ORDER — HYDROMORPHONE HYDROCHLORIDE 1 MG/ML
0.5 INJECTION, SOLUTION INTRAMUSCULAR; INTRAVENOUS; SUBCUTANEOUS
Status: COMPLETED | OUTPATIENT
Start: 2024-08-20 | End: 2024-08-20

## 2024-08-20 RX ORDER — LISINOPRIL 10 MG/1
10 TABLET ORAL DAILY
Status: DISCONTINUED | OUTPATIENT
Start: 2024-08-20 | End: 2024-08-22 | Stop reason: HOSPADM

## 2024-08-20 RX ORDER — ALUMINUM HYDROXIDE, MAGNESIUM HYDROXIDE, AND SIMETHICONE 1200; 120; 1200 MG/30ML; MG/30ML; MG/30ML
30 SUSPENSION ORAL 4 TIMES DAILY PRN
Status: DISCONTINUED | OUTPATIENT
Start: 2024-08-20 | End: 2024-08-22 | Stop reason: HOSPADM

## 2024-08-20 RX ORDER — LANOLIN ALCOHOL/MO/W.PET/CERES
800 CREAM (GRAM) TOPICAL
Status: DISCONTINUED | OUTPATIENT
Start: 2024-08-20 | End: 2024-08-22 | Stop reason: HOSPADM

## 2024-08-20 RX ORDER — ACETAMINOPHEN 325 MG/1
650 TABLET ORAL EVERY 4 HOURS PRN
Status: DISCONTINUED | OUTPATIENT
Start: 2024-08-20 | End: 2024-08-20

## 2024-08-20 RX ORDER — HYDROCODONE BITARTRATE AND ACETAMINOPHEN 5; 325 MG/1; MG/1
1 TABLET ORAL EVERY 6 HOURS PRN
Status: DISCONTINUED | OUTPATIENT
Start: 2024-08-20 | End: 2024-08-20

## 2024-08-20 RX ORDER — ENOXAPARIN SODIUM 100 MG/ML
40 INJECTION SUBCUTANEOUS EVERY 24 HOURS
Status: DISCONTINUED | OUTPATIENT
Start: 2024-08-20 | End: 2024-08-22 | Stop reason: HOSPADM

## 2024-08-20 RX ORDER — SODIUM,POTASSIUM PHOSPHATES 280-250MG
2 POWDER IN PACKET (EA) ORAL
Status: DISCONTINUED | OUTPATIENT
Start: 2024-08-20 | End: 2024-08-22 | Stop reason: HOSPADM

## 2024-08-20 RX ORDER — KETOROLAC TROMETHAMINE 30 MG/ML
15 INJECTION, SOLUTION INTRAMUSCULAR; INTRAVENOUS
Status: COMPLETED | OUTPATIENT
Start: 2024-08-20 | End: 2024-08-20

## 2024-08-20 RX ORDER — FAMOTIDINE 20 MG/1
20 TABLET, FILM COATED ORAL 2 TIMES DAILY
Status: DISCONTINUED | OUTPATIENT
Start: 2024-08-20 | End: 2024-08-22 | Stop reason: HOSPADM

## 2024-08-20 RX ORDER — OXYCODONE HYDROCHLORIDE 5 MG/1
5 TABLET ORAL EVERY 6 HOURS PRN
Status: DISCONTINUED | OUTPATIENT
Start: 2024-08-20 | End: 2024-08-22 | Stop reason: HOSPADM

## 2024-08-20 RX ORDER — AMOXICILLIN 250 MG
1 CAPSULE ORAL DAILY PRN
Status: DISCONTINUED | OUTPATIENT
Start: 2024-08-20 | End: 2024-08-22 | Stop reason: HOSPADM

## 2024-08-20 RX ORDER — ONDANSETRON HYDROCHLORIDE 2 MG/ML
4 INJECTION, SOLUTION INTRAVENOUS EVERY 6 HOURS PRN
Status: DISCONTINUED | OUTPATIENT
Start: 2024-08-20 | End: 2024-08-22 | Stop reason: HOSPADM

## 2024-08-20 RX ORDER — NALOXONE HCL 0.4 MG/ML
0.02 VIAL (ML) INJECTION
Status: DISCONTINUED | OUTPATIENT
Start: 2024-08-20 | End: 2024-08-22 | Stop reason: HOSPADM

## 2024-08-20 RX ORDER — SODIUM CHLORIDE 9 MG/ML
INJECTION, SOLUTION INTRAVENOUS CONTINUOUS
Status: ACTIVE | OUTPATIENT
Start: 2024-08-20 | End: 2024-08-21

## 2024-08-20 RX ORDER — SODIUM CHLORIDE 0.9 % (FLUSH) 0.9 %
10 SYRINGE (ML) INJECTION EVERY 12 HOURS PRN
Status: DISCONTINUED | OUTPATIENT
Start: 2024-08-20 | End: 2024-08-22 | Stop reason: HOSPADM

## 2024-08-20 RX ORDER — TAMSULOSIN HYDROCHLORIDE 0.4 MG/1
0.4 CAPSULE ORAL DAILY
Status: DISCONTINUED | OUTPATIENT
Start: 2024-08-20 | End: 2024-08-22 | Stop reason: HOSPADM

## 2024-08-20 RX ORDER — KETOROLAC TROMETHAMINE 30 MG/ML
15 INJECTION, SOLUTION INTRAMUSCULAR; INTRAVENOUS EVERY 6 HOURS PRN
Status: DISPENSED | OUTPATIENT
Start: 2024-08-20 | End: 2024-08-21

## 2024-08-20 RX ORDER — SODIUM CHLORIDE 9 MG/ML
1000 INJECTION, SOLUTION INTRAVENOUS
Status: COMPLETED | OUTPATIENT
Start: 2024-08-20 | End: 2024-08-20

## 2024-08-20 RX ORDER — TALC
6 POWDER (GRAM) TOPICAL NIGHTLY PRN
Status: DISCONTINUED | OUTPATIENT
Start: 2024-08-20 | End: 2024-08-22 | Stop reason: HOSPADM

## 2024-08-20 RX ADMIN — LISINOPRIL 10 MG: 10 TABLET ORAL at 04:08

## 2024-08-20 RX ADMIN — SODIUM CHLORIDE 1000 ML: 9 INJECTION, SOLUTION INTRAVENOUS at 11:08

## 2024-08-20 RX ADMIN — FAMOTIDINE 20 MG: 20 TABLET ORAL at 10:08

## 2024-08-20 RX ADMIN — SODIUM CHLORIDE: 9 INJECTION, SOLUTION INTRAVENOUS at 11:08

## 2024-08-20 RX ADMIN — TAMSULOSIN HYDROCHLORIDE 0.4 MG: 0.4 CAPSULE ORAL at 04:08

## 2024-08-20 RX ADMIN — OXYCODONE HYDROCHLORIDE 5 MG: 5 TABLET ORAL at 10:08

## 2024-08-20 RX ADMIN — ONDANSETRON 4 MG: 2 INJECTION INTRAMUSCULAR; INTRAVENOUS at 11:08

## 2024-08-20 RX ADMIN — HYDROMORPHONE HYDROCHLORIDE 0.5 MG: 0.5 INJECTION, SOLUTION INTRAMUSCULAR; INTRAVENOUS; SUBCUTANEOUS at 11:08

## 2024-08-20 RX ADMIN — PIPERACILLIN SODIUM AND TAZOBACTAM SODIUM 3.38 G: 3; .375 INJECTION, POWDER, LYOPHILIZED, FOR SOLUTION INTRAVENOUS at 11:08

## 2024-08-20 RX ADMIN — ENOXAPARIN SODIUM 40 MG: 40 INJECTION SUBCUTANEOUS at 04:08

## 2024-08-20 RX ADMIN — SODIUM CHLORIDE: 9 INJECTION, SOLUTION INTRAVENOUS at 04:08

## 2024-08-20 RX ADMIN — KETOROLAC TROMETHAMINE 15 MG: 30 INJECTION, SOLUTION INTRAMUSCULAR; INTRAVENOUS at 11:08

## 2024-08-20 RX ADMIN — PIPERACILLIN SODIUM AND TAZOBACTAM SODIUM 3.38 G: 3; .375 INJECTION, POWDER, LYOPHILIZED, FOR SOLUTION INTRAVENOUS at 04:08

## 2024-08-20 NOTE — ASSESSMENT & PLAN NOTE
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

## 2024-08-20 NOTE — H&P
On license of [facility] - Emergency Dept  Hospital Medicine  History & Physical    Patient Name: Lewis Matias  MRN: [id number]  Patient Class: OP- Observation  Admission Date: 8/20/2024  Attending Physician: Geo Malone MD   Primary Care Provider: Ping Juarez NP         Patient information was obtained from patient, past medical records, and ER records.     Subjective:     Principal Problem:Acute pancreatitis    Chief Complaint:   Chief Complaint   Patient presents with    Nephrolithiasis     Has known kidney stones right flank pain, pain getting worse        HPI: 69-year-old male presented to ED for eval of flank pain and abdominal pain.  Patient reported he has been having intermittent right flank pain that radiates to his right lower quadrant and testicles for the last several months.  Denies testicle injury.  Patient does have history of multiple kidney stones, reports this feels very similar to when he has kidney stone pain, follows with Urology outpatient.  Denies fever, chills.  Denies vomiting, change in bowel habits.  Denies dysuria, hematuria.  Noted to have distended belly, patient reports this is chronic for him.  CT renal stone study/abdomen/pelvis impression with numerous bilateral nonobstructing renal stones , no hydronephrosis/hydroureter, heterogeneous moderately enlarged prostate gland, small bowel mesenteric fat-stranding with normal size small bowel mesenteric lymph nodes which may suggest mild enteritis or mesenteric panniculitis, note that mesenteric lymphoma is not excluded.  Lipase 137.  T bili 1.9.  Admit to hospital medicine for further eval.      Past Medical History:   Diagnosis Date    Aspiration pneumonia     aspirated after anesthesia due to hiatal hernia per patient, spent 6 days in ICU    COVID-19 8/2/20021    GERD (gastroesophageal reflux disease)     Hard of hearing     Hiatal hernia     Hypertension     Kidney stones     Knee pain 06/2020    left       Past Surgical  History:   Procedure Laterality Date    CHOLECYSTECTOMY      COLONOSCOPY  2012    Dr Silver- rtluis 10 yr    CYSTOSCOPY W/ RETROGRADES Left 4/25/2023    Procedure: CYSTOSCOPY, WITH RETROGRADE PYELOGRAM;  Surgeon: Pepe Bee Jr., MD;  Location: [facility];  Service: Urology;  Laterality: Left;    CYSTOURETEROSCOPY,WITH HOLMIUM LASER LITHOTRIPSY OF URETERAL CALCULUS Left 4/25/2023    Procedure: CYSTOURETEROSCOPY,WITH HOLMIUM LASER LITHOTRIPSY OF URETERAL CALCULUS;  Surgeon: Pepe Bee Jr., MD;  Location: [facility] OR;  Service: Urology;  Laterality: Left;    HAND SURGERY      traumatic injury    HEMORRHOID SURGERY      KIDNEY STONE SURGERY      KNEE ARTHROSCOPY W/ MENISCECTOMY Left 11/4/2020    Procedure: ARTHROSCOPY, KNEE, WITH PARTIAL MEDIAL MENISCECTOMY;  Surgeon: Andres Blanco MD;  Location: [facility] OR;  Service: Orthopedics;  Laterality: Left;    TONSILLECTOMY      URETEROSCOPIC REMOVAL OF URETERIC CALCULUS Left 4/25/2023    Procedure: REMOVAL, CALCULUS, URETER, URETEROSCOPIC;  Surgeon: Pepe Bee Jr., MD;  Location: [facility];  Service: Urology;  Laterality: Left;       Review of patient's allergies indicates:   Allergen Reactions    Iodinated contrast media Hives and Shortness Of Breath    Viagra [sildenafil] Other (See Comments)     Pounding in the head and chest  Headaches       No current facility-administered medications on file prior to encounter.     Current Outpatient Medications on File Prior to Encounter   Medication Sig    acetaminophen (TYLENOL EXTRA STRENGTH ORAL) Take by mouth.    lisinopriL 10 MG tablet Take 1 tablet (10 mg total) by mouth once daily.    meloxicam (MOBIC) 7.5 MG tablet Take 1 tablet (7.5 mg total) by mouth once daily.    tamsulosin (FLOMAX) 0.4 mg Cap Take 1 capsule (0.4 mg total) by mouth once daily. Take in evening.    tadalafiL (CIALIS) 20 MG Tab Take 1 tablet (20 mg total) by mouth once daily. (Patient not taking: Reported on 7/18/2024)     Family History       Problem Relation  (Age of Onset)    Cancer Father, Sister    Depression Sister    Diabetes Mother, Brother    Heart disease Mother          Tobacco Use    Smoking status: Never     Passive exposure: Never    Smokeless tobacco: Never   Substance and Sexual Activity    Alcohol use: Yes     Comment: social    Drug use: Never    Sexual activity: Yes     Partners: Female     Review of Systems   Constitutional:  Negative for chills and fever.   HENT:  Negative for congestion and sore throat.    Respiratory:  Negative for shortness of breath.    Cardiovascular:  Negative for chest pain.   Gastrointestinal:  Positive for abdominal distention and abdominal pain. Negative for blood in stool, constipation, diarrhea, nausea and vomiting.   Genitourinary:  Positive for flank pain and testicular pain. Negative for difficulty urinating and hematuria.   Musculoskeletal:  Positive for back pain.   Neurological:  Negative for syncope.   Psychiatric/Behavioral:  Negative for confusion.      Objective:     Vital Signs (Most Recent):  Temp: 98.2 °F (36.8 °C) (08/20/24 1032)  Pulse: 64 (08/20/24 1402)  Resp: 20 (08/20/24 1104)  BP: 120/77 (08/20/24 1402)  SpO2: 97 % (08/20/24 1402) Vital Signs (24h Range):  Temp:  [98.2 °F (36.8 °C)] 98.2 °F (36.8 °C)  Pulse:  [64-83] 64  Resp:  [18-20] 20  SpO2:  [95 %-97 %] 97 %  BP: (120-134)/(77-83) 120/77     Weight: 113.4 kg (250 lb)  Body mass index is 36.92 kg/m².     Physical Exam  Vitals reviewed.   Constitutional:       General: He is not in acute distress.  HENT:      Head: Normocephalic and atraumatic.      Nose: Nose normal.      Mouth/Throat:      Mouth: Mucous membranes are moist.   Eyes:      Conjunctiva/sclera: Conjunctivae normal.   Cardiovascular:      Rate and Rhythm: Normal rate and regular rhythm.   Pulmonary:      Effort: Pulmonary effort is normal. No respiratory distress.   Abdominal:      General: Bowel sounds are normal. There is distension.      Tenderness: There is no abdominal tenderness.  "There is no guarding.   Genitourinary:     Testes: Normal.   Musculoskeletal:         General: Normal range of motion.      Cervical back: Normal range of motion.      Right lower leg: No edema.      Left lower leg: No edema.   Skin:     General: Skin is warm and dry.      Capillary Refill: Capillary refill takes less than 2 seconds.   Neurological:      Mental Status: He is alert and oriented to person, place, and time.   Psychiatric:         Mood and Affect: Mood normal.                Significant Labs: All pertinent labs within the past 24 hours have been reviewed.  Bilirubin: No results for input(s): "BILIDIR", "BILIRUBINTOT", "BILITOT" in the last 720 hours.  CBC:   Recent Labs   Lab 08/20/24  1115   WBC 8.47   HGB 18.5*   HCT 52.7        CMP: No results for input(s): "NA", "K", "CL", "CO2", "GLU", "BUN", "CREATININE", "CALCIUM", "PROT", "ALBUMIN", "BILITOT", "ALKPHOS", "AST", "ALT", "ANIONGAP", "EGFRNONAA" in the last 48 hours.    Invalid input(s): "ESTGFAFRICA"  Lipase:   Recent Labs   Lab 08/20/24  1115   LIPASE 137*     Urine Studies:   Recent Labs   Lab 08/20/24  1115   COLORU Yellow   APPEARANCEUA Clear   PHUR 8.0   SPECGRAV 1.010   PROTEINUA Negative   GLUCUA Negative   KETONESU Negative   BILIRUBINUA Negative   OCCULTUA Negative   NITRITE Negative   UROBILINOGEN Negative   LEUKOCYTESUR Negative       Significant Imaging: I have reviewed all pertinent imaging results/findings within the past 24 hours.  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 Latest blood pressure and vitals reviewed-     Temp:  [98.2 °F (36.8 °C)]   Pulse:  [64-83]   Resp:  [18-20] "   BP: (120-134)/(77-83)   SpO2:  [95 %-97 %] .   Home meds for hypertension were reviewed and noted below.   Hypertension Medications               lisinopriL 10 MG tablet Take 1 tablet (10 mg total) by mouth once daily.            While in the hospital, will manage blood pressure as follows; Continue home antihypertensive regimen    Will utilize p.r.n. blood pressure medication only if patient's blood pressure greater than 180/110 and he develops symptoms such as worsening chest pain or shortness of breath.      VTE Risk Mitigation (From admission, onward)           Ordered     enoxaparin injection 40 mg  Daily         08/20/24 1445     IP VTE HIGH RISK PATIENT  Once         08/20/24 1445     Place sequential compression device  Until discontinued         08/20/24 1445                         On 08/20/2024, patient should be placed in hospital observation services under my care in collaboration with Dr. Malone.           Yuko Allen, NP  Department of Hospital Medicine  [facility] - Emergency Dept

## 2024-08-20 NOTE — ASSESSMENT & PLAN NOTE
Chronic, controlled. Latest blood pressure and vitals reviewed-     Temp:  [98.2 °F (36.8 °C)]   Pulse:  [64-83]   Resp:  [18-20]   BP: (120-134)/(77-83)   SpO2:  [95 %-97 %] .   Home meds for hypertension were reviewed and noted below.   Hypertension Medications               lisinopriL 10 MG tablet Take 1 tablet (10 mg total) by mouth once daily.            While in the hospital, will manage blood pressure as follows; Continue home antihypertensive regimen    Will utilize p.r.n. blood pressure medication only if patient's blood pressure greater than 180/110 and he develops symptoms such as worsening chest pain or shortness of breath.

## 2024-08-20 NOTE — ED PROVIDER NOTES
Encounter Date: 8/20/2024       History     Chief Complaint   Patient presents with    Nephrolithiasis     Has known kidney stones right flank pain, pain getting worse     69-year-old male who has a history of hypertension, GERD, difficulty hearing, hiatal hernia and multiple kidney stones who states he currently has a stone 7 mm on the right and 6 mm on the left.  He admits that he has been having pain in his right side now for an extended period of time and not due to see his urologist for some time.  No fever no dysuria hematuria.  No dark urine.  He has denied any nausea vomiting.  Does admit to some right testicular pain.  He has previously had lithotripsy.  Patient was due to see Dr. Bee, urology.      Review of patient's allergies indicates:   Allergen Reactions    Iodinated contrast media Hives and Shortness Of Breath    Viagra [sildenafil] Other (See Comments)     Pounding in the head and chest  Headaches     Past Medical History:   Diagnosis Date    Aspiration pneumonia     aspirated after anesthesia due to hiatal hernia per patient, spent 6 days in ICU    COVID-19 8/2/20021    GERD (gastroesophageal reflux disease)     Hard of hearing     Hiatal hernia     Hypertension     Kidney stones     Knee pain 06/2020    left     Past Surgical History:   Procedure Laterality Date    CHOLECYSTECTOMY      COLONOSCOPY  2012    Dr Silver- rtc 10 yr    CYSTOSCOPY W/ RETROGRADES Left 4/25/2023    Procedure: CYSTOSCOPY, WITH RETROGRADE PYELOGRAM;  Surgeon: Pepe Bee Jr., MD;  Location: [facility];  Service: Urology;  Laterality: Left;    CYSTOURETEROSCOPY,WITH HOLMIUM LASER LITHOTRIPSY OF URETERAL CALCULUS Left 4/25/2023    Procedure: CYSTOURETEROSCOPY,WITH HOLMIUM LASER LITHOTRIPSY OF URETERAL CALCULUS;  Surgeon: Pepe Bee Jr., MD;  Location: [facility];  Service: Urology;  Laterality: Left;    HAND SURGERY      traumatic injury    HEMORRHOID SURGERY      KIDNEY STONE SURGERY      KNEE ARTHROSCOPY W/ MENISCECTOMY  Left 11/4/2020    Procedure: ARTHROSCOPY, KNEE, WITH PARTIAL MEDIAL MENISCECTOMY;  Surgeon: Andres Blanco MD;  Location: [facility] OR;  Service: Orthopedics;  Laterality: Left;    TONSILLECTOMY      URETEROSCOPIC REMOVAL OF URETERIC CALCULUS Left 4/25/2023    Procedure: REMOVAL, CALCULUS, URETER, URETEROSCOPIC;  Surgeon: Pepe Bee Jr., MD;  Location: [facility] OR;  Service: Urology;  Laterality: Left;     Family History   Problem Relation Name Age of Onset    Heart disease Mother      Diabetes Mother      Cancer Father      Depression Sister      Cancer Sister          ovarian    Diabetes Brother       Social History     Tobacco Use    Smoking status: Never     Passive exposure: Never    Smokeless tobacco: Never   Substance Use Topics    Alcohol use: Yes     Comment: social    Drug use: Never     Review of Systems   Constitutional:  Negative for chills and fever.   HENT:  Negative for congestion, rhinorrhea, sinus pain, sore throat and trouble swallowing.    Respiratory:  Negative for cough and shortness of breath.    Cardiovascular:  Negative for chest pain.   Gastrointestinal:  Negative for abdominal pain, constipation, diarrhea, nausea and vomiting.   Genitourinary:  Positive for flank pain and testicular pain. Negative for difficulty urinating, dysuria, frequency, hematuria and scrotal swelling.   Musculoskeletal:  Positive for back pain.   Skin:  Negative for pallor, rash and wound.   Neurological:  Negative for headaches.   All other systems reviewed and are negative.      Physical Exam     Initial Vitals [08/20/24 1032]   BP Pulse Resp Temp SpO2   133/81 83 18 98.2 °F (36.8 °C) 96 %      MAP       --         Physical Exam    Vitals reviewed.  Constitutional: He appears well-developed and well-nourished. He is not diaphoretic. No distress.   Hard of hearing.  Appearing acutely uncomfortable   HENT:   Head: Normocephalic and atraumatic.   Right Ear: External ear normal.   Left Ear: External ear normal.   Nose: Nose  normal.   Mouth/Throat: Oropharynx is clear and moist.   Eyes: Conjunctivae and EOM are normal. Pupils are equal, round, and reactive to light. Right eye exhibits no discharge. Left eye exhibits no discharge. No scleral icterus.   Neck: Neck supple. No tracheal deviation present. No JVD present.   Normal range of motion.  Cardiovascular:  Normal rate, regular rhythm, normal heart sounds and intact distal pulses.     Exam reveals no gallop and no friction rub.       No murmur heard.  Pulmonary/Chest: Breath sounds normal. No respiratory distress. He has no wheezes. He has no rhonchi. He has no rales. He exhibits no tenderness.   Abdominal: Abdomen is soft. Bowel sounds are normal. He exhibits no distension. There is no abdominal tenderness. There is no rebound and no guarding.   Genitourinary:    Penis normal.      Genitourinary Comments: Testes are both descended and without swelling     Musculoskeletal:         General: No tenderness or edema. Normal range of motion.      Cervical back: Normal range of motion and neck supple.     Lymphadenopathy:     He has no cervical adenopathy.   Neurological: He is alert and oriented to person, place, and time. He has normal strength. GCS score is 15. GCS eye subscore is 4. GCS verbal subscore is 5. GCS motor subscore is 6.   Skin: Skin is warm and dry. Capillary refill takes less than 2 seconds. No rash noted. No erythema. No pallor.   Psychiatric: He has a normal mood and affect. His behavior is normal. Judgment and thought content normal.         ED Course   Procedures  Labs Reviewed   CBC W/ AUTO DIFFERENTIAL - Abnormal       Result Value    WBC 8.47      RBC 5.94      Hemoglobin 18.5 (*)     Hematocrit 52.7      MCV 89      MCH 31.1 (*)     MCHC 35.1      RDW 13.6      Platelets 164      MPV 11.5      Immature Granulocytes 0.4      Gran # (ANC) 5.2      Immature Grans (Abs) 0.03      Lymph # 1.9      Mono # 1.1 (*)     Eos # 0.3      Baso # 0.07      nRBC 0      Gran %  60.9      Lymph % 22.1      Mono % 12.5      Eosinophil % 3.3      Basophil % 0.8      Differential Method Automated     COMPREHENSIVE METABOLIC PANEL - Abnormal    Sodium 136      Potassium 4.4      Chloride 102      CO2 21 (*)     Glucose 101      BUN 13      Creatinine 0.9      Calcium 9.8      Total Protein 7.3      Albumin 4.3      Total Bilirubin 1.9 (*)     Alkaline Phosphatase 65      AST 18      ALT 19      eGFR >60.0      Anion Gap 13     LIPASE - Abnormal    Lipase 137 (*)    URINALYSIS, REFLEX TO URINE CULTURE    Specimen UA Urine, Clean Catch      Color, UA Yellow      Appearance, UA Clear      pH, UA 8.0      Specific Gravity, UA 1.010      Protein, UA Negative      Glucose, UA Negative      Ketones, UA Negative      Bilirubin (UA) Negative      Occult Blood UA Negative      Nitrite, UA Negative      Urobilinogen, UA Negative      Leukocytes, UA Negative      Narrative:     Specimen Source->Urine   LIPASE   PROCALCITONIN   AMYLASE   AMYLASE    Amylase 65     URINALYSIS, REFLEX TO URINE CULTURE          Imaging Results              US Abdomen Complete (In process)                      US Scrotum And Testicles (In process)                      CT Renal Stone Study ABD Pelvis WO (Edited Result - FINAL)  Result time 08/20/24 12:46:45      Addendum (preliminary) 1 of 1 by Tavon Hinds MD (08/20/24 12:46:45)      RESULT NOTIFICATION: These observations were discussed by the dictating physician, by phone with, and acknowledged by Gama Underwood at 12:46 on 8/20/2024.      Electronically signed by: Tavon Hinds  Date:    08/20/2024  Time:    12:46                 Final result by Tavon Hinds MD (08/20/24 11:57:49)                   Impression:      1.  Numerous bilateral nonobstructing renal stones as above.    2.  No hydronephrosis/hydroureter are finding of obstructive uropathy.    3.  Heterogeneous moderately enlarged prostate gland.    4.  Hepatomegaly and background parenchymal  "findings of steatosis.    5.  Unchanged small bowel mesenteric fat-stranding with normal size small bowel mesenteric lymph nodes, none of which are enlarged by short axis size criteria ("richard mesentery pattern"); while this may be idiopathic, suggest mild enteritis or mesenteric panniculitis, note that mesenteric lymphoma is not excluded. Consider correlation with clinical symptoms, laboratory values and possible followup in 6 months as this is usually self-limiting      Electronically signed by: Tavon Hinds  Date:    08/20/2024  Time:    11:57               Narrative:      CMS MANDATED QUALITY DATA - CT RADIATION - 436    All CT scans at this facility utilize dose modulation, iterative reconstruction, and/or weight based dosing when appropriate to reduce radiation dose to as low as reasonably achievable.    CLINICAL HISTORY:  ([id number])68 y/o  (1955) M    Flank pain, kidney stone suspected;    TECHNIQUE:  (A#[id number], exam time 8/20/2024 11:52)    CT RENAL STONE STUDY ABD PELVIS WO [id number]    Axial CT images of the abdomen and pelvis were obtained from the dome of the diaphragm to the proximal thighs.    COMPARISON:  CT from 07/29/2024.    FINDINGS:  Lower Thorax:    The lung bases are clear. The heart is top normal in size.    CT Abdomen:    Liver: The liver is borderline enlarged measuring 18.1 cm sagittal right lobe.  There is relative diffuse low attenuation suggesting steatosis.  Unchanged rounded low-attenuation simple fluid attenuation cysts are seen.    Gallbladder: Surgically absent.    Biliary Tree: No intra or extrahepatic ductal dilation.    Spleen: Normal.    Pancreas: The pancreas is normal.    Adrenal Glands: Normal    Kidneys: No hydronephrosis/hydroureter or evidence of obstructive uropathy.  There is a 15 mm simple cyst in the interpolar aspect of the right kidney.  Numerous bilateral nonobstructing renal stones are present, for example:    -5 mm stone in the midpole the right kidney " (image 87)    -6 x 5 mm stone in the lower pole of the right kidney (image 104)    -5 x 4 mm stone in the midpole the left kidney (image 83)    -3 mm stone in the anterior interpolar aspect of the left kidney (image 87)    -2 mm and 2 mm adjacent stones in the midpole of the left kidney (image 95)    Vasculature: Normal.    Lymph nodes: Mild nonspecific small bowel mesenteric fat stranding is present without pathologic lymphadenopathy.  Findings are similar to the previous exam.    Intraperitoneal structures: There is no ascites.    Bowel: The bowel is normal in course and caliber without finding of obstruction, free air or abscess identified. The appendix is normal (image 155)    Abdominal wall: Small fat containing umbilical hernia.  Moderate-sized fat containing inguinal hernias.    Musculoskeletal: No acute osseous abnormality is identified.  There is exaggerated lordotic curvature of the lumbar spine.    CT Pelvis:    Bladder: Normal.    Reproductive Organs: The prostate is heterogeneous and moderately enlarged.    Pelvic Lymph nodes: No pelvic lymphadenopathy is identified.                                       Medications   sodium chloride 0.9% flush 10 mL (has no administration in time range)   melatonin tablet 6 mg (has no administration in time range)   ondansetron injection 4 mg (has no administration in time range)   senna-docusate 8.6-50 mg per tablet 1 tablet (has no administration in time range)   aluminum-magnesium hydroxide-simethicone 200-200-20 mg/5 mL suspension 30 mL (has no administration in time range)   naloxone 0.4 mg/mL injection 0.02 mg (has no administration in time range)   potassium bicarbonate disintegrating tablet 50 mEq (has no administration in time range)   potassium bicarbonate disintegrating tablet 35 mEq (has no administration in time range)   potassium bicarbonate disintegrating tablet 60 mEq (has no administration in time range)   magnesium oxide tablet 800 mg (has no  administration in time range)   magnesium oxide tablet 800 mg (has no administration in time range)   potassium, sodium phosphates 280-160-250 mg packet 2 packet (has no administration in time range)   potassium, sodium phosphates 280-160-250 mg packet 2 packet (has no administration in time range)   potassium, sodium phosphates 280-160-250 mg packet 2 packet (has no administration in time range)   piperacillin-tazobactam 3.375 g in dextrose 5 % 100 mL IVPB (ready to mix) (3.375 g Intravenous New Bag 8/20/24 1604)   0.9%  NaCl infusion ( Intravenous New Bag 8/20/24 1606)   enoxaparin injection 40 mg (40 mg Subcutaneous Given 8/20/24 1604)   lisinopriL tablet 10 mg (10 mg Oral Given 8/20/24 1604)   tamsulosin 24 hr capsule 0.4 mg (0.4 mg Oral Given 8/20/24 1605)   oxyCODONE immediate release tablet 5 mg (has no administration in time range)   morphine injection 4 mg (has no administration in time range)   ketorolac injection 15 mg (has no administration in time range)   HYDROmorphone injection 0.5 mg (0.5 mg Intravenous Given 8/20/24 1106)   ondansetron injection 4 mg (4 mg Intravenous Given 8/20/24 1105)   ketorolac injection 15 mg (15 mg Intravenous Given 8/20/24 1104)   0.9%  NaCl infusion (1,000 mLs Intravenous New Bag 8/20/24 1106)     Medical Decision Making  Amount and/or Complexity of Data Reviewed  Labs: ordered.  Radiology: ordered.    Risk  Prescription drug management.              Attending Attestation:             Attending ED Notes:   ED course and MDM:  This 69-year-old male who has had a history of kidney stones who presented with complaints of right lower abdominal pain with some tenderness directly to palpation as well as some right CVA tenderness.  The patient had screening labs obtained which showed unremarkable urine and a normal white count.  Patient had a CT scan which did not show any evidence of any obstructive uropathy.  There was some old chronic changes of small bowel mesenteric  inflammation.  During the ED course the patient did receive Toradol and Zofran as well as Dilaudid for pain with good results.  It was presenting symptoms and consult Allergy does some number homicidal, but for an observation admission.                             Clinical Impression:  Final diagnoses:  [R10.30] Lower abdominal pain (Primary)          ED Disposition Condition    Observation Stable                Gama Underwood Jr., MD  08/20/24 0240

## 2024-08-20 NOTE — NURSING
Nurses Note -- 4 Eyes      8/20/2024   4:21 PM      Skin assessed during: Admit      [x] No Altered Skin Integrity Present    []Prevention Measures Documented      [] Yes- Altered Skin Integrity Present or Discovered   [] LDA Added if Not in Epic (Describe Wound)   [] New Altered Skin Integrity was Present on Admit and Documented in LDA   [] Wound Image Taken    Wound Care Consulted? No    Attending Nurse:  Shanell Rodriguez RN/Staff Member:     ALE Beckman RN

## 2024-08-20 NOTE — SUBJECTIVE & OBJECTIVE
Past Medical History:   Diagnosis Date    Aspiration pneumonia     aspirated after anesthesia due to hiatal hernia per patient, spent 6 days in ICU    COVID-19 8/2/20021    GERD (gastroesophageal reflux disease)     Hard of hearing     Hiatal hernia     Hypertension     Kidney stones     Knee pain 06/2020    left       Past Surgical History:   Procedure Laterality Date    CHOLECYSTECTOMY      COLONOSCOPY  2012    Dr Silver- rtc 10 yr    CYSTOSCOPY W/ RETROGRADES Left 4/25/2023    Procedure: CYSTOSCOPY, WITH RETROGRADE PYELOGRAM;  Surgeon: Pepe Bee Jr., MD;  Location: [facility];  Service: Urology;  Laterality: Left;    CYSTOURETEROSCOPY,WITH HOLMIUM LASER LITHOTRIPSY OF URETERAL CALCULUS Left 4/25/2023    Procedure: CYSTOURETEROSCOPY,WITH HOLMIUM LASER LITHOTRIPSY OF URETERAL CALCULUS;  Surgeon: Pepe Bee Jr., MD;  Location: [facility] OR;  Service: Urology;  Laterality: Left;    HAND SURGERY      traumatic injury    HEMORRHOID SURGERY      KIDNEY STONE SURGERY      KNEE ARTHROSCOPY W/ MENISCECTOMY Left 11/4/2020    Procedure: ARTHROSCOPY, KNEE, WITH PARTIAL MEDIAL MENISCECTOMY;  Surgeon: Andres Blanco MD;  Location: [facility] OR;  Service: Orthopedics;  Laterality: Left;    TONSILLECTOMY      URETEROSCOPIC REMOVAL OF URETERIC CALCULUS Left 4/25/2023    Procedure: REMOVAL, CALCULUS, URETER, URETEROSCOPIC;  Surgeon: Pepe Bee Jr., MD;  Location: [facility];  Service: Urology;  Laterality: Left;       Review of patient's allergies indicates:   Allergen Reactions    Iodinated contrast media Hives and Shortness Of Breath    Viagra [sildenafil] Other (See Comments)     Pounding in the head and chest  Headaches       No current facility-administered medications on file prior to encounter.     Current Outpatient Medications on File Prior to Encounter   Medication Sig    acetaminophen (TYLENOL EXTRA STRENGTH ORAL) Take by mouth.    lisinopriL 10 MG tablet Take 1 tablet (10 mg total) by mouth once daily.    meloxicam (MOBIC)  7.5 MG tablet Take 1 tablet (7.5 mg total) by mouth once daily.    tamsulosin (FLOMAX) 0.4 mg Cap Take 1 capsule (0.4 mg total) by mouth once daily. Take in evening.    tadalafiL (CIALIS) 20 MG Tab Take 1 tablet (20 mg total) by mouth once daily. (Patient not taking: Reported on 7/18/2024)     Family History       Problem Relation (Age of Onset)    Cancer Father, Sister    Depression Sister    Diabetes Mother, Brother    Heart disease Mother          Tobacco Use    Smoking status: Never     Passive exposure: Never    Smokeless tobacco: Never   Substance and Sexual Activity    Alcohol use: Yes     Comment: social    Drug use: Never    Sexual activity: Yes     Partners: Female     Review of Systems   Constitutional:  Negative for chills and fever.   HENT:  Negative for congestion and sore throat.    Respiratory:  Negative for shortness of breath.    Cardiovascular:  Negative for chest pain.   Gastrointestinal:  Positive for abdominal distention and abdominal pain. Negative for blood in stool, constipation, diarrhea, nausea and vomiting.   Genitourinary:  Positive for flank pain and testicular pain. Negative for difficulty urinating and hematuria.   Musculoskeletal:  Positive for back pain.   Neurological:  Negative for syncope.   Psychiatric/Behavioral:  Negative for confusion.      Objective:     Vital Signs (Most Recent):  Temp: 98.2 °F (36.8 °C) (08/20/24 1032)  Pulse: 64 (08/20/24 1402)  Resp: 20 (08/20/24 1104)  BP: 120/77 (08/20/24 1402)  SpO2: 97 % (08/20/24 1402) Vital Signs (24h Range):  Temp:  [98.2 °F (36.8 °C)] 98.2 °F (36.8 °C)  Pulse:  [64-83] 64  Resp:  [18-20] 20  SpO2:  [95 %-97 %] 97 %  BP: (120-134)/(77-83) 120/77     Weight: 113.4 kg (250 lb)  Body mass index is 36.92 kg/m².     Physical Exam  Vitals reviewed.   Constitutional:       General: He is not in acute distress.  HENT:      Head: Normocephalic and atraumatic.      Nose: Nose normal.      Mouth/Throat:      Mouth: Mucous membranes are  "moist.   Eyes:      Conjunctiva/sclera: Conjunctivae normal.   Cardiovascular:      Rate and Rhythm: Normal rate and regular rhythm.   Pulmonary:      Effort: Pulmonary effort is normal. No respiratory distress.   Abdominal:      General: Bowel sounds are normal. There is distension.      Tenderness: There is no abdominal tenderness. There is no guarding.   Genitourinary:     Testes: Normal.   Musculoskeletal:         General: Normal range of motion.      Cervical back: Normal range of motion.      Right lower leg: No edema.      Left lower leg: No edema.   Skin:     General: Skin is warm and dry.      Capillary Refill: Capillary refill takes less than 2 seconds.   Neurological:      Mental Status: He is alert and oriented to person, place, and time.   Psychiatric:         Mood and Affect: Mood normal.                Significant Labs: All pertinent labs within the past 24 hours have been reviewed.  Bilirubin: No results for input(s): "BILIDIR", "BILIRUBINTOT", "BILITOT" in the last 720 hours.  CBC:   Recent Labs   Lab 08/20/24  1115   WBC 8.47   HGB 18.5*   HCT 52.7        CMP: No results for input(s): "NA", "K", "CL", "CO2", "GLU", "BUN", "CREATININE", "CALCIUM", "PROT", "ALBUMIN", "BILITOT", "ALKPHOS", "AST", "ALT", "ANIONGAP", "EGFRNONAA" in the last 48 hours.    Invalid input(s): "ESTGFAFRICA"  Lipase:   Recent Labs   Lab 08/20/24  1115   LIPASE 137*     Urine Studies:   Recent Labs   Lab 08/20/24  1115   COLORU Yellow   APPEARANCEUA Clear   PHUR 8.0   SPECGRAV 1.010   PROTEINUA Negative   GLUCUA Negative   KETONESU Negative   BILIRUBINUA Negative   OCCULTUA Negative   NITRITE Negative   UROBILINOGEN Negative   LEUKOCYTESUR Negative       Significant Imaging: I have reviewed all pertinent imaging results/findings within the past 24 hours.  "

## 2024-08-21 PROBLEM — R10.9 ABDOMINAL PAIN: Status: ACTIVE | Noted: 2024-08-20

## 2024-08-21 LAB
ALBUMIN SERPL BCP-MCNC: 3.4 G/DL (ref 3.5–5.2)
ALP SERPL-CCNC: 48 U/L (ref 55–135)
ALT SERPL W/O P-5'-P-CCNC: 13 U/L (ref 10–44)
ANION GAP SERPL CALC-SCNC: 6 MMOL/L (ref 8–16)
AST SERPL-CCNC: 12 U/L (ref 10–40)
BASOPHILS # BLD AUTO: 0.05 K/UL (ref 0–0.2)
BASOPHILS NFR BLD: 0.9 % (ref 0–1.9)
BILIRUB SERPL-MCNC: 2 MG/DL (ref 0.1–1)
BUN SERPL-MCNC: 14 MG/DL (ref 8–23)
CALCIUM SERPL-MCNC: 8 MG/DL (ref 8.7–10.5)
CHLORIDE SERPL-SCNC: 104 MMOL/L (ref 95–110)
CO2 SERPL-SCNC: 27 MMOL/L (ref 23–29)
CREAT SERPL-MCNC: 1 MG/DL (ref 0.5–1.4)
CRP SERPL-MCNC: 0.3 MG/DL
DIFFERENTIAL METHOD BLD: ABNORMAL
EOSINOPHIL # BLD AUTO: 0.3 K/UL (ref 0–0.5)
EOSINOPHIL NFR BLD: 5.4 % (ref 0–8)
ERYTHROCYTE [DISTWIDTH] IN BLOOD BY AUTOMATED COUNT: 13.6 % (ref 11.5–14.5)
EST. GFR  (NO RACE VARIABLE): >60 ML/MIN/1.73 M^2
GLUCOSE SERPL-MCNC: 113 MG/DL (ref 70–110)
HCT VFR BLD AUTO: 45.1 % (ref 40–54)
HGB BLD-MCNC: 15.3 G/DL (ref 14–18)
IMM GRANULOCYTES # BLD AUTO: 0.01 K/UL (ref 0–0.04)
IMM GRANULOCYTES NFR BLD AUTO: 0.2 % (ref 0–0.5)
LACTATE SERPL-SCNC: 1.6 MMOL/L (ref 0.5–1.9)
LIPASE SERPL-CCNC: 51 U/L (ref 4–60)
LYMPHOCYTES # BLD AUTO: 1.6 K/UL (ref 1–4.8)
LYMPHOCYTES NFR BLD: 29.3 % (ref 18–48)
MAGNESIUM SERPL-MCNC: 1.9 MG/DL (ref 1.6–2.6)
MCH RBC QN AUTO: 31 PG (ref 27–31)
MCHC RBC AUTO-ENTMCNC: 33.9 G/DL (ref 32–36)
MCV RBC AUTO: 91 FL (ref 82–98)
MONOCYTES # BLD AUTO: 0.7 K/UL (ref 0.3–1)
MONOCYTES NFR BLD: 12.9 % (ref 4–15)
NEUTROPHILS # BLD AUTO: 2.7 K/UL (ref 1.8–7.7)
NEUTROPHILS NFR BLD: 51.3 % (ref 38–73)
NRBC BLD-RTO: 0 /100 WBC
PLATELET # BLD AUTO: 130 K/UL (ref 150–450)
PMV BLD AUTO: 11.6 FL (ref 9.2–12.9)
POTASSIUM SERPL-SCNC: 4.1 MMOL/L (ref 3.5–5.1)
PROT SERPL-MCNC: 5.5 G/DL (ref 6–8.4)
RBC # BLD AUTO: 4.94 M/UL (ref 4.6–6.2)
SODIUM SERPL-SCNC: 137 MMOL/L (ref 136–145)
WBC # BLD AUTO: 5.33 K/UL (ref 3.9–12.7)

## 2024-08-21 PROCEDURE — 86140 C-REACTIVE PROTEIN: CPT | Performed by: INTERNAL MEDICINE

## 2024-08-21 PROCEDURE — 94761 N-INVAS EAR/PLS OXIMETRY MLT: CPT

## 2024-08-21 PROCEDURE — 96366 THER/PROPH/DIAG IV INF ADDON: CPT

## 2024-08-21 PROCEDURE — 83690 ASSAY OF LIPASE: CPT

## 2024-08-21 PROCEDURE — 83735 ASSAY OF MAGNESIUM: CPT

## 2024-08-21 PROCEDURE — 21400001 HC TELEMETRY ROOM

## 2024-08-21 PROCEDURE — 83605 ASSAY OF LACTIC ACID: CPT | Performed by: NURSE PRACTITIONER

## 2024-08-21 PROCEDURE — 63600175 PHARM REV CODE 636 W HCPCS

## 2024-08-21 PROCEDURE — 36415 COLL VENOUS BLD VENIPUNCTURE: CPT

## 2024-08-21 PROCEDURE — 99900035 HC TECH TIME PER 15 MIN (STAT)

## 2024-08-21 PROCEDURE — 25000003 PHARM REV CODE 250

## 2024-08-21 PROCEDURE — 85025 COMPLETE CBC W/AUTO DIFF WBC: CPT

## 2024-08-21 PROCEDURE — 80053 COMPREHEN METABOLIC PANEL: CPT

## 2024-08-21 PROCEDURE — 36415 COLL VENOUS BLD VENIPUNCTURE: CPT | Performed by: NURSE PRACTITIONER

## 2024-08-21 PROCEDURE — 25000003 PHARM REV CODE 250: Performed by: INTERNAL MEDICINE

## 2024-08-21 PROCEDURE — 94799 UNLISTED PULMONARY SVC/PX: CPT

## 2024-08-21 PROCEDURE — 96376 TX/PRO/DX INJ SAME DRUG ADON: CPT

## 2024-08-21 RX ADMIN — FAMOTIDINE 20 MG: 20 TABLET ORAL at 08:08

## 2024-08-21 RX ADMIN — LISINOPRIL 10 MG: 10 TABLET ORAL at 08:08

## 2024-08-21 RX ADMIN — ENOXAPARIN SODIUM 40 MG: 40 INJECTION SUBCUTANEOUS at 04:08

## 2024-08-21 RX ADMIN — OXYCODONE HYDROCHLORIDE 5 MG: 5 TABLET ORAL at 01:08

## 2024-08-21 RX ADMIN — KETOROLAC TROMETHAMINE 15 MG: 30 INJECTION, SOLUTION INTRAMUSCULAR; INTRAVENOUS at 12:08

## 2024-08-21 RX ADMIN — PIPERACILLIN SODIUM AND TAZOBACTAM SODIUM 3.38 G: 3; .375 INJECTION, POWDER, LYOPHILIZED, FOR SOLUTION INTRAVENOUS at 04:08

## 2024-08-21 RX ADMIN — TAMSULOSIN HYDROCHLORIDE 0.4 MG: 0.4 CAPSULE ORAL at 08:08

## 2024-08-21 NOTE — PLAN OF CARE
Inpatient Upgrade Note     Lewis Matias has warranted treatment spanning two or more midnights of hospital level care for the management of  abdominal pain, testicular pain, nephrolithiasis, hyperbilirubinemia, abnormal CT, bright red bleeding with bm today . He continues to require IV antibiotics, daily labs, further testing/imaging, monitoring of vital signs, IV pain medication, and further evaluation by consultants-gi consult pending. His condition is also complicated by the following comorbidities: Hypertension and internal hemorrhoids requiring surgery nearly 20 years ago, multiple kidney stones, aspiration pneumonia resulting in icu admission, GERD, Covid-19.

## 2024-08-21 NOTE — CARE UPDATE
08/20/24 2022   Patient Assessment/Suction   Level of Consciousness (AVPU) alert   Respiratory Effort Normal   Expansion/Accessory Muscles/Retractions no use of accessory muscles   All Lung Fields Breath Sounds clear   Rhythm/Pattern, Respiratory unlabored   PRE-TX-O2   Device (Oxygen Therapy) room air   SpO2 95 %   Pulse Oximetry Type Intermittent   $ Pulse Oximetry - Multiple Charge Pulse Oximetry - Multiple   Pulse 70   Resp 19   Incentive Spirometer   $ Incentive Spirometer Charges done independently per patient   Administration (IS) instruction provided, initial   Number of Repetitions (IS) 10   Level Incentive Spirometer (mL) 2000   Patient Tolerance (IS) good   Preset CPAP/BiPAP Settings   Mode Of Delivery CPAP;Standby   $ CPAP/BiPAP Daily Charge BiPAP/CPAP Daily   $ Initial CPAP/BiPAP Setup? Yes   $ Is patient using? Yes   Education   $ Education Incentive Spirometry;15 min   Tobacco Cessation Intervention   Do you use any type of tobacco product? No   $ Smoking Cessation Charges Smoking Cessation - Intermediate (Non-CTTS)   Respiratory Evaluation   $ Care Plan Tech Time 15 min   $ Respiratory Evaluation Complete   Evaluation For New Orders   Admitting Diagnosis KIDNEY STONE   Cardiac Diagnosis NA   Pulmonary Diagnosis NA   Current Surgeries NA   Home Oxygen   Has Home Oxygen? No   Home Aerosol, MDI, DPI, and Other Treatments/Therapies   Home Respiratory Therapy Per Patient/Review of Chart Yes   Other Home Respiratory Therapies QHS-CPAP   Oxygen Care Plan   Oxygen Care Plan Per Protocol   Rationale No rational found   Bronchodilator Care Plan   Bronchodilator Care Plan N/A   Rationale No Rationale found   Atelectasis Care Plan   Atelectasis Care Plan Incentive Spiromentry   Frequency PRN   Rationale Post-op abdominal   Airway Clearance Care Plan   Airway Clearance Care Plan Other   Rationale No rationale found

## 2024-08-21 NOTE — PLAN OF CARE
[facility]  Initial Discharge Assessment    Assessment completed at bedside with Pt, and all information on FaceSheet confirmed, including demographics, PCP, pharmacy and insurance. Pt has not addressed advance directives. He currently lives in [place] with his spouse. His  last appointment was a month ago. His preferred pharmacy is [facility] [address]. He denies any HH/HD/Blood Thinners but he does use a CPAP.  Felisha Matias (Spouse) [phone] (Mobile) will transport back home after discharge. He denies any recent hospitalizations. Plan for discharge is to return home. Case Management to continue to follow for discharge planning needs.        Primary Care Provider: Ping Juarez NP    Admission Diagnosis: Lower abdominal pain [R10.30]    Admission Date: 8/20/2024  Expected Discharge Date: 8/23/2024    Transition of Care Barriers: None    [facility] / Plan: [employer] CHO[employer] / Product Type: Medicare Advantage /     Extended Emergency Contact Information  Primary Emergency Contact: Felisha Matias  Address: [address] [place] [address] [id number] [place] [address]  Mobile Phone: [phone]  Relation: Spouse  Preferred language: English   needed? No    Discharge Plan A: Home with family  Discharge Plan B: Home      [facility] - [place], [place] - [address].  [address] [address]  Phone: [phone] Fax: [phone]      Initial Assessment (most recent)       Adult Discharge Assessment - 08/21/24 1301          Discharge Assessment    Confirmed/corrected address, phone number and insurance Yes     Confirmed Demographics Correct on Facesheet     Source of Information patient     When was your last doctors appointment? --   A month ago    Reason For Admission Lower abdominal pain     People in Home spouse     Do you expect to return to your current living situation? Yes     Do you have help at home or someone  to help you manage your care at home? Yes     Who are your caregiver(s) and their phone number(s)? Libby Matiasa (Spouse)  [phone] (Mobile)     Prior to hospitilization cognitive status: Alert/Oriented     Current cognitive status: Alert/Oriented     Walking or Climbing Stairs Difficulty no     Dressing/Bathing Difficulty no     Home Accessibility wheelchair accessible     Home Layout Able to live on 1st floor     Equipment Currently Used at Home CPAP     Readmission within 30 days? No     Patient currently being followed by outpatient case management? No     Do you currently have service(s) that help you manage your care at home? No     Do you take prescription medications? Yes     Do you have prescription coverage? Yes     Coverage [employer] [facility] [facility] -     Do you have any problems affording any of your prescribed medications? No     Is the patient taking medications as prescribed? yes     Who is going to help you get home at discharge? Felisha Matias (Spouse)  [phone] (Mobile)     How do you get to doctors appointments? car, drives self     Are you on dialysis? No     Do you take coumadin? No     Discharge Plan A Home with family     Discharge Plan B Home     DME Needed Upon Discharge  none     Discharge Plan discussed with: Patient     Transition of Care Barriers None        Physical Activity    On average, how many days per week do you engage in moderate to strenuous exercise (like a brisk walk)? 0 days     On average, how many minutes do you engage in exercise at this level? 0 min        Financial Resource Strain    How hard is it for you to pay for the very basics like food, housing, medical care, and heating? Not hard at all        Housing Stability    In the last 12 months, was there a time when you were not able to pay the mortgage or rent on time? No     At any time in the past 12 months, were you homeless or living in a shelter (including now)? No         Transportation Needs    Has the lack of transportation kept you from medical appointments, meetings, work or from getting things needed for daily living? No        Food Insecurity    Within the past 12 months, you worried that your food would run out before you got the money to buy more. Never true     Within the past 12 months, the food you bought just didn't last and you didn't have money to get more. Never true        Stress    Do you feel stress - tense, restless, nervous, or anxious, or unable to sleep at night because your mind is troubled all the time - these days? Not at all        Social Isolation    How often do you feel lonely or isolated from those around you?  Never        Alcohol Use    Q1: How often do you have a drink containing alcohol? Never     Q2: How many drinks containing alcohol do you have on a typical day when you are drinking? Patient does not drink     Q3: How often do you have six or more drinks on one occasion? Never        Utilities    In the past 12 months has the electric, gas, oil, or water company threatened to shut off services in your home? No        Health Literacy    How often do you need to have someone help you when you read instructions, pamphlets, or other written material from your doctor or pharmacy? Never

## 2024-08-21 NOTE — SUBJECTIVE & OBJECTIVE
Interval History:  Pt seen and examined.  He is not having abdominal pain this morning, though did experience bright red bleeding with bowel movement this morning.  He denies draining for bowel movement or hard stool.  Has Hx of internal hemorrhoids requiring surgery nearly 20 years ago.  Has had colonoscopy since, though was unable to relay timing.    Review of Systems   Constitutional:  Negative for chills and fever.   Respiratory:  Negative for cough and shortness of breath.    Cardiovascular:  Negative for chest pain.   Gastrointestinal:  Positive for abdominal pain and blood in stool. Negative for nausea and vomiting.   Genitourinary:  Positive for flank pain. Negative for difficulty urinating and hematuria.     Objective:     Vital Signs (Most Recent):  Temp: 97.7 °F (36.5 °C) (08/21/24 0727)  Pulse: 66 (08/21/24 0727)  Resp: 16 (08/21/24 0727)  BP: (!) 113/57 (08/21/24 0727)  SpO2: (!) 94 % (08/21/24 0727) Vital Signs (24h Range):  Temp:  [97.4 °F (36.3 °C)-97.7 °F (36.5 °C)] 97.7 °F (36.5 °C)  Pulse:  [61-72] 66  Resp:  [15-25] 16  SpO2:  [93 %-97 %] 94 %  BP: ()/(51-93) 113/57     Weight: 119.1 kg (262 lb 9.6 oz)  Body mass index is 38.78 kg/m².    Intake/Output Summary (Last 24 hours) at 8/21/2024 1052  Last data filed at 8/21/2024 0805  Gross per 24 hour   Intake 1050 ml   Output 1150 ml   Net -100 ml         Physical Exam  Vitals and nursing note reviewed.   Constitutional:       Appearance: Normal appearance. He is obese.   HENT:      Head: Normocephalic and atraumatic.   Eyes:      Extraocular Movements: Extraocular movements intact.      Conjunctiva/sclera: Conjunctivae normal.      Pupils: Pupils are equal, round, and reactive to light.   Cardiovascular:      Rate and Rhythm: Normal rate and regular rhythm.   Pulmonary:      Effort: Pulmonary effort is normal. No respiratory distress.      Breath sounds: Normal breath sounds.   Abdominal:      General: Abdomen is flat. Bowel sounds are normal.  There is no distension.      Palpations: Abdomen is soft.      Tenderness: There is no abdominal tenderness.   Genitourinary:     Comments: Rectal exam done with nurse, Shanell, at bedside.  -no evidence of fissure, external hemorrhoids noted without evidence of inflammation or acute bleeding.  Digital rectal exam with no rocco blood or obvious abnormality to me  Musculoskeletal:         General: Normal range of motion.      Cervical back: Normal range of motion and neck supple.   Skin:     General: Skin is warm and dry.      Capillary Refill: Capillary refill takes less than 2 seconds.   Neurological:      General: No focal deficit present.      Mental Status: He is alert and oriented to person, place, and time. Mental status is at baseline.             Significant Labs: All pertinent labs within the past 24 hours have been reviewed.  CBC:   Recent Labs   Lab 08/20/24  1115 08/21/24  0256   WBC 8.47 5.33   HGB 18.5* 15.3   HCT 52.7 45.1    130*     CMP:   Recent Labs   Lab 08/20/24  1115 08/21/24  0256    137   K 4.4 4.1    104   CO2 21* 27    113*   BUN 13 14   CREATININE 0.9 1.0   CALCIUM 9.8 8.0*   PROT 7.3 5.5*   ALBUMIN 4.3 3.4*   BILITOT 1.9* 2.0*   ALKPHOS 65 48*   AST 18 12   ALT 19 13   ANIONGAP 13 6*       Significant Imaging: I have reviewed all pertinent imaging results/findings within the past 24 hours.

## 2024-08-21 NOTE — ASSESSMENT & PLAN NOTE
Pt has 2-3 month Hx of abdominal pain worse on the right and seems positional.  He does have Hx of kidney stones and CT scan redemonstrates presence of kidney stones bilaterally, though no stones in ureters, no hydroureter.  The CT scan does exhibit Luisana mesentery in the mid abdomen without associated mesenteric lymphadenopathy.  Today he started having bright red blood with stooling.  -given his symptomatology, abnormal CT findings, and blood in stool feel it is prudent to consult with GI   -if this pain is in fact related to his kidney stones, it would be reasonable for him to follow up as an outpatient with the urologist to discuss further  -he can stay on liquid diet for now pending GI evaluation   -the CBC is stable

## 2024-08-21 NOTE — PLAN OF CARE
BARNETT completed with Pt at bedside. Pt verbalized understanding and signed BARNETT. BARNETT scanned into media.      08/21/24 1307   BARNETT Message   Medicare Outpatient and Observation Notification regarding financial responsibility Given to patient/caregiver;Explained to patient/caregiver;Signed/date by patient/caregiver   Date BARNETT was signed 08/21/24   Time BARNETT was signed 0906

## 2024-08-21 NOTE — CONSULTS
GASTROENTEROLOGY INPATIENT CONSULT NOTE  Patient Name: Lewis Matias  Patient MRN: [id number]  Patient : 1955    Admit Date: 2024  Service date: 2024    Reason for Consult:  Abdominal pain/rectal bleeding    PCP: Ping Juarez NP    Chief Complaint   Patient presents with    Nephrolithiasis     Has known kidney stones right flank pain, pain getting worse       HPI: Patient is 69-year-old male presented to ED for eval of flank pain and abdominal pain.  Patient reported he has been having intermittent right flank pain that radiates to his right lower quadrant and testicles for the last several months.  Denies testicle injury.  Patient does have history of multiple kidney stones, reports this feels very similar to when he has kidney stone pain, follows with Urology outpatient.  Denies fever, chills.  Denies vomiting, change in bowel habits.  Denies dysuria, hematuria.  Noted to have distended belly, patient reports this is chronic for him.  CT renal stone study/abdomen/pelvis impression with numerous bilateral nonobstructing renal stones , no hydronephrosis/hydroureter, heterogeneous moderately enlarged prostate gland, small bowel mesenteric fat-stranding with normal size small bowel mesenteric lymph nodes which may suggest mild enteritis or mesenteric panniculitis, note that mesenteric lymphoma is not excluded.  Lipase 137.  T bili 1.9.  Admit to hospital medicine for further eval.       Patient has had a prior hemorrhoidectomy.  Yesterday when he wiped on the toilet paper he noticed that there was blood on the toilet paper but a small amount.    CHART REVIEW:  Colonoscopy 08/15/2012 normal.    Past Medical History:  Past Medical History:   Diagnosis Date    Aspiration pneumonia     aspirated after anesthesia due to hiatal hernia per patient, spent 6 days in ICU    COVID-19     GERD (gastroesophageal reflux disease)     Hard of hearing     Hiatal hernia     Hypertension     Kidney stones      Knee pain 06/2020    left        Past Surgical History:  Past Surgical History:   Procedure Laterality Date    CHOLECYSTECTOMY      COLONOSCOPY  2012    Dr Silver- rtc 10 yr    CYSTOSCOPY W/ RETROGRADES Left 4/25/2023    Procedure: CYSTOSCOPY, WITH RETROGRADE PYELOGRAM;  Surgeon: Pepe Bee Jr., MD;  [facility];  Service: Urology;  Laterality: Left;    CYSTOURETEROSCOPY,WITH HOLMIUM LASER LITHOTRIPSY OF URETERAL CALCULUS Left 4/25/2023    Procedure: CYSTOURETEROSCOPY,WITH HOLMIUM LASER LITHOTRIPSY OF URETERAL CALCULUS;  Surgeon: Pepe Bee Jr., MD;  Location: [facility] OR;  Service: Urology;  Laterality: Left;    HAND SURGERY      traumatic injury    HEMORRHOID SURGERY      KIDNEY STONE SURGERY      KNEE ARTHROSCOPY W/ MENISCECTOMY Left 11/4/2020    Procedure: ARTHROSCOPY, KNEE, WITH PARTIAL MEDIAL MENISCECTOMY;  Surgeon: Andres Blanco MD;  Location: [facility] OR;  Service: Orthopedics;  Laterality: Left;    TONSILLECTOMY      URETEROSCOPIC REMOVAL OF URETERIC CALCULUS Left 4/25/2023    Procedure: REMOVAL, CALCULUS, URETER, URETEROSCOPIC;  Surgeon: Pepe Bee Jr., MD;  [facility]: [facility];  Service: Urology;  Laterality: Left;        Home Medications:  Medications Prior to Admission   Medication Sig Dispense Refill Last Dose    acetaminophen (TYLENOL EXTRA STRENGTH ORAL) Take by mouth.   8/19/2024    lisinopriL 10 MG tablet Take 1 tablet (10 mg total) by mouth once daily. 90 tablet 1 8/19/2024    meloxicam (MOBIC) 7.5 MG tablet Take 1 tablet (7.5 mg total) by mouth once daily. 7 tablet 0 8/19/2024    tamsulosin (FLOMAX) 0.4 mg Cap Take 1 capsule (0.4 mg total) by mouth once daily. Take in evening. 90 capsule 4 8/19/2024    tadalafiL (CIALIS) 20 MG Tab Take 1 tablet (20 mg total) by mouth once daily. (Patient not taking: Reported on 7/18/2024) 30 tablet 11        Inpatient Medications:  Review of patient's allergies indicates:   Allergen Reactions    Iodinated contrast media Hives and Shortness Of Breath     Viagra [sildenafil] Other (See Comments)     Pounding in the head and chest  Headaches       Social History:   Social History     Occupational History    Not on file   Tobacco Use    Smoking status: Never     Passive exposure: Never    Smokeless tobacco: Never   Substance and Sexual Activity    Alcohol use: Yes     Comment: social    Drug use: Never    Sexual activity: Yes     Partners: Female       Family History:   Family History   Problem Relation Name Age of Onset    Heart disease Mother      Diabetes Mother      Cancer Father      Depression Sister      Cancer Sister          ovarian    Diabetes Brother         Review of Systems:  GENERAL: No fever, chills, weight loss  SKIN: No rashes, jaundice, pruritus  HEENT: No rhinorrhea, epistaxis, vision changes.  No trauma, tinnitus, lymphadenopathy or pharyngitis  CV: No chest pain, palpitations, edima or MCCORMACK  PULM: No cough or sputum production.  No wheezing.  GI: AS IN HPI  URINARY:  No hematuria, dysuria  MS: No change in muscle or joint pain, weakness, or ROM  Neuro: No focal neurologic changes  PSYCH: No change in mood or personality.  No suicidal ideation.  ENDOCRINE: No fatigue      OBJECTIVE:    Vitals:    08/21/24 0124 08/21/24 0330 08/21/24 0727 08/21/24 1128   BP:  (!) 96/51 (!) 113/57 (!) 114/55   BP Location:  Right arm     Patient Position:  Lying     Pulse: 72 65 66 68   Resp: 19 17 16 15   Temp:  97.4 °F (36.3 °C) 97.7 °F (36.5 °C) 98 °F (36.7 °C)   TempSrc:  Oral Oral Oral   SpO2: (!) 94% (!) 93% (!) 94% 95%   Weight:       Height:         Physical Exam:    GEN: well-developed, well-nourished, awake and alert, non-toxic appearing adult  HEENT: PERRL, sclera anicteric, oral mucosa pink and moist without lesion  NECK: trachea midline; Good ROM  CV: regular rate and rhythm, no murmurs or gallops  RESP: clear to auscultation bilaterally, no wheezes, rhonci or rales  ABD: soft, non-tender, non-distended, normal bowel sounds  EXT: no swelling or edema, 2+  "pulses distally  SKIN: no rashes or jaundice  PSYCH: normal affect    Labs:   Recent Labs   Lab 08/20/24  1115 08/21/24  0256   WBC 8.47 5.33   HGB 18.5* 15.3    130*   MCV 89 91     No results for input(s): "IRON", "FERRITIN" in the last 168 hours.    Invalid input(s): "IRONSAT"  Recent Labs   Lab 08/20/24  1115 08/21/24  0256    137   K 4.4 4.1   BUN 13 14   CREATININE 0.9 1.0   ALBUMIN 4.3 3.4*   AST 18 12   ALT 19 13   ALKPHOS 65 48*            Radiology Review:  Imaging Results              US Abdomen Complete (Final result)  Result time 08/20/24 16:36:09      Final result by Tavon Hinds MD (08/20/24 16:36:09)                   Impression:      1.  Technically suboptimal exam secondary to bowel gas and habitus.    2.  Hepatomegaly background parenchymal findings of steatosis.    3.  Bilateral nonobstructing renal stones (better appreciated on the recent CT).    4.  Poor visualization of the pancreas, without findings to specifically suggest pancreatitis.    5.  Right renal cortical cyst.    .      Electronically signed by: Tavon Hinds  Date:    08/20/2024  Time:    16:36               Narrative:    CLINICAL HISTORY:  ([id number] y/o  (1955) M    abd pain, elevated lipase;    TECHNIQUE:  ([id number], exam time 8/20/2024 16:27)    US ABDOMEN COMPLETE [id number]    Complete abdominal ultrasound, images obtained in grayscale and color. Additional Doppler images of the portal vein were obtained.    COMPARISON:  CT from the same day.    FINDINGS:  Please note this examination is technically suboptimal due to patient related factors  including body habitus as well as overlying bowel gas.    The LIVER is enlarged measuring 18.1 cm (sagittal right lobe). Hepatic parenchyma has increased echogenicity with decreased delineation of the periportal triads. No definite intrahepatic masses are noted. The portal vein is patent with hepatopetal flow .    The BILIARY SYSTEM is normal in caliber; the " common hepatic duct measures 3 mm.  The gallbladder is surgically absent.    The PANCREATIC head and body are partially obscured by bowel gas with no gross focal abnormality in the region.    The right KIDNEY is normal in size at 12.2 x 5.7 x 6.1 cm and echogenicity/texture.  There is a 1.3 cm simple cyst in the midpole the right kidney.  Partially obscured renal stones are seen (to better affect on the recent CT).  No hydronephrosis or contour deforming mass is identified.    The left KIDNEY is normal in size at 10.6 x 5.2 x 5.1 cm and echogenicity/texture.  No hydronephrosis is seen.  The previously described renal stones are seen on the previous CT.    The SPLEEN measures 9.4 cm and is homogeneous in echotexture.    The AORTA and IVC are partially visualized and unremarkable.                                       US Scrotum And Testicles (Final result)  Result time 08/20/24 16:32:39      Final result by Tavon Hinds MD (08/20/24 16:32:39)                   Impression:      No finding of spermatic cord torsion, intratesticular mass or epididymo-orchitis.      Electronically signed by: Tavon Hinds  Date:    08/20/2024  Time:    16:32               Narrative:    CLINICAL HISTORY:  ([id number] y/o  (1955) M    pain;    TECHNIQUE:  ([id number], exam time 8/20/2024 16:27)    US SCROTUM AND TESTICLES [id number]    Ultrasound examination of the genitalia was performed using grayscale and color Doppler    COMPARISON:  CT from the same day.    FINDINGS:  RIGHT:    Right testicle: Normal in size and echotexture. Normal vascularity on color Doppler. No mass.    Testicular size: 4.5 x 3.3 x 2.3 cm    Right epididymis: Visualized portions are normal. Normal vascularity on color Doppler.    Epididymal head size: 11 mm    Other: Trace peritesticular fluid, likely physiologic.  No varicocele is seen.    LEFT:    Left testicle: Normal in size and echotexture. Normal vascularity on color Doppler. No  "mass.    Testicular size: 4.2 x 3.1 x 2.9 cm    Left epididymis: Visualized portions are normal. Normal vascularity on color Doppler.    Epididymal head size: 10 mm    Other: Trace likely physiologic peritesticular fluid.  No varicocele is seen.                                       CT Renal Stone Study ABD Pelvis WO (Edited Result - FINAL)  Result time 08/20/24 12:46:45      Addendum (preliminary) 1 of 1 by Tavon Hinds MD (08/20/24 12:46:45)      RESULT NOTIFICATION: These observations were discussed by the dictating physician, by phone with, and acknowledged by Gama Underwood at 12:46 on 8/20/2024.      Electronically signed by: Tavon Hinds  Date:    08/20/2024  Time:    12:46                 Final result by Tavon Hinds MD (08/20/24 11:57:49)                   Impression:      1.  Numerous bilateral nonobstructing renal stones as above.    2.  No hydronephrosis/hydroureter are finding of obstructive uropathy.    3.  Heterogeneous moderately enlarged prostate gland.    4.  Hepatomegaly and background parenchymal findings of steatosis.    5.  Unchanged small bowel mesenteric fat-stranding with normal size small bowel mesenteric lymph nodes, none of which are enlarged by short axis size criteria ("richard mesentery pattern"); while this may be idiopathic, suggest mild enteritis or mesenteric panniculitis, note that mesenteric lymphoma is not excluded. Consider correlation with clinical symptoms, laboratory values and possible followup in 6 months as this is usually self-limiting      Electronically signed by: Tavon Hinds  Date:    08/20/2024  Time:    11:57               Narrative:      CMS MANDATED QUALITY DATA - CT RADIATION - 436    All CT scans at this facility utilize dose modulation, iterative reconstruction, and/or weight based dosing when appropriate to reduce radiation dose to as low as reasonably achievable.    CLINICAL HISTORY:  ([id number] y/o  (1955) M    Flank pain, " kidney stone suspected;    TECHNIQUE:  ([id number], exam time 8/20/2024 11:52)    CT RENAL STONE STUDY ABD PELVIS WO [id number]    Axial CT images of the abdomen and pelvis were obtained from the dome of the diaphragm to the proximal thighs.    COMPARISON:  CT from 07/29/2024.    FINDINGS:  Lower Thorax:    The lung bases are clear. The heart is top normal in size.    CT Abdomen:    Liver: The liver is borderline enlarged measuring 18.1 cm sagittal right lobe.  There is relative diffuse low attenuation suggesting steatosis.  Unchanged rounded low-attenuation simple fluid attenuation cysts are seen.    Gallbladder: Surgically absent.    Biliary Tree: No intra or extrahepatic ductal dilation.    Spleen: Normal.    Pancreas: The pancreas is normal.    Adrenal Glands: Normal    Kidneys: No hydronephrosis/hydroureter or evidence of obstructive uropathy.  There is a 15 mm simple cyst in the interpolar aspect of the right kidney.  Numerous bilateral nonobstructing renal stones are present, for example:    -5 mm stone in the midpole the right kidney (image 87)    -6 x 5 mm stone in the lower pole of the right kidney (image 104)    -5 x 4 mm stone in the midpole the left kidney (image 83)    -3 mm stone in the anterior interpolar aspect of the left kidney (image 87)    -2 mm and 2 mm adjacent stones in the midpole of the left kidney (image 95)    Vasculature: Normal.    Lymph nodes: Mild nonspecific small bowel mesenteric fat stranding is present without pathologic lymphadenopathy.  Findings are similar to the previous exam.    Intraperitoneal structures: There is no ascites.    Bowel: The bowel is normal in course and caliber without finding of obstruction, free air or abscess identified. The appendix is normal (image 155)    Abdominal wall: Small fat containing umbilical hernia.  Moderate-sized fat containing inguinal hernias.    Musculoskeletal: No acute osseous abnormality is identified.  There is exaggerated lordotic  curvature of the lumbar spine.    CT Pelvis:    Bladder: Normal.    Reproductive Organs: The prostate is heterogeneous and moderately enlarged.    Pelvic Lymph nodes: No pelvic lymphadenopathy is identified.                                          IMPRESSION / RECOMMENDATIONS:   Active Problem List with Overview Notes    Diagnosis Date Noted    Lower abdominal pain 08/20/2024    Testicular pain 08/20/2024    Nephrolithiasis 08/20/2024    Abdominal pain 08/20/2024    Hyperbilirubinemia 08/20/2024    Vertigo 05/11/2021    Complex tear of medial meniscus of left knee as current injury 10/13/2020    Spider bite wound 09/12/2019    Tick bite of calf, right, initial encounter 09/12/2019    Essential hypertension 09/12/2019    Gastroesophageal reflux disease without esophagitis 09/12/2019   69-year-old gentleman with a history of 2-3 months of abdominal pain mainly in the right flank radiating to the right groin.  CT with incidental finding mesenteric panniculitis and lipase mildly elevated.  Elevated bilirubin- can obtain MRCP in a.m. elevated lipase that is mild may also be consistent with mesenteric panniculitis.  Lipase has now normalized.  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  Normally this would manifest with more periumbilical pain in the patient's pain is right flank.  Symptomatic mesenteric panniculitis can often respond to Flagyl for 7 days   Rectal bleeding only 1 event.  We will coordinate outpatient colonoscopy  Hepatomegaly with steatosis.  We will order viral serologies for hepatitis as well  If MRCP is negative okay to discharge home by GI standpoint with follow-up with us as an outpatient       Thank you for this consult.    Inna Barker  8/21/2024  1:15 PM

## 2024-08-21 NOTE — NURSING
Pt tolerating full liquid diet. Antibiotics infusing. Pt reported blood with BM this am, NP notified. GI consult called in. No other needs at this time. Will continue to monitor.

## 2024-08-21 NOTE — PROGRESS NOTES
[facility] Medicine  Progress Note    Patient Name: Lewis Matias  MRN: [id number]  Patient Class: OP- Observation   Admission Date: 8/20/2024  Length of Stay: 0 days  Attending Physician: Pierre Abdi MD  Primary Care Provider: Ping Juarez NP        Subjective:     Principal Problem:Abdominal pain        HPI:  69-year-old male presented to ED for eval of flank pain and abdominal pain.  Patient reported he has been having intermittent right flank pain that radiates to his right lower quadrant and testicles for the last several months.  Denies testicle injury.  Patient does have history of multiple kidney stones, reports this feels very similar to when he has kidney stone pain, follows with Urology outpatient.  Denies fever, chills.  Denies vomiting, change in bowel habits.  Denies dysuria, hematuria.  Noted to have distended belly, patient reports this is chronic for him.  CT renal stone study/abdomen/pelvis impression with numerous bilateral nonobstructing renal stones , no hydronephrosis/hydroureter, heterogeneous moderately enlarged prostate gland, small bowel mesenteric fat-stranding with normal size small bowel mesenteric lymph nodes which may suggest mild enteritis or mesenteric panniculitis, note that mesenteric lymphoma is not excluded.  Lipase 137.  T bili 1.9.  Admit to hospital medicine for further eval.      Overview/Hospital Course:  No notes on file    Interval History:  Pt seen and examined.  He is not having abdominal pain this morning, though did experience bright red bleeding with bowel movement this morning.  He denies straining for bowel movement or hard stool.  Has Hx of internal hemorrhoids requiring surgery nearly 20 years ago.  Has had colonoscopy since, though was unable to relay timing.  Reports right abd pain extending to flank and groin x 2-3 mo.    Review of Systems   Constitutional:  Negative for chills and fever.   Respiratory:  Negative for cough and  shortness of breath.    Cardiovascular:  Negative for chest pain.   Gastrointestinal:  Positive for abdominal pain and blood in stool. Negative for nausea and vomiting.   Genitourinary:  Positive for flank pain. Negative for difficulty urinating and hematuria.     Objective:     Vital Signs (Most Recent):  Temp: 97.7 °F (36.5 °C) (08/21/24 0727)  Pulse: 66 (08/21/24 0727)  Resp: 16 (08/21/24 0727)  BP: (!) 113/57 (08/21/24 0727)  SpO2: (!) 94 % (08/21/24 0727) Vital Signs (24h Range):  Temp:  [97.4 °F (36.3 °C)-97.7 °F (36.5 °C)] 97.7 °F (36.5 °C)  Pulse:  [61-72] 66  Resp:  [15-25] 16  SpO2:  [93 %-97 %] 94 %  BP: ()/(51-93) 113/57     Weight: 119.1 kg (262 lb 9.6 oz)  Body mass index is 38.78 kg/m².    Intake/Output Summary (Last 24 hours) at 8/21/2024 1052  Last data filed at 8/21/2024 0805  Gross per 24 hour   Intake 1050 ml   Output 1150 ml   Net -100 ml         Physical Exam  Vitals and nursing note reviewed.   Constitutional:       Appearance: Normal appearance. He is obese.   HENT:      Head: Normocephalic and atraumatic.   Eyes:      Extraocular Movements: Extraocular movements intact.      Conjunctiva/sclera: Conjunctivae normal.      Pupils: Pupils are equal, round, and reactive to light.   Cardiovascular:      Rate and Rhythm: Normal rate and regular rhythm.   Pulmonary:      Effort: Pulmonary effort is normal. No respiratory distress.      Breath sounds: Normal breath sounds.   Abdominal:      General: Abdomen is flat. Bowel sounds are normal. There is no distension.      Palpations: Abdomen is soft.      Tenderness: There is no abdominal tenderness.   Genitourinary:     Comments: Rectal exam done with nurse, Shanell, at bedside.  -no evidence of fissure, external hemorrhoids noted without evidence of inflammation or acute bleeding.  Digital rectal exam with no rocco blood or obvious abnormality to me  Musculoskeletal:         General: Normal range of motion.      Cervical back: Normal range of  motion and neck supple.   Skin:     General: Skin is warm and dry.      Capillary Refill: Capillary refill takes less than 2 seconds.   Neurological:      General: No focal deficit present.      Mental Status: He is alert and oriented to person, place, and time. Mental status is at baseline.             Significant Labs: All pertinent labs within the past 24 hours have been reviewed.  CBC:   Recent Labs   Lab 08/20/24  1115 08/21/24  0256   WBC 8.47 5.33   HGB 18.5* 15.3   HCT 52.7 45.1    130*     CMP:   Recent Labs   Lab 08/20/24  1115 08/21/24  0256    137   K 4.4 4.1    104   CO2 21* 27    113*   BUN 13 14   CREATININE 0.9 1.0   CALCIUM 9.8 8.0*   PROT 7.3 5.5*   ALBUMIN 4.3 3.4*   BILITOT 1.9* 2.0*   ALKPHOS 65 48*   AST 18 12   ALT 19 13   ANIONGAP 13 6*       Significant Imaging: I have reviewed all pertinent imaging results/findings within the past 24 hours.    Assessment/Plan:      * Abdominal pain  Pt has 2-3 month Hx of abdominal pain worse on the right and seems positional.  He does have Hx of kidney stones and CT scan redemonstrates presence of kidney stones bilaterally, though no stones in ureters, no hydroureter.  The CT scan does exhibit Luisana mesentery in the mid abdomen without associated mesenteric lymphadenopathy.  Today he started having bright red blood with stooling.  -given his symptomatology, abnormal CT findings, and blood in stool feel it is prudent to consult with GI   -if this pain is in fact related to his kidney stones, it would be reasonable for him to follow up as an outpatient with the urologist to discuss further  -he can stay on liquid diet for now pending GI evaluation   -the CBC is stable    Hyperbilirubinemia  Past lab reviewed does sporadic elevations over the last several years   -nothing on the CT or ultrasound concerning for cholestasis, the gallbladder is surgically absent      Nephrolithiasis  Strain urine  IVF  Flomax  Pain control  -prompt  outpatient follow-up will be warranted      Testicular pain  Testicular US without any abnormalities      Essential hypertension  Chronic, controlled. Latest blood pressure and vitals reviewed-     Temp:  [98.2 °F (36.8 °C)]   Pulse:  [64-83]   Resp:  [18-20]   BP: (120-134)/(77-83)   SpO2:  [95 %-97 %] .   Home meds for hypertension were reviewed and noted below.   Hypertension Medications               lisinopriL 10 MG tablet Take 1 tablet (10 mg total) by mouth once daily.            While in the hospital, will manage blood pressure as follows; Continue home antihypertensive regimen    Will utilize p.r.n. blood pressure medication only if patient's blood pressure greater than 180/110 and he develops symptoms such as worsening chest pain or shortness of breath.      VTE Risk Mitigation (From admission, onward)           Ordered     enoxaparin injection 40 mg  Daily         08/20/24 1445     IP VTE HIGH RISK PATIENT  Once         08/20/24 1445     Place sequential compression device  Until discontinued         08/20/24 1445                    Discharge Planning   TJ: 8/23/2024     Code Status: Full Code   Is the patient medically ready for discharge?:     Reason for patient still in hospital (select all that apply): Patient trending condition, Treatment, and Consult recommendations                     Mitra Gerardo NP  Department of Hospital Medicine   [facility]

## 2024-08-21 NOTE — ASSESSMENT & PLAN NOTE
Past lab reviewed does sporadic elevations over the last several years   -nothing on the CT or ultrasound concerning for cholestasis, the gallbladder is surgically absent

## 2024-08-22 VITALS
RESPIRATION RATE: 15 BRPM | DIASTOLIC BLOOD PRESSURE: 74 MMHG | WEIGHT: 262.63 LBS | SYSTOLIC BLOOD PRESSURE: 137 MMHG | BODY MASS INDEX: 38.9 KG/M2 | TEMPERATURE: 98 F | HEART RATE: 72 BPM | HEIGHT: 69 IN | OXYGEN SATURATION: 96 %

## 2024-08-22 LAB
ALBUMIN SERPL BCP-MCNC: 3.7 G/DL (ref 3.5–5.2)
ALP SERPL-CCNC: 48 U/L (ref 55–135)
ALT SERPL W/O P-5'-P-CCNC: 16 U/L (ref 10–44)
ANION GAP SERPL CALC-SCNC: 7 MMOL/L (ref 8–16)
AST SERPL-CCNC: 14 U/L (ref 10–40)
BASOPHILS # BLD AUTO: 0.03 K/UL (ref 0–0.2)
BASOPHILS NFR BLD: 0.6 % (ref 0–1.9)
BILIRUB SERPL-MCNC: 1.8 MG/DL (ref 0.1–1)
BUN SERPL-MCNC: 11 MG/DL (ref 8–23)
CALCIUM SERPL-MCNC: 8.6 MG/DL (ref 8.7–10.5)
CHLORIDE SERPL-SCNC: 105 MMOL/L (ref 95–110)
CO2 SERPL-SCNC: 27 MMOL/L (ref 23–29)
CREAT SERPL-MCNC: 0.9 MG/DL (ref 0.5–1.4)
DIFFERENTIAL METHOD BLD: ABNORMAL
EOSINOPHIL # BLD AUTO: 0.3 K/UL (ref 0–0.5)
EOSINOPHIL NFR BLD: 5.6 % (ref 0–8)
ERYTHROCYTE [DISTWIDTH] IN BLOOD BY AUTOMATED COUNT: 13.5 % (ref 11.5–14.5)
EST. GFR  (NO RACE VARIABLE): >60 ML/MIN/1.73 M^2
GLUCOSE SERPL-MCNC: 100 MG/DL (ref 70–110)
HCT VFR BLD AUTO: 45.5 % (ref 40–54)
HGB BLD-MCNC: 15.4 G/DL (ref 14–18)
IMM GRANULOCYTES # BLD AUTO: 0.02 K/UL (ref 0–0.04)
IMM GRANULOCYTES NFR BLD AUTO: 0.4 % (ref 0–0.5)
LDH SERPL L TO P-CCNC: 147 U/L (ref 110–260)
LYMPHOCYTES # BLD AUTO: 1.3 K/UL (ref 1–4.8)
LYMPHOCYTES NFR BLD: 25.9 % (ref 18–48)
MAGNESIUM SERPL-MCNC: 1.9 MG/DL (ref 1.6–2.6)
MCH RBC QN AUTO: 30.3 PG (ref 27–31)
MCHC RBC AUTO-ENTMCNC: 33.8 G/DL (ref 32–36)
MCV RBC AUTO: 89 FL (ref 82–98)
MONOCYTES # BLD AUTO: 0.7 K/UL (ref 0.3–1)
MONOCYTES NFR BLD: 13.3 % (ref 4–15)
NEUTROPHILS # BLD AUTO: 2.8 K/UL (ref 1.8–7.7)
NEUTROPHILS NFR BLD: 54.2 % (ref 38–73)
NRBC BLD-RTO: 0 /100 WBC
PLATELET # BLD AUTO: 132 K/UL (ref 150–450)
PMV BLD AUTO: 11.3 FL (ref 9.2–12.9)
POTASSIUM SERPL-SCNC: 4 MMOL/L (ref 3.5–5.1)
PROT SERPL-MCNC: 5.9 G/DL (ref 6–8.4)
RBC # BLD AUTO: 5.09 M/UL (ref 4.6–6.2)
SODIUM SERPL-SCNC: 139 MMOL/L (ref 136–145)
TSH SERPL DL<=0.005 MIU/L-ACNC: 5.09 UIU/ML (ref 0.34–5.6)
URATE SERPL-MCNC: 7.7 MG/DL (ref 3.4–7)
WBC # BLD AUTO: 5.18 K/UL (ref 3.9–12.7)

## 2024-08-22 PROCEDURE — 36415 COLL VENOUS BLD VENIPUNCTURE: CPT | Performed by: INTERNAL MEDICINE

## 2024-08-22 PROCEDURE — 86258 DGP ANTIBODY EACH IG CLASS: CPT | Mod: 59 | Performed by: INTERNAL MEDICINE

## 2024-08-22 PROCEDURE — 86231 EMA EACH IG CLASS: CPT | Performed by: INTERNAL MEDICINE

## 2024-08-22 PROCEDURE — 86235 NUCLEAR ANTIGEN ANTIBODY: CPT | Performed by: INTERNAL MEDICINE

## 2024-08-22 PROCEDURE — 83516 IMMUNOASSAY NONANTIBODY: CPT | Mod: 59 | Performed by: INTERNAL MEDICINE

## 2024-08-22 PROCEDURE — 85025 COMPLETE CBC W/AUTO DIFF WBC: CPT

## 2024-08-22 PROCEDURE — 83615 LACTATE (LD) (LDH) ENZYME: CPT | Performed by: INTERNAL MEDICINE

## 2024-08-22 PROCEDURE — 84443 ASSAY THYROID STIM HORMONE: CPT | Performed by: INTERNAL MEDICINE

## 2024-08-22 PROCEDURE — 86255 FLUORESCENT ANTIBODY SCREEN: CPT | Performed by: INTERNAL MEDICINE

## 2024-08-22 PROCEDURE — 63600175 PHARM REV CODE 636 W HCPCS

## 2024-08-22 PROCEDURE — 25000003 PHARM REV CODE 250

## 2024-08-22 PROCEDURE — 25000003 PHARM REV CODE 250: Performed by: INTERNAL MEDICINE

## 2024-08-22 PROCEDURE — 80053 COMPREHEN METABOLIC PANEL: CPT

## 2024-08-22 PROCEDURE — 84550 ASSAY OF BLOOD/URIC ACID: CPT | Performed by: INTERNAL MEDICINE

## 2024-08-22 PROCEDURE — 82787 IGG 1 2 3 OR 4 EACH: CPT | Mod: 91 | Performed by: INTERNAL MEDICINE

## 2024-08-22 PROCEDURE — 83735 ASSAY OF MAGNESIUM: CPT

## 2024-08-22 RX ORDER — METRONIDAZOLE 500 MG/1
500 TABLET ORAL 3 TIMES DAILY
Qty: 21 TABLET | Refills: 0 | Status: SHIPPED | OUTPATIENT
Start: 2024-08-22 | End: 2024-08-29

## 2024-08-22 RX ADMIN — PIPERACILLIN SODIUM AND TAZOBACTAM SODIUM 3.38 G: 3; .375 INJECTION, POWDER, LYOPHILIZED, FOR SOLUTION INTRAVENOUS at 03:08

## 2024-08-22 RX ADMIN — LISINOPRIL 10 MG: 10 TABLET ORAL at 08:08

## 2024-08-22 RX ADMIN — FAMOTIDINE 20 MG: 20 TABLET ORAL at 08:08

## 2024-08-22 RX ADMIN — TAMSULOSIN HYDROCHLORIDE 0.4 MG: 0.4 CAPSULE ORAL at 08:08

## 2024-08-22 NOTE — PLAN OF CARE
Discharge orders and chart reviewed. No other discharge needs noted at this time. Pt is clear for discharge from case management. Pt is discharging to home.     PCP appointment scheduled per epic recommendations and added to AVS    Spouse to transport patient home   08/22/24 1012   Final Note   Assessment Type Final Discharge Note   Anticipated Discharge Disposition Home   What phone number can be called within the next 1-3 days to see how you are doing after discharge? [phone]   Hospital Resources/Appts/Education Provided Appointments scheduled and added to AVS   Post-Acute Status   Discharge Delays None known at this time

## 2024-08-22 NOTE — HOSPITAL COURSE
Patient was monitored closely during his stay after admission for abdominal pain.  He was treated with pain regimen with improved control.  He had some findings on imaging concerning for bilateral nephrolithiasis without ureterolithiasis, as well as mesenteric panniculitis.  GI was consulted after patient had an episode of bloody BM here in the hospital.  His blood counts remained stable and he had no recurrent bleeding.  GI ordered MRCP and serologies to better investigate his presentation.  MRCP with intrahepatic cyst stable since 2017 with no other concerning acute findings to explain presentation.  His pain improved to resolution here in the hospital.  He was discharged to complete a 7 day course of flagyl in case the mesenteric panniculitis was to blame for the symptoms.  He will follow closely in clinic with GI to review lab testing and plan for outpatient colonoscopy.  He will follow closely with Dr. Bee for his findings of nephrolithiasis without ureterolithiasis.    Patient was seen on day of discharge and deemed appropriate for discharge.

## 2024-08-22 NOTE — CARE UPDATE
08/21/24 7027   Patient Assessment/Suction   Level of Consciousness (AVPU) alert   Respiratory Effort Normal;Unlabored   Preset CPAP/BiPAP Settings   Mode Of Delivery   (Home cpap machine)   $ Is patient using? Yes   Size of Mask Medium/Large   CPAP (cm H2O) 14

## 2024-08-22 NOTE — NURSING
Pt verbalized understanding of discharge instructions. IV removed. Pt ambulated down to private vehicle with all personal belongings and spouse.

## 2024-08-22 NOTE — DISCHARGE SUMMARY
[facility], later [facility]  Discharge Summary      Patient Name: Lewis Matias  MRN: [id number]  DAYANNA: [id number]  Patient Class: IP- Inpatient  Admission Date: 8/20/2024  Hospital Length of Stay: 1 days  Discharge Date and Time: 8/22/2024 10:25 AM  Attending Physician: No att. providers found   Discharging Provider: Mitra Gerardo NP  Primary Care Provider: Ping Juarez NP    Primary Care Team: Networked reference to record PCT     HPI:   69-year-old male presented to ED for eval of flank pain and abdominal pain.  Patient reported he has been having intermittent right flank pain that radiates to his right lower quadrant and testicles for the last several months.  Denies testicle injury.  Patient does have history of multiple kidney stones, reports this feels very similar to when he has kidney stone pain, follows with Urology outpatient.  Denies fever, chills.  Denies vomiting, change in bowel habits.  Denies dysuria, hematuria.  Noted to have distended belly, patient reports this is chronic for him.  CT renal stone study/abdomen/pelvis impression with numerous bilateral nonobstructing renal stones , no hydronephrosis/hydroureter, heterogeneous moderately enlarged prostate gland, small bowel mesenteric fat-stranding with normal size small bowel mesenteric lymph nodes which may suggest mild enteritis or mesenteric panniculitis, note that mesenteric lymphoma is not excluded.  Lipase 137.  T bili 1.9.  Admit to hospital medicine for further eval.      * No surgery found *      Hospital Course:   Patient was monitored closely during his stay after admission for abdominal pain.  He was treated with pain regimen with improved control.  He had some findings on imaging concerning for bilateral nephrolithiasis without ureterolithiasis, as well as mesenteric panniculitis.  GI was consulted after patient had an episode of bloody BM here in the hospital.  His blood counts remained stable and he had no  recurrent bleeding.  GI ordered MRCP and serologies to better investigate his presentation.  MRCP with intrahepatic cyst stable since 2017 with no other concerning acute findings to explain presentation.  His pain improved to resolution here in the hospital.  He was discharged to complete a 7 day course of flagyl in case the mesenteric panniculitis was to blame for the symptoms.  He will follow closely in clinic with GI to review lab testing and plan for outpatient colonoscopy.  He will follow closely with Dr. Bee for his findings of nephrolithiasis without ureterolithiasis.    Patient was seen on day of discharge and deemed appropriate for discharge.     Goals of Care Treatment Preferences:  Code Status: Full Code      SDOH Screening:  The patient was screened for utility difficulties, food insecurity, transport difficulties, housing insecurity, and interpersonal safety and there were no concerns identified this admission.     Consults:   Consults (From admission, onward)          Status Ordering Provider     Inpatient consult to Gastroenterology  Once        Provider:  (Not yet assigned)    BALBINA Bragg            No new Assessment & Plan notes have been filed under this hospital service since the last note was generated.  Service: Hospital Medicine    Final Active Diagnoses:    Diagnosis Date Noted POA    PRINCIPAL PROBLEM:  Abdominal pain [R10.9] 08/20/2024 Yes    Testicular pain [N50.819] 08/20/2024 Yes    Nephrolithiasis [N20.0] 08/20/2024 Yes    Hyperbilirubinemia [E80.6] 08/20/2024 Yes    Essential hypertension [I10] 09/12/2019 Yes      Problems Resolved During this Admission:       Discharged Condition: good    Disposition: Home or Self Care    Follow Up:   Follow-up Information       Ping Juarez NP. Go on 9/3/2024.    Specialty: Family Medicine  Why: hospital follow up appointment scheduled at 10:20 AM  Contact information:  [address]  [address]  [place]  [address]  [phone]               Inna Barker MD Follow up in 1 week(s).    Specialty: Gastroenterology  Why: case management unable to schedule - please call clinic to schedule hospital follow up appointment  Contact information:  [address]  [place] [address]  [phone]               Pepe Bee Jr., MD Follow up in 1 week(s).    Specialty: Urology  Why: [employer] message sent to Dr. Bee' clinic to notify of discharge on today and requested close hospital follow up appointment - clinic to contact patient  Contact information:  [address] [place] [address] [address]  [place] [address]  [phone]                           Patient Instructions:      Diet Adult Regular     Notify your health care provider if you experience any of the following:  temperature >100.4     Notify your health care provider if you experience any of the following:  persistent nausea and vomiting or diarrhea     Notify your health care provider if you experience any of the following:  severe uncontrolled pain     Notify your health care provider if you experience any of the following:  difficulty breathing or increased cough     Activity as tolerated       Significant Diagnostic Studies: Labs: CMP   Recent Labs   Lab 08/21/24  0256 08/22/24  0337    139   K 4.1 4.0    105   CO2 27 27   * 100   BUN 14 11   CREATININE 1.0 0.9   CALCIUM 8.0* 8.6*   PROT 5.5* 5.9*   ALBUMIN 3.4* 3.7   BILITOT 2.0* 1.8*   ALKPHOS 48* 48*   AST 12 14   ALT 13 16   ANIONGAP 6* 7*    and CBC   Recent Labs   Lab 08/21/24  0256 08/22/24  0337   WBC 5.33 5.18   HGB 15.3 15.4   HCT 45.1 45.5   * 132*     Imaging Results              US Abdomen Complete (Final result)  Result time 08/20/24 16:36:09      Final result by Tavon Hinds MD (08/20/24 16:36:09)                   Impression:      1.  Technically suboptimal exam secondary to bowel gas and habitus.    2.  Hepatomegaly background parenchymal findings of  steatosis.    3.  Bilateral nonobstructing renal stones (better appreciated on the recent CT).    4.  Poor visualization of the pancreas, without findings to specifically suggest pancreatitis.    5.  Right renal cortical cyst.    .      Electronically signed by: Tavon Hinds  Date:    08/20/2024  Time:    16:36               Narrative:    CLINICAL HISTORY:  ([id number])68 y/o  (1955) M    abd pain, elevated lipase;    TECHNIQUE:  ([id number], exam time 8/20/2024 16:27)    US ABDOMEN COMPLETE [id number]    Complete abdominal ultrasound, images obtained in grayscale and color. Additional Doppler images of the portal vein were obtained.    COMPARISON:  CT from the same day.    FINDINGS:  Please note this examination is technically suboptimal due to patient related factors  including body habitus as well as overlying bowel gas.    The LIVER is enlarged measuring 18.1 cm (sagittal right lobe). Hepatic parenchyma has increased echogenicity with decreased delineation of the periportal triads. No definite intrahepatic masses are noted. The portal vein is patent with hepatopetal flow .    The BILIARY SYSTEM is normal in caliber; the common hepatic duct measures 3 mm.  The gallbladder is surgically absent.    The PANCREATIC head and body are partially obscured by bowel gas with no gross focal abnormality in the region.    The right KIDNEY is normal in size at 12.2 x 5.7 x 6.1 cm and echogenicity/texture.  There is a 1.3 cm simple cyst in the midpole the right kidney.  Partially obscured renal stones are seen (to better affect on the recent CT).  No hydronephrosis or contour deforming mass is identified.    The left KIDNEY is normal in size at 10.6 x 5.2 x 5.1 cm and echogenicity/texture.  No hydronephrosis is seen.  The previously described renal stones are seen on the previous CT.    The SPLEEN measures 9.4 cm and is homogeneous in echotexture.    The AORTA and IVC are partially visualized and unremarkable.                                        US Scrotum And Testicles (Final result)  Result time 08/20/24 16:32:39      Final result by Tavon Hinds MD (08/20/24 16:32:39)                   Impression:      No finding of spermatic cord torsion, intratesticular mass or epididymo-orchitis.      Electronically signed by: Tavon Hinds  Date:    08/20/2024  Time:    16:32               Narrative:    CLINICAL HISTORY:  ([id number])70 y/o  (1955) M    pain;    TECHNIQUE:  ([id number], exam time 8/20/2024 16:27)    US SCROTUM AND TESTICLES [id number]    Ultrasound examination of the genitalia was performed using grayscale and color Doppler    COMPARISON:  CT from the same day.    FINDINGS:  RIGHT:    Right testicle: Normal in size and echotexture. Normal vascularity on color Doppler. No mass.    Testicular size: 4.5 x 3.3 x 2.3 cm    Right epididymis: Visualized portions are normal. Normal vascularity on color Doppler.    Epididymal head size: 11 mm    Other: Trace peritesticular fluid, likely physiologic.  No varicocele is seen.    LEFT:    Left testicle: Normal in size and echotexture. Normal vascularity on color Doppler. No mass.    Testicular size: 4.2 x 3.1 x 2.9 cm    Left epididymis: Visualized portions are normal. Normal vascularity on color Doppler.    Epididymal head size: 10 mm    Other: Trace likely physiologic peritesticular fluid.  No varicocele is seen.                                       CT Renal Stone Study ABD Pelvis WO (Edited Result - FINAL)  Result time 08/20/24 12:46:45      Addendum (preliminary) 1 of 1 by Tavon Hinds MD (08/20/24 12:46:45)      RESULT NOTIFICATION: These observations were discussed by the dictating physician, by phone with, and acknowledged by Gama Underwood at 12:46 on 8/20/2024.      Electronically signed by: Tavon Hinds  Date:    08/20/2024  Time:    12:46                 Final result by Tavon Hinds MD (08/20/24 11:57:49)                   Impression:      1.   "Numerous bilateral nonobstructing renal stones as above.    2.  No hydronephrosis/hydroureter are finding of obstructive uropathy.    3.  Heterogeneous moderately enlarged prostate gland.    4.  Hepatomegaly and background parenchymal findings of steatosis.    5.  Unchanged small bowel mesenteric fat-stranding with normal size small bowel mesenteric lymph nodes, none of which are enlarged by short axis size criteria ("richard mesentery pattern"); while this may be idiopathic, suggest mild enteritis or mesenteric panniculitis, note that mesenteric lymphoma is not excluded. Consider correlation with clinical symptoms, laboratory values and possible followup in 6 months as this is usually self-limiting      Electronically signed by: Tavon Hinds  Date:    08/20/2024  Time:    11:57               Narrative:      CMS MANDATED QUALITY DATA - CT RADIATION - 436    All CT scans at this facility utilize dose modulation, iterative reconstruction, and/or weight based dosing when appropriate to reduce radiation dose to as low as reasonably achievable.    CLINICAL HISTORY:  ([id number])68 y/o  (1955) M    Flank pain, kidney stone suspected;    TECHNIQUE:  (A#[id number], exam time 8/20/2024 11:52)    CT RENAL STONE STUDY ABD PELVIS WO [id number]    Axial CT images of the abdomen and pelvis were obtained from the dome of the diaphragm to the proximal thighs.    COMPARISON:  CT from 07/29/2024.    FINDINGS:  Lower Thorax:    The lung bases are clear. The heart is top normal in size.    CT Abdomen:    Liver: The liver is borderline enlarged measuring 18.1 cm sagittal right lobe.  There is relative diffuse low attenuation suggesting steatosis.  Unchanged rounded low-attenuation simple fluid attenuation cysts are seen.    Gallbladder: Surgically absent.    Biliary Tree: No intra or extrahepatic ductal dilation.    Spleen: Normal.    Pancreas: The pancreas is normal.    Adrenal Glands: Normal    Kidneys: No hydronephrosis/hydroureter " or evidence of obstructive uropathy.  There is a 15 mm simple cyst in the interpolar aspect of the right kidney.  Numerous bilateral nonobstructing renal stones are present, for example:    -5 mm stone in the midpole the right kidney (image 87)    -6 x 5 mm stone in the lower pole of the right kidney (image 104)    -5 x 4 mm stone in the midpole the left kidney (image 83)    -3 mm stone in the anterior interpolar aspect of the left kidney (image 87)    -2 mm and 2 mm adjacent stones in the midpole of the left kidney (image 95)    Vasculature: Normal.    Lymph nodes: Mild nonspecific small bowel mesenteric fat stranding is present without pathologic lymphadenopathy.  Findings are similar to the previous exam.    Intraperitoneal structures: There is no ascites.    Bowel: The bowel is normal in course and caliber without finding of obstruction, free air or abscess identified. The appendix is normal (image 155)    Abdominal wall: Small fat containing umbilical hernia.  Moderate-sized fat containing inguinal hernias.    Musculoskeletal: No acute osseous abnormality is identified.  There is exaggerated lordotic curvature of the lumbar spine.    CT Pelvis:    Bladder: Normal.    Reproductive Organs: The prostate is heterogeneous and moderately enlarged.    Pelvic Lymph nodes: No pelvic lymphadenopathy is identified.                                      Pending Diagnostic Studies:       Procedure Component Value Units Date/Time    Anti-scleroderma antibody [[id number]] Collected: 08/22/24 0336    Order Status: Sent Lab Status: In process Updated: 08/22/24 0507    Specimen: Blood     Narrative:      Collection has been rescheduled by [facility] at 08/21/2024 19:35 Reason:   Draw with am labs    Celiac Disease Comprehensive Panel [[id number]] Collected: 08/22/24 0336    Order Status: Sent Lab Status: In process Updated: 08/22/24 0507    Specimen: Blood     Narrative:      Collection has been rescheduled by [facility] at 08/21/2024 19:35  Reason:   Draw with am labs    IBD Expanded Panel [[id number]] Collected: 08/22/24 0336    Order Status: Sent Lab Status: In process Updated: 08/22/24 0507    Specimen: Blood     Narrative:      Collection has been rescheduled by MYB at 08/21/2024 19:35 Reason:   Draw with am labs    IgG 1, 2, 3, and 4 [[id number]] Collected: 08/22/24 0336    Order Status: Sent Lab Status: In process Updated: 08/22/24 0507    Specimen: Blood     Narrative:      Collection has been rescheduled by MYB at 08/21/2024 19:35 Reason:   Draw with am labs  Collection has been rescheduled by MYB at 08/21/2024 19:35 Reason:   Draw with am labs           Medications:  Reconciled Home Medications:      Medication List        START taking these medications      metroNIDAZOLE 500 MG tablet  Commonly known as: FLAGYL  Take 1 tablet (500 mg total) by mouth 3 (three) times daily. for 7 days            CONTINUE taking these medications      lisinopriL 10 MG tablet  Take 1 tablet (10 mg total) by mouth once daily.     meloxicam 7.5 MG tablet  Commonly known as: MOBIC  Take 1 tablet (7.5 mg total) by mouth once daily.     tamsulosin 0.4 mg Cap  Commonly known as: FLOMAX  Take 1 capsule (0.4 mg total) by mouth once daily. Take in evening.     TYLENOL EXTRA STRENGTH ORAL  Take by mouth.            ASK your doctor about these medications      tadalafiL 20 MG Tab  Commonly known as: CIALIS  Take 1 tablet (20 mg total) by mouth once daily.              Indwelling Lines/Drains at time of discharge:   Lines/Drains/Airways       None                   Time spent on the discharge of patient: 35 minutes         Mitra Gerardo NP  Department of Hospital Medicine  [facility]

## 2024-08-22 NOTE — CARE UPDATE
08/21/24 2000   Patient Assessment/Suction   Level of Consciousness (AVPU) alert   Respiratory Effort Normal   Expansion/Accessory Muscles/Retractions no use of accessory muscles;no retractions   All Lung Fields Breath Sounds clear;equal bilaterally   Rhythm/Pattern, Respiratory no shortness of breath reported;unlabored;pattern regular   PRE-TX-O2   Device (Oxygen Therapy) room air   SpO2 98 %   Pulse Oximetry Type Intermittent   $ Pulse Oximetry - Multiple Charge Pulse Oximetry - Multiple   Incentive Spirometer   $ Incentive Spirometer Charges done with encouragement   Incentive Spirometer Predicted Level (mL) 2500   Administration (IS) mouthpiece utilized   Number of Repetitions (IS) 10   Level Incentive Spirometer (mL) 2000   Patient Tolerance (IS) good

## 2024-08-22 NOTE — DISCHARGE INSTRUCTIONS
"You will need close follow up with GI and urology.  GI will go over lab work up with you which is not yet resulted and discuss colonoscopy planning  -you will take 7 days of flagyl for CT findings concerning for something called "mesenteric panniculitis".    -Do not while taking the flagyl (metronidazole)    Discharge Instructions, [facility]    Thank you for choosing [facility] for your medical care. The primary doctor who is taking care of you at the time of your discharge is Pierre Abdi MD.     You were admitted to the hospital with Abdominal pain.     Please note your discharge instructions, including diet/activity restrictions, follow-up appointments, and medication changes.  If you have any questions about your medical issues, prescriptions, or any other questions, please feel free to contact the [facility] Medicine Dept at [phone] and we will help.    If you are previously with Home health, outpatient PT/OT or under a therapy program, you are cleared to return to those programs.    Please direct all long term medication refills and follow up to your primary care provider, Ping Juarez NP. Thank you again for letting us take care of your health care needs.    Please note the following discharge instructions per your discharging physician-  Mitra Gerardo NP  "

## 2024-08-23 ENCOUNTER — TELEPHONE (OUTPATIENT)
Dept: FAMILY MEDICINE | Facility: CLINIC | Age: 69
End: 2024-08-23
Payer: MEDICARE

## 2024-08-23 LAB — ENA SCL70 AB SER-ACNC: <0.2 AI (ref 0–0.9)

## 2024-08-23 NOTE — TELEPHONE ENCOUNTER
Makenzie Diop LPN [facility] Staff  Call patient - needs post-hospital phone call within 2 business days and hospital follow up visit scheduled within 7-14 days.

## 2024-08-24 LAB
ENDOMYSIUM IGA SER QL: NEGATIVE
GLIADIN PEPTIDE IGA SER-ACNC: 5 UNITS (ref 0–19)
GLIADIN PEPTIDE IGG SER-ACNC: 2 UNITS (ref 0–19)
IGA SERPL-MCNC: 101 MG/DL (ref 61–437)
TTG IGA SER-ACNC: <2 U/ML (ref 0–3)
TTG IGG SER-ACNC: <2 U/ML (ref 0–5)

## 2024-08-26 ENCOUNTER — HOSPITAL ENCOUNTER (OUTPATIENT)
Dept: RADIOLOGY | Facility: HOSPITAL | Age: 69
Discharge: HOME | End: 2024-08-26
Attending: SPECIALIST
Payer: MEDICARE

## 2024-08-26 DIAGNOSIS — N20.0 URIC ACID NEPHROLITHIASIS: ICD-10-CM

## 2024-08-26 DIAGNOSIS — Z12.5 SPECIAL SCREENING FOR MALIGNANT NEOPLASM OF PROSTATE: ICD-10-CM

## 2024-08-26 DIAGNOSIS — N20.0 URIC ACID NEPHROLITHIASIS: Primary | ICD-10-CM

## 2024-08-26 PROCEDURE — 74018 RADEX ABDOMEN 1 VIEW: CPT | Mod: TC,PO

## 2024-08-26 PROCEDURE — 74018 RADEX ABDOMEN 1 VIEW: CPT | Mod: 26,,, | Performed by: RADIOLOGY

## 2024-08-27 ENCOUNTER — TELEPHONE (OUTPATIENT)
Dept: FAMILY MEDICINE | Facility: CLINIC | Age: 69
End: 2024-08-27
Payer: MEDICARE

## 2024-08-27 NOTE — TELEPHONE ENCOUNTER
----- Message from Mary Farooq sent at 8/27/2024  1:55 PM CDT -----  - 1:27- pt is calling back   [phone]

## 2024-08-28 ENCOUNTER — PATIENT OUTREACH (OUTPATIENT)
Dept: FAMILY MEDICINE | Facility: CLINIC | Age: 69
End: 2024-08-28
Payer: MEDICARE

## 2024-08-28 NOTE — PROGRESS NOTES
Discharge Information     Discharge Date:   8/22/24     Primary Discharge Diagnosis:  Abdominal pain       Discharge Summary:  Reviewed      Medication & Order Review     Were medication changes made or new medications added?   Yes    If so, has the patient filled the prescriptions?  Yes     Was Home Health ordered? No    If so, has Home Health contacted patient and/or initiated services?  No    Name of Home Health Agency? N/A    Durable Medical Equipment ordered?  No     If so, has the DME provider contacted patient and delivered equipment?  N/A    Follow Up               Any problems since discharge? No    How is the patient feeling since returning home?      Have you set up recommended follow up appointments?  (cardiology, surgery, etc.)    Schedule Hospital Follow-up appointment within 7-14 days (preferably 7).      Notes:  Patient states he is feeling okay. Medication changes and picked up. No home health care and durable medical equipment prescribed. Patient reports no complications. Did follow up with Dr. Swain this past Monday and due to follow up with Dr. Barker. Patient already scheduled for appointment with Nurse Practitioner Larry.            Bernarda Baker

## 2024-08-28 NOTE — TELEPHONE ENCOUNTER
Left voice message for patient to return clinic's call.    Detail Level: Zone Initiate Treatment: Cicloprox solution apply twice daily until completely resolved Initiate Treatment: Ketoconazole cream 2% twice daily as needed

## 2024-08-28 NOTE — TELEPHONE ENCOUNTER
Patient states he is feeling okay. Medication changes and picked up. No home health care and durable medical equipment prescribed. Patient reports no complications. Did follow up with Dr. Swain this past Monday and due to follow up with Dr. Barker. Patient already scheduled for appointment with Nurse Practitioner Larry. Patient out reach created.

## 2024-09-03 ENCOUNTER — OFFICE VISIT (OUTPATIENT)
Dept: FAMILY MEDICINE | Facility: CLINIC | Age: 69
End: 2024-09-03
Payer: MEDICARE

## 2024-09-03 VITALS
BODY MASS INDEX: 39.55 KG/M2 | HEIGHT: 69 IN | DIASTOLIC BLOOD PRESSURE: 62 MMHG | HEART RATE: 84 BPM | WEIGHT: 267 LBS | RESPIRATION RATE: 18 BRPM | SYSTOLIC BLOOD PRESSURE: 118 MMHG | OXYGEN SATURATION: 96 %

## 2024-09-03 DIAGNOSIS — Z79.899 HIGH RISK MEDICATION USE: ICD-10-CM

## 2024-09-03 DIAGNOSIS — M79.3 PANNICULITIS: ICD-10-CM

## 2024-09-03 DIAGNOSIS — N20.0 KIDNEY STONE: ICD-10-CM

## 2024-09-03 DIAGNOSIS — Z09 HOSPITAL DISCHARGE FOLLOW-UP: ICD-10-CM

## 2024-09-03 DIAGNOSIS — I10 ESSENTIAL HYPERTENSION: Primary | ICD-10-CM

## 2024-09-03 PROCEDURE — 99495 TRANSJ CARE MGMT MOD F2F 14D: CPT | Mod: S$GLB,,, | Performed by: NURSE PRACTITIONER

## 2024-09-03 PROCEDURE — 1125F AMNT PAIN NOTED PAIN PRSNT: CPT | Mod: CPTII,S$GLB,, | Performed by: NURSE PRACTITIONER

## 2024-09-03 PROCEDURE — 1159F MED LIST DOCD IN RCRD: CPT | Mod: CPTII,S$GLB,, | Performed by: NURSE PRACTITIONER

## 2024-09-03 PROCEDURE — 3288F FALL RISK ASSESSMENT DOCD: CPT | Mod: CPTII,S$GLB,, | Performed by: NURSE PRACTITIONER

## 2024-09-03 PROCEDURE — 1160F RVW MEDS BY RX/DR IN RCRD: CPT | Mod: CPTII,S$GLB,, | Performed by: NURSE PRACTITIONER

## 2024-09-03 PROCEDURE — 1101F PT FALLS ASSESS-DOCD LE1/YR: CPT | Mod: CPTII,S$GLB,, | Performed by: NURSE PRACTITIONER

## 2024-09-03 PROCEDURE — 4010F ACE/ARB THERAPY RXD/TAKEN: CPT | Mod: CPTII,S$GLB,, | Performed by: NURSE PRACTITIONER

## 2024-09-03 PROCEDURE — 3078F DIAST BP <80 MM HG: CPT | Mod: CPTII,S$GLB,, | Performed by: NURSE PRACTITIONER

## 2024-09-03 PROCEDURE — 3074F SYST BP LT 130 MM HG: CPT | Mod: CPTII,S$GLB,, | Performed by: NURSE PRACTITIONER

## 2024-09-03 PROCEDURE — 1111F DSCHRG MED/CURRENT MED MERGE: CPT | Mod: CPTII,S$GLB,, | Performed by: NURSE PRACTITIONER

## 2024-09-03 RX ORDER — HYDROCODONE BITARTRATE AND ACETAMINOPHEN 7.5; 325 MG/1; MG/1
1 TABLET ORAL EVERY 8 HOURS PRN
Qty: 30 TABLET | Refills: 0 | Status: ON HOLD | OUTPATIENT
Start: 2024-09-03 | End: 2024-09-05 | Stop reason: HOSPADM

## 2024-09-03 NOTE — PROGRESS NOTES
SUBJECTIVE:    Patient ID: Lewis Matias is a 69 y.o. male.    Chief Complaint: Follow-up (No bottles// no refills needed//hospital follow up pt went to ER 08/20 for kidney stones was having right side pain pt states he is still in pain he was told kidney stone is 5mm//PHQ-9=15)    69-year-old male presents for hospital follow up. He is alone at visit today. He is treated regularly for htn. Bp is well controlled. He present to ED for eval of flank pain and abdominal pain.  Patient reported he has been having intermittent right flank pain that radiates to his right lower quadrant and testicles for the last several months.  Denies testicle injury.  Patient does have history of multiple kidney stones, reports this feels very similar to when he has kidney stone pain, follows with Urology outpatient.  Denies fever, chills.  Denies vomiting, change in bowel habits.  Denies dysuria, hematuria.  Noted to have distended belly, patient reports this is chronic for him.  CT renal stone study/abdomen/pelvis impression with numerous bilateral nonobstructing renal stones , no hydronephrosis/hydroureter, heterogeneous moderately enlarged prostate gland, small bowel mesenteric fat-stranding with normal size small bowel mesenteric lymph nodes which may suggest mild enteritis or mesenteric panniculitis, note that mesenteric lymphoma is not excluded.  Lipase 137.  T bili 1.9.  Admit to hospital medicine for further eval.    Patient was monitored closely during his stay after admission for abdominal pain.  He was treated with pain regimen with improved control.  He had some findings on imaging concerning for bilateral nephrolithiasis without ureterolithiasis, as well as mesenteric panniculitis.  GI was consulted after patient had an episode of bloody BM here in the hospital.  His blood counts remained stable and he had no recurrent bleeding.  GI ordered MRCP and serologies to better investigate his presentation.  MRCP with  intrahepatic cyst stable since 2017 with no other concerning acute findings to explain presentation.  His pain improved to resolution here in the hospital.  He was discharged to complete a 7 day course of flagyl in case the mesenteric panniculitis was to blame for the symptoms.  He will follow closely in clinic with GI to review lab testing and plan for outpatient colonoscopy.    No additional diarrhea since discharge. He is still having back and side pain. He is planning to follow up with dr. Mcdermott today. Hoping to have stones removed.       Follow-up  Pertinent negatives include no abdominal pain, arthralgias, chest pain, coughing, fatigue, fever, headaches, nausea, rash, sore throat, vomiting or weakness.       Admit Date: 08/20/24  Discharge Date: 08/21/24  Discharge Facility: Hospital      Family and/or Caretaker present at visit? No  Medication Reconciliation:  Medications changed/added/deleted. Med list reconciled  New Prescriptions filled after discharge: yes  Discharge summary reviewed:  yes  Diagnostic tests reviewed/disposition: I have reviewed all completed as well as pending diagnostic tests at the time of discharge  Disease/illness education: y  Follow up appointments scheduled:  yes             urology  Follow up labs/tests ordered:   yes  Home Health ordered on discharge: Patient does not have home health established from hospital visit.  They do not need home health.  If needed, we will set up home health for the patient  Home Health company name: no  Establishment or re-establishment of referral orders for community resources: No other necessary community resources  DME ordered at discharge:   yes  How patient is feeling since discharge from the hospital?  still have significant     Discussion with other health care providers: No discussion with other health care providers necessary  Patient follow up phone call documented on separate encounter.    Lab Visit on 08/26/2024   Component Date  Value Ref Range Status    Sodium 08/26/2024 140  136 - 145 mmol/L Final    Potassium 08/26/2024 4.1  3.5 - 5.1 mmol/L Final    Chloride 08/26/2024 103  95 - 110 mmol/L Final    CO2 08/26/2024 28  23 - 29 mmol/L Final    Glucose 08/26/2024 90  70 - 110 mg/dL Final    BUN 08/26/2024 13  8 - 23 mg/dL Final    Creatinine 08/26/2024 0.9  0.5 - 1.4 mg/dL Final    Calcium 08/26/2024 9.4  8.7 - 10.5 mg/dL Final    Anion Gap 08/26/2024 9  8 - 16 mmol/L Final    eGFR 08/26/2024 >60.0  >60 mL/min/1.73 m^2 Final    PSA, Screen 08/26/2024 3.08  0.00 - 4.00 ng/mL Final   Admission on 08/20/2024, Discharged on 08/22/2024   Component Date Value Ref Range Status    WBC 08/20/2024 8.47  3.90 - 12.70 K/uL Final    RBC 08/20/2024 5.94  4.60 - 6.20 M/uL Final    Hemoglobin 08/20/2024 18.5 (H)  14.0 - 18.0 g/dL Final    Hematocrit 08/20/2024 52.7  40.0 - 54.0 % Final    MCV 08/20/2024 89  82 - 98 fL Final    MCH 08/20/2024 31.1 (H)  27.0 - 31.0 pg Final    MCHC 08/20/2024 35.1  32.0 - 36.0 g/dL Final    RDW 08/20/2024 13.6  11.5 - 14.5 % Final    Platelets 08/20/2024 164  150 - 450 K/uL Final    MPV 08/20/2024 11.5  9.2 - 12.9 fL Final    Immature Granulocytes 08/20/2024 0.4  0.0 - 0.5 % Final    Gran # (ANC) 08/20/2024 5.2  1.8 - 7.7 K/uL Final    Immature Grans (Abs) 08/20/2024 0.03  0.00 - 0.04 K/uL Final    Lymph # 08/20/2024 1.9  1.0 - 4.8 K/uL Final    Mono # 08/20/2024 1.1 (H)  0.3 - 1.0 K/uL Final    Eos # 08/20/2024 0.3  0.0 - 0.5 K/uL Final    Baso # 08/20/2024 0.07  0.00 - 0.20 K/uL Final    nRBC 08/20/2024 0  0 /100 WBC Final    Gran % 08/20/2024 60.9  38.0 - 73.0 % Final    Lymph % 08/20/2024 22.1  18.0 - 48.0 % Final    Mono % 08/20/2024 12.5  4.0 - 15.0 % Final    Eosinophil % 08/20/2024 3.3  0.0 - 8.0 % Final    Basophil % 08/20/2024 0.8  0.0 - 1.9 % Final    Differential Method 08/20/2024 Automated   Final    Sodium 08/20/2024 136  136 - 145 mmol/L Final    Potassium 08/20/2024 4.4  3.5 - 5.1 mmol/L Final    Chloride  08/20/2024 102  95 - 110 mmol/L Final    CO2 08/20/2024 21 (L)  23 - 29 mmol/L Final    Glucose 08/20/2024 101  70 - 110 mg/dL Final    BUN 08/20/2024 13  8 - 23 mg/dL Final    Creatinine 08/20/2024 0.9  0.5 - 1.4 mg/dL Final    Calcium 08/20/2024 9.8  8.7 - 10.5 mg/dL Final    Total Protein 08/20/2024 7.3  6.0 - 8.4 g/dL Final    Albumin 08/20/2024 4.3  3.5 - 5.2 g/dL Final    Total Bilirubin 08/20/2024 1.9 (H)  0.1 - 1.0 mg/dL Final    Alkaline Phosphatase 08/20/2024 65  55 - 135 U/L Final    AST 08/20/2024 18  10 - 40 U/L Final    ALT 08/20/2024 19  10 - 44 U/L Final    eGFR 08/20/2024 >60.0  >60 mL/min/1.73 m^2 Final    Anion Gap 08/20/2024 13  8 - 16 mmol/L Final    Specimen UA 08/20/2024 Urine, Clean Catch   Final    Color, UA 08/20/2024 Yellow  Yellow, Straw, Bee Final    Appearance, UA 08/20/2024 Clear  Clear Final    pH, UA 08/20/2024 8.0  5.0 - 8.0 Final    Specific Gravity, UA 08/20/2024 1.010  1.005 - 1.030 Final    Protein, UA 08/20/2024 Negative  Negative Final    Glucose, UA 08/20/2024 Negative  Negative Final    Ketones, UA 08/20/2024 Negative  Negative Final    Bilirubin (UA) 08/20/2024 Negative  Negative Final    Occult Blood UA 08/20/2024 Negative  Negative Final    Nitrite, UA 08/20/2024 Negative  Negative Final    Urobilinogen, UA 08/20/2024 Negative  Negative EU/dL Final    Leukocytes, UA 08/20/2024 Negative  Negative Final    Lipase 08/20/2024 137 (H)  4 - 60 U/L Final    Amylase 08/20/2024 65  20 - 110 U/L Final    Procalcitonin 08/20/2024 0.054  0.000 - 0.500 ng/mL Final    Sodium 08/21/2024 137  136 - 145 mmol/L Final    Potassium 08/21/2024 4.1  3.5 - 5.1 mmol/L Final    Chloride 08/21/2024 104  95 - 110 mmol/L Final    CO2 08/21/2024 27  23 - 29 mmol/L Final    Glucose 08/21/2024 113 (H)  70 - 110 mg/dL Final    BUN 08/21/2024 14  8 - 23 mg/dL Final    Creatinine 08/21/2024 1.0  0.5 - 1.4 mg/dL Final    Calcium 08/21/2024 8.0 (L)  8.7 - 10.5 mg/dL Final    Total Protein 08/21/2024 5.5  (L)  6.0 - 8.4 g/dL Final    Albumin 08/21/2024 3.4 (L)  3.5 - 5.2 g/dL Final    Total Bilirubin 08/21/2024 2.0 (H)  0.1 - 1.0 mg/dL Final    Alkaline Phosphatase 08/21/2024 48 (L)  55 - 135 U/L Final    AST 08/21/2024 12  10 - 40 U/L Final    ALT 08/21/2024 13  10 - 44 U/L Final    eGFR 08/21/2024 >60.0  >60 mL/min/1.73 m^2 Final    Anion Gap 08/21/2024 6 (L)  8 - 16 mmol/L Final    Magnesium 08/21/2024 1.9  1.6 - 2.6 mg/dL Final    WBC 08/21/2024 5.33  3.90 - 12.70 K/uL Final    RBC 08/21/2024 4.94  4.60 - 6.20 M/uL Final    Hemoglobin 08/21/2024 15.3  14.0 - 18.0 g/dL Final    Hematocrit 08/21/2024 45.1  40.0 - 54.0 % Final    MCV 08/21/2024 91  82 - 98 fL Final    MCH 08/21/2024 31.0  27.0 - 31.0 pg Final    MCHC 08/21/2024 33.9  32.0 - 36.0 g/dL Final    RDW 08/21/2024 13.6  11.5 - 14.5 % Final    Platelets 08/21/2024 130 (L)  150 - 450 K/uL Final    MPV 08/21/2024 11.6  9.2 - 12.9 fL Final    Immature Granulocytes 08/21/2024 0.2  0.0 - 0.5 % Final    Gran # (ANC) 08/21/2024 2.7  1.8 - 7.7 K/uL Final    Immature Grans (Abs) 08/21/2024 0.01  0.00 - 0.04 K/uL Final    Lymph # 08/21/2024 1.6  1.0 - 4.8 K/uL Final    Mono # 08/21/2024 0.7  0.3 - 1.0 K/uL Final    Eos # 08/21/2024 0.3  0.0 - 0.5 K/uL Final    Baso # 08/21/2024 0.05  0.00 - 0.20 K/uL Final    nRBC 08/21/2024 0  0 /100 WBC Final    Gran % 08/21/2024 51.3  38.0 - 73.0 % Final    Lymph % 08/21/2024 29.3  18.0 - 48.0 % Final    Mono % 08/21/2024 12.9  4.0 - 15.0 % Final    Eosinophil % 08/21/2024 5.4  0.0 - 8.0 % Final    Basophil % 08/21/2024 0.9  0.0 - 1.9 % Final    Differential Method 08/21/2024 Automated   Final    Lipase 08/21/2024 51  4 - 60 U/L Final    CRP 08/21/2024 0.30  <1.00 mg/dL Final    Lactate (Lactic Acid) 08/21/2024 1.6  0.5 - 1.9 mmol/L Final    Uric Acid 08/22/2024 7.7 (H)  3.4 - 7.0 mg/dL Final    LD 08/22/2024 147  110 - 260 U/L Final    TSH 08/22/2024 5.091  0.340 - 5.600 uIU/mL Final    Larissa 08/22/2024 12  0 - 50 units Final     ACCA 08/22/2024 15  0 - 90 units Final    ALCA 08/22/2024 1  0 - 60 units Final    AMCA 08/22/2024 27  0 - 100 units Final    Atypical pANCA 08/22/2024 Negative  Negative Final    IBD Panel Comments 08/22/2024 Comment   Final    Scleroderma SCL- 08/22/2024 <0.2  0.0 - 0.9 AI Final    IgG 08/22/2024 820  603 - 1613 mg/dL Final    IgG 1 08/22/2024 459  248 - 810 mg/dL Final    IgG 2 08/22/2024 255  130 - 555 mg/dL Final    IgG 3 08/22/2024 68  15 - 102 mg/dL Final    IgG 4 08/22/2024 19  2 - 96 mg/dL Final    Gliadin AB IgA, Deaminated 08/22/2024 5  0 - 19 units Final    Gliadin AB IgG, Deaminated 08/22/2024 2  0 - 19 units Final    TTG IgA 08/22/2024 <2  0 - 3 U/mL Final    TTG IgG 08/22/2024 <2  0 - 5 U/mL Final    Endomysial Abs, IgA 08/22/2024 Negative  Negative Final    IgA 08/22/2024 101  61 - 437 mg/dL Final    Sodium 08/22/2024 139  136 - 145 mmol/L Final    Potassium 08/22/2024 4.0  3.5 - 5.1 mmol/L Final    Chloride 08/22/2024 105  95 - 110 mmol/L Final    CO2 08/22/2024 27  23 - 29 mmol/L Final    Glucose 08/22/2024 100  70 - 110 mg/dL Final    BUN 08/22/2024 11  8 - 23 mg/dL Final    Creatinine 08/22/2024 0.9  0.5 - 1.4 mg/dL Final    Calcium 08/22/2024 8.6 (L)  8.7 - 10.5 mg/dL Final    Total Protein 08/22/2024 5.9 (L)  6.0 - 8.4 g/dL Final    Albumin 08/22/2024 3.7  3.5 - 5.2 g/dL Final    Total Bilirubin 08/22/2024 1.8 (H)  0.1 - 1.0 mg/dL Final    Alkaline Phosphatase 08/22/2024 48 (L)  55 - 135 U/L Final    AST 08/22/2024 14  10 - 40 U/L Final    ALT 08/22/2024 16  10 - 44 U/L Final    eGFR 08/22/2024 >60.0  >60 mL/min/1.73 m^2 Final    Anion Gap 08/22/2024 7 (L)  8 - 16 mmol/L Final    Magnesium 08/22/2024 1.9  1.6 - 2.6 mg/dL Final    WBC 08/22/2024 5.18  3.90 - 12.70 K/uL Final    RBC 08/22/2024 5.09  4.60 - 6.20 M/uL Final    Hemoglobin 08/22/2024 15.4  14.0 - 18.0 g/dL Final    Hematocrit 08/22/2024 45.5  40.0 - 54.0 % Final    MCV 08/22/2024 89  82 - 98 fL Final    MCH 08/22/2024 30.3  27.0 -  31.0 pg Final    MCHC 08/22/2024 33.8  32.0 - 36.0 g/dL Final    RDW 08/22/2024 13.5  11.5 - 14.5 % Final    Platelets 08/22/2024 132 (L)  150 - 450 K/uL Final    MPV 08/22/2024 11.3  9.2 - 12.9 fL Final    Immature Granulocytes 08/22/2024 0.4  0.0 - 0.5 % Final    Gran # (ANC) 08/22/2024 2.8  1.8 - 7.7 K/uL Final    Immature Grans (Abs) 08/22/2024 0.02  0.00 - 0.04 K/uL Final    Lymph # 08/22/2024 1.3  1.0 - 4.8 K/uL Final    Mono # 08/22/2024 0.7  0.3 - 1.0 K/uL Final    Eos # 08/22/2024 0.3  0.0 - 0.5 K/uL Final    Baso # 08/22/2024 0.03  0.00 - 0.20 K/uL Final    nRBC 08/22/2024 0  0 /100 WBC Final    Gran % 08/22/2024 54.2  38.0 - 73.0 % Final    Lymph % 08/22/2024 25.9  18.0 - 48.0 % Final    Mono % 08/22/2024 13.3  4.0 - 15.0 % Final    Eosinophil % 08/22/2024 5.6  0.0 - 8.0 % Final    Basophil % 08/22/2024 0.6  0.0 - 1.9 % Final    Differential Method 08/22/2024 Automated   Final   Office Visit on 07/18/2024   Component Date Value Ref Range Status    Color, POC UA 07/18/2024 Yellow  Yellow, Straw, Colorless Final    Clarity, POC UA 07/18/2024 Clear  Clear Final    Glucose, POC UA 07/18/2024 Negative  Negative Final    Bilirubin, POC UA 07/18/2024 Negative  Negative Final    Ketones, POC UA 07/18/2024 Negative  Negative Final    Spec Grav POC UA 07/18/2024 1.015  1.005 - 1.030 Final    Blood, POC UA 07/18/2024 Negative  Negative Final    pH, POC UA 07/18/2024 7.0  5.0 - 8.0 Final    Protein, POC UA 07/18/2024 Negative  Negative Final    Urobilinogen, POC UA 07/18/2024 1.0  <=1.0 Final    Nitrite, POC UA 07/18/2024 Negative  Negative Final    WBC, POC UA 07/18/2024 Negative  Negative Final   Admission on 07/09/2024, Discharged on 07/09/2024   Component Date Value Ref Range Status    WBC 07/09/2024 7.63  3.90 - 12.70 K/uL Final    RBC 07/09/2024 5.66  4.60 - 6.20 M/uL Final    Hemoglobin 07/09/2024 17.6  14.0 - 18.0 g/dL Final    Hematocrit 07/09/2024 51.6  40.0 - 54.0 % Final    MCV 07/09/2024 91  82 - 98 fL  Final    MCH 07/09/2024 31.1 (H)  27.0 - 31.0 pg Final    MCHC 07/09/2024 34.1  32.0 - 36.0 g/dL Final    RDW 07/09/2024 13.2  11.5 - 14.5 % Final    Platelets 07/09/2024 174  150 - 450 K/uL Final    MPV 07/09/2024 11.0  9.2 - 12.9 fL Final    Immature Granulocytes 07/09/2024 0.3  0.0 - 0.5 % Final    Gran # (ANC) 07/09/2024 4.7  1.8 - 7.7 K/uL Final    Immature Grans (Abs) 07/09/2024 0.02  0.00 - 0.04 K/uL Final    Lymph # 07/09/2024 1.8  1.0 - 4.8 K/uL Final    Mono # 07/09/2024 0.7  0.3 - 1.0 K/uL Final    Eos # 07/09/2024 0.3  0.0 - 0.5 K/uL Final    Baso # 07/09/2024 0.05  0.00 - 0.20 K/uL Final    nRBC 07/09/2024 0  0 /100 WBC Final    Gran % 07/09/2024 61.6  38.0 - 73.0 % Final    Lymph % 07/09/2024 23.5  18.0 - 48.0 % Final    Mono % 07/09/2024 9.7  4.0 - 15.0 % Final    Eosinophil % 07/09/2024 4.2  0.0 - 8.0 % Final    Basophil % 07/09/2024 0.7  0.0 - 1.9 % Final    Differential Method 07/09/2024 Automated   Final    Sodium 07/09/2024 136  136 - 145 mmol/L Final    Potassium 07/09/2024 4.1  3.5 - 5.1 mmol/L Final    Chloride 07/09/2024 103  95 - 110 mmol/L Final    CO2 07/09/2024 24  23 - 29 mmol/L Final    Glucose 07/09/2024 145 (H)  70 - 110 mg/dL Final    BUN 07/09/2024 12  8 - 23 mg/dL Final    Creatinine 07/09/2024 1.0  0.5 - 1.4 mg/dL Final    Calcium 07/09/2024 8.9  8.7 - 10.5 mg/dL Final    Total Protein 07/09/2024 6.7  6.0 - 8.4 g/dL Final    Albumin 07/09/2024 4.1  3.5 - 5.2 g/dL Final    Total Bilirubin 07/09/2024 1.0  0.1 - 1.0 mg/dL Final    Alkaline Phosphatase 07/09/2024 74  55 - 135 U/L Final    AST 07/09/2024 21  10 - 40 U/L Final    ALT 07/09/2024 22  10 - 44 U/L Final    eGFR 07/09/2024 >60.0  >60 mL/min/1.73 m^2 Final    Anion Gap 07/09/2024 9  8 - 16 mmol/L Final    Specimen UA 07/09/2024 Urine, Clean Catch   Final    Color, UA 07/09/2024 Yellow  Yellow, Straw, Bee Final    Appearance, UA 07/09/2024 Clear  Clear Final    pH, UA 07/09/2024 6.0  5.0 - 8.0 Final    Specific Gravity, UA  07/09/2024 1.010  1.005 - 1.030 Final    Protein, UA 07/09/2024 Negative  Negative Final    Glucose, UA 07/09/2024 Negative  Negative Final    Ketones, UA 07/09/2024 Negative  Negative Final    Bilirubin (UA) 07/09/2024 Negative  Negative Final    Occult Blood UA 07/09/2024 Negative  Negative Final    Nitrite, UA 07/09/2024 Negative  Negative Final    Urobilinogen, UA 07/09/2024 Negative  Negative EU/dL Final    Leukocytes, UA 07/09/2024 Negative  Negative Final    QRS Duration 07/09/2024 68  ms Final    [facility] QTC Calculation 07/09/2024 408  ms Final   Lab Visit on 03/06/2024   Component Date Value Ref Range Status    WBC 03/06/2024 6.97  3.90 - 12.70 K/uL Final    RBC 03/06/2024 5.58  4.60 - 6.20 M/uL Final    Hemoglobin 03/06/2024 17.4  14.0 - 18.0 g/dL Final    Hematocrit 03/06/2024 50.5  40.0 - 54.0 % Final    MCV 03/06/2024 91  82 - 98 fL Final    MCH 03/06/2024 31.2 (H)  27.0 - 31.0 pg Final    MCHC 03/06/2024 34.5  32.0 - 36.0 g/dL Final    RDW 03/06/2024 13.1  11.5 - 14.5 % Final    Platelets 03/06/2024 167  150 - 450 K/uL Final    MPV 03/06/2024 11.4  9.2 - 12.9 fL Final    Sodium 03/06/2024 137  136 - 145 mmol/L Final    Potassium 03/06/2024 4.5  3.5 - 5.1 mmol/L Final    Chloride 03/06/2024 103  95 - 110 mmol/L Final    CO2 03/06/2024 25  23 - 29 mmol/L Final    Glucose 03/06/2024 120 (H)  70 - 110 mg/dL Final    BUN 03/06/2024 19  8 - 23 mg/dL Final    Creatinine 03/06/2024 1.1  0.5 - 1.4 mg/dL Final    Calcium 03/06/2024 9.4  8.7 - 10.5 mg/dL Final    Anion Gap 03/06/2024 9  8 - 16 mmol/L Final    eGFR 03/06/2024 >60  >60 mL/min/1.73 m^2 Final    Testosterone 03/06/2024 321  264 - 916 ng/dL Final    Prolactin 03/06/2024 13.6  4.0 - 15.2 ng/mL Final    Follicle Stimulating Hormone 03/06/2024 3.8  1.2 - 15.8 IU/L Final    LH 03/06/2024 3.5  1.3 - 9.6 IU/L Final    Estrogen 03/06/2024 124  pg/mL Final    PSA Total 03/06/2024 1.9  0.00 - 4.00 ng/mL Final    PSA, Free 03/06/2024 0.79  0.00 - 1.50 ng/mL Final     PSA, Free % 03/06/2024 41.58  Not established % Final       Past Medical History:   Diagnosis Date    Aspiration pneumonia     aspirated after anesthesia due to hiatal hernia per patient, spent 6 days in ICU    COVID-19 8/2/20021    GERD (gastroesophageal reflux disease)     Hard of hearing     Hiatal hernia     Hypertension     Kidney stones     Knee pain 06/2020    left     Past Surgical History:   Procedure Laterality Date    CHOLECYSTECTOMY      COLONOSCOPY  2012    Dr Silver- rtc 10 yr    CYSTOSCOPY W/ RETROGRADES Left 4/25/2023    Procedure: CYSTOSCOPY, WITH RETROGRADE PYELOGRAM;  Surgeon: Pepe Bee Jr., MD;  Location: [facility];  Service: Urology;  Laterality: Left;    CYSTOURETEROSCOPY,WITH HOLMIUM LASER LITHOTRIPSY OF URETERAL CALCULUS Left 4/25/2023    Procedure: CYSTOURETEROSCOPY,WITH HOLMIUM LASER LITHOTRIPSY OF URETERAL CALCULUS;  Surgeon: Pepe Bee Jr., MD;  Location: [facility];  Service: Urology;  Laterality: Left;    EXTRACORPOREAL SHOCK WAVE LITHOTRIPSY Right 9/5/2024    Procedure: LITHOTRIPSY, ESWL;  Surgeon: Nixon Swain MD;  Location: [facility];  Service: Urology;  Laterality: Right;    HAND SURGERY      traumatic injury    HEMORRHOID SURGERY      KIDNEY STONE SURGERY      KNEE ARTHROSCOPY W/ MENISCECTOMY Left 11/4/2020    Procedure: ARTHROSCOPY, KNEE, WITH PARTIAL MEDIAL MENISCECTOMY;  Surgeon: Andres Blanco MD;  Location: [facility];  Service: Orthopedics;  Laterality: Left;    TONSILLECTOMY      URETEROSCOPIC REMOVAL OF URETERIC CALCULUS Left 4/25/2023    Procedure: REMOVAL, CALCULUS, URETER, URETEROSCOPIC;  Surgeon: Pepe Bee Jr., MD;  Location: [facility];  Service: Urology;  Laterality: Left;     Family History   Problem Relation Name Age of Onset    Heart disease Mother      Diabetes Mother      Cancer Father      Depression Sister      Cancer Sister          ovarian    Diabetes Brother         Marital Status:   Alcohol History:  reports current alcohol use.  Tobacco  "History:  reports that he has never smoked. He has never been exposed to tobacco smoke. He has never used smokeless tobacco.  Drug History:  reports no history of drug use.    Review of patient's allergies indicates:   Allergen Reactions    Iodinated contrast media Hives and Shortness Of Breath    Viagra [sildenafil] Other (See Comments)     Pounding in the head and chest  Headaches       Current Outpatient Medications:     lisinopriL 10 MG tablet, Take 1 tablet (10 mg total) by mouth once daily., Disp: 90 tablet, Rfl: 1    tamsulosin (FLOMAX) 0.4 mg Cap, Take 1 capsule (0.4 mg total) by mouth once daily. Take in evening., Disp: 90 capsule, Rfl: 4    HYDROcodone-acetaminophen (NORCO) 5-325 mg per tablet, Take 1 tablet by mouth every 6 (six) hours as needed for Pain., Disp: 12 tablet, Rfl: 0    oxyCODONE (ROXICODONE) 5 MG immediate release tablet, Take 1 tablet (5 mg total) by mouth every 4 (four) hours as needed for Pain., Disp: 10 tablet, Rfl: 0    sulfamethoxazole-trimethoprim 800-160mg (BACTRIM DS) 800-160 mg Tab, Take 1 tablet by mouth once daily., Disp: 5 tablet, Rfl: 0    Review of Systems   Constitutional:  Negative for fatigue, fever and unexpected weight change.   HENT:  Negative for ear pain, sinus pressure and sore throat.    Eyes:  Negative for pain.   Respiratory:  Negative for cough and shortness of breath.    Cardiovascular:  Negative for chest pain and leg swelling.   Gastrointestinal:  Negative for abdominal pain, constipation, nausea and vomiting.   Genitourinary:  Positive for hematuria. Negative for dysuria, frequency and urgency.   Musculoskeletal:  Positive for back pain. Negative for arthralgias.   Skin:  Negative for rash.   Neurological:  Negative for dizziness, weakness and headaches.   Psychiatric/Behavioral:  Negative for sleep disturbance.         Objective:      Vitals:    09/03/24 1016   BP: 118/62   Pulse: 84   Resp: 18   SpO2: 96%   Weight: 121.1 kg (267 lb)   Height: 5' 9" (1.753 m) "     Physical Exam  Vitals and nursing note reviewed.   Constitutional:       Appearance: Normal appearance. He is well-developed. He is obese.   HENT:      Head: Normocephalic and atraumatic.      Right Ear: External ear normal.      Left Ear: External ear normal.   Eyes:      Pupils: Pupils are equal, round, and reactive to light.   Neck:      Trachea: No tracheal deviation.   Cardiovascular:      Rate and Rhythm: Normal rate and regular rhythm.      Heart sounds: No murmur heard.     No friction rub. No gallop.   Pulmonary:      Breath sounds: Normal breath sounds. No stridor. No wheezing or rales.   Abdominal:      Palpations: Abdomen is soft. There is no mass.      Tenderness: There is no abdominal tenderness.   Musculoskeletal:         General: No tenderness or deformity.      Cervical back: Neck supple.        Back:    Lymphadenopathy:      Cervical: No cervical adenopathy.   Skin:     General: Skin is warm and dry.   Neurological:      Mental Status: He is alert and oriented to person, place, and time.      Coordination: Coordination normal.   Psychiatric:         Thought Content: Thought content normal.         Assessment:       1. Essential hypertension    2. High risk medication use    3. Kidney stone    4. Hospital discharge follow-up    5. Panniculitis         Plan:       Essential hypertension  Comments:  bp controlled    High risk medication use    Kidney stone  Comments:  plans to follow up with dr. may today  Orders:  -     Discontinue: HYDROcodone-acetaminophen (NORCO) 7.5-325 mg per tablet; Take 1 tablet by mouth every 8 (eight) hours as needed for Pain.  Dispense: 30 tablet; Refill: 0    Hospital discharge follow-up  Comments:  discharge summary reviewed    Panniculitis  Comments:  will follow up with gi      No follow-ups on file.

## 2024-09-04 ENCOUNTER — HOSPITAL ENCOUNTER (OUTPATIENT)
Dept: PREADMISSION TESTING | Facility: HOSPITAL | Age: 69
Discharge: HOME | End: 2024-09-04
Attending: SPECIALIST
Payer: MEDICARE

## 2024-09-04 ENCOUNTER — HOSPITAL ENCOUNTER (OUTPATIENT)
Dept: RADIOLOGY | Facility: HOSPITAL | Age: 69
Discharge: HOME | End: 2024-09-04
Payer: MEDICARE

## 2024-09-04 ENCOUNTER — ANESTHESIA EVENT (OUTPATIENT)
Dept: SURGERY | Facility: HOSPITAL | Age: 69
End: 2024-09-04
Payer: MEDICARE

## 2024-09-04 VITALS
HEIGHT: 69 IN | RESPIRATION RATE: 17 BRPM | WEIGHT: 267 LBS | SYSTOLIC BLOOD PRESSURE: 137 MMHG | HEART RATE: 70 BPM | TEMPERATURE: 98 F | DIASTOLIC BLOOD PRESSURE: 81 MMHG | BODY MASS INDEX: 39.55 KG/M2 | OXYGEN SATURATION: 96 %

## 2024-09-04 DIAGNOSIS — Z79.01 LONG TERM CURRENT USE OF ANTICOAGULANT: ICD-10-CM

## 2024-09-04 DIAGNOSIS — Z01.818 PRE-OP TESTING: ICD-10-CM

## 2024-09-04 DIAGNOSIS — Z01.818 PRE-OP TESTING: Primary | ICD-10-CM

## 2024-09-04 PROCEDURE — 71046 X-RAY EXAM CHEST 2 VIEWS: CPT | Mod: 26,,, | Performed by: RADIOLOGY

## 2024-09-04 PROCEDURE — 71046 X-RAY EXAM CHEST 2 VIEWS: CPT | Mod: TC

## 2024-09-04 RX ORDER — CEFAZOLIN SODIUM 2 G/50ML
2 SOLUTION INTRAVENOUS ONCE
Status: CANCELLED | OUTPATIENT
Start: 2024-09-05

## 2024-09-04 NOTE — DISCHARGE INSTRUCTIONS
To confirm, Your doctor has instructed you that surgery is scheduled for: 9/5/24    Pre-Op will call the afternoon prior to surgery between 4:00 and 6:00 PM with the final arrival time.      Please report to [facility] via [facility] entrance. Check in at registration desk.    Do not eat or drink anything after midnight the night before your surgery - THIS INCLUDES  WATER, GUM, MINTS AND CANDY.  YOU MAY BRUSH YOUR TEETH BUT DO NOT SWALLOW     TAKE ONLY THESE MEDICATIONS WITH A SMALL SIP OF WATER THE MORNING OF YOUR PROCEDURE:      ONLY if you are diabetic, check your sugar in the morning before your procedure.       Do not take any diabetic medicines or insulin the morning of surgery .     PLEASE NOTE:  The surgery schedule has many variables which may affect the time of your surgery case.  Family members should be available if your surgery time changes.  Plan to be here the day of your procedure between 4-6 hours.      DO NOT TAKE THESE MEDICATIONS 5-7 DAYS PRIOR to your procedure or per your surgeon's request: ASPIRIN, ALEVE, ADVIL, IBUPROFEN,  CARSON SELTZER, BC , FISH OIL , VITAMIN E, HERBALS  (May take Tylenol)      ONLY if you are prescribed any types of blood thinners such as:  Aspirin, Coumadin, Plavix, Pradaxa, Xarelto, Aggrenox, Effient, Eliquis, Savasya, Brilinta, or any other, ask your surgeon whether you should stop taking them and how long before surgery you should stop.  You may also need to verify with the prescribing physician if it is ok to stop your medication.                                                        IMPORTANT INSTRUCTIONS      Do not smoke, vape or drink alcoholic beverages 24 hours prior to your procedure.  Shower the night before AND the morning of your procedure with a Chlorhexidine wash such as Hibiclens or Dial antibacterial soap from the neck down. Do not apply any deodorants, lotions or powders after each shower.  Do not get it on your face or in your eyes.  You may  use your own shampoo and face wash. This helps your skin to be as bacteria free as possible.  DO NOT remove hair from the surgery site.  Do not shave the incision site unless you are given specific instructions to do so.    Sleep in a bed with clean sheets.  Do not sleep with a pet in the bed.   If you wear contact lenses, dentures, hearing aids or glasses, bring a container to put them in during surgery and give to a family member for safe keeping.    Please leave all jewelry, piercing's and valuables at home.   ONLY if you have been diagnosed with sleep apnea please bring your C-PAP machine.  ONLY if you wear home oxygen please bring your portable oxygen tank the day of your procedure.   ONLY for patients requiring bowel prep, written instructions will be given by your doctor's office.  ONLY if you have a neuro stimulator, please bring the controller with you the morning of surgery.    If your doctor has scheduled you for an overnight stay, bring a small overnight bag with any personal items you need.    Make arrangements in advance for transportation home by a responsible adult.      You must make arrangements for transportation, TAXI'S, UBER'S OR LYFTS ARE NOT ALLOWED.        If you have any questions about these instructions, call Pre-Op Admit  Nursing at [phone] or the Pre-Op Day Surgery Unit at [phone].

## 2024-09-04 NOTE — ANESTHESIA PREPROCEDURE EVALUATION
09/04/2024  Lewis Matias is a 69 y.o., male.      Results for orders placed or performed during the hospital encounter of 07/09/24   EKG 12-lead    Collection Time: 07/09/24  7:52 PM   Result Value Ref Range    QRS Duration 68 ms    OHS QTC Calculation 408 ms    Narrative    Test Reason : R10.9,    Vent. Rate : 075 BPM     Atrial Rate : 075 BPM     P-R Int : 172 ms          QRS Dur : 068 ms      QT Int : 366 ms       P-R-T Axes : 039 040 041 degrees     QTc Int : 408 ms    Normal sinus rhythm  Low voltage QRS  Borderline Abnormal ECG  When compared with ECG of 25-APR-2023 11:45,  No significant change was found  Confirmed by Alexander FRY, Zac LOMELI ([id number]) on 7/10/2024 8:53:25 PM    Referred By: [email or web address]   SELF           Confirmed By:Zac Munoz MD             Lab Results   Component Value Date    WBC 5.18 08/22/2024    HGB 15.4 08/22/2024    HCT 45.5 08/22/2024    MCV 89 08/22/2024     (L) 08/22/2024     BMP  Lab Results   Component Value Date     08/26/2024    K 4.1 08/26/2024     08/26/2024    CO2 28 08/26/2024    BUN 13 08/26/2024    CREATININE 0.9 08/26/2024    CALCIUM 9.4 08/26/2024    ANIONGAP 9 08/26/2024    GLU 90 08/26/2024     08/22/2024     (H) 08/21/2024       No results found for this or any previous visit.         Pre-op Assessment    I have reviewed the Patient Summary Reports.     I have reviewed the Nursing Notes. I have reviewed the NPO Status.   I have reviewed the Medications.     Review of Systems  Anesthesia Hx:  No problems with previous Anesthesia             Denies Family Hx of Anesthesia complications.   Personal Hx of Anesthesia complications (Aspiration PNA after ESWL in the past)                    Social:  Alcohol Use, Non-Smoker       Hematology/Oncology:  Hematology Normal   Oncology Normal        --  Thrombocytopenia:                                EENT/Dental:  EENT/Dental Normal    Ears General/Symptom(s) Hearing Impairment:             Cardiovascular:     Hypertension, well controlled              ECG has been reviewed.                          Pulmonary:  Pulmonary Normal        History of Aspiration Pneumonia                Renal/:  Chronic Renal Disease                Hepatic/GI:    Hiatal Hernia, GERD             Musculoskeletal:  Musculoskeletal Normal                Neurological:    Neuromuscular Disease,                                   Endocrine:  Endocrine Normal          Morbid Obesity / BMI > 40  Psych:  Psychiatric Normal                    Physical Exam  General: Well nourished, Cooperative, Alert and Oriented    Airway:  Mallampati: III   Mouth Opening: Normal  TM Distance: > 6 cm  Tongue: Normal  Neck ROM: Normal ROM    Chest/Lungs:  Clear to auscultation    Heart:  Rate: Normal  Rhythm: Regular Rhythm        Anesthesia Plan  Type of Anesthesia, risks & benefits discussed:    Anesthesia Type: Gen ETT  Intra-op Monitoring Plan: Standard ASA Monitors  Post Op Pain Control Plan: multimodal analgesia and IV/PO Opioids PRN  Induction:  IV  Airway Plan: Video, Post-Induction  Informed Consent: Informed consent signed with the Patient and all parties understand the risks and agree with anesthesia plan.  All questions answered.   ASA Score: 3  Anesthesia Plan Notes:           GETA  Decadron 8mg, iv Zofran 4mg iv, Pepcid 20mg iv   Ofirmev 1000mg iv  Sugammadex   ROSE Precautions: Extubate patient awake with HOB elevated and oral airway in place        Ready For Surgery From Anesthesia Perspective.     .

## 2024-09-05 ENCOUNTER — ANESTHESIA (OUTPATIENT)
Dept: SURGERY | Facility: HOSPITAL | Age: 69
End: 2024-09-05
Payer: MEDICARE

## 2024-09-05 ENCOUNTER — HOSPITAL ENCOUNTER (OUTPATIENT)
Facility: HOSPITAL | Age: 69
Discharge: HOME | End: 2024-09-05
Attending: SPECIALIST | Admitting: SPECIALIST
Payer: MEDICARE

## 2024-09-05 VITALS
HEIGHT: 69 IN | RESPIRATION RATE: 16 BRPM | BODY MASS INDEX: 39.55 KG/M2 | SYSTOLIC BLOOD PRESSURE: 126 MMHG | DIASTOLIC BLOOD PRESSURE: 86 MMHG | WEIGHT: 267 LBS | TEMPERATURE: 97 F | HEART RATE: 68 BPM | OXYGEN SATURATION: 95 %

## 2024-09-05 DIAGNOSIS — Z01.818 PRE-OP TESTING: ICD-10-CM

## 2024-09-05 DIAGNOSIS — N20.0 NEPHROLITHIASIS: Primary | ICD-10-CM

## 2024-09-05 PROCEDURE — 25000003 PHARM REV CODE 250: Performed by: ANESTHESIOLOGY

## 2024-09-05 PROCEDURE — 25000003 PHARM REV CODE 250: Performed by: NURSE ANESTHETIST, CERTIFIED REGISTERED

## 2024-09-05 PROCEDURE — 71000033 HC RECOVERY, INTIAL HOUR: Performed by: SPECIALIST

## 2024-09-05 PROCEDURE — 71000015 HC POSTOP RECOV 1ST HR: Performed by: SPECIALIST

## 2024-09-05 PROCEDURE — 27200651 HC AIRWAY, LMA: Performed by: ANESTHESIOLOGY

## 2024-09-05 PROCEDURE — 36000704 HC OR TIME LEV I 1ST 15 MIN: Performed by: SPECIALIST

## 2024-09-05 PROCEDURE — 71000039 HC RECOVERY, EACH ADD'L HOUR: Performed by: SPECIALIST

## 2024-09-05 PROCEDURE — 37000008 HC ANESTHESIA 1ST 15 MINUTES: Performed by: SPECIALIST

## 2024-09-05 PROCEDURE — 63600175 PHARM REV CODE 636 W HCPCS: Performed by: NURSE ANESTHETIST, CERTIFIED REGISTERED

## 2024-09-05 PROCEDURE — 37000009 HC ANESTHESIA EA ADD 15 MINS: Performed by: SPECIALIST

## 2024-09-05 PROCEDURE — 36000705 HC OR TIME LEV I EA ADD 15 MIN: Performed by: SPECIALIST

## 2024-09-05 PROCEDURE — 63600175 PHARM REV CODE 636 W HCPCS: Performed by: SPECIALIST

## 2024-09-05 RX ORDER — ONDANSETRON HYDROCHLORIDE 2 MG/ML
INJECTION, SOLUTION INTRAVENOUS
Status: DISCONTINUED | OUTPATIENT
Start: 2024-09-05 | End: 2024-09-05

## 2024-09-05 RX ORDER — OXYCODONE HYDROCHLORIDE 5 MG/1
5 TABLET ORAL
Status: DISCONTINUED | OUTPATIENT
Start: 2024-09-05 | End: 2024-09-05 | Stop reason: HOSPADM

## 2024-09-05 RX ORDER — CEFAZOLIN SODIUM 2 G/50ML
2 SOLUTION INTRAVENOUS ONCE
Status: COMPLETED | OUTPATIENT
Start: 2024-09-05 | End: 2024-09-05

## 2024-09-05 RX ORDER — HYDROCODONE BITARTRATE AND ACETAMINOPHEN 5; 325 MG/1; MG/1
1 TABLET ORAL EVERY 6 HOURS PRN
Qty: 12 TABLET | Refills: 0
Start: 2024-09-05

## 2024-09-05 RX ORDER — FAMOTIDINE 10 MG/ML
INJECTION INTRAVENOUS
Status: DISCONTINUED | OUTPATIENT
Start: 2024-09-05 | End: 2024-09-05

## 2024-09-05 RX ORDER — ACETAMINOPHEN 10 MG/ML
INJECTION, SOLUTION INTRAVENOUS
Status: DISCONTINUED | OUTPATIENT
Start: 2024-09-05 | End: 2024-09-05

## 2024-09-05 RX ORDER — SULFAMETHOXAZOLE AND TRIMETHOPRIM 800; 160 MG/1; MG/1
1 TABLET ORAL DAILY
Qty: 5 TABLET | Refills: 0
Start: 2024-09-05

## 2024-09-05 RX ORDER — LIDOCAINE HYDROCHLORIDE 20 MG/ML
INJECTION INTRAVENOUS
Status: DISCONTINUED | OUTPATIENT
Start: 2024-09-05 | End: 2024-09-05

## 2024-09-05 RX ORDER — DEXAMETHASONE SODIUM PHOSPHATE 4 MG/ML
INJECTION, SOLUTION INTRA-ARTICULAR; INTRALESIONAL; INTRAMUSCULAR; INTRAVENOUS; SOFT TISSUE
Status: DISCONTINUED | OUTPATIENT
Start: 2024-09-05 | End: 2024-09-05

## 2024-09-05 RX ORDER — SODIUM CHLORIDE, SODIUM LACTATE, POTASSIUM CHLORIDE, CALCIUM CHLORIDE 600; 310; 30; 20 MG/100ML; MG/100ML; MG/100ML; MG/100ML
INJECTION, SOLUTION INTRAVENOUS CONTINUOUS PRN
Status: DISCONTINUED | OUTPATIENT
Start: 2024-09-05 | End: 2024-09-05

## 2024-09-05 RX ORDER — FENTANYL CITRATE 50 UG/ML
INJECTION, SOLUTION INTRAMUSCULAR; INTRAVENOUS
Status: DISCONTINUED | OUTPATIENT
Start: 2024-09-05 | End: 2024-09-05

## 2024-09-05 RX ORDER — SUCCINYLCHOLINE CHLORIDE 20 MG/ML
INJECTION INTRAMUSCULAR; INTRAVENOUS
Status: DISCONTINUED | OUTPATIENT
Start: 2024-09-05 | End: 2024-09-05

## 2024-09-05 RX ORDER — ONDANSETRON HYDROCHLORIDE 2 MG/ML
4 INJECTION, SOLUTION INTRAVENOUS DAILY PRN
Status: DISCONTINUED | OUTPATIENT
Start: 2024-09-05 | End: 2024-09-05 | Stop reason: HOSPADM

## 2024-09-05 RX ORDER — GLUCAGON 1 MG
1 KIT INJECTION
Status: DISCONTINUED | OUTPATIENT
Start: 2024-09-05 | End: 2024-09-05 | Stop reason: HOSPADM

## 2024-09-05 RX ORDER — OXYCODONE HYDROCHLORIDE 5 MG/1
5 TABLET ORAL ONCE
Status: COMPLETED | OUTPATIENT
Start: 2024-09-05 | End: 2024-09-05

## 2024-09-05 RX ORDER — PROPOFOL 10 MG/ML
INJECTION, EMULSION INTRAVENOUS
Status: DISCONTINUED | OUTPATIENT
Start: 2024-09-05 | End: 2024-09-05

## 2024-09-05 RX ADMIN — Medication 120 MG: at 01:09

## 2024-09-05 RX ADMIN — FAMOTIDINE 20 MG: 10 INJECTION, SOLUTION INTRAVENOUS at 01:09

## 2024-09-05 RX ADMIN — LIDOCAINE HYDROCHLORIDE 100 MG: 20 INJECTION, SOLUTION INTRAVENOUS at 01:09

## 2024-09-05 RX ADMIN — FENTANYL CITRATE 50 MCG: 50 INJECTION, SOLUTION INTRAMUSCULAR; INTRAVENOUS at 01:09

## 2024-09-05 RX ADMIN — ONDANSETRON 4 MG: 2 INJECTION INTRAMUSCULAR; INTRAVENOUS at 01:09

## 2024-09-05 RX ADMIN — PROPOFOL 200 MG: 10 INJECTION, EMULSION INTRAVENOUS at 01:09

## 2024-09-05 RX ADMIN — CEFAZOLIN SODIUM 2 G: 2 SOLUTION INTRAVENOUS at 01:09

## 2024-09-05 RX ADMIN — OXYCODONE HYDROCHLORIDE 5 MG: 5 TABLET ORAL at 03:09

## 2024-09-05 RX ADMIN — DEXAMETHASONE SODIUM PHOSPHATE 8 MG: 4 INJECTION, SOLUTION INTRAMUSCULAR; INTRAVENOUS at 01:09

## 2024-09-05 RX ADMIN — ACETAMINOPHEN 1000 MG: 10 INJECTION, SOLUTION INTRAVENOUS at 01:09

## 2024-09-05 RX ADMIN — SODIUM CHLORIDE, SODIUM LACTATE, POTASSIUM CHLORIDE, AND CALCIUM CHLORIDE: .6; .31; .03; .02 INJECTION, SOLUTION INTRAVENOUS at 01:09

## 2024-09-05 NOTE — ANESTHESIA PROCEDURE NOTES
Intubation    Date/Time: 9/5/2024 1:18 PM    Performed by: Grace Millard CRNA  Authorized by: Jermain Arteaga MD    Intubation:     Induction:  Intravenous    Intubated:  Postinduction    Mask Ventilation:  Moderately difficult with oral airway    Attempts:  2    Attempted By:  Staff anesthesiologist and CRNA    Attempted By (2nd Attempt):  Staff anesthesiologist and CRNA    Blade (2nd Attempt):  Parker 4    Laryngeal View Grade (2nd Attempt): Grade I - full view of cords      Difficult Airway Encountered?: No      Complications:  None    Airway Device:  Supraglottic airway/LMA    Airway Device Size:  4.0    Placement Verified By:  Capnometry    Complicating Factors:  None    Findings Post-Intubation:  BS equal bilateral and atraumatic/condition of teeth unchanged  Notes:      Obese, large neck, large tongue, small mouth large amount of adipose tissue. Decreased ROM of neck.  Attempted to intubate with eschmann without success. LMA placed instead #4 without difficulty

## 2024-09-05 NOTE — DISCHARGE SUMMARY
Patient comes in for right ESWL    Procedure is done w no complication    After a brief stay in recovery, patient is discharged in good condition    Meds given:  Lortab Bactrim    F/u office:  1 week with sandy REYNOLDS

## 2024-09-05 NOTE — DISCHARGE INSTRUCTIONS
No driving, operating heavy machinery or signing legal documents x 24 hours  No drinking alcohol x 24 hours or while taking pain medication  You should not be driving while taking pain medication  You should not be running a temp greater than 101     keep follow up appt  Drink plenty of fluids today   Call MD or go to the ER if you are unable to urinate  You may have a little blood in your urine- it should clear up with inreased fluid intake

## 2024-09-05 NOTE — H&P
[facility]  History & Physical    SUBJECTIVE:     Chief Complaint/Reason for Admission:     History of Present Illness:  Patient is a 69 y.o. male presents with right renal stone 7 mm  Here for outpatient lithotripsy    PTA Medications   Medication Sig    lisinopriL 10 MG tablet Take 1 tablet (10 mg total) by mouth once daily.    tamsulosin (FLOMAX) 0.4 mg Cap Take 1 capsule (0.4 mg total) by mouth once daily. Take in evening.    HYDROcodone-acetaminophen (NORCO) 7.5-325 mg per tablet Take 1 tablet by mouth every 8 (eight) hours as needed for Pain.       Review of patient's allergies indicates:   Allergen Reactions    Iodinated contrast media Hives and Shortness Of Breath    Viagra [sildenafil] Other (See Comments)     Pounding in the head and chest  Headaches       Past Medical History:   Diagnosis Date    Aspiration pneumonia     aspirated after anesthesia due to hiatal hernia per patient, spent 6 days in ICU    COVID-19 8/2/20021    GERD (gastroesophageal reflux disease)     Hard of hearing     Hiatal hernia     Hypertension     Kidney stones     Knee pain 06/2020    left     Past Surgical History:   Procedure Laterality Date    CHOLECYSTECTOMY      COLONOSCOPY  2012    Dr Silver- rtc 10 yr    CYSTOSCOPY W/ RETROGRADES Left 4/25/2023    Procedure: CYSTOSCOPY, WITH RETROGRADE PYELOGRAM;  Surgeon: Pepe Bee Jr., MD;  Location: [facility];  Service: Urology;  Laterality: Left;    CYSTOURETEROSCOPY,WITH HOLMIUM LASER LITHOTRIPSY OF URETERAL CALCULUS Left 4/25/2023    Procedure: CYSTOURETEROSCOPY,WITH HOLMIUM LASER LITHOTRIPSY OF URETERAL CALCULUS;  Surgeon: Pepe Bee Jr., MD;  Location: [facility] OR;  Service: Urology;  Laterality: Left;    HAND SURGERY      traumatic injury    HEMORRHOID SURGERY      KIDNEY STONE SURGERY      KNEE ARTHROSCOPY W/ MENISCECTOMY Left 11/4/2020    Procedure: ARTHROSCOPY, KNEE, WITH PARTIAL MEDIAL MENISCECTOMY;  Surgeon: Andres Blanco MD;  Location: [facility] OR;  Service:  Orthopedics;  Laterality: Left;    TONSILLECTOMY      URETEROSCOPIC REMOVAL OF URETERIC CALCULUS Left 4/25/2023    Procedure: REMOVAL, CALCULUS, URETER, URETEROSCOPIC;  Surgeon: Pepe Bee Jr., MD;  Location: [facility];  Service: Urology;  Laterality: Left;     Family History   Problem Relation Name Age of Onset    Heart disease Mother      Diabetes Mother      Cancer Father      Depression Sister      Cancer Sister          ovarian    Diabetes Brother       Social History     Tobacco Use    Smoking status: Never     Passive exposure: Never    Smokeless tobacco: Never   Substance Use Topics    Alcohol use: Yes     Comment: social    Drug use: Never        Review of Systems:  Negative    OBJECTIVE:     Vital Signs (Most Recent):  Temp: 98.1 °F (36.7 °C) (09/05/24 0903)  Pulse: 100 (09/05/24 0903)  Resp: 16 (09/05/24 0903)  BP: (!) 147/83 (09/05/24 0903)  SpO2: 95 % (09/05/24 0903)    Inpatient Medications:  Current Facility-Administered Medications   Medication Dose Route Frequency Provider Last Rate Last Admin    cefazolin (ANCEF) 2 gram in dextrose 5% 50 mL IVPB (premix)  2 g Intravenous Once Nixon Swain MD              ASSESSMENT/PLAN:   Right renal stone 7 mm    Outpatient ESWL

## 2024-09-05 NOTE — TRANSFER OF CARE
"Anesthesia Transfer of Care Note    Patient: Lewis Matias    Procedure(s) Performed: Procedure(s) (LRB):  LITHOTRIPSY, ESWL (RIGHT) (Right)    Patient location: PACU    Anesthesia Type: general    Transport from OR: Transported from OR on room air with adequate spontaneous ventilation    Post pain: adequate analgesia    Post assessment: no apparent anesthetic complications    Post vital signs: stable    Level of consciousness: awake and alert    Nausea/Vomiting: no nausea/vomiting    Complications: none    Transfer of care protocol was followed      Last vitals: Visit Vitals  BP (!) 147/83 (BP Location: Right arm, Patient Position: Sitting)   Pulse 100   Temp 36.7 °C (98.1 °F) (Oral)   Resp 16   Ht 5' 9" (1.753 m)   Wt 121.1 kg (267 lb)   SpO2 95%   BMI 39.43 kg/m²     "

## 2024-09-05 NOTE — OP NOTE
Operative Note         SUMMARY     Surgery Date:  9/5/2024     Assistant:     Indications:  69-year-old male with a right renal stone 7 mm  Here for lithotripsy      Pre-op Diagnosis:   Right renal stone    Post-op Diagnosis:  Same    Procedure:  Right ESWL    Anesthesia: General    Description of Procedure:   After consents were obtained the patient was taken to the operating room  Placed in a supine position  Anesthesia is given  Dornier lithotripter is brought into the room  Patient is properly positioned on the lithotripsy probe  The stone is    7    mm, right midpole  We began at a KV of 16 and gradually increased to 25    3000       Shocks were delivered      The procedure was done without any complication  After the procedure the patient is brought to the recovery room in satisfactory condition      Findings/Key Components:          Estimated Blood Loss:  None

## 2024-09-05 NOTE — ANESTHESIA POSTPROCEDURE EVALUATION
Anesthesia Post Evaluation    Patient: Lewis Matias    Procedure(s) Performed: Procedure(s) (LRB):  LITHOTRIPSY, ESWL (Right)    Final Anesthesia Type: general      Patient location during evaluation: PACU  Patient participation: Yes- Able to Participate  Level of consciousness: awake and alert, oriented and awake  Post-procedure vital signs: reviewed and stable  Pain management: adequate  Airway patency: patent  ROSE mitigation strategies: Multimodal analgesia, Extubation while patient is awake, Verification of full reversal of neuromuscular block and Extubation and recovery carried out in lateral, semiupright, or other nonsupine position  PONV status at discharge: No PONV  Anesthetic complications: no      Cardiovascular status: blood pressure returned to baseline, hemodynamically stable and stable  Respiratory status: unassisted, spontaneous ventilation and room air  Hydration status: euvolemic  Follow-up not needed.  Comments: Patient with noted sore throat following intubation attempt and eventual LMA placement.  The patient's airway was examined without airway edema or erythema noted.  The use of comfort measures such as ice 3rd largest advised.  He is notify the Department of Anesthesiology if discomfort persists, becomes worse.  He is to return to the emergency room for any proceed shortness of breath or difficulty breathing.              Vitals Value Taken Time   /83 09/05/24 1500   Temp 97.4 09/05/24 1502   Pulse 73 09/05/24 1500   Resp 22 09/05/24 1500   SpO2 95 % 09/05/24 1500   Vitals shown include unfiled device data.      No case tracking events are documented in the log.      Pain/Shar Score: Shar Score: 9 (9/5/2024  2:01 PM)

## 2024-09-08 ENCOUNTER — HOSPITAL ENCOUNTER (EMERGENCY)
Facility: HOSPITAL | Age: 69
Discharge: HOME | End: 2024-09-09
Attending: EMERGENCY MEDICINE
Payer: MEDICARE

## 2024-09-08 DIAGNOSIS — N20.0 NEPHROLITHIASIS: Primary | ICD-10-CM

## 2024-09-08 PROCEDURE — 96361 HYDRATE IV INFUSION ADD-ON: CPT

## 2024-09-08 PROCEDURE — 63600175 PHARM REV CODE 636 W HCPCS: Performed by: EMERGENCY MEDICINE

## 2024-09-08 PROCEDURE — 80053 COMPREHEN METABOLIC PANEL: CPT | Performed by: EMERGENCY MEDICINE

## 2024-09-08 PROCEDURE — 85025 COMPLETE CBC W/AUTO DIFF WBC: CPT | Performed by: EMERGENCY MEDICINE

## 2024-09-08 PROCEDURE — 81001 URINALYSIS AUTO W/SCOPE: CPT | Performed by: EMERGENCY MEDICINE

## 2024-09-08 PROCEDURE — 96375 TX/PRO/DX INJ NEW DRUG ADDON: CPT

## 2024-09-08 PROCEDURE — 96374 THER/PROPH/DIAG INJ IV PUSH: CPT

## 2024-09-08 PROCEDURE — 99285 EMERGENCY DEPT VISIT HI MDM: CPT | Mod: 25

## 2024-09-08 RX ORDER — HYDROMORPHONE HYDROCHLORIDE 1 MG/ML
0.5 INJECTION, SOLUTION INTRAMUSCULAR; INTRAVENOUS; SUBCUTANEOUS
Status: COMPLETED | OUTPATIENT
Start: 2024-09-08 | End: 2024-09-08

## 2024-09-08 RX ORDER — ONDANSETRON HYDROCHLORIDE 2 MG/ML
4 INJECTION, SOLUTION INTRAVENOUS
Status: COMPLETED | OUTPATIENT
Start: 2024-09-08 | End: 2024-09-08

## 2024-09-08 RX ORDER — SODIUM CHLORIDE, SODIUM LACTATE, POTASSIUM CHLORIDE, CALCIUM CHLORIDE 600; 310; 30; 20 MG/100ML; MG/100ML; MG/100ML; MG/100ML
INJECTION, SOLUTION INTRAVENOUS CONTINUOUS
Status: DISCONTINUED | OUTPATIENT
Start: 2024-09-09 | End: 2024-09-09 | Stop reason: HOSPADM

## 2024-09-08 RX ADMIN — HYDROMORPHONE HYDROCHLORIDE 0.5 MG: 0.5 INJECTION, SOLUTION INTRAMUSCULAR; INTRAVENOUS; SUBCUTANEOUS at 11:09

## 2024-09-08 RX ADMIN — ONDANSETRON 4 MG: 2 INJECTION INTRAMUSCULAR; INTRAVENOUS at 11:09

## 2024-09-08 RX ADMIN — SODIUM CHLORIDE, POTASSIUM CHLORIDE, SODIUM LACTATE AND CALCIUM CHLORIDE: 600; 310; 30; 20 INJECTION, SOLUTION INTRAVENOUS at 11:09

## 2024-09-09 VITALS
RESPIRATION RATE: 20 BRPM | SYSTOLIC BLOOD PRESSURE: 118 MMHG | TEMPERATURE: 98 F | HEIGHT: 69 IN | OXYGEN SATURATION: 95 % | HEART RATE: 75 BPM | BODY MASS INDEX: 38.51 KG/M2 | DIASTOLIC BLOOD PRESSURE: 71 MMHG | WEIGHT: 260 LBS

## 2024-09-09 LAB
ALBUMIN SERPL BCP-MCNC: 4.2 G/DL (ref 3.5–5.2)
ALP SERPL-CCNC: 74 U/L (ref 55–135)
ALT SERPL W/O P-5'-P-CCNC: 20 U/L (ref 10–44)
ANION GAP SERPL CALC-SCNC: 7 MMOL/L (ref 8–16)
AST SERPL-CCNC: 16 U/L (ref 10–40)
BASOPHILS # BLD AUTO: 0.07 K/UL (ref 0–0.2)
BASOPHILS NFR BLD: 0.8 % (ref 0–1.9)
BILIRUB SERPL-MCNC: 1.1 MG/DL (ref 0.1–1)
BILIRUB UR QL STRIP: NEGATIVE
BUN SERPL-MCNC: 20 MG/DL (ref 8–23)
CALCIUM SERPL-MCNC: 9.3 MG/DL (ref 8.7–10.5)
CHLORIDE SERPL-SCNC: 103 MMOL/L (ref 95–110)
CLARITY UR: CLEAR
CO2 SERPL-SCNC: 24 MMOL/L (ref 23–29)
COLOR UR: YELLOW
CREAT SERPL-MCNC: 1.1 MG/DL (ref 0.5–1.4)
DIFFERENTIAL METHOD BLD: ABNORMAL
EOSINOPHIL # BLD AUTO: 0.2 K/UL (ref 0–0.5)
EOSINOPHIL NFR BLD: 2.8 % (ref 0–8)
ERYTHROCYTE [DISTWIDTH] IN BLOOD BY AUTOMATED COUNT: 13.9 % (ref 11.5–14.5)
EST. GFR  (NO RACE VARIABLE): >60 ML/MIN/1.73 M^2
GLUCOSE SERPL-MCNC: 129 MG/DL (ref 70–110)
GLUCOSE UR QL STRIP: NEGATIVE
HCT VFR BLD AUTO: 48.9 % (ref 40–54)
HGB BLD-MCNC: 17.1 G/DL (ref 14–18)
HGB UR QL STRIP: ABNORMAL
IMM GRANULOCYTES # BLD AUTO: 0.03 K/UL (ref 0–0.04)
IMM GRANULOCYTES NFR BLD AUTO: 0.4 % (ref 0–0.5)
KETONES UR QL STRIP: NEGATIVE
LEUKOCYTE ESTERASE UR QL STRIP: NEGATIVE
LYMPHOCYTES # BLD AUTO: 2.3 K/UL (ref 1–4.8)
LYMPHOCYTES NFR BLD: 26.6 % (ref 18–48)
MCH RBC QN AUTO: 31 PG (ref 27–31)
MCHC RBC AUTO-ENTMCNC: 35 G/DL (ref 32–36)
MCV RBC AUTO: 89 FL (ref 82–98)
MICROSCOPIC COMMENT: NORMAL
MONOCYTES # BLD AUTO: 1.3 K/UL (ref 0.3–1)
MONOCYTES NFR BLD: 14.9 % (ref 4–15)
NEUTROPHILS # BLD AUTO: 4.6 K/UL (ref 1.8–7.7)
NEUTROPHILS NFR BLD: 54.5 % (ref 38–73)
NITRITE UR QL STRIP: NEGATIVE
NRBC BLD-RTO: 0 /100 WBC
PH UR STRIP: 6 [PH] (ref 5–8)
PLATELET # BLD AUTO: 173 K/UL (ref 150–450)
PMV BLD AUTO: 11.4 FL (ref 9.2–12.9)
POTASSIUM SERPL-SCNC: 4.3 MMOL/L (ref 3.5–5.1)
PROT SERPL-MCNC: 6.8 G/DL (ref 6–8.4)
PROT UR QL STRIP: NEGATIVE
RBC # BLD AUTO: 5.52 M/UL (ref 4.6–6.2)
RBC #/AREA URNS HPF: 3 /HPF (ref 0–4)
SODIUM SERPL-SCNC: 134 MMOL/L (ref 136–145)
SP GR UR STRIP: 1.01 (ref 1–1.03)
URN SPEC COLLECT METH UR: ABNORMAL
UROBILINOGEN UR STRIP-ACNC: NEGATIVE EU/DL
WBC # BLD AUTO: 8.46 K/UL (ref 3.9–12.7)
WBC #/AREA URNS HPF: 1 /HPF (ref 0–5)

## 2024-09-09 PROCEDURE — 96376 TX/PRO/DX INJ SAME DRUG ADON: CPT

## 2024-09-09 PROCEDURE — 63600175 PHARM REV CODE 636 W HCPCS: Performed by: EMERGENCY MEDICINE

## 2024-09-09 PROCEDURE — 63600175 PHARM REV CODE 636 W HCPCS: Performed by: STUDENT IN AN ORGANIZED HEALTH CARE EDUCATION/TRAINING PROGRAM

## 2024-09-09 PROCEDURE — 25000003 PHARM REV CODE 250: Performed by: STUDENT IN AN ORGANIZED HEALTH CARE EDUCATION/TRAINING PROGRAM

## 2024-09-09 PROCEDURE — 96361 HYDRATE IV INFUSION ADD-ON: CPT

## 2024-09-09 RX ORDER — OXYCODONE HYDROCHLORIDE 10 MG/1
10 TABLET ORAL ONCE
Status: COMPLETED | OUTPATIENT
Start: 2024-09-09 | End: 2024-09-09

## 2024-09-09 RX ORDER — KETOROLAC TROMETHAMINE 30 MG/ML
10 INJECTION, SOLUTION INTRAMUSCULAR; INTRAVENOUS
Status: COMPLETED | OUTPATIENT
Start: 2024-09-09 | End: 2024-09-09

## 2024-09-09 RX ORDER — OXYCODONE HYDROCHLORIDE 5 MG/1
5 TABLET ORAL EVERY 4 HOURS PRN
Qty: 10 TABLET | Refills: 0 | Status: SHIPPED | OUTPATIENT
Start: 2024-09-09

## 2024-09-09 RX ORDER — HYDROMORPHONE HYDROCHLORIDE 1 MG/ML
1 INJECTION, SOLUTION INTRAMUSCULAR; INTRAVENOUS; SUBCUTANEOUS
Status: COMPLETED | OUTPATIENT
Start: 2024-09-09 | End: 2024-09-09

## 2024-09-09 RX ORDER — HYDROMORPHONE HYDROCHLORIDE 1 MG/ML
0.5 INJECTION, SOLUTION INTRAMUSCULAR; INTRAVENOUS; SUBCUTANEOUS
Status: COMPLETED | OUTPATIENT
Start: 2024-09-09 | End: 2024-09-09

## 2024-09-09 RX ORDER — KETOROLAC TROMETHAMINE 10 MG/1
10 TABLET, FILM COATED ORAL EVERY 6 HOURS
Qty: 20 TABLET | Refills: 0 | Status: SHIPPED | OUTPATIENT
Start: 2024-09-09 | End: 2024-09-14

## 2024-09-09 RX ADMIN — KETOROLAC TROMETHAMINE 10 MG: 30 INJECTION, SOLUTION INTRAMUSCULAR; INTRAVENOUS at 01:09

## 2024-09-09 RX ADMIN — SODIUM CHLORIDE 1000 ML: 9 INJECTION, SOLUTION INTRAVENOUS at 01:09

## 2024-09-09 RX ADMIN — OXYCODONE HYDROCHLORIDE 10 MG: 10 TABLET ORAL at 03:09

## 2024-09-09 RX ADMIN — HYDROMORPHONE HYDROCHLORIDE 0.5 MG: 0.5 INJECTION, SOLUTION INTRAMUSCULAR; INTRAVENOUS; SUBCUTANEOUS at 12:09

## 2024-09-09 RX ADMIN — HYDROMORPHONE HYDROCHLORIDE 1 MG: 1 INJECTION, SOLUTION INTRAMUSCULAR; INTRAVENOUS; SUBCUTANEOUS at 01:09

## 2024-09-09 NOTE — DISCHARGE INSTRUCTIONS
Please return to the emergency department if you have uncontrolled pain, fever, uncontrolled nausea or vomiting or other issues which concern you.      Please follow-up with your urologist

## 2024-09-09 NOTE — ED PROVIDER NOTES
Encounter Date: 9/8/2024       History     Chief Complaint   Patient presents with    Flank Pain     Pt here with R flank pain and testicular pain.      HPI    69-year-old male presenting for evaluation of flank pain.  Patient recently had lithotripsy and now is presenting with worsening pain while trying to pass the stone.  He denies nausea, vomiting, fever.  He has been taking Flomax.  Home Norco not working.    Review of patient's allergies indicates:   Allergen Reactions    Iodinated contrast media Hives and Shortness Of Breath    Viagra [sildenafil] Other (See Comments)     Pounding in the head and chest  Headaches     Past Medical History:   Diagnosis Date    Aspiration pneumonia     aspirated after anesthesia due to hiatal hernia per patient, spent 6 days in ICU    COVID-19 8/2/20021    GERD (gastroesophageal reflux disease)     Hard of hearing     Hiatal hernia     Hypertension     Kidney stones     Knee pain 06/2020    left     Past Surgical History:   Procedure Laterality Date    CHOLECYSTECTOMY      COLONOSCOPY  2012    Dr Silver- rtc 10 yr    CYSTOSCOPY W/ RETROGRADES Left 4/25/2023    Procedure: CYSTOSCOPY, WITH RETROGRADE PYELOGRAM;  Surgeon: Pepe Bee Jr., MD;  Location: [facility] OR;  Service: Urology;  Laterality: Left;    CYSTOURETEROSCOPY,WITH HOLMIUM LASER LITHOTRIPSY OF URETERAL CALCULUS Left 4/25/2023    Procedure: CYSTOURETEROSCOPY,WITH HOLMIUM LASER LITHOTRIPSY OF URETERAL CALCULUS;  Surgeon: Pepe Bee Jr., MD;  Location: [facility] OR;  Service: Urology;  Laterality: Left;    EXTRACORPOREAL SHOCK WAVE LITHOTRIPSY Right 9/5/2024    Procedure: LITHOTRIPSY, ESWL;  Surgeon: Nixon Swain MD;  Location: [facility] OR;  Service: Urology;  Laterality: Right;    HAND SURGERY      traumatic injury    HEMORRHOID SURGERY      KIDNEY STONE SURGERY      KNEE ARTHROSCOPY W/ MENISCECTOMY Left 11/4/2020    Procedure: ARTHROSCOPY, KNEE, WITH PARTIAL MEDIAL MENISCECTOMY;  Surgeon: Andres Blanco MD;   Location: [facility] OR;  Service: Orthopedics;  Laterality: Left;    TONSILLECTOMY      URETEROSCOPIC REMOVAL OF URETERIC CALCULUS Left 4/25/2023    Procedure: REMOVAL, CALCULUS, URETER, URETEROSCOPIC;  Surgeon: Pepe Bee Jr., MD;  Location: [facility] OR;  Service: Urology;  Laterality: Left;     Family History   Problem Relation Name Age of Onset    Heart disease Mother      Diabetes Mother      Cancer Father      Depression Sister      Cancer Sister          ovarian    Diabetes Brother       Social History     Tobacco Use    Smoking status: Never     Passive exposure: Never    Smokeless tobacco: Never   Substance Use Topics    Alcohol use: Yes     Comment: social    Drug use: Never     Review of Systems  Negative besides where noted above  Physical Exam     Initial Vitals [09/08/24 2328]   BP Pulse Resp Temp SpO2   (!) 162/104 95 20 97.7 °F (36.5 °C) 95 %      MAP       --         Physical Exam    Nursing note and vitals reviewed.  Constitutional: He appears well-developed and well-nourished.   Uncomfortable appearing and writhing around in bed   HENT:   Head: Normocephalic and atraumatic.   Eyes: EOM are normal. Pupils are equal, round, and reactive to light.   Neck: Neck supple.   Normal range of motion.  Cardiovascular:  Normal rate.     Exam reveals no gallop and no friction rub.       No murmur heard.  Pulmonary/Chest: Breath sounds normal. He has no wheezes. He has no rhonchi. He has no rales. He exhibits no tenderness.   Abdominal: Bowel sounds are normal. He exhibits no mass. There is no abdominal tenderness.   No CVA tenderness There is no rebound and no guarding.   Musculoskeletal:         General: Normal range of motion.      Cervical back: Normal range of motion and neck supple.     Neurological: He is oriented to person, place, and time. GCS score is 15. GCS eye subscore is 4. GCS verbal subscore is 5. GCS motor subscore is 6.   Skin: Skin is warm. Capillary refill takes less than 2 seconds.   Psychiatric:  He has a normal mood and affect.         ED Course   Procedures  Labs Reviewed   CBC W/ AUTO DIFFERENTIAL - Abnormal       Result Value    WBC 8.46      RBC 5.52      Hemoglobin 17.1      Hematocrit 48.9      MCV 89      MCH 31.0      MCHC 35.0      RDW 13.9      Platelets 173      MPV 11.4      Immature Granulocytes 0.4      Gran # (ANC) 4.6      Immature Grans (Abs) 0.03      Lymph # 2.3      Mono # 1.3 (*)     Eos # 0.2      Baso # 0.07      nRBC 0      Gran % 54.5      Lymph % 26.6      Mono % 14.9      Eosinophil % 2.8      Basophil % 0.8      Differential Method Automated     COMPREHENSIVE METABOLIC PANEL - Abnormal    Sodium 134 (*)     Potassium 4.3      Chloride 103      CO2 24      Glucose 129 (*)     BUN 20      Creatinine 1.1      Calcium 9.3      Total Protein 6.8      Albumin 4.2      Total Bilirubin 1.1 (*)     Alkaline Phosphatase 74      AST 16      ALT 20      eGFR >60.0      Anion Gap 7 (*)    URINALYSIS, REFLEX TO URINE CULTURE - Abnormal    Specimen UA Urine, Clean Catch      Color, UA Yellow      Appearance, UA Clear      pH, UA 6.0      Specific Gravity, UA 1.015      Protein, UA Negative      Glucose, UA Negative      Ketones, UA Negative      Bilirubin (UA) Negative      Occult Blood UA 2+ (*)     Nitrite, UA Negative      Urobilinogen, UA Negative      Leukocytes, UA Negative      Narrative:     Specimen Source->Urine   URINALYSIS MICROSCOPIC    RBC, UA 3      WBC, UA 1      Microscopic Comment SEE COMMENT      Narrative:     Specimen Source->Urine          Imaging Results              CT Renal Stone Study ABD Pelvis WO (Final result)  Result time 09/09/24 01:32:00      Final result by Dennis Zepeda MD (09/09/24 01:32:00)                   Impression:      Mild right-sided hydroureteronephrosis secondary to a 0.6 cm calculus in the distal right ureter with adjacent smaller 0.2 cm ureteral calculus.    Bilateral nonobstructing nephrolithiasis.    Mild nonspecific urinary bladder wall  thickening as well as perinephric edema.  Correlation with urinalysis to exclude infectious process advised.    Colonic diverticulosis without evidence of acute diverticulitis.    Additional findings as above.      Electronically signed by: Dennis Zepeda MD  Date:    09/09/2024  Time:    01:32               Narrative:    EXAMINATION:  CT RENAL STONE STUDY ABD PELVIS WO    CLINICAL HISTORY:  Flank pain, kidney stone suspected;Flank pain;    TECHNIQUE:  Low dose axial images, sagittal and coronal reformations were obtained from the lung bases to the pubic symphysis.  Contrast was not administered.    COMPARISON:  CT 08/20/2024    FINDINGS:  There is mild bibasilar atelectasis at the visualized lung bases.  There is a stable pulmonary micronodule at the right lung base.  No significant pleural fluid.  The visualized portions of the heart appear normal.    The liver is mildly enlarged.  There are several well-circumscribed hepatic hypodensities suggestive of cysts, similar to prior examination.  Further assessment of the hepatic parenchyma is limited by lack of IV contrast.  The gallbladder is surgically absent.  There is no intra-or extrahepatic biliary ductal dilatation.    The stomach, spleen, pancreas, and adrenal glands appear within normal limits for non-contrast technique.    There is mild right-sided hydronephrosis secondary to a 0.6 cm calculus in the distal right ureter.  There is an adjacent more proximal ureteral 0.2 cm calculus.  There is bilateral perinephric edema, right greater than left.  There are multiple additional nonobstructing bilateral renal calculi.  No left-sided hydronephrosis.  Urinary bladder is not significantly distended with mild nonspecific wall thickening.  Prostate is mildly prominent measuring 4.6 cm.  There are bilateral fat containing inguinal hernias.    The abdominal aorta is normal in course and caliber without significant atherosclerotic calcifications.    The visualized loops  of small and large bowel show no evidence of obstruction or inflammation. There is colonic diverticulosis without evidence of acute diverticulitis.  There is no CT evidence of acute appendicitis.  There is no ascites, free fluid, or intraperitoneal free air. There is a fat containing umbilical hernia.  There are a few scattered shotty small mesenteric and retroperitoneal lymph nodes.    Osseous structures demonstrate degenerative changes of the visualized spine.  The extraperitoneal soft tissues are unremarkable.                                       Medications   lactated ringers infusion (0 mL/hr Intravenous Stopped 9/9/24 0108)   HYDROmorphone injection 0.5 mg (0.5 mg Intravenous Given 9/8/24 2351)   ondansetron injection 4 mg (4 mg Intravenous Given 9/8/24 2352)   HYDROmorphone injection 0.5 mg (0.5 mg Intravenous Given 9/9/24 0010)   HYDROmorphone injection 1 mg (1 mg Intravenous Given 9/9/24 0104)   ketorolac injection 9.999 mg (9.999 mg Intravenous Given 9/9/24 0104)   sodium chloride 0.9% bolus 1,000 mL 1,000 mL (0 mLs Intravenous Stopped 9/9/24 0203)   oxyCODONE immediate release tablet Tab 10 mg (10 mg Oral Given 9/9/24 0326)     Medical Decision Making  69-year-old male with history as above presenting for evaluation of intractable pain.  He is afebrile and hemodynamically stable on evaluation.  Urinalysis without signs of infection and abdomen is soft, nontender, nondistended.  CT abdomen pelvis reveals small nephrolith source for trying to pass.  Patient had pain control after Toradol and multiple doses of Dilaudid.  Discussed with patient admission for pain control versus outpatient management of pain.  He will opt for outpatient management with increase in his outpatient narcotics and a refill on Toradol.  He will follow up with his urologist as soon as possible.    Chrissy Centeno MD      Problems Addressed:  Nephrolithiasis: acute illness or injury    Amount and/or Complexity of Data Reviewed  Labs:  ordered.  Radiology: ordered.    Risk  Prescription drug management.  Decision regarding hospitalization.               ED Course as of 09/09/24 0327   Mon Sep 09, 2024   0054 Labs look okay with normal renal function.  Urine does not appear to be infected.  Patient need to be admitted for intractable pain..  [MH]      ED Course User Index  [MH] Chrissy Centeno MD                           Clinical Impression:  Final diagnoses:  [N20.0] Nephrolithiasis (Primary)          ED Disposition Condition    Discharge Stable          ED Prescriptions       Medication Sig Dispense Start Date End Date Auth. Provider    oxyCODONE (ROXICODONE) 5 MG immediate release tablet Take 1 tablet (5 mg total) by mouth every 4 (four) hours as needed for Pain. 10 tablet 9/9/2024 -- Chrissy Centeno MD    ketorolac (TORADOL) 10 mg tablet Take 1 tablet (10 mg total) by mouth every 6 (six) hours. for 5 days 20 tablet 9/9/2024 9/14/2024 Chrissy Centeno MD          Follow-up Information       Follow up With Specialties Details Why Contact Info Additional Information    [facility] Go to  As needed, If symptoms worsen [address]  [address]  [place] [address]  [phone]       [facility] Emergency [facility] Emergency Medicine Go to  As needed, If symptoms worsen [address] [address]  [phone] 1st floor             Chrissy Centeno MD  Resident  09/09/24 0327

## 2024-09-11 ENCOUNTER — PATIENT OUTREACH (OUTPATIENT)
Dept: EMERGENCY MEDICINE | Facility: HOSPITAL | Age: 69
End: 2024-09-11

## 2024-09-13 DIAGNOSIS — N20.0 URIC ACID NEPHROLITHIASIS: Primary | ICD-10-CM

## 2024-09-13 DIAGNOSIS — N20.1 CALCULUS OF URETER: ICD-10-CM

## 2024-09-23 ENCOUNTER — HOSPITAL ENCOUNTER (OUTPATIENT)
Dept: RADIOLOGY | Facility: HOSPITAL | Age: 69
Discharge: HOME | End: 2024-09-23
Attending: SPECIALIST
Payer: MEDICARE

## 2024-09-23 DIAGNOSIS — N20.1 CALCULUS OF URETER: ICD-10-CM

## 2024-09-23 DIAGNOSIS — N20.0 URIC ACID NEPHROLITHIASIS: ICD-10-CM

## 2024-09-23 PROCEDURE — 74018 RADEX ABDOMEN 1 VIEW: CPT | Mod: TC,PO

## 2024-09-23 PROCEDURE — 74018 RADEX ABDOMEN 1 VIEW: CPT | Mod: 26,,, | Performed by: RADIOLOGY

## 2024-09-24 DIAGNOSIS — N20.0 URIC ACID NEPHROLITHIASIS: Primary | ICD-10-CM

## 2024-09-27 ENCOUNTER — HOSPITAL ENCOUNTER (OUTPATIENT)
Dept: RADIOLOGY | Facility: HOSPITAL | Age: 69
Discharge: HOME | End: 2024-09-27
Attending: SPECIALIST
Payer: MEDICARE

## 2024-09-27 DIAGNOSIS — N20.0 URIC ACID NEPHROLITHIASIS: ICD-10-CM

## 2024-09-27 PROCEDURE — 74176 CT ABD & PELVIS W/O CONTRAST: CPT | Mod: 26,,, | Performed by: RADIOLOGY

## 2024-09-27 PROCEDURE — 74176 CT ABD & PELVIS W/O CONTRAST: CPT | Mod: TC,PO

## 2024-10-14 DIAGNOSIS — I10 ESSENTIAL HYPERTENSION: ICD-10-CM

## 2024-10-14 RX ORDER — LISINOPRIL 10 MG/1
10 TABLET ORAL DAILY
Qty: 90 TABLET | Refills: 1 | Status: SHIPPED | OUTPATIENT
Start: 2024-10-14

## 2024-10-14 NOTE — TELEPHONE ENCOUNTER
----- Message from Mary sent at 10/14/2024  1:42 PM CDT -----  Vm- 1:17- pt needs refill on lisinopril 10 mg   [phone]

## 2024-11-05 ENCOUNTER — HOSPITAL ENCOUNTER (OUTPATIENT)
Dept: RADIOLOGY | Facility: HOSPITAL | Age: 69
Discharge: HOME | End: 2024-11-05
Attending: SPECIALIST
Payer: MEDICARE

## 2024-11-05 DIAGNOSIS — N20.0 KIDNEY STONE: ICD-10-CM

## 2024-11-05 DIAGNOSIS — N20.0 KIDNEY STONE: Primary | ICD-10-CM

## 2024-11-05 PROCEDURE — 74176 CT ABD & PELVIS W/O CONTRAST: CPT | Mod: TC,PO

## 2024-11-05 PROCEDURE — 74176 CT ABD & PELVIS W/O CONTRAST: CPT | Mod: 26,,, | Performed by: RADIOLOGY

## 2024-11-06 ENCOUNTER — HOSPITAL ENCOUNTER (OUTPATIENT)
Dept: RADIOLOGY | Facility: HOSPITAL | Age: 69
Discharge: HOME | End: 2024-11-06
Attending: SPECIALIST
Payer: MEDICARE

## 2024-11-06 DIAGNOSIS — N20.0 URIC ACID NEPHROLITHIASIS: Primary | ICD-10-CM

## 2024-11-06 DIAGNOSIS — N20.0 URIC ACID NEPHROLITHIASIS: ICD-10-CM

## 2024-11-06 PROCEDURE — 74018 RADEX ABDOMEN 1 VIEW: CPT | Mod: 26,,, | Performed by: RADIOLOGY

## 2024-11-06 PROCEDURE — 74018 RADEX ABDOMEN 1 VIEW: CPT | Mod: TC,PO

## 2024-11-13 ENCOUNTER — HOSPITAL ENCOUNTER (OUTPATIENT)
Dept: PREADMISSION TESTING | Facility: HOSPITAL | Age: 69
Discharge: HOME | End: 2024-11-13
Attending: SPECIALIST
Payer: MEDICARE

## 2024-11-13 VITALS
OXYGEN SATURATION: 95 % | SYSTOLIC BLOOD PRESSURE: 136 MMHG | TEMPERATURE: 98 F | RESPIRATION RATE: 16 BRPM | HEIGHT: 69 IN | BODY MASS INDEX: 39.25 KG/M2 | DIASTOLIC BLOOD PRESSURE: 79 MMHG | WEIGHT: 265 LBS | HEART RATE: 72 BPM

## 2024-11-13 DIAGNOSIS — Z01.818 PREOP TESTING: Primary | ICD-10-CM

## 2024-11-13 DIAGNOSIS — N28.89 OTHER SPECIFIED DISORDERS OF KIDNEY AND URETER: ICD-10-CM

## 2024-11-13 RX ORDER — CEFAZOLIN SODIUM 2 G/50ML
2 SOLUTION INTRAVENOUS ONCE
Status: CANCELLED | OUTPATIENT
Start: 2024-11-14

## 2024-11-13 RX ORDER — TRAMADOL HYDROCHLORIDE 50 MG/1
50 TABLET ORAL EVERY 8 HOURS
COMMUNITY
Start: 2024-11-06

## 2024-11-13 NOTE — DISCHARGE INSTRUCTIONS
INSTRUCTIONS  To confirm your doctor has scheduled your surgery for:  Thursday November 14th    Morning of surgery please check in with registration near Parking Garage Entrance then proceed to Outpatient Surgery Department.    Preop nurses will call the afternoon prior to surgery between 4:00 and 6:00 PM with your final arrival time.  PLEASE NOTE:  The surgery schedule has many variables which may affect the time of your surgery case. Family members should be available if your surgery time changes. Plan to be here the day of your procedure between 4-6 hours.    TAKE ONLY THESE MEDICATIONS WITH A SMALL SIP OF WATER THE MORNING OF SURGERY: see list    DO NOT TAKE THESE MEDICATIONS 5-7 DAYS PRIOR to your procedure per your surgeon's request: ASPIRIN, ALEVE, BC powder, CARSON SELTZER, IBUPROFEN, FISH OIL, VITAMIN E, OR HERBALS   (May take Tylenol)    If you are prescribed any types of blood thinners (Aspirin, Coumadin, Plavix, Pradaxa, Xarelto, Aggrenox, Effient, Eliquis, Savasya, Brilinta or any other), please ask your surgeon how many days before scheduled procedure should you stop taking them. You may also need to verify with prescribing physician if it is OK to stop your blood thinners.      INSTRUCTIONS IMPORTANT!!  Do not eat anything after midnight. OK to drink clear liquids until 2 hours before arrival time.  ONLY if you are diabetic, check your sugar in the morning before your procedure.  Do not smoke, vape or drink alcoholic beverages 24 hours prior to your procedure.  Shower the night before AND the morning of your procedure with a Chlorhexidine wash such as hibiclens or Dial antibacterial soap from neck down. You may use your own shampoo and face wash. This helps your skin to be as bacteria free as possible.  If you wear contact lenses, dentures, hearing aids or glasses, bring a container to put them in and give to a family member.    Please leave all jewelry, piercings and valuables at home.  OK to shave  hair from surgical site with ELECTRIC RAZOR ONLY.  If your condition changes such as fever, cough, etc, please notify your surgeon.   ONLY if you have been diagnosed with sleep apnea please bring your C-PAP machine.  ONLY if you wear home oxygen please bring your portable oxygen tank the day of your procedure.   ONLY for patients requiring bowel prep, written instructions will be given by your doctor's office.  ONLY if you have a neuro stimulator, please bring the controller with you the morning of surgery  Make arrangements in advance for transportation home by a responsible adult.  You must make arrangements for transportation, TAXI'S, UBER'S OR LYFTS ARE NOT ALLOWED.        If you have any questions about these instructions, call Pre-Op Admit  Nursing at [phone] or the Pre-Op Day Surgery Unit at [phone].

## 2024-11-13 NOTE — PLAN OF CARE
Preop appt completed. Reviewed history and home meds. Reviewed preop instructions including med instructions, nothing to eat after midnight, ok to drink clear liquids 2 hours prior to arrival. Will have ride home

## 2024-11-14 ENCOUNTER — HOSPITAL ENCOUNTER (OUTPATIENT)
Facility: HOSPITAL | Age: 69
Discharge: HOME | End: 2024-11-15
Attending: SPECIALIST | Admitting: SPECIALIST
Payer: MEDICARE

## 2024-11-14 ENCOUNTER — HOSPITAL ENCOUNTER (OUTPATIENT)
Dept: RADIOLOGY | Facility: HOSPITAL | Age: 69
Discharge: HOME | End: 2024-11-14
Attending: SPECIALIST | Admitting: SPECIALIST
Payer: MEDICARE

## 2024-11-14 ENCOUNTER — ANESTHESIA EVENT (OUTPATIENT)
Dept: SURGERY | Facility: HOSPITAL | Age: 69
End: 2024-11-14
Payer: MEDICARE

## 2024-11-14 ENCOUNTER — ANESTHESIA (OUTPATIENT)
Dept: SURGERY | Facility: HOSPITAL | Age: 69
End: 2024-11-14
Payer: MEDICARE

## 2024-11-14 DIAGNOSIS — Z01.818 PREOP TESTING: ICD-10-CM

## 2024-11-14 DIAGNOSIS — N20.0 KIDNEY STONE: ICD-10-CM

## 2024-11-14 DIAGNOSIS — N20.0 NEPHROLITHIASIS: Primary | ICD-10-CM

## 2024-11-14 PROCEDURE — 63600175 PHARM REV CODE 636 W HCPCS

## 2024-11-14 PROCEDURE — C1769 GUIDE WIRE: HCPCS | Performed by: SPECIALIST

## 2024-11-14 PROCEDURE — 36000707: Performed by: SPECIALIST

## 2024-11-14 PROCEDURE — C1758 CATHETER, URETERAL: HCPCS | Performed by: SPECIALIST

## 2024-11-14 PROCEDURE — 63600175 PHARM REV CODE 636 W HCPCS: Performed by: SPECIALIST

## 2024-11-14 PROCEDURE — 71000033 HC RECOVERY, INTIAL HOUR: Performed by: SPECIALIST

## 2024-11-14 PROCEDURE — 25500020 PHARM REV CODE 255: Performed by: SPECIALIST

## 2024-11-14 PROCEDURE — 25000003 PHARM REV CODE 250: Performed by: NURSE ANESTHETIST, CERTIFIED REGISTERED

## 2024-11-14 PROCEDURE — 76000 FLUOROSCOPY <1 HR PHYS/QHP: CPT | Mod: TC

## 2024-11-14 PROCEDURE — 36000706: Performed by: SPECIALIST

## 2024-11-14 PROCEDURE — 37000008 HC ANESTHESIA 1ST 15 MINUTES: Performed by: SPECIALIST

## 2024-11-14 PROCEDURE — 94761 N-INVAS EAR/PLS OXIMETRY MLT: CPT

## 2024-11-14 PROCEDURE — 25000003 PHARM REV CODE 250: Performed by: ANESTHESIOLOGY

## 2024-11-14 PROCEDURE — 63600175 PHARM REV CODE 636 W HCPCS: Performed by: NURSE ANESTHETIST, CERTIFIED REGISTERED

## 2024-11-14 PROCEDURE — 99900031 HC PATIENT EDUCATION (STAT)

## 2024-11-14 PROCEDURE — 37000009 HC ANESTHESIA EA ADD 15 MINS: Performed by: SPECIALIST

## 2024-11-14 PROCEDURE — 63600175 PHARM REV CODE 636 W HCPCS: Performed by: ANESTHESIOLOGY

## 2024-11-14 PROCEDURE — 25000003 PHARM REV CODE 250: Performed by: SPECIALIST

## 2024-11-14 PROCEDURE — 71000039 HC RECOVERY, EACH ADD'L HOUR: Performed by: SPECIALIST

## 2024-11-14 RX ORDER — HYDROMORPHONE HYDROCHLORIDE 1 MG/ML
0.2 INJECTION, SOLUTION INTRAMUSCULAR; INTRAVENOUS; SUBCUTANEOUS EVERY 5 MIN PRN
Status: DISCONTINUED | OUTPATIENT
Start: 2024-11-14 | End: 2024-11-14 | Stop reason: HOSPADM

## 2024-11-14 RX ORDER — MEPERIDINE HYDROCHLORIDE 50 MG/ML
12.5 INJECTION INTRAMUSCULAR; INTRAVENOUS; SUBCUTANEOUS EVERY 10 MIN PRN
Status: DISCONTINUED | OUTPATIENT
Start: 2024-11-14 | End: 2024-11-14 | Stop reason: HOSPADM

## 2024-11-14 RX ORDER — FAMOTIDINE 10 MG/ML
INJECTION INTRAVENOUS
Status: DISCONTINUED | OUTPATIENT
Start: 2024-11-14 | End: 2024-11-14

## 2024-11-14 RX ORDER — PHENAZOPYRIDINE HYDROCHLORIDE 100 MG/1
100 TABLET, FILM COATED ORAL ONCE
Status: COMPLETED | OUTPATIENT
Start: 2024-11-14 | End: 2024-11-14

## 2024-11-14 RX ORDER — SUCCINYLCHOLINE CHLORIDE 20 MG/ML
INJECTION INTRAMUSCULAR; INTRAVENOUS
Status: DISCONTINUED | OUTPATIENT
Start: 2024-11-14 | End: 2024-11-14

## 2024-11-14 RX ORDER — ACETAMINOPHEN 10 MG/ML
INJECTION, SOLUTION INTRAVENOUS
Status: DISCONTINUED | OUTPATIENT
Start: 2024-11-14 | End: 2024-11-14

## 2024-11-14 RX ORDER — ROCURONIUM BROMIDE 10 MG/ML
INJECTION, SOLUTION INTRAVENOUS
Status: DISCONTINUED | OUTPATIENT
Start: 2024-11-14 | End: 2024-11-14

## 2024-11-14 RX ORDER — GLUCAGON 1 MG
1 KIT INJECTION
Status: DISCONTINUED | OUTPATIENT
Start: 2024-11-14 | End: 2024-11-14 | Stop reason: HOSPADM

## 2024-11-14 RX ORDER — CEFAZOLIN SODIUM 2 G/50ML
2 SOLUTION INTRAVENOUS ONCE
Status: COMPLETED | OUTPATIENT
Start: 2024-11-14 | End: 2024-11-14

## 2024-11-14 RX ORDER — HYDROMORPHONE HYDROCHLORIDE 1 MG/ML
INJECTION, SOLUTION INTRAMUSCULAR; INTRAVENOUS; SUBCUTANEOUS
Status: COMPLETED
Start: 2024-11-14 | End: 2024-11-14

## 2024-11-14 RX ORDER — FENTANYL CITRATE 50 UG/ML
INJECTION, SOLUTION INTRAMUSCULAR; INTRAVENOUS
Status: DISCONTINUED | OUTPATIENT
Start: 2024-11-14 | End: 2024-11-14

## 2024-11-14 RX ORDER — OXYCODONE HYDROCHLORIDE 5 MG/1
5 TABLET ORAL
Status: DISCONTINUED | OUTPATIENT
Start: 2024-11-14 | End: 2024-11-14 | Stop reason: HOSPADM

## 2024-11-14 RX ORDER — ONDANSETRON HYDROCHLORIDE 2 MG/ML
4 INJECTION, SOLUTION INTRAVENOUS DAILY PRN
Status: DISCONTINUED | OUTPATIENT
Start: 2024-11-14 | End: 2024-11-14 | Stop reason: HOSPADM

## 2024-11-14 RX ORDER — LIDOCAINE HYDROCHLORIDE 20 MG/ML
JELLY TOPICAL
Status: DISCONTINUED | OUTPATIENT
Start: 2024-11-14 | End: 2024-11-14 | Stop reason: HOSPADM

## 2024-11-14 RX ORDER — ONDANSETRON HYDROCHLORIDE 2 MG/ML
INJECTION, SOLUTION INTRAMUSCULAR; INTRAVENOUS
Status: DISCONTINUED | OUTPATIENT
Start: 2024-11-14 | End: 2024-11-14

## 2024-11-14 RX ORDER — PROPOFOL 10 MG/ML
VIAL (ML) INTRAVENOUS
Status: DISCONTINUED | OUTPATIENT
Start: 2024-11-14 | End: 2024-11-14

## 2024-11-14 RX ORDER — HYDROCODONE BITARTRATE AND ACETAMINOPHEN 5; 325 MG/1; MG/1
1 TABLET ORAL EVERY 6 HOURS PRN
Status: DISCONTINUED | OUTPATIENT
Start: 2024-11-14 | End: 2024-11-15 | Stop reason: HOSPADM

## 2024-11-14 RX ORDER — LISINOPRIL 5 MG/1
10 TABLET ORAL DAILY
Status: DISCONTINUED | OUTPATIENT
Start: 2024-11-15 | End: 2024-11-15 | Stop reason: HOSPADM

## 2024-11-14 RX ORDER — DIPHENHYDRAMINE HYDROCHLORIDE 50 MG/ML
12.5 INJECTION INTRAMUSCULAR; INTRAVENOUS
Status: DISCONTINUED | OUTPATIENT
Start: 2024-11-14 | End: 2024-11-14 | Stop reason: HOSPADM

## 2024-11-14 RX ORDER — CELECOXIB 100 MG/1
200 CAPSULE ORAL ONCE
Status: COMPLETED | OUTPATIENT
Start: 2024-11-14 | End: 2024-11-14

## 2024-11-14 RX ADMIN — HYDROMORPHONE HYDROCHLORIDE 0.2 MG: 1 INJECTION, SOLUTION INTRAMUSCULAR; INTRAVENOUS; SUBCUTANEOUS at 03:11

## 2024-11-14 RX ADMIN — CELECOXIB 200 MG: 100 CAPSULE ORAL at 03:11

## 2024-11-14 RX ADMIN — HYDROMORPHONE HYDROCHLORIDE 0.2 MG: 1 INJECTION, SOLUTION INTRAMUSCULAR; INTRAVENOUS; SUBCUTANEOUS at 02:11

## 2024-11-14 RX ADMIN — ROCURONIUM BROMIDE 5 MG: 10 INJECTION, SOLUTION INTRAVENOUS at 01:11

## 2024-11-14 RX ADMIN — FENTANYL CITRATE 50 MCG: 0.05 INJECTION, SOLUTION INTRAMUSCULAR; INTRAVENOUS at 01:11

## 2024-11-14 RX ADMIN — HYDROCODONE BITARTRATE AND ACETAMINOPHEN 1 TABLET: 5; 325 TABLET ORAL at 07:11

## 2024-11-14 RX ADMIN — CEFAZOLIN SODIUM 2 G: 2 SOLUTION INTRAVENOUS at 01:11

## 2024-11-14 RX ADMIN — ONDANSETRON 4 MG: 2 INJECTION INTRAMUSCULAR; INTRAVENOUS at 01:11

## 2024-11-14 RX ADMIN — PROPOFOL 200 MG: 10 INJECTION, EMULSION INTRAVENOUS at 01:11

## 2024-11-14 RX ADMIN — FAMOTIDINE 20 MG: 10 INJECTION, SOLUTION INTRAVENOUS at 01:11

## 2024-11-14 RX ADMIN — PHENAZOPYRIDINE 100 MG: 100 TABLET ORAL at 03:11

## 2024-11-14 RX ADMIN — OXYCODONE HYDROCHLORIDE 5 MG: 5 TABLET ORAL at 02:11

## 2024-11-14 RX ADMIN — ACETAMINOPHEN 1000 MG: 10 INJECTION, SOLUTION INTRAVENOUS at 01:11

## 2024-11-14 RX ADMIN — Medication 120 MG: at 01:11

## 2024-11-14 NOTE — ANESTHESIA PREPROCEDURE EVALUATION
11/14/2024  Lewis Matias is a 69 y.o., male.      Results for orders placed or performed during the hospital encounter of 07/09/24   EKG 12-lead    Collection Time: 07/09/24  7:52 PM   Result Value Ref Range    QRS Duration 68 ms    OHS QTC Calculation 408 ms    Narrative    Test Reason : R10.9,    Vent. Rate : 075 BPM     Atrial Rate : 075 BPM     P-R Int : 172 ms          QRS Dur : 068 ms      QT Int : 366 ms       P-R-T Axes : 039 040 041 degrees     QTc Int : 408 ms    Normal sinus rhythm  Low voltage QRS  Borderline Abnormal ECG  When compared with ECG of 25-APR-2023 11:45,  No significant change was found  Confirmed by Alexander FRY, aZc LOMELI ([id number]) on 7/10/2024 8:53:25 PM    Referred By: [email or web address]   SELF           Confirmed By:Zac Munoz MD             Lab Results   Component Value Date    WBC 7.09 11/13/2024    HGB 17.0 11/13/2024    HCT 49.6 11/13/2024    MCV 90 11/13/2024     11/13/2024     BMP  Lab Results   Component Value Date     11/13/2024    K 4.3 11/13/2024     11/13/2024    CO2 29 11/13/2024    BUN 11 11/13/2024    CREATININE 0.9 11/13/2024    CALCIUM 10.0 11/13/2024    ANIONGAP 6 (L) 11/13/2024     11/13/2024     (H) 09/08/2024    GLU 90 08/26/2024       No results found for this or any previous visit.         Pre-op Assessment    I have reviewed the Patient Summary Reports.     I have reviewed the Nursing Notes. I have reviewed the NPO Status.   I have reviewed the Medications.     Review of Systems  Anesthesia Hx:  No problems with previous Anesthesia             Denies Family Hx of Anesthesia complications.   Personal Hx of Anesthesia complications (Aspiration PNA after ESWL in the past)                    Social:  Alcohol Use, Non-Smoker       Hematology/Oncology:  Hematology Normal   Oncology Normal        --  Thrombocytopenia:                                EENT/Dental:  EENT/Dental Normal    Ears General/Symptom(s) Hearing Impairment:             Cardiovascular:     Hypertension, well controlled              ECG has been reviewed.                            Pulmonary:  Pulmonary Normal        History of Aspiration Pneumonia                Renal/:  Chronic Renal Disease                Hepatic/GI:    Hiatal Hernia, GERD                Musculoskeletal:  Musculoskeletal Normal                Neurological:    Neuromuscular Disease,                                   Endocrine:  Endocrine Normal          Morbid Obesity / BMI > 40  Psych:  Psychiatric Normal                    Physical Exam  General: Well nourished, Cooperative, Alert and Oriented    Airway:  Mallampati: IV   Mouth Opening: Small, but > 3cm  TM Distance: < 4 cm  Tongue: Large  Neck ROM: Normal ROM    Dental:  Intact    Chest/Lungs:  Clear to auscultation    Heart:  Rate: Normal  Rhythm: Regular Rhythm        Anesthesia Plan  Type of Anesthesia, risks & benefits discussed:    Anesthesia Type: Gen ETT  Intra-op Monitoring Plan: Standard ASA Monitors  Post Op Pain Control Plan: multimodal analgesia and IV/PO Opioids PRN  Induction:  IV and rapid sequence  Airway Plan: Video, Post-Induction  Informed Consent: Informed consent signed with the Patient and all parties understand the risks and agree with anesthesia plan.  All questions answered.   ASA Score: 3  Anesthesia Plan Notes: GETA. RSI, history of aspiration.  Decadron 8mg, iv Zofran 4mg iv, Pepcid 20mg iv   Ofirmev 1000mg iv  Sugammadex   ROSE Precautions: Extubate patient awake with HOB elevated and oral airway in place        Ready For Surgery From Anesthesia Perspective.     .

## 2024-11-14 NOTE — DISCHARGE SUMMARY
Patient comes in for cystoscopic evaluation after recent right renal stone treatment  No residual obstruction was noted  No stent was left  Patient did have a urethrotomy and a Engle catheter was left at the end of the case  Procedure is done w no complication    After a brief stay in recovery, patient is discharged in good condition    Meds given:  Lortab Bactrim    F/u office:  Follow up tomorrow to remove his Engle catheter

## 2024-11-14 NOTE — OP NOTE
Operative Note         SUMMARY     Surgery Date:  11/14/2024     Assistant:     Indications:  69-year-old male  With recently treated right renal stone  Now with right flank pain  KUB demonstrates right renal calculi in the midpole  Here for cystoscopic evaluation, with retrograde pyelography  Patient can not have IV contrast      Pre-op Diagnosis:   Renal calculi  Urethral stricture    Post-op Diagnosis:  Same    Procedure:  Urethrotomy  Right retrograde pyelography  Fulguration with electrocautery    Anesthesia: General    Description of Procedure:   Patient brought to cystoscopy suite.  Placed in the cystoscopy position.  Patient is prepped and draped.  Anesthesia is given.  We enter the urinary bladder with the 21 fr cystoscope.  No lesions found in the bladder  We had to use a electrocautery  To open up the urethra at the level of the bladder neck, creating a urethrotomy  We then used fulguration to cauterize it  Once inside the bladder no lesions were found  Cannulate the right ureter with a cone-tipped catheter and inject contrast  The ureter was normal caliber from distal to proximal  Collecting system is within normal limits  Drainage was excellent at 5 minutes  No filling defects were noted  At this point we cauterized again at the bladder neck  And then removed the telescope  I did leave a Engle catheter in  At the end of the case and he goes to recovery in good condition    Findings/Key Components:          Estimated Blood Loss:     20 cc

## 2024-11-14 NOTE — ANESTHESIA POSTPROCEDURE EVALUATION
Anesthesia Post Evaluation    Patient: Lewis Matias    Procedure(s) Performed: Procedure(s) (LRB):  CYSTOSCOPY, WITH RETROGRADE PYELOGRAM (Right)  REMOVAL, CALCULUS, URETER, URETEROSCOPIC (N/A)  URETEROSCOPY (N/A)  FULGURATION, BLADDER (N/A)    Final Anesthesia Type: general      Patient location during evaluation: PACU  Patient participation: Yes- Able to Participate  Level of consciousness: awake and alert  Post-procedure vital signs: reviewed and stable  Pain management: adequate  Airway patency: patent    PONV status at discharge: No PONV  Anesthetic complications: no      Cardiovascular status: blood pressure returned to baseline and stable  Respiratory status: nasal cannula  Hydration status: euvolemic  Follow-up not needed.              Vitals Value Taken Time   /75 11/14/24 1645   Temp 36.9 °C (98.5 °F) 11/14/24 1420   Pulse 99 11/14/24 1659   Resp 19 11/14/24 1659   SpO2 97 % 11/14/24 1659   Vitals shown include unfiled device data.      No case tracking events are documented in the log.      Pain/Shar Score: Pain Rating Prior to Med Admin: 7 (11/14/2024  3:33 PM)  Shar Score: 8 (11/14/2024  4:00 PM)

## 2024-11-14 NOTE — TRANSFER OF CARE
Anesthesia Transfer of Care Note    Patient: Lewis Matias    Procedure(s) Performed: Procedure(s) (LRB):  CYSTOSCOPY, WITH RETROGRADE PYELOGRAM (Right)  REMOVAL, CALCULUS, URETER, URETEROSCOPIC (N/A)  URETEROSCOPY (N/A)  FULGURATION, BLADDER (N/A)    Patient location: PACU    Anesthesia Type: general    Transport from OR: Transported from OR on room air with adequate spontaneous ventilation    Post pain: adequate analgesia    Post assessment: no apparent anesthetic complications and tolerated procedure well    Post vital signs: stable    Level of consciousness: awake, alert and oriented    Nausea/Vomiting: no nausea/vomiting    Complications: none    Transfer of care protocol was followed      Last vitals: Visit Vitals  BP (!) 140/77 (BP Location: Right arm)   Pulse 77   Temp 36.4 °C (97.5 °F) (Oral)   Resp 17   SpO2 99%

## 2024-11-14 NOTE — H&P
[facility]  History & Physical    SUBJECTIVE:     Chief Complaint/Reason for Admission:     History of Present Illness:  Patient is a 69 y.o. male presents with history of right flank pain  Patient having had a large stone treated with lithotripsy several weeks ago  Now with fragments seen in the right kidney  Patient has an unusual amount of right flank pain that persists  Here for cystoscopic evaluation to include retrograde pyelography    PTA Medications   Medication Sig    lisinopriL 10 MG tablet Take 1 tablet (10 mg total) by mouth once daily.    oxyCODONE (ROXICODONE) 5 MG immediate release tablet Take 1 tablet (5 mg total) by mouth every 4 (four) hours as needed for Pain.    tamsulosin (FLOMAX) 0.4 mg Cap Take 1 capsule (0.4 mg total) by mouth once daily. Take in evening.    traMADoL (ULTRAM) 50 mg tablet Take 50 mg by mouth every 8 (eight) hours.    HYDROcodone-acetaminophen (NORCO) 5-325 mg per tablet Take 1 tablet by mouth every 6 (six) hours as needed for Pain.       Review of patient's allergies indicates:   Allergen Reactions    Iodinated contrast media Hives and Shortness Of Breath    Viagra [sildenafil] Other (See Comments)     Pounding in the head and chest  Headaches       Past Medical History:   Diagnosis Date    Aspiration pneumonia     aspirated after anesthesia due to hiatal hernia per patient, spent 6 days in ICU    COVID-19 8/2/20021    GERD (gastroesophageal reflux disease)     Hard of hearing     Hiatal hernia     Hypertension     Kidney stones     Knee pain 06/2020    left    Sleep apnea      Past Surgical History:   Procedure Laterality Date    CHOLECYSTECTOMY      COLONOSCOPY  2012    Dr Silver- rtc 10 yr    CYSTOSCOPY W/ RETROGRADES Left 4/25/2023    Procedure: CYSTOSCOPY, WITH RETROGRADE PYELOGRAM;  Surgeon: Pepe Bee Jr., MD;  [facility];  Service: Urology;  Laterality: Left;    CYSTOURETEROSCOPY,WITH HOLMIUM LASER LITHOTRIPSY OF URETERAL CALCULUS Left  4/25/2023    Procedure: CYSTOURETEROSCOPY,WITH HOLMIUM LASER LITHOTRIPSY OF URETERAL CALCULUS;  Surgeon: Pepe Bee Jr., MD;  Location: [facility] OR;  Service: Urology;  Laterality: Left;    EXTRACORPOREAL SHOCK WAVE LITHOTRIPSY Right 9/5/2024    Procedure: LITHOTRIPSY, ESWL;  Surgeon: Nixon Swain MD;  Location: [facility] OR;  Service: Urology;  Laterality: Right;    HAND SURGERY      traumatic injury    HEMORRHOID SURGERY      KIDNEY STONE SURGERY      KNEE ARTHROSCOPY W/ MENISCECTOMY Left 11/4/2020    Procedure: ARTHROSCOPY, KNEE, WITH PARTIAL MEDIAL MENISCECTOMY;  Surgeon: Andres Blanco MD;  Location: [facility] OR;  Service: Orthopedics;  Laterality: Left;    TONSILLECTOMY      URETEROSCOPIC REMOVAL OF URETERIC CALCULUS Left 4/25/2023    Procedure: REMOVAL, CALCULUS, URETER, URETEROSCOPIC;  Surgeon: Pepe Bee Jr., MD;  Location: [facility] OR;  Service: Urology;  Laterality: Left;     Family History   Problem Relation Name Age of Onset    Heart disease Mother      Diabetes Mother      Cancer Father      Depression Sister      Cancer Sister          ovarian    Diabetes Brother       Social History     Tobacco Use    Smoking status: Never     Passive exposure: Never    Smokeless tobacco: Never   Substance Use Topics    Alcohol use: Yes     Comment: social    Drug use: Never        Review of Systems:  Negative    OBJECTIVE:     Vital Signs (Most Recent):  Temp: 97.5 °F (36.4 °C) (11/14/24 0928)  Pulse: 77 (11/14/24 0928)  Resp: 17 (11/14/24 0928)  BP: (!) 140/77 (11/14/24 0928)  SpO2: 99 % (11/14/24 0928)    Inpatient Medications:  Current Facility-Administered Medications   Medication Dose Route Frequency Provider Last Rate Last Admin    cefazolin (ANCEF) 2 gram in dextrose 5% 50 mL IVPB (premix)  2 g Intravenous Once Nixon Swain MD              ASSESSMENT/PLAN:       Right renal calculi  Severe pain      Cystoscopy with retrograde pyelography  Possible stent

## 2024-11-15 VITALS
HEART RATE: 88 BPM | SYSTOLIC BLOOD PRESSURE: 143 MMHG | WEIGHT: 272.94 LBS | TEMPERATURE: 98 F | DIASTOLIC BLOOD PRESSURE: 84 MMHG | BODY MASS INDEX: 40.3 KG/M2 | RESPIRATION RATE: 18 BRPM | OXYGEN SATURATION: 95 %

## 2024-11-15 PROCEDURE — 99406 BEHAV CHNG SMOKING 3-10 MIN: CPT

## 2024-11-15 PROCEDURE — 99900035 HC TECH TIME PER 15 MIN (STAT)

## 2024-11-15 PROCEDURE — 94799 UNLISTED PULMONARY SVC/PX: CPT

## 2024-11-15 PROCEDURE — 94761 N-INVAS EAR/PLS OXIMETRY MLT: CPT

## 2024-11-15 PROCEDURE — 99900031 HC PATIENT EDUCATION (STAT)

## 2024-11-15 NOTE — PLAN OF CARE
Problem: Adult Inpatient Plan of Care  Goal: Plan of Care Review  Outcome: Met  Goal: Patient-Specific Goal (Individualized)  Outcome: Met  Goal: Absence of Hospital-Acquired Illness or Injury  Outcome: Met  Goal: Optimal Comfort and Wellbeing  Outcome: Met  Goal: Readiness for Transition of Care  Outcome: Met     Problem: Infection  Goal: Absence of Infection Signs and Symptoms  Outcome: Met     Problem: Bariatric Environmental Safety  Goal: Safety Maintained with Care  Outcome: Met

## 2024-11-15 NOTE — RESPIRATORY THERAPY
11/14/24 2009   Patient Assessment/Suction   Level of Consciousness (AVPU) alert   Respiratory Effort Unlabored;Normal   Expansion/Accessory Muscles/Retractions no use of accessory muscles;no retractions   All Lung Fields Breath Sounds Anterior:;Lateral:;clear   Rhythm/Pattern, Respiratory no shortness of breath reported;unlabored   Cough Frequency no cough   PRE-TX-O2   Device (Oxygen Therapy) room air   Flow (L/min) (Oxygen Therapy) 2  (STANDBY)   SpO2 (!) 94 %   Pulse Oximetry Type Intermittent   $ Pulse Oximetry - Multiple Charge Pulse Oximetry - Multiple   Pulse 77   Resp 17   Positioning HOB elevated 30 degrees   Education   $ Education Oxygen;15 min

## 2024-11-15 NOTE — CARE UPDATE
11/15/24 0750   Patient Assessment/Suction   Level of Consciousness (AVPU) alert   Respiratory Effort Normal;Unlabored   Expansion/Accessory Muscles/Retractions no use of accessory muscles;no retractions   All Lung Fields Breath Sounds Anterior:;Lateral:   Rhythm/Pattern, Respiratory no shortness of breath reported;pattern regular;unlabored   Cough Frequency no cough   PRE-TX-O2   Device (Oxygen Therapy) room air   SpO2 (!) 94 %   Pulse Oximetry Type Intermittent   $ Pulse Oximetry - Multiple Charge Pulse Oximetry - Multiple   Pulse 84   Resp 18   Education   $ Education 15 min   Tobacco Cessation Intervention   Do you use any type of tobacco product? No   $ Smoking Cessation Charges Smoking Cessation - Intermediate (Non-CTTS)   Respiratory Evaluation   $ Care Plan Tech Time 15 min   $ Respiratory Evaluation Complete   Evaluation For New Orders   Admitting Diagnosis outpatient recovery   Cardiac Diagnosis hx of hypertension   Pulmonary Diagnosis sleep apnea   Current Surgeries renal   Home Oxygen   Has Home Oxygen? No   Home Aerosol, MDI, DPI, and Other Treatments/Therapies   Home Respiratory Therapy Per Patient/Review of Chart Yes   Other Home Respiratory Therapies   (sleep apnea)   Oxygen Care Plan   Oxygen Care Plan Per Protocol   SPO2 Goal (%) 92% non-cardiac   Rationale SpO2 is <92% (Non-cardiac Pt.)   Bronchodilator Care Plan   Rationale No Rationale found   Atelectasis Care Plan   Rationale No Rational Found   Airway Clearance Care Plan   Rationale No rationale found

## 2024-11-15 NOTE — NURSING
Discharge instructions reviewed with patient and spouse. All questions and concerns addressed/answered. PIV removed.

## 2024-11-15 NOTE — PLAN OF CARE
Pt clear for DC from case management standpoint. Discharging to home with spouse. Patient to follow up with Dr. Swain.          11/15/24 0934   Final Note   Assessment Type Final Discharge Note   Anticipated Discharge Disposition Home

## 2024-11-18 ENCOUNTER — TELEPHONE (OUTPATIENT)
Dept: PULMONOLOGY | Facility: CLINIC | Age: 69
End: 2024-11-18
Payer: MEDICARE

## 2024-11-18 DIAGNOSIS — G47.33 OSA (OBSTRUCTIVE SLEEP APNEA): Primary | ICD-10-CM

## 2024-11-18 NOTE — TELEPHONE ENCOUNTER
----- Message from Med Assistant Delgadillo sent at 11/18/2024 10:34 AM CST -----  Patient left a voicemail stating he wants a new C PAP mask   Please call him  at [phone]  [facility]  11/18/24

## 2024-11-20 ENCOUNTER — PATIENT MESSAGE (OUTPATIENT)
Dept: PULMONOLOGY | Facility: CLINIC | Age: 69
End: 2024-11-20
Payer: MEDICARE

## 2024-11-20 NOTE — TELEPHONE ENCOUNTER
Order for new full face mask was faxed to [facility].  Pt came into the office today with his old one and he had glued the seal around the mask back together.  He has not heard from DME to get new mask.  I gave pt one sample we had in the office and re faxed the order and contacted [facility] to get pt set up with anything else he needs.

## 2024-12-16 ENCOUNTER — OFFICE VISIT (OUTPATIENT)
Dept: PULMONOLOGY | Facility: CLINIC | Age: 69
End: 2024-12-16
Payer: MEDICARE

## 2024-12-16 VITALS
SYSTOLIC BLOOD PRESSURE: 130 MMHG | BODY MASS INDEX: 39.87 KG/M2 | TEMPERATURE: 98 F | HEIGHT: 69 IN | DIASTOLIC BLOOD PRESSURE: 76 MMHG | WEIGHT: 269.19 LBS | OXYGEN SATURATION: 97 %

## 2024-12-16 DIAGNOSIS — G47.33 OSA (OBSTRUCTIVE SLEEP APNEA): Primary | ICD-10-CM

## 2024-12-16 PROCEDURE — 99213 OFFICE O/P EST LOW 20 MIN: CPT | Mod: S$GLB,,, | Performed by: NURSE PRACTITIONER

## 2024-12-16 PROCEDURE — 3075F SYST BP GE 130 - 139MM HG: CPT | Mod: CPTII,S$GLB,, | Performed by: NURSE PRACTITIONER

## 2024-12-16 PROCEDURE — 1126F AMNT PAIN NOTED NONE PRSNT: CPT | Mod: CPTII,S$GLB,, | Performed by: NURSE PRACTITIONER

## 2024-12-16 PROCEDURE — 3078F DIAST BP <80 MM HG: CPT | Mod: CPTII,S$GLB,, | Performed by: NURSE PRACTITIONER

## 2024-12-16 PROCEDURE — 1101F PT FALLS ASSESS-DOCD LE1/YR: CPT | Mod: CPTII,S$GLB,, | Performed by: NURSE PRACTITIONER

## 2024-12-16 PROCEDURE — 3288F FALL RISK ASSESSMENT DOCD: CPT | Mod: CPTII,S$GLB,, | Performed by: NURSE PRACTITIONER

## 2024-12-16 PROCEDURE — 99999 PR PBB SHADOW E&M-EST. PATIENT-LVL III: CPT | Mod: PBBFAC,,, | Performed by: NURSE PRACTITIONER

## 2024-12-16 PROCEDURE — 1159F MED LIST DOCD IN RCRD: CPT | Mod: CPTII,S$GLB,, | Performed by: NURSE PRACTITIONER

## 2024-12-16 PROCEDURE — 4010F ACE/ARB THERAPY RXD/TAKEN: CPT | Mod: CPTII,S$GLB,, | Performed by: NURSE PRACTITIONER

## 2024-12-16 PROCEDURE — 3008F BODY MASS INDEX DOCD: CPT | Mod: CPTII,S$GLB,, | Performed by: NURSE PRACTITIONER

## 2024-12-16 NOTE — PROGRESS NOTES
SUBJECTIVE:    Patient ID: Lewis Matias is a 69 y.o. male.    Chief Complaint: Follow-up (10 month follow up ROSE)     Patient here today feeling well. He is sleeping on his CPAP machine every night. He does feel benefit from it. He denies headaches and brain fog now.  His compliance report shows he is 100% compliant sleeps an average of 6 hours and 43 minutes. His AHI is 0.7.      Past Medical History:   Diagnosis Date    Aspiration pneumonia     aspirated after anesthesia due to hiatal hernia per patient, spent 6 days in ICU    COVID-19 8/2/20021    GERD (gastroesophageal reflux disease)     Hard of hearing     Hiatal hernia     Hypertension     Kidney stones     Knee pain 06/2020    left    Sleep apnea      Past Surgical History:   Procedure Laterality Date    BLADDER FULGURATION N/A 11/14/2024    Procedure: FULGURATION, BLADDER;  Surgeon: Nixon Swain MD;  Location: [facility];  Service: Urology;  Laterality: N/A;    CHOLECYSTECTOMY      COLONOSCOPY  2012    Dr Silver- rtc 10 yr    CYSTOSCOPY W/ RETROGRADES Left 4/25/2023    Procedure: CYSTOSCOPY, WITH RETROGRADE PYELOGRAM;  Surgeon: Pepe Bee Jr., MD;  Location: [facility] OR;  Service: Urology;  Laterality: Left;    CYSTOSCOPY W/ RETROGRADES Right 11/14/2024    Procedure: CYSTOSCOPY, WITH RETROGRADE PYELOGRAM;  Surgeon: Nixon Swain MD;  Location: [facility] OR;  Service: Urology;  Laterality: Right;    CYSTOURETEROSCOPY,WITH HOLMIUM LASER LITHOTRIPSY OF URETERAL CALCULUS Left 4/25/2023    Procedure: CYSTOURETEROSCOPY,WITH HOLMIUM LASER LITHOTRIPSY OF URETERAL CALCULUS;  Surgeon: Pepe Bee Jr., MD;  Location: [facility] OR;  Service: Urology;  Laterality: Left;    EXTRACORPOREAL SHOCK WAVE LITHOTRIPSY Right 9/5/2024    Procedure: LITHOTRIPSY, ESWL;  Surgeon: Nixon Swain MD;  Location: [facility] OR;  Service: Urology;  Laterality: Right;    HAND SURGERY      traumatic injury    HEMORRHOID SURGERY      KIDNEY STONE SURGERY      KNEE ARTHROSCOPY W/  MENISCECTOMY Left 11/4/2020    Procedure: ARTHROSCOPY, KNEE, WITH PARTIAL MEDIAL MENISCECTOMY;  Surgeon: Andres Blanco MD;  Location: [facility] OR;  Service: Orthopedics;  Laterality: Left;    TONSILLECTOMY      URETEROSCOPIC REMOVAL OF URETERIC CALCULUS Left 4/25/2023    Procedure: REMOVAL, CALCULUS, URETER, URETEROSCOPIC;  Surgeon: Pepe Bee Jr., MD;  Location: [facility] OR;  Service: Urology;  Laterality: Left;    URETEROSCOPIC REMOVAL OF URETERIC CALCULUS N/A 11/14/2024    Procedure: REMOVAL, CALCULUS, URETER, URETEROSCOPIC;  Surgeon: Nixon Swain MD;  Location: [facility] OR;  Service: Urology;  Laterality: N/A;    URETEROSCOPY N/A 11/14/2024    Procedure: URETEROSCOPY;  Surgeon: Nxion Swain MD;  Location: [facility] OR;  Service: Urology;  Laterality: N/A;     Family History   Problem Relation Name Age of Onset    Heart disease Mother      Diabetes Mother      Cancer Father      Depression Sister      Cancer Sister          ovarian    Diabetes Brother          Social History:   Marital Status:   Occupation: Data Unavailable  Alcohol History:  reports current alcohol use.  Tobacco History:  reports that he has never smoked. He has never been exposed to tobacco smoke. He has never used smokeless tobacco.  Drug History:  reports no history of drug use.    Review of patient's allergies indicates:   Allergen Reactions    Iodinated contrast media Hives and Shortness Of Breath    Viagra [sildenafil] Other (See Comments)     Pounding in the head and chest  Headaches       Current Outpatient Medications   Medication Sig Dispense Refill    lisinopriL 10 MG tablet Take 1 tablet (10 mg total) by mouth once daily. 90 tablet 1    tamsulosin (FLOMAX) 0.4 mg Cap Take 1 capsule (0.4 mg total) by mouth once daily. Take in evening. 90 capsule 4    traMADoL (ULTRAM) 50 mg tablet Take 50 mg by mouth every 8 (eight) hours.       No current facility-administered medications for this visit.     Last chest xray  "08/2023  MPRESSION:  Normal chest.    Review of Systems  General: Feeling Well.  Feels well   Eyes: Vision is good.  ENT:  hard of hearing  Heart:: No chest pain or palpitations.  Lungs: no complaints   GI: No Nausea, vomiting, constipation, diarrhea, or reflux.  : No dysuria, hesitancy, or nocturia.  Musculoskeletal: No joint pain or myalgias.  Skin: No lesions or rashes.  Neuro:no  headaches, no brain fog    Lymph: swelling to legs   Psych: No anxiety or depression.  Endo: weight stable .    OBJECTIVE:      /76 (BP Location: Right arm, Patient Position: Sitting)   Temp 97.7 °F (36.5 °C)   Ht 5' 9" (1.753 m)   Wt 122.1 kg (269 lb 3.2 oz)   SpO2 97%   BMI 39.75 kg/m²     Physical Exam  GENERAL: Older patient in no distress.  HEENT: Pupils equal and reactive. Extraocular movements intact. Nose intact.      Pharynx moist.   NECK: Supple.   HEART: Regular rate and rhythm. No murmur or gallop auscultated.  LUNGS: Clear to auscultation and percussion. Lung excursion symmetrical. No change in fremitus. No adventitial noises.  ABDOMEN: Bowel sounds present. Non-tender, no masses palpated.  EXTREMITIES: Normal muscle tone and joint movement, no cyanosis or clubbing.   LYMPHATICS:trace edema to legs    SKIN: Dry, intact, no lesions.   NEURO: Cranial nerves II-XII intact. Motor strength 5/5 bilaterally, upper and lower extremities.  PSYCH: Appropriate affect.    Assessment:       1. ROSE (obstructive sleep apnea)              Plan:        Keep sleeping on your CPAP  Try to lose weight       Follow up in about 1 year (around 12/16/2025).                "

## 2025-02-20 ENCOUNTER — HOSPITAL ENCOUNTER (OUTPATIENT)
Dept: RADIOLOGY | Facility: HOSPITAL | Age: 70
Discharge: HOME | End: 2025-02-20
Attending: SPECIALIST
Payer: MEDICARE

## 2025-02-20 DIAGNOSIS — N20.0 KIDNEY STONE: ICD-10-CM

## 2025-02-20 DIAGNOSIS — N20.0 KIDNEY STONE: Primary | ICD-10-CM

## 2025-02-20 PROCEDURE — 74018 RADEX ABDOMEN 1 VIEW: CPT | Mod: TC,PO

## 2025-02-21 DIAGNOSIS — N20.0 URIC ACID NEPHROLITHIASIS: Primary | ICD-10-CM

## 2025-02-24 ENCOUNTER — HOSPITAL ENCOUNTER (OUTPATIENT)
Dept: RADIOLOGY | Facility: HOSPITAL | Age: 70
Discharge: HOME | End: 2025-02-24
Attending: SPECIALIST
Payer: MEDICARE

## 2025-02-24 DIAGNOSIS — N20.0 URIC ACID NEPHROLITHIASIS: ICD-10-CM

## 2025-02-24 PROCEDURE — 74176 CT ABD & PELVIS W/O CONTRAST: CPT | Mod: TC,PO

## 2025-02-24 PROCEDURE — 74176 CT ABD & PELVIS W/O CONTRAST: CPT | Mod: 26,,, | Performed by: RADIOLOGY

## 2025-02-27 ENCOUNTER — HOSPITAL ENCOUNTER (OUTPATIENT)
Dept: RADIOLOGY | Facility: HOSPITAL | Age: 70
Discharge: HOME | End: 2025-02-27
Attending: NURSE PRACTITIONER
Payer: MEDICARE

## 2025-02-27 ENCOUNTER — OFFICE VISIT (OUTPATIENT)
Dept: FAMILY MEDICINE | Facility: CLINIC | Age: 70
End: 2025-02-27
Payer: MEDICARE

## 2025-02-27 VITALS
SYSTOLIC BLOOD PRESSURE: 134 MMHG | OXYGEN SATURATION: 98 % | HEIGHT: 69 IN | DIASTOLIC BLOOD PRESSURE: 82 MMHG | WEIGHT: 269.63 LBS | BODY MASS INDEX: 39.93 KG/M2 | HEART RATE: 85 BPM

## 2025-02-27 DIAGNOSIS — R10.9 SIDE PAIN: ICD-10-CM

## 2025-02-27 DIAGNOSIS — Z87.442 HISTORY OF KIDNEY STONES: ICD-10-CM

## 2025-02-27 DIAGNOSIS — E66.01 SEVERE OBESITY (BMI 35.0-39.9) WITH COMORBIDITY: ICD-10-CM

## 2025-02-27 DIAGNOSIS — I10 ESSENTIAL HYPERTENSION: ICD-10-CM

## 2025-02-27 DIAGNOSIS — H61.20 IMPACTED CERUMEN, UNSPECIFIED LATERALITY: ICD-10-CM

## 2025-02-27 DIAGNOSIS — Z23 NEED FOR INFLUENZA VACCINATION: Primary | ICD-10-CM

## 2025-02-27 PROCEDURE — 3008F BODY MASS INDEX DOCD: CPT | Mod: CPTII,S$GLB,, | Performed by: NURSE PRACTITIONER

## 2025-02-27 PROCEDURE — 1101F PT FALLS ASSESS-DOCD LE1/YR: CPT | Mod: CPTII,S$GLB,, | Performed by: NURSE PRACTITIONER

## 2025-02-27 PROCEDURE — 76770 US EXAM ABDO BACK WALL COMP: CPT | Mod: TC

## 2025-02-27 PROCEDURE — 1160F RVW MEDS BY RX/DR IN RCRD: CPT | Mod: CPTII,S$GLB,, | Performed by: NURSE PRACTITIONER

## 2025-02-27 PROCEDURE — 1159F MED LIST DOCD IN RCRD: CPT | Mod: CPTII,S$GLB,, | Performed by: NURSE PRACTITIONER

## 2025-02-27 PROCEDURE — 3288F FALL RISK ASSESSMENT DOCD: CPT | Mod: CPTII,S$GLB,, | Performed by: NURSE PRACTITIONER

## 2025-02-27 PROCEDURE — 3079F DIAST BP 80-89 MM HG: CPT | Mod: CPTII,S$GLB,, | Performed by: NURSE PRACTITIONER

## 2025-02-27 PROCEDURE — 3075F SYST BP GE 130 - 139MM HG: CPT | Mod: CPTII,S$GLB,, | Performed by: NURSE PRACTITIONER

## 2025-02-27 PROCEDURE — 99214 OFFICE O/P EST MOD 30 MIN: CPT | Mod: S$GLB,,, | Performed by: NURSE PRACTITIONER

## 2025-02-27 PROCEDURE — 4010F ACE/ARB THERAPY RXD/TAKEN: CPT | Mod: CPTII,S$GLB,, | Performed by: NURSE PRACTITIONER

## 2025-02-27 PROCEDURE — 76770 US EXAM ABDO BACK WALL COMP: CPT | Mod: 26,,, | Performed by: RADIOLOGY

## 2025-02-27 PROCEDURE — 1125F AMNT PAIN NOTED PAIN PRSNT: CPT | Mod: CPTII,S$GLB,, | Performed by: NURSE PRACTITIONER

## 2025-02-28 ENCOUNTER — TELEPHONE (OUTPATIENT)
Dept: FAMILY MEDICINE | Facility: CLINIC | Age: 70
End: 2025-02-28
Payer: MEDICARE

## 2025-03-10 NOTE — PROGRESS NOTES
SUBJECTIVE:    Patient ID: Lewis Matias is a 69 y.o. male.    Chief Complaint: Follow-up (No bottles//Pt is here for a follow up//Pt's ear ache went away after going to [facility]//Pt had a Lithotripsy with Dr Swain and is still waking up with back pain between 2-4 AM and throughout the day. Would like to know if Ping can help him//KE)    History of Present Illness    CHIEF COMPLAINT:  69 year old male  presents today for right-sided flank pain    KIDNEY STONE HISTORY:  He had recent lithotripsy for a 6mm right kidney stone, after which he passed three small fragments that did not total 6mm. He is experiencing severe right-sided pain below the rib cage, which he describes as knife-like and distinct from back pain. Based on his previous experiences, he believes this is consistent with kidney stone pain. The pain frequently wakes him between 2-4 AM, requiring pain medication and making it difficult to lie comfortably. Multiple imaging studies including x-rays and CTs have shown no findings. He reports an allergy to IV contrast dye.    ENT:  He recently sought urgent care treatment for severe ear pain, which has since improved. [facility]    PAST MEDICAL HISTORY:  History of kidney stones and fall with associated back pain.      ROS:  General: -fever, -chills, -fatigue, -weight gain, -weight loss  Eyes: -vision changes, -redness, -discharge  ENT: +ear pain, -nasal congestion, -sore throat  Cardiovascular: -chest pain, -palpitations, -lower extremity edema  Respiratory: -cough, -shortness of breath  Gastrointestinal: -abdominal pain, -nausea, -vomiting, -diarrhea, -constipation, -blood in stool  Genitourinary: -dysuria, -hematuria, -frequency  Musculoskeletal: -joint pain, -muscle pain, -back pain  Skin: -rash, -lesion  Neurological: -headache, -dizziness, -numbness, -tingling  Psychiatric: -anxiety, -depression, -sleep difficulty         Lab Visit on  11/13/2024   Component Date Value Ref Range Status    Sodium 11/13/2024 137  136 - 145 mmol/L Final    Potassium 11/13/2024 4.3  3.5 - 5.1 mmol/L Final    Chloride 11/13/2024 102  95 - 110 mmol/L Final    CO2 11/13/2024 29  23 - 29 mmol/L Final    Glucose 11/13/2024 103  70 - 110 mg/dL Final    BUN 11/13/2024 11  8 - 23 mg/dL Final    Creatinine 11/13/2024 0.9  0.5 - 1.4 mg/dL Final    Calcium 11/13/2024 10.0  8.7 - 10.5 mg/dL Final    Total Protein 11/13/2024 7.2  6.0 - 8.4 g/dL Final    Albumin 11/13/2024 4.5  3.5 - 5.2 g/dL Final    Total Bilirubin 11/13/2024 1.3 (H)  0.1 - 1.0 mg/dL Final    Alkaline Phosphatase 11/13/2024 69  55 - 135 U/L Final    AST 11/13/2024 17  10 - 40 U/L Final    ALT 11/13/2024 23  10 - 44 U/L Final    eGFR 11/13/2024 >60.0  >60 mL/min/1.73 m^2 Final    Anion Gap 11/13/2024 6 (L)  8 - 16 mmol/L Final    WBC 11/13/2024 7.09  3.90 - 12.70 K/uL Final    RBC 11/13/2024 5.53  4.60 - 6.20 M/uL Final    Hemoglobin 11/13/2024 17.0  14.0 - 18.0 g/dL Final    Hematocrit 11/13/2024 49.6  40.0 - 54.0 % Final    MCV 11/13/2024 90  82 - 98 fL Final    MCH 11/13/2024 30.7  27.0 - 31.0 pg Final    MCHC 11/13/2024 34.3  32.0 - 36.0 g/dL Final    RDW 11/13/2024 13.6  11.5 - 14.5 % Final    Platelets 11/13/2024 167  150 - 450 K/uL Final    MPV 11/13/2024 11.4  9.2 - 12.9 fL Final    Immature Granulocytes 11/13/2024 0.3  0.0 - 0.5 % Final    Gran # (ANC) 11/13/2024 4.3  1.8 - 7.7 K/uL Final    Immature Grans (Abs) 11/13/2024 0.02  0.00 - 0.04 K/uL Final    Lymph # 11/13/2024 1.5  1.0 - 4.8 K/uL Final    Mono # 11/13/2024 1.0  0.3 - 1.0 K/uL Final    Eos # 11/13/2024 0.3  0.0 - 0.5 K/uL Final    Baso # 11/13/2024 0.06  0.00 - 0.20 K/uL Final    nRBC 11/13/2024 0  0 /100 WBC Final    Gran % 11/13/2024 60.5  38.0 - 73.0 % Final    Lymph % 11/13/2024 21.4  18.0 - 48.0 % Final    Mono % 11/13/2024 13.5  4.0 - 15.0 % Final    Eosinophil % 11/13/2024 3.5  0.0 - 8.0 % Final    Basophil % 11/13/2024 0.8  0.0 - 1.9 %  Final    Differential Method 11/13/2024 Automated   Final    aPTT 11/13/2024 30.6  21.0 - 32.0 sec Final    Prothrombin Time 11/13/2024 10.3  9.0 - 12.5 sec Final    INR 11/13/2024 0.9  0.8 - 1.2 Final    Specimen UA 11/13/2024 Urine, Clean Catch   Final    Color, UA 11/13/2024 Yellow  Yellow, Straw, Bee Final    Appearance, UA 11/13/2024 Clear  Clear Final    pH, UA 11/13/2024 7.0  5.0 - 8.0 Final    Specific Gravity, UA 11/13/2024 1.010  1.005 - 1.030 Final    Protein, UA 11/13/2024 Negative  Negative Final    Glucose, UA 11/13/2024 Negative  Negative Final    Ketones, UA 11/13/2024 Negative  Negative Final    Bilirubin (UA) 11/13/2024 Negative  Negative Final    Occult Blood UA 11/13/2024 Negative  Negative Final    Nitrite, UA 11/13/2024 Negative  Negative Final    Urobilinogen, UA 11/13/2024 Negative  Negative EU/dL Final    Leukocytes, UA 11/13/2024 Negative  Negative Final   Admission on 09/08/2024, Discharged on 09/09/2024   Component Date Value Ref Range Status    WBC 09/08/2024 8.46  3.90 - 12.70 K/uL Final    RBC 09/08/2024 5.52  4.60 - 6.20 M/uL Final    Hemoglobin 09/08/2024 17.1  14.0 - 18.0 g/dL Final    Hematocrit 09/08/2024 48.9  40.0 - 54.0 % Final    MCV 09/08/2024 89  82 - 98 fL Final    MCH 09/08/2024 31.0  27.0 - 31.0 pg Final    MCHC 09/08/2024 35.0  32.0 - 36.0 g/dL Final    RDW 09/08/2024 13.9  11.5 - 14.5 % Final    Platelets 09/08/2024 173  150 - 450 K/uL Final    MPV 09/08/2024 11.4  9.2 - 12.9 fL Final    Immature Granulocytes 09/08/2024 0.4  0.0 - 0.5 % Final    Gran # (ANC) 09/08/2024 4.6  1.8 - 7.7 K/uL Final    Immature Grans (Abs) 09/08/2024 0.03  0.00 - 0.04 K/uL Final    Lymph # 09/08/2024 2.3  1.0 - 4.8 K/uL Final    Mono # 09/08/2024 1.3 (H)  0.3 - 1.0 K/uL Final    Eos # 09/08/2024 0.2  0.0 - 0.5 K/uL Final    Baso # 09/08/2024 0.07  0.00 - 0.20 K/uL Final    nRBC 09/08/2024 0  0 /100 WBC Final    Gran % 09/08/2024 54.5  38.0 - 73.0 % Final    Lymph % 09/08/2024 26.6  18.0 -  48.0 % Final    Mono % 09/08/2024 14.9  4.0 - 15.0 % Final    Eosinophil % 09/08/2024 2.8  0.0 - 8.0 % Final    Basophil % 09/08/2024 0.8  0.0 - 1.9 % Final    Differential Method 09/08/2024 Automated   Final    Sodium 09/08/2024 134 (L)  136 - 145 mmol/L Final    Potassium 09/08/2024 4.3  3.5 - 5.1 mmol/L Final    Chloride 09/08/2024 103  95 - 110 mmol/L Final    CO2 09/08/2024 24  23 - 29 mmol/L Final    Glucose 09/08/2024 129 (H)  70 - 110 mg/dL Final    BUN 09/08/2024 20  8 - 23 mg/dL Final    Creatinine 09/08/2024 1.1  0.5 - 1.4 mg/dL Final    Calcium 09/08/2024 9.3  8.7 - 10.5 mg/dL Final    Total Protein 09/08/2024 6.8  6.0 - 8.4 g/dL Final    Albumin 09/08/2024 4.2  3.5 - 5.2 g/dL Final    Total Bilirubin 09/08/2024 1.1 (H)  0.1 - 1.0 mg/dL Final    Alkaline Phosphatase 09/08/2024 74  55 - 135 U/L Final    AST 09/08/2024 16  10 - 40 U/L Final    ALT 09/08/2024 20  10 - 44 U/L Final    eGFR 09/08/2024 >60.0  >60 mL/min/1.73 m^2 Final    Anion Gap 09/08/2024 7 (L)  8 - 16 mmol/L Final    Specimen UA 09/08/2024 Urine, Clean Catch   Final    Color, UA 09/08/2024 Yellow  Yellow, Straw, Bee Final    Appearance, UA 09/08/2024 Clear  Clear Final    pH, UA 09/08/2024 6.0  5.0 - 8.0 Final    Specific Gravity, UA 09/08/2024 1.015  1.005 - 1.030 Final    Protein, UA 09/08/2024 Negative  Negative Final    Glucose, UA 09/08/2024 Negative  Negative Final    Ketones, UA 09/08/2024 Negative  Negative Final    Bilirubin (UA) 09/08/2024 Negative  Negative Final    Occult Blood UA 09/08/2024 2+ (A)  Negative Final    Nitrite, UA 09/08/2024 Negative  Negative Final    Urobilinogen, UA 09/08/2024 Negative  Negative EU/dL Final    Leukocytes, UA 09/08/2024 Negative  Negative Final    RBC, UA 09/08/2024 3  0 - 4 /hpf Final    WBC, UA 09/08/2024 1  0 - 5 /hpf Final    Microscopic Comment 09/08/2024 SEE COMMENT   Final   Lab Visit on 09/04/2024   Component Date Value Ref Range Status    Prothrombin Time 09/04/2024 10.5  9.0 - 12.5  sec Final    INR 09/04/2024 0.9  0.8 - 1.2 Final    aPTT 09/04/2024 30.5  21.0 - 32.0 sec Final    Specimen UA 09/04/2024 Urine, Clean Catch   Final    Color, UA 09/04/2024 Yellow  Yellow, Straw, Bee Final    Appearance, UA 09/04/2024 Clear  Clear Final    pH, UA 09/04/2024 6.0  5.0 - 8.0 Final    Specific Gravity, UA 09/04/2024 1.010  1.005 - 1.030 Final    Protein, UA 09/04/2024 Negative  Negative Final    Glucose, UA 09/04/2024 Negative  Negative Final    Ketones, UA 09/04/2024 Negative  Negative Final    Bilirubin (UA) 09/04/2024 Negative  Negative Final    Occult Blood UA 09/04/2024 Negative  Negative Final    Nitrite, UA 09/04/2024 Negative  Negative Final    Urobilinogen, UA 09/04/2024 Negative  Negative EU/dL Final    Leukocytes, UA 09/04/2024 Negative  Negative Final       Past Medical History:   Diagnosis Date    Aspiration pneumonia     aspirated after anesthesia due to hiatal hernia per patient, spent 6 days in ICU    COVID-19 8/2/20021    GERD (gastroesophageal reflux disease)     Hard of hearing     Hiatal hernia     Hypertension     Kidney stones     Knee pain 06/2020    left    Sleep apnea      Social History[1]  Past Surgical History:   Procedure Laterality Date    BLADDER FULGURATION N/A 11/14/2024    Procedure: FULGURATION, BLADDER;  Surgeon: Nixon Swain MD;  Location: [facility] OR;  Service: Urology;  Laterality: N/A;    CHOLECYSTECTOMY      COLONOSCOPY  2012    Dr Silver- rtc 10 yr    CYSTOSCOPY W/ RETROGRADES Left 4/25/2023    Procedure: CYSTOSCOPY, WITH RETROGRADE PYELOGRAM;  Surgeon: Pepe Bee Jr., MD;  Location[facility];  Service: Urology;  Laterality: Left;    CYSTOSCOPY W/ RETROGRADES Right 11/14/2024    Procedure: CYSTOSCOPY, WITH RETROGRADE PYELOGRAM;  Surgeon: Nixon Swain MD;  Location: [facility] OR;  Service: Urology;  Laterality: Right;    CYSTOURETEROSCOPY,WITH HOLMIUM LASER LITHOTRIPSY OF URETERAL CALCULUS Left 4/25/2023    Procedure: CYSTOURETEROSCOPY,WITH HOLMIUM  "LASER LITHOTRIPSY OF URETERAL CALCULUS;  Surgeon: Pepe Bee Jr., MD;  Location: [facility] OR;  Service: Urology;  Laterality: Left;    EXTRACORPOREAL SHOCK WAVE LITHOTRIPSY Right 9/5/2024    Procedure: LITHOTRIPSY, ESWL;  Surgeon: Nixon Swain MD;  Location: [facility] OR;  Service: Urology;  Laterality: Right;    HAND SURGERY      traumatic injury    HEMORRHOID SURGERY      KIDNEY STONE SURGERY      KNEE ARTHROSCOPY W/ MENISCECTOMY Left 11/4/2020    Procedure: ARTHROSCOPY, KNEE, WITH PARTIAL MEDIAL MENISCECTOMY;  Surgeon: Andres Blanco MD;  Location: [facility] OR;  Service: Orthopedics;  Laterality: Left;    TONSILLECTOMY      URETEROSCOPIC REMOVAL OF URETERIC CALCULUS Left 4/25/2023    Procedure: REMOVAL, CALCULUS, URETER, URETEROSCOPIC;  Surgeon: Pepe Bee Jr., MD;  Location: [facility] OR;  Service: Urology;  Laterality: Left;    URETEROSCOPIC REMOVAL OF URETERIC CALCULUS N/A 11/14/2024    Procedure: REMOVAL, CALCULUS, URETER, URETEROSCOPIC;  Surgeon: Nixon Swain MD;  Location: [facility] OR;  Service: Urology;  Laterality: N/A;    URETEROSCOPY N/A 11/14/2024    Procedure: URETEROSCOPY;  Surgeon: Nixon Swain MD;  Location: [facility] OR;  Service: Urology;  Laterality: N/A;     Family History   Problem Relation Name Age of Onset    Heart disease Mother      Diabetes Mother      Cancer Father      Depression Sister      Cancer Sister          ovarian    Diabetes Brother         All of your core healthy metrics are met.      Review of patient's allergies indicates:   Allergen Reactions    Iodinated contrast media Hives and Shortness Of Breath    Viagra [sildenafil] Other (See Comments)     Pounding in the head and chest  Headaches     Current Medications[2]    Objective:      Vitals:    02/27/25 0917   BP: 134/82   Pulse: 85   SpO2: 98%   Weight: 122.3 kg (269 lb 9.6 oz)   Height: 5' 9" (1.753 m)     Physical Exam    General: No acute distress. Well-developed. Well-nourished.  HENT: Normocephalic. Atraumatic. " Nares patent. Moist oral mucosa.  Ears: Bilateral TMs clear. Cerumen in right ear. Minimal cerumen in left ear.  Cardiovascular: Regular rate. Regular rhythm. No murmurs. No rubs. No gallops. Normal S1, S2.  Respiratory: Normal respiratory effort. Clear to auscultation bilaterally. No rales. No rhonchi. No wheezing.  Abdomen: Soft. Non-tender. Non-distended. Normoactive bowel sounds. Palpation tenderness to right side. No deformity  Musculoskeletal: No  obvious deformity.  Extremities: No lower extremity edema.  Neurological: Alert & oriented x3. No slurred speech. Normal gait.  Psychiatric: Normal mood. Normal affect. Good insight. Good judgment.  Skin: Warm. Dry. No rash.       Assessment:       Assessment & Plan    - Performed ear exam, noting minimal wax in one ear and slightly more in the other  - Considering ultrasound of kidney to investigate persistent pain, avoiding radiation and contrast dye due to patient's allergy  - Reviewed previous imaging results to inform decision-making process    SEVERE OBESITY (BMI 35.0-39.9) WITH COMORBIDITY:  - Recommend resuming water aerobics for weight management.    KIDNEY STONE:  - Acknowledged patient's persistent pain in the side, described as feeling like being stabbed with a knife and causing nighttime waking.  - Noted previous interventions: multiple x-rays and CTs with no significant findings, lithotripsy by Dr. Mcdermott revealing a 6 mm stone (patient passed 3 small pieces), and cystoscopy by Dr. Mcdermott  - Considered possibility of small stone fragments causing ongoing pain.  - Ordered kidney ultrasound for further investigation.  - Continued pain medication as needed.  - Scheduled follow-up after ultrasound results to discuss findings and determine next steps.  - Compared current symptoms with patient's previous kidney stone experiences.    EAR WAX REMOVAL:  - Examined both ears during visit.  - Right ear showed minimal cerumen.  - Left ear contained some  cerumen; planned for and performed irrigation.  - Lewis reported history of ear problems, including previous treatment at urgent care and facial pain.        BACK PAIN:  - Lewis mentioned previous fall treated by the doctor and reported lower back pain upon waking.  - Discussed the difference between back pain and kidney stone pain with the patient to aid in symptom differentiation.       Plan:       Need for influenza vaccination  -     Discontinue: influenza (adjuvanted) (Fluad) 45 mcg/0.5 mL IM vaccine (> or = 66 yo) 0.5 mL    History of kidney stones  -     US Retroperitoneal Complete; Future; Expected date: 02/27/2025    Side pain  Comments:  ultrasound  Orders:  -     US Retroperitoneal Complete; Future; Expected date: 02/27/2025    Impacted cerumen, unspecified laterality  Comments:  ear irrigation  Orders:  -     Ear wax removal  ..Ceruminosis is noted.  Wax is removed by syringing and manual debridement. Instructions for home care to prevent wax buildup are given.   Severe obesity (BMI 35.0-39.9) with comorbidity  Comments:  diet    Essential hypertension  Comments:  bp is controlled      Follow up in about 4 weeks (around 3/27/2025), or if symptoms worsen or fail to improve, for medication management.        This note was generated with the assistance of ambient listening technology. Verbal consent was obtained by the patient and accompanying visitor(s) for the recording of patient appointment to facilitate this note. I attest to having reviewed and edited the generated note for accuracy, though some syntax or spelling errors may persist. Please contact the author of this note for any clarification.      3/9/2025 Ping Juarez           [1]   Social History  Socioeconomic History    Marital status:    Tobacco Use    Smoking status: Never     Passive exposure: Never    Smokeless tobacco: Never   Substance and Sexual Activity    Alcohol use: Yes     Comment: social    Drug use: Never    Sexual  activity: Yes     Partners: Female     Social Drivers of Health     Financial Resource Strain: Medium Risk (8/27/2024)    Overall Financial Resource Strain (CARDIA)     Difficulty of Paying Living Expenses: Somewhat hard   Food Insecurity: Unknown (8/27/2024)    Hunger Vital Sign     Worried About Running Out of Food in the Last Year: Never true     Ran Out of Food in the Last Year: Patient declined   Transportation Needs: No Transportation Needs (8/21/2024)    TRANSPORTATION NEEDS     Transportation : No   Physical Activity: Insufficiently Active (8/27/2024)    Exercise Vital Sign     Days of Exercise per Week: 2 days     Minutes of Exercise per Session: 60 min   Stress: No Stress Concern Present (8/27/2024)    [employer] - Occupational Stress Questionnaire     Feeling of Stress : Only a little   Housing Stability: Unknown (8/27/2024)    Housing Stability Vital Sign     Unable to Pay for Housing in the Last Year: No     Homeless in the Last Year: No   [2]   Current Outpatient Medications:     lisinopriL 10 MG tablet, Take 1 tablet (10 mg total) by mouth once daily., Disp: 90 tablet, Rfl: 1    tamsulosin (FLOMAX) 0.4 mg Cap, Take 1 capsule (0.4 mg total) by mouth once daily. Take in evening., Disp: 90 capsule, Rfl: 4    traMADoL (ULTRAM) 50 mg tablet, Take 50 mg by mouth every 8 (eight) hours., Disp: , Rfl:

## 2025-03-14 DIAGNOSIS — N20.0 URIC ACID NEPHROLITHIASIS: Primary | ICD-10-CM

## 2025-03-17 ENCOUNTER — TELEPHONE (OUTPATIENT)
Dept: FAMILY MEDICINE | Facility: CLINIC | Age: 70
End: 2025-03-17
Payer: MEDICARE

## 2025-03-17 NOTE — TELEPHONE ENCOUNTER
Spoke to pts wife and she stated pt keeps doing test over and over again and nothing is being shown. He is still having the pain. He wants to be seen by Ping myrick wife is upset she feels like nothing is being done

## 2025-03-17 NOTE — TELEPHONE ENCOUNTER
----- Message from Beverly sent at 3/17/2025  9:10 AM CDT -----  Patient asking to be rescheduled to a sooner appointment due to ultrasound results. [phone]

## 2025-03-19 ENCOUNTER — OFFICE VISIT (OUTPATIENT)
Dept: FAMILY MEDICINE | Facility: CLINIC | Age: 70
End: 2025-03-19
Payer: MEDICARE

## 2025-03-19 VITALS
HEART RATE: 80 BPM | HEIGHT: 69 IN | DIASTOLIC BLOOD PRESSURE: 80 MMHG | OXYGEN SATURATION: 96 % | BODY MASS INDEX: 39.96 KG/M2 | SYSTOLIC BLOOD PRESSURE: 128 MMHG | WEIGHT: 269.81 LBS

## 2025-03-19 DIAGNOSIS — R10.10 PAIN OF UPPER ABDOMEN: ICD-10-CM

## 2025-03-19 DIAGNOSIS — G47.00 INSOMNIA, UNSPECIFIED TYPE: ICD-10-CM

## 2025-03-19 DIAGNOSIS — N20.0 KIDNEY STONES: ICD-10-CM

## 2025-03-19 DIAGNOSIS — R09.89 SYMPTOMS OF UPPER RESPIRATORY INFECTION (URI): Primary | ICD-10-CM

## 2025-03-19 DIAGNOSIS — R10.9 RIGHT SIDED ABDOMINAL PAIN: ICD-10-CM

## 2025-03-19 PROCEDURE — 3008F BODY MASS INDEX DOCD: CPT | Mod: CPTII,S$GLB,, | Performed by: NURSE PRACTITIONER

## 2025-03-19 PROCEDURE — 4010F ACE/ARB THERAPY RXD/TAKEN: CPT | Mod: CPTII,S$GLB,, | Performed by: NURSE PRACTITIONER

## 2025-03-19 PROCEDURE — 99214 OFFICE O/P EST MOD 30 MIN: CPT | Mod: S$GLB,,, | Performed by: NURSE PRACTITIONER

## 2025-03-19 PROCEDURE — 81003 URINALYSIS AUTO W/O SCOPE: CPT | Mod: QW,S$GLB,, | Performed by: NURSE PRACTITIONER

## 2025-03-19 PROCEDURE — 1125F AMNT PAIN NOTED PAIN PRSNT: CPT | Mod: CPTII,S$GLB,, | Performed by: NURSE PRACTITIONER

## 2025-03-19 PROCEDURE — 1160F RVW MEDS BY RX/DR IN RCRD: CPT | Mod: CPTII,S$GLB,, | Performed by: NURSE PRACTITIONER

## 2025-03-19 PROCEDURE — 1101F PT FALLS ASSESS-DOCD LE1/YR: CPT | Mod: CPTII,S$GLB,, | Performed by: NURSE PRACTITIONER

## 2025-03-19 PROCEDURE — 1159F MED LIST DOCD IN RCRD: CPT | Mod: CPTII,S$GLB,, | Performed by: NURSE PRACTITIONER

## 2025-03-19 PROCEDURE — 3288F FALL RISK ASSESSMENT DOCD: CPT | Mod: CPTII,S$GLB,, | Performed by: NURSE PRACTITIONER

## 2025-03-19 PROCEDURE — 3079F DIAST BP 80-89 MM HG: CPT | Mod: CPTII,S$GLB,, | Performed by: NURSE PRACTITIONER

## 2025-03-19 PROCEDURE — 3074F SYST BP LT 130 MM HG: CPT | Mod: CPTII,S$GLB,, | Performed by: NURSE PRACTITIONER

## 2025-03-20 ENCOUNTER — TELEPHONE (OUTPATIENT)
Dept: FAMILY MEDICINE | Facility: CLINIC | Age: 70
End: 2025-03-20
Payer: MEDICARE

## 2025-03-20 DIAGNOSIS — R10.10 PAIN OF UPPER ABDOMEN: ICD-10-CM

## 2025-03-20 DIAGNOSIS — R10.11 RIGHT UPPER QUADRANT ABDOMINAL PAIN: Primary | ICD-10-CM

## 2025-03-20 DIAGNOSIS — N20.0 KIDNEY STONE: ICD-10-CM

## 2025-03-20 DIAGNOSIS — K76.89 HEPATIC CYST: ICD-10-CM

## 2025-03-20 LAB
BILIRUBIN, UA POC OHS: NEGATIVE
BLOOD, UA POC OHS: NEGATIVE
CLARITY, UA POC OHS: CLEAR
COLOR, UA POC OHS: YELLOW
GLUCOSE, UA POC OHS: NEGATIVE
KETONES, UA POC OHS: NEGATIVE
LEUKOCYTES, UA POC OHS: NEGATIVE
NITRITE, UA POC OHS: NEGATIVE
PH, UA POC OHS: 7.5
PROTEIN, UA POC OHS: NEGATIVE
SPECIFIC GRAVITY, UA POC OHS: 1.02
UROBILINOGEN, UA POC OHS: 1

## 2025-03-20 RX ORDER — ESZOPICLONE 3 MG/1
3 TABLET, FILM COATED ORAL NIGHTLY
Qty: 30 TABLET | Refills: 0 | Status: SHIPPED | OUTPATIENT
Start: 2025-03-20 | End: 2025-04-19

## 2025-03-20 NOTE — TELEPHONE ENCOUNTER
----- Message from Med Assistant Morillo sent at 3/20/2025 11:45 AM CDT -----  Pt have questions after seeing chief complaints also pt would like medication for sleep says he's having trouble staying asleep Pt # [phone]

## 2025-03-20 NOTE — TELEPHONE ENCOUNTER
----- Message from Jody Key sent at 3/20/2025 12:45 PM CDT -----  Regarding: MRI Abd/Pelvis  Good Afternoon,We will need 2 separate orders and you will need to specify what they are looking for in each exam. The DX needs to justify both exams.[facility] Scheduling

## 2025-03-20 NOTE — TELEPHONE ENCOUNTER
----- Message from Beverly sent at 3/20/2025 11:58 AM CDT -----  Patient states an MRI was supposed to be ordered. He was told that someone was supposed to call him to set up an appointment. However, patient was under that impression and confused to what he need to do to receive his MRI. [phone]

## 2025-03-24 DIAGNOSIS — Z00.00 ENCOUNTER FOR MEDICARE ANNUAL WELLNESS EXAM: ICD-10-CM

## 2025-03-25 NOTE — PROGRESS NOTES
"  SUBJECTIVE:    Patient ID: Lewis Matias is a 69 y.o. male.    Chief Complaint: Side pain (No bottles//Pt is here for pain in right side times 10 months-Saw Dr Swain whom states per patient that he can't do anything for the Cyst//Pt is trying to get answers for the pain//KE)      History of Present Illness    CHIEF COMPLAINT:  69 year old male  presents today for persistent right-sided abdominal and back pain. Wife is present during the exam    HISTORY OF PRESENT ILLNESS:  He reports right-sided abdominal pain under the rib cage that radiates to the middle, describing it as knife-like in quality. Movement and position changes do not affect pain intensity. He experiences occasional associated nausea. Pain temporarily improves for 5-6 hours after morning coffee consumption.    IMAGING:  Recent ultrasound revealed a 24mm cyst on the right kidney and multiple liver cysts. He has undergone five CT scans, five x-rays, and an MRI during hospitalization on 8/21/2024.    KIDNEY STONE HISTORY:  He has a history of calcium oxalate kidney stones with previous lithotripsy procedures, including one by Dr. Bee. Currently has a 6mm stone on the left side without associated pain. Following a recent lithotripsy, he passed three small stone fragments which were collected and provided to Dr. Barraza    MEDICAL HISTORY:  History of COVID-19 with significant symptoms and cholecystectomy.    MENTAL HEALTH:  He reports depression secondary to chronic pain, leading to decreased activity and social engagement. Family members note increased irritability, particularly with his children. He describes a "snowball effect" where pain leads to inactivity, worsening his mood. Denies suicidal ideation.      ROS:  General: -fever, -chills, -fatigue, -weight gain, -weight loss  Eyes: -vision changes, -redness, -discharge  ENT: -ear pain, -nasal congestion, -sore throat  Cardiovascular: -chest pain, -palpitations, -lower extremity " edema  Respiratory: -cough, -shortness of breath  Gastrointestinal: -abdominal pain, +nausea, -vomiting, -diarrhea, -constipation, -blood in stool, +flank pain  Genitourinary: -dysuria, -hematuria, -frequency  Musculoskeletal: -joint pain, -muscle pain  Skin: -rash, -lesion  Neurological: -headache, -dizziness, -numbness, -tingling  Psychiatric: -anxiety, +depression, +sleep difficulty, +difficulty staying asleep, +sleep disturbances              Office Visit on 03/19/2025   Component Date Value Ref Range Status    Color, POC UA 03/20/2025 Yellow  Yellow, Straw, Colorless Final    Clarity, POC UA 03/20/2025 Clear  Clear Final    Glucose, POC UA 03/20/2025 Negative  Negative Final    Bilirubin, POC UA 03/20/2025 Negative  Negative Final    Ketones, POC UA 03/20/2025 Negative  Negative Final    Spec Grav POC UA 03/20/2025 1.020  1.005 - 1.030 Final    Blood, POC UA 03/20/2025 Negative  Negative Final    pH, POC UA 03/20/2025 7.5  5.0 - 8.0 Final    Protein, POC UA 03/20/2025 Negative  Negative Final    Urobilinogen, POC UA 03/20/2025 1.0  <=1.0 Final    Nitrite, POC UA 03/20/2025 Negative  Negative Final    WBC, POC UA 03/20/2025 Negative  Negative Final   Lab Visit on 11/13/2024   Component Date Value Ref Range Status    Sodium 11/13/2024 137  136 - 145 mmol/L Final    Potassium 11/13/2024 4.3  3.5 - 5.1 mmol/L Final    Chloride 11/13/2024 102  95 - 110 mmol/L Final    CO2 11/13/2024 29  23 - 29 mmol/L Final    Glucose 11/13/2024 103  70 - 110 mg/dL Final    BUN 11/13/2024 11  8 - 23 mg/dL Final    Creatinine 11/13/2024 0.9  0.5 - 1.4 mg/dL Final    Calcium 11/13/2024 10.0  8.7 - 10.5 mg/dL Final    Total Protein 11/13/2024 7.2  6.0 - 8.4 g/dL Final    Albumin 11/13/2024 4.5  3.5 - 5.2 g/dL Final    Total Bilirubin 11/13/2024 1.3 (H)  0.1 - 1.0 mg/dL Final    Alkaline Phosphatase 11/13/2024 69  55 - 135 U/L Final    AST 11/13/2024 17  10 - 40 U/L Final    ALT 11/13/2024 23  10 - 44 U/L Final    eGFR 11/13/2024 >60.0   >60 mL/min/1.73 m^2 Final    Anion Gap 11/13/2024 6 (L)  8 - 16 mmol/L Final    WBC 11/13/2024 7.09  3.90 - 12.70 K/uL Final    RBC 11/13/2024 5.53  4.60 - 6.20 M/uL Final    Hemoglobin 11/13/2024 17.0  14.0 - 18.0 g/dL Final    Hematocrit 11/13/2024 49.6  40.0 - 54.0 % Final    MCV 11/13/2024 90  82 - 98 fL Final    MCH 11/13/2024 30.7  27.0 - 31.0 pg Final    MCHC 11/13/2024 34.3  32.0 - 36.0 g/dL Final    RDW 11/13/2024 13.6  11.5 - 14.5 % Final    Platelets 11/13/2024 167  150 - 450 K/uL Final    MPV 11/13/2024 11.4  9.2 - 12.9 fL Final    Immature Granulocytes 11/13/2024 0.3  0.0 - 0.5 % Final    Gran # (ANC) 11/13/2024 4.3  1.8 - 7.7 K/uL Final    Immature Grans (Abs) 11/13/2024 0.02  0.00 - 0.04 K/uL Final    Lymph # 11/13/2024 1.5  1.0 - 4.8 K/uL Final    Mono # 11/13/2024 1.0  0.3 - 1.0 K/uL Final    Eos # 11/13/2024 0.3  0.0 - 0.5 K/uL Final    Baso # 11/13/2024 0.06  0.00 - 0.20 K/uL Final    nRBC 11/13/2024 0  0 /100 WBC Final    Gran % 11/13/2024 60.5  38.0 - 73.0 % Final    Lymph % 11/13/2024 21.4  18.0 - 48.0 % Final    Mono % 11/13/2024 13.5  4.0 - 15.0 % Final    Eosinophil % 11/13/2024 3.5  0.0 - 8.0 % Final    Basophil % 11/13/2024 0.8  0.0 - 1.9 % Final    Differential Method 11/13/2024 Automated   Final    aPTT 11/13/2024 30.6  21.0 - 32.0 sec Final    Prothrombin Time 11/13/2024 10.3  9.0 - 12.5 sec Final    INR 11/13/2024 0.9  0.8 - 1.2 Final    Specimen UA 11/13/2024 Urine, Clean Catch   Final    Color, UA 11/13/2024 Yellow  Yellow, Straw, Bee Final    Appearance, UA 11/13/2024 Clear  Clear Final    pH, UA 11/13/2024 7.0  5.0 - 8.0 Final    Specific Gravity, UA 11/13/2024 1.010  1.005 - 1.030 Final    Protein, UA 11/13/2024 Negative  Negative Final    Glucose, UA 11/13/2024 Negative  Negative Final    Ketones, UA 11/13/2024 Negative  Negative Final    Bilirubin (UA) 11/13/2024 Negative  Negative Final    Occult Blood UA 11/13/2024 Negative  Negative Final    Nitrite, UA 11/13/2024 Negative   Negative Final    Urobilinogen, UA 11/13/2024 Negative  Negative EU/dL Final    Leukocytes, UA 11/13/2024 Negative  Negative Final       Past Medical History:   Diagnosis Date    Aspiration pneumonia     aspirated after anesthesia due to hiatal hernia per patient, spent 6 days in ICU    COVID-19 8/2/20021    GERD (gastroesophageal reflux disease)     Hard of hearing     Hiatal hernia     Hypertension     Kidney stones     Knee pain 06/2020    left    Sleep apnea      Past Surgical History:   Procedure Laterality Date    BLADDER FULGURATION N/A 11/14/2024    Procedure: FULGURATION, BLADDER;  Surgeon: Nixon Swain MD;  Location: [facility];  Service: Urology;  Laterality: N/A;    CHOLECYSTECTOMY      COLONOSCOPY  2012    Dr Silver- rtc 10 yr    CYSTOSCOPY W/ RETROGRADES Left 4/25/2023    Procedure: CYSTOSCOPY, WITH RETROGRADE PYELOGRAM;  Surgeon: Pepe Bee Jr., MD;  Location: [facility];  Service: Urology;  Laterality: Left;    CYSTOSCOPY W/ RETROGRADES Right 11/14/2024    Procedure: CYSTOSCOPY, WITH RETROGRADE PYELOGRAM;  Surgeon: Nixon Swain MD;  Location: [facility];  Service: Urology;  Laterality: Right;    CYSTOURETEROSCOPY,WITH HOLMIUM LASER LITHOTRIPSY OF URETERAL CALCULUS Left 4/25/2023    Procedure: CYSTOURETEROSCOPY,WITH HOLMIUM LASER LITHOTRIPSY OF URETERAL CALCULUS;  Surgeon: Pepe Bee Jr., MD;  Location: [facility];  Service: Urology;  Laterality: Left;    EXTRACORPOREAL SHOCK WAVE LITHOTRIPSY Right 9/5/2024    Procedure: LITHOTRIPSY, ESWL;  Surgeon: Nixon Swain MD;  Location: [facility];  Service: Urology;  Laterality: Right;    HAND SURGERY      traumatic injury    HEMORRHOID SURGERY      KIDNEY STONE SURGERY      KNEE ARTHROSCOPY W/ MENISCECTOMY Left 11/4/2020    Procedure: ARTHROSCOPY, KNEE, WITH PARTIAL MEDIAL MENISCECTOMY;  Surgeon: Andres Blanco MD;  Location: [facility];  Service: Orthopedics;  Laterality: Left;    TONSILLECTOMY      URETEROSCOPIC REMOVAL OF URETERIC CALCULUS  Left 4/25/2023    Procedure: REMOVAL, CALCULUS, URETER, URETEROSCOPIC;  Surgeon: Pepe Bee Jr., MD;  Location: [facility] OR;  Service: Urology;  Laterality: Left;    URETEROSCOPIC REMOVAL OF URETERIC CALCULUS N/A 11/14/2024    Procedure: REMOVAL, CALCULUS, URETER, URETEROSCOPIC;  Surgeon: Nixon Swain MD;  Location: [facility] OR;  Service: Urology;  Laterality: N/A;    URETEROSCOPY N/A 11/14/2024    Procedure: URETEROSCOPY;  Surgeon: Nixon Swain MD;  Location: [facility] OR;  Service: Urology;  Laterality: N/A;     Family History   Problem Relation Name Age of Onset    Heart disease Mother      Diabetes Mother      Cancer Father      Depression Sister      Cancer Sister          ovarian    Diabetes Brother         All of your core healthy metrics are met.      The 10-year CVD risk score (Kalebino et al., 2008) is: 28%    Values used to calculate the score:      Age: 69 years      Sex: Male      Diabetic: No      Tobacco smoker: No      Systolic Blood Pressure: 128 mmHg      Is BP treated: Yes      HDL Cholesterol: 41 mg/dL      Total Cholesterol: 170 mg/dL     Marital Status:   Alcohol History:  reports current alcohol use.  Tobacco History:  reports that he has never smoked. He has never been exposed to tobacco smoke. He has never used smokeless tobacco.  Drug History:  reports no history of drug use.    Health Maintenance Topics with due status: Not Due       Topic Last Completion Date    TETANUS VACCINE 08/07/2019    Lipid Panel 05/10/2023    Colorectal Cancer Screening 07/24/2023     Immunization History   Administered Date(s) Administered    COVID-19, MRNA, LN-S, PF (Pfizer) (Purple Cap) 11/10/2021, 12/01/2021    Dtap, Unspecified Formulation 06/02/2016    Tdap 06/02/2016, 08/07/2019       Review of patient's allergies indicates:   Allergen Reactions    Iodinated contrast media Hives and Shortness Of Breath    Viagra [sildenafil] Other (See Comments)     Pounding in the head and chest  Headaches  "    Current Medications[1]        Objective:      Vitals:    03/19/25 0845   BP: 128/80   Pulse: 80   SpO2: 96%   Weight: 122.4 kg (269 lb 12.8 oz)   Height: 5' 9" (1.753 m)       Physical Exam  Vitals and nursing note reviewed.   Constitutional:       General: He is not in acute distress.     Appearance: Normal appearance. He is well-developed. He is obese.   HENT:      Right Ear: External ear normal.      Left Ear: External ear normal.   Eyes:      Pupils: Pupils are equal, round, and reactive to light.   Neck:      Trachea: No tracheal deviation.   Cardiovascular:      Rate and Rhythm: Normal rate and regular rhythm.      Heart sounds: No murmur heard.     No friction rub. No gallop.   Pulmonary:      Breath sounds: Normal breath sounds. No stridor. No wheezing or rales.   Abdominal:      Palpations: Abdomen is soft. There is no mass.      Tenderness: There is abdominal tenderness in the right upper quadrant. There is no right CVA tenderness or left CVA tenderness.   Musculoskeletal:         General: No tenderness or deformity.      Cervical back: Neck supple.        Back:    Lymphadenopathy:      Cervical: No cervical adenopathy.   Skin:     General: Skin is warm and dry.   Neurological:      Mental Status: He is alert and oriented to person, place, and time.      Coordination: Coordination normal.   Psychiatric:         Mood and Affect: Mood is anxious.         Thought Content: Thought content normal.            Assessment:       1. Symptoms of upper respiratory infection (URI)    2. Kidney stones    3. Right sided abdominal pain    4. Pain of upper abdomen    5. Insomnia, unspecified type           Assessment & Plan    - Suspect pain may not be kidney-related due to ability to reproduce pain with external pressure.  - Considering MRI Abdomen and Back to further investigate persistent right upper quadrant pain, as previous imaging studies (US, CT) have not revealed a clear cause.    RIGHT UPPER QUADRANT " PAIN:  - Ordered XR Back as initial step before considering MRI.  - Follow up for MRI if XR results are inconclusive.  - Lewis reports persistent pain in the right upper quadrant and back, waking up at night due to pain.  - Pain is described as feeling like someone is driving a knife, and is not affected by movement or position changes.  - Physical exam revealed tenderness in the right upper quadrant when pressed, more pronounced in the front than in the back.  - Considering further imaging to investigate the cause of the pain, as previous tests have not provided a clear explanation.  - Plan to perform MRI of the abdomen and back, and potentially refer to another specialist if no clear cause is found.    KIDNEY CYST:  - Ultrasound revealed a 24 mm cyst on the right kidney.  - Assessed the cyst as likely benign and not requiring intervention.    LIVER DISEASE:  - Ultrasound showed cysts on the liver.    KIDNEY STONES:  - Lewis has a history of kidney stones, with a 6 mm stone on the left side.  - Previous lithotripsy was performed, and the patient passed 3 small pieces but believes the entire 6 mm stone was not passed.  - Consider further investigation as the patient believes not all stone fragments have passed.  - Advised the patient to continue drinking plenty of fluids.    DEPRESSION:  - Lewis reports feeling depressed and irritable due to persistent pain and inability to engage in normal activities.  - Acknowledged patient's depressed mood and attributed it to the ongoing pain and frustration.    SLEEP DISORDER:  - Consider prescribing sleep medication to help with sleep disturbances caused by pain.  - Lewis reports disturbed sleep due to pain, waking up at night.  - Prescribed sleep medication to address disrupted sleep pattern, to be taken with or without pain medication at bedtime.    POST-COVID-19 CONDITION:  - Lewis mentions previous COVID-19 infection that affected their lungs.    FOLLOW-UP AND  REFERRAL:  - Ordered urinalysis to check for hematuria and other abnormalities.  - Consider referral to another urologist (potentially Dr. Castellano or a specialist in [facility]) if current diagnostic efforts do not yield results.  - Instruct the patient to contact the office for potential referral based on test results.        Plan:       1. Symptoms of upper respiratory infection (URI)  -     POCT Urinalysis(Instrument)    2. Kidney stones  Comments:  being followed by urology  Orders:  -     MRI Abdomen Pelvis Without Contrast (XPD); Future; Expected date: 03/20/2025    3. Right sided abdominal pain  Comments:  mri  Orders:  -     MRI Abdomen Pelvis Without Contrast (XPD); Future; Expected date: 03/20/2025    4. Pain of upper abdomen  -     MRI Abdomen Pelvis Without Contrast (XPD); Future; Expected date: 03/20/2025    5. Insomnia, unspecified type  Comments:  try lunesta  Orders:  -     eszopiclone (LUNESTA) 3 mg Tab; Take 1 tablet (3 mg total) by mouth every evening.  Dispense: 30 tablet; Refill: 0      Follow up in about 4 weeks (around 4/16/2025), or if symptoms worsen or fail to improve, for medication management.          Counseled on age and gender appropriate medical preventative services, including cancer screenings, immunizations, overall nutritional health, need for a consistent exercise regimen and an overall push towards maintaining a vigorous and active lifestyle.      This note was generated with the assistance of ambient listening technology. Verbal consent was obtained by the patient and accompanying visitor(s) for the recording of patient appointment to facilitate this note. I attest to having reviewed and edited the generated note for accuracy, though some syntax or spelling errors may persist. Please contact the author of this note for any clarification.       3/25/2025 Ping Juarez NP         [1]   Current Outpatient Medications:     lisinopriL 10 MG tablet, Take 1 tablet  (10 mg total) by mouth once daily., Disp: 90 tablet, Rfl: 1    tamsulosin (FLOMAX) 0.4 mg Cap, Take 1 capsule (0.4 mg total) by mouth once daily. Take in evening., Disp: 90 capsule, Rfl: 4    traMADoL (ULTRAM) 50 mg tablet, Take 50 mg by mouth every 8 (eight) hours., Disp: , Rfl:     eszopiclone (LUNESTA) 3 mg Tab, Take 1 tablet (3 mg total) by mouth every evening., Disp: 30 tablet, Rfl: 0

## 2025-04-03 ENCOUNTER — HOSPITAL ENCOUNTER (OUTPATIENT)
Dept: RADIOLOGY | Facility: HOSPITAL | Age: 70
Discharge: HOME | End: 2025-04-03
Attending: NURSE PRACTITIONER
Payer: MEDICARE

## 2025-04-03 DIAGNOSIS — N20.0 KIDNEY STONE: ICD-10-CM

## 2025-04-03 DIAGNOSIS — K76.89 HEPATIC CYST: ICD-10-CM

## 2025-04-03 DIAGNOSIS — R10.11 RIGHT UPPER QUADRANT ABDOMINAL PAIN: ICD-10-CM

## 2025-04-03 DIAGNOSIS — R10.10 PAIN OF UPPER ABDOMEN: ICD-10-CM

## 2025-04-03 LAB
CREAT SERPL-MCNC: 1.1 MG/DL (ref 0.5–1.4)
SAMPLE: NORMAL

## 2025-04-03 PROCEDURE — 74183 MRI ABD W/O CNTR FLWD CNTR: CPT | Mod: 26,,, | Performed by: RADIOLOGY

## 2025-04-03 PROCEDURE — 74183 MRI ABD W/O CNTR FLWD CNTR: CPT | Mod: TC,PO

## 2025-04-03 PROCEDURE — 25500020 PHARM REV CODE 255: Mod: PO | Performed by: NURSE PRACTITIONER

## 2025-04-03 PROCEDURE — A9585 GADOBUTROL INJECTION: HCPCS | Mod: PO | Performed by: NURSE PRACTITIONER

## 2025-04-03 RX ORDER — GADOBUTROL 604.72 MG/ML
12.5 INJECTION INTRAVENOUS
Status: COMPLETED | OUTPATIENT
Start: 2025-04-03 | End: 2025-04-03

## 2025-04-03 RX ADMIN — GADOBUTROL 12.5 ML: 604.72 INJECTION INTRAVENOUS at 01:04

## 2025-04-14 ENCOUNTER — OFFICE VISIT (OUTPATIENT)
Dept: FAMILY MEDICINE | Facility: CLINIC | Age: 70
End: 2025-04-14
Payer: MEDICARE

## 2025-04-14 VITALS
BODY MASS INDEX: 39.3 KG/M2 | HEIGHT: 69 IN | HEART RATE: 97 BPM | SYSTOLIC BLOOD PRESSURE: 118 MMHG | OXYGEN SATURATION: 96 % | DIASTOLIC BLOOD PRESSURE: 82 MMHG | WEIGHT: 265.38 LBS

## 2025-04-14 DIAGNOSIS — Z87.442 HISTORY OF KIDNEY STONES: ICD-10-CM

## 2025-04-14 DIAGNOSIS — R74.8 ELEVATED LIVER ENZYMES: ICD-10-CM

## 2025-04-14 DIAGNOSIS — M54.9 BACK PAIN, UNSPECIFIED BACK LOCATION, UNSPECIFIED BACK PAIN LATERALITY, UNSPECIFIED CHRONICITY: ICD-10-CM

## 2025-04-14 DIAGNOSIS — K76.0 FATTY LIVER: Primary | ICD-10-CM

## 2025-04-14 DIAGNOSIS — R10.84 GENERALIZED ABDOMINAL PAIN: ICD-10-CM

## 2025-04-14 DIAGNOSIS — G47.00 INSOMNIA, UNSPECIFIED TYPE: ICD-10-CM

## 2025-04-14 PROCEDURE — 1125F AMNT PAIN NOTED PAIN PRSNT: CPT | Mod: CPTII,S$GLB,, | Performed by: NURSE PRACTITIONER

## 2025-04-14 PROCEDURE — 1101F PT FALLS ASSESS-DOCD LE1/YR: CPT | Mod: CPTII,S$GLB,, | Performed by: NURSE PRACTITIONER

## 2025-04-14 PROCEDURE — 3079F DIAST BP 80-89 MM HG: CPT | Mod: CPTII,S$GLB,, | Performed by: NURSE PRACTITIONER

## 2025-04-14 PROCEDURE — 4010F ACE/ARB THERAPY RXD/TAKEN: CPT | Mod: CPTII,S$GLB,, | Performed by: NURSE PRACTITIONER

## 2025-04-14 PROCEDURE — 3288F FALL RISK ASSESSMENT DOCD: CPT | Mod: CPTII,S$GLB,, | Performed by: NURSE PRACTITIONER

## 2025-04-14 PROCEDURE — 99214 OFFICE O/P EST MOD 30 MIN: CPT | Mod: S$GLB,,, | Performed by: NURSE PRACTITIONER

## 2025-04-14 PROCEDURE — 1159F MED LIST DOCD IN RCRD: CPT | Mod: CPTII,S$GLB,, | Performed by: NURSE PRACTITIONER

## 2025-04-14 PROCEDURE — 3074F SYST BP LT 130 MM HG: CPT | Mod: CPTII,S$GLB,, | Performed by: NURSE PRACTITIONER

## 2025-04-14 PROCEDURE — 3008F BODY MASS INDEX DOCD: CPT | Mod: CPTII,S$GLB,, | Performed by: NURSE PRACTITIONER

## 2025-04-14 PROCEDURE — 1160F RVW MEDS BY RX/DR IN RCRD: CPT | Mod: CPTII,S$GLB,, | Performed by: NURSE PRACTITIONER

## 2025-04-14 RX ORDER — HYDROCODONE BITARTRATE AND ACETAMINOPHEN 7.5; 325 MG/1; MG/1
1 TABLET ORAL EVERY 8 HOURS PRN
Qty: 20 TABLET | Refills: 0 | Status: SHIPPED | OUTPATIENT
Start: 2025-04-14

## 2025-04-15 ENCOUNTER — TELEPHONE (OUTPATIENT)
Dept: FAMILY MEDICINE | Facility: CLINIC | Age: 70
End: 2025-04-15
Payer: MEDICARE

## 2025-04-15 DIAGNOSIS — R17 ELEVATED BILIRUBIN: Primary | ICD-10-CM

## 2025-04-15 LAB
ALBUMIN SERPL-MCNC: 4.6 G/DL (ref 3.6–5.1)
ALBUMIN/GLOB SERPL: 1.8 (CALC) (ref 1–2.5)
ALP SERPL-CCNC: 74 U/L (ref 35–144)
ALT SERPL-CCNC: 25 U/L (ref 9–46)
AST SERPL-CCNC: 17 U/L (ref 10–35)
BILIRUB SERPL-MCNC: 2.1 MG/DL (ref 0.2–1.2)
BUN SERPL-MCNC: 14 MG/DL (ref 7–25)
BUN/CREAT SERPL: ABNORMAL (CALC) (ref 6–22)
CALCIUM SERPL-MCNC: 9.7 MG/DL (ref 8.6–10.3)
CHLORIDE SERPL-SCNC: 99 MMOL/L (ref 98–110)
CO2 SERPL-SCNC: 29 MMOL/L (ref 20–32)
COMMENT: NORMAL
CREAT SERPL-MCNC: 0.98 MG/DL (ref 0.7–1.28)
EGFR: 83 ML/MIN/1.73M2
GLOBULIN SER CALC-MCNC: 2.5 G/DL (CALC) (ref 1.9–3.7)
GLUCOSE SERPL-MCNC: 113 MG/DL (ref 65–99)
HAV IGM SERPL QL IA: NORMAL
HBV CORE IGM SERPL QL IA: NORMAL
HBV SURFACE AG SERPL QL IA: NORMAL
HCV AB SERPL QL IA: NORMAL
POTASSIUM SERPL-SCNC: 4.8 MMOL/L (ref 3.5–5.3)
PROT SERPL-MCNC: 7.1 G/DL (ref 6.1–8.1)
SODIUM SERPL-SCNC: 136 MMOL/L (ref 135–146)

## 2025-04-15 NOTE — TELEPHONE ENCOUNTER
Spoke to pt and he is calling about his lab work, let him know it is not back yet and Ping is not in the office. Wants a call once Ping gets the results

## 2025-04-15 NOTE — TELEPHONE ENCOUNTER
----- Message from Madalyn sent at 4/15/2025  3:38 PM CDT -----  Pt would like to schedule an appointment to talk with laith regarding his results.[phone]

## 2025-04-16 ENCOUNTER — TELEPHONE (OUTPATIENT)
Dept: FAMILY MEDICINE | Facility: CLINIC | Age: 70
End: 2025-04-16
Payer: MEDICARE

## 2025-04-16 ENCOUNTER — TELEPHONE (OUTPATIENT)
Dept: HEMATOLOGY/ONCOLOGY | Facility: CLINIC | Age: 70
End: 2025-04-16
Payer: MEDICARE

## 2025-04-16 NOTE — TELEPHONE ENCOUNTER
Returned call to patient no answer left message on VM to call back       ----- Message from Soledad sent at 4/15/2025  3:48 PM CDT -----    ----- Message -----  From: Shanell Howard  Sent: 4/15/2025  12:16 PM CDT  To: [facility] Staff; [facility] Nurse Navigator    Pt having a problem for months, called here on his own, no referral, but extreme pain in his side and back, much info in his chart and needs consult to determine to what is going on.Right side pain under rib cagePt would like a call back to help him see Dr. Rosado  [phone]

## 2025-04-17 NOTE — TELEPHONE ENCOUNTER
Bili is elevated . I suspect gilgiuseppe but want to do additional labs. Hepatitis panel is negative Has he seen the back md?

## 2025-04-28 ENCOUNTER — TELEPHONE (OUTPATIENT)
Dept: FAMILY MEDICINE | Facility: CLINIC | Age: 70
End: 2025-04-28
Payer: MEDICARE

## 2025-04-28 NOTE — TELEPHONE ENCOUNTER
Spoke to pt and he wants to make Appt to go over labs. Per Ping labs are negative did he see back specialist. Let her know he did see the back specialist and they are recommending another MRI since the X rays are not clear. Let pt know per Ping do mri with back specialist and be follow up with her

## 2025-04-30 NOTE — PROGRESS NOTES
"  SUBJECTIVE:    Patient ID: Lewis Matias is a 69 y.o. male.    Chief Complaint: Follow-up (No bottles//Pt is here to discuss MRI results//KE)      History of Present Illness    HPI:  69 year old male presents with persistent abdominal pain and to discuss recent MRI results showing fatty liver. Lewis reports ongoing abdominal pain, which has not improved since the last visit. The pain is localized to the right side of the abdomen and radiates outward. He has pain while sitting up, particularly when driving. The pain affects his sleep, waking him between 2-5 AM due to discomfort. He has been awake since 3:30 AM today due to pain.    The pain sometimes worsens after eating, with an episode occurring the previous day about 30 minutes after dinner. He uses a foam pillow between his knees while sleeping on his side, which provides minimal relief but does not fully alleviate the pain.    The pain impacts his daily activities and mood, causing decreased motivation. He had previously mentioned back pain to Dr. Watkins, who attributed it to a common age-related issue.    He recently underwent an MRI, which showed fatty liver. He is concerned about this finding and its potential relation to his pain, although he acknowledges that fatty liver typically does not cause pain.    He does not deny any specific symptoms or medical diagnoses.    MEDICAL HISTORY:  Lewis has a history of fatty liver, degenerative changes of the spine, and small cysts in the right kidney.    SURGICAL HISTORY:  He underwent a cholecystectomy, as indicated by the mention of "clips in for the gallbladder". The exact date of this surgery is not specified.    TEST RESULTS:  Lewis's liver enzymes were tested in November and found to be within normal limits.      ROS:  General: -fever, -chills, -fatigue, -weight gain, -weight loss  Eyes: -vision changes, -redness, -discharge  ENT: -ear pain, -nasal congestion, -sore throat  Cardiovascular: -chest pain, " -palpitations, -lower extremity edema  Respiratory: -cough, -shortness of breath  Gastrointestinal: +abdominal pain, -nausea, -vomiting, -diarrhea, -constipation, -blood in stool, +indigestion  Genitourinary: -dysuria, -hematuria, -frequency  Musculoskeletal: -joint pain, -muscle pain, +back pain  Skin: -rash, -lesion  Neurological: -headache, -dizziness, -numbness, -tingling, +sleep disturbances, +difficulty falling asleep, +difficulty staying asleep  Psychiatric: -anxiety, +depression, +sleep difficulty              Office Visit on 04/14/2025   Component Date Value Ref Range Status    Glucose 04/14/2025 113 (H)  65 - 99 mg/dL Final    BUN 04/14/2025 14  7 - 25 mg/dL Final    Creatinine 04/14/2025 0.98  0.70 - 1.28 mg/dL Final    eGFR 04/14/2025 83  > OR = 60 mL/min/1.73m2 Final    BUN/Creatinine Ratio 04/14/2025 SEE NOTE:  6 - 22 (calc) Final    Sodium 04/14/2025 136  135 - 146 mmol/L Final    Potassium 04/14/2025 4.8  3.5 - 5.3 mmol/L Final    Chloride 04/14/2025 99  98 - 110 mmol/L Final    CO2 04/14/2025 29  20 - 32 mmol/L Final    Calcium 04/14/2025 9.7  8.6 - 10.3 mg/dL Final    Total Protein 04/14/2025 7.1  6.1 - 8.1 g/dL Final    Albumin 04/14/2025 4.6  3.6 - 5.1 g/dL Final    Globulin, Total 04/14/2025 2.5  1.9 - 3.7 g/dL (calc) Final    Albumin/Globulin Ratio 04/14/2025 1.8  1.0 - 2.5 (calc) Final    Total Bilirubin 04/14/2025 2.1 (H)  0.2 - 1.2 mg/dL Final    Alkaline Phosphatase 04/14/2025 74  35 - 144 U/L Final    AST 04/14/2025 17  10 - 35 U/L Final    ALT 04/14/2025 25  9 - 46 U/L Final    Hep A IgM 04/14/2025 NON-REACTIVE  NON-REACTIVE Final    Comment 04/14/2025 For additional information, please refer to http://education."[email or web address]/faq/WBR297 (This link is being provided for informational/ educational purposes only.)   Final    Hepatitis B Surface Ag 04/14/2025 NON-REACTIVE  NON-REACTIVE Final    Comment 04/14/2025 For additional information, please refer to  http://[employer]/faq/MOL084 (This link is being provided for informational/ educational purposes only.)   Final    Hep B C IgM 04/14/2025 NON-REACTIVE  NON-REACTIVE Final    Comment 04/14/2025 For additional information, please refer to http://[employer]/faq/KLG328 (This link is being provided for informational/ educational purposes only.)   Final    Hepatitis C Ab 04/14/2025 NON-REACTIVE  NON-REACTIVE Final   Hospital Outpatient Visit on 04/03/2025   Component Date Value Ref Range Status    POC Creatinine 04/03/2025 1.1  0.5 - 1.4 mg/dL Final    Sample 04/03/2025 VENOUS   Final   Office Visit on 03/19/2025   Component Date Value Ref Range Status    Color, POC UA 03/20/2025 Yellow  Yellow, Straw, Colorless Final    Clarity, POC UA 03/20/2025 Clear  Clear Final    Glucose, POC UA 03/20/2025 Negative  Negative Final    Bilirubin, POC UA 03/20/2025 Negative  Negative Final    Ketones, POC UA 03/20/2025 Negative  Negative Final    Spec Grav POC UA 03/20/2025 1.020  1.005 - 1.030 Final    Blood, POC UA 03/20/2025 Negative  Negative Final    pH, POC UA 03/20/2025 7.5  5.0 - 8.0 Final    Protein, POC UA 03/20/2025 Negative  Negative Final    Urobilinogen, POC UA 03/20/2025 1.0  <=1.0 Final    Nitrite, POC UA 03/20/2025 Negative  Negative Final    WBC, POC UA 03/20/2025 Negative  Negative Final   Lab Visit on 11/13/2024   Component Date Value Ref Range Status    Sodium 11/13/2024 137  136 - 145 mmol/L Final    Potassium 11/13/2024 4.3  3.5 - 5.1 mmol/L Final    Chloride 11/13/2024 102  95 - 110 mmol/L Final    CO2 11/13/2024 29  23 - 29 mmol/L Final    Glucose 11/13/2024 103  70 - 110 mg/dL Final    BUN 11/13/2024 11  8 - 23 mg/dL Final    Creatinine 11/13/2024 0.9  0.5 - 1.4 mg/dL Final    Calcium 11/13/2024 10.0  8.7 - 10.5 mg/dL Final    Total Protein 11/13/2024 7.2  6.0 - 8.4 g/dL Final    Albumin 11/13/2024 4.5  3.5 - 5.2 g/dL Final    Total Bilirubin 11/13/2024 1.3 (H)  0.1 -  1.0 mg/dL Final    Alkaline Phosphatase 11/13/2024 69  55 - 135 U/L Final    AST 11/13/2024 17  10 - 40 U/L Final    ALT 11/13/2024 23  10 - 44 U/L Final    eGFR 11/13/2024 >60.0  >60 mL/min/1.73 m^2 Final    Anion Gap 11/13/2024 6 (L)  8 - 16 mmol/L Final    WBC 11/13/2024 7.09  3.90 - 12.70 K/uL Final    RBC 11/13/2024 5.53  4.60 - 6.20 M/uL Final    Hemoglobin 11/13/2024 17.0  14.0 - 18.0 g/dL Final    Hematocrit 11/13/2024 49.6  40.0 - 54.0 % Final    MCV 11/13/2024 90  82 - 98 fL Final    MCH 11/13/2024 30.7  27.0 - 31.0 pg Final    MCHC 11/13/2024 34.3  32.0 - 36.0 g/dL Final    RDW 11/13/2024 13.6  11.5 - 14.5 % Final    Platelets 11/13/2024 167  150 - 450 K/uL Final    MPV 11/13/2024 11.4  9.2 - 12.9 fL Final    Immature Granulocytes 11/13/2024 0.3  0.0 - 0.5 % Final    Gran # (ANC) 11/13/2024 4.3  1.8 - 7.7 K/uL Final    Immature Grans (Abs) 11/13/2024 0.02  0.00 - 0.04 K/uL Final    Lymph # 11/13/2024 1.5  1.0 - 4.8 K/uL Final    Mono # 11/13/2024 1.0  0.3 - 1.0 K/uL Final    Eos # 11/13/2024 0.3  0.0 - 0.5 K/uL Final    Baso # 11/13/2024 0.06  0.00 - 0.20 K/uL Final    nRBC 11/13/2024 0  0 /100 WBC Final    Gran % 11/13/2024 60.5  38.0 - 73.0 % Final    Lymph % 11/13/2024 21.4  18.0 - 48.0 % Final    Mono % 11/13/2024 13.5  4.0 - 15.0 % Final    Eosinophil % 11/13/2024 3.5  0.0 - 8.0 % Final    Basophil % 11/13/2024 0.8  0.0 - 1.9 % Final    Differential Method 11/13/2024 Automated   Final    aPTT 11/13/2024 30.6  21.0 - 32.0 sec Final    Prothrombin Time 11/13/2024 10.3  9.0 - 12.5 sec Final    INR 11/13/2024 0.9  0.8 - 1.2 Final    Specimen UA 11/13/2024 Urine, Clean Catch   Final    Color, UA 11/13/2024 Yellow  Yellow, Straw, Bee Final    Appearance, UA 11/13/2024 Clear  Clear Final    pH, UA 11/13/2024 7.0  5.0 - 8.0 Final    Specific Gravity, UA 11/13/2024 1.010  1.005 - 1.030 Final    Protein, UA 11/13/2024 Negative  Negative Final    Glucose, UA 11/13/2024 Negative  Negative Final    Ketones, UA  11/13/2024 Negative  Negative Final    Bilirubin (UA) 11/13/2024 Negative  Negative Final    Occult Blood UA 11/13/2024 Negative  Negative Final    Nitrite, UA 11/13/2024 Negative  Negative Final    Urobilinogen, UA 11/13/2024 Negative  Negative EU/dL Final    Leukocytes, UA 11/13/2024 Negative  Negative Final       Past Medical History:   Diagnosis Date    Aspiration pneumonia     aspirated after anesthesia due to hiatal hernia per patient, spent 6 days in ICU    COVID-19 8/2/20021    GERD (gastroesophageal reflux disease)     Hard of hearing     Hiatal hernia     Hypertension     Kidney stones     Knee pain 06/2020    left    Sleep apnea      Past Surgical History:   Procedure Laterality Date    BLADDER FULGURATION N/A 11/14/2024    Procedure: FULGURATION, BLADDER;  Surgeon: Nixon Swain MD;  Location: [facility];  Service: Urology;  Laterality: N/A;    CHOLECYSTECTOMY      COLONOSCOPY  2012    Dr Silver- rtc 10 yr    CYSTOSCOPY W/ RETROGRADES Left 4/25/2023    Procedure: CYSTOSCOPY, WITH RETROGRADE PYELOGRAM;  Surgeon: Pepe Bee Jr., MD;  Location: [facility] OR;  Service: Urology;  Laterality: Left;    CYSTOSCOPY W/ RETROGRADES Right 11/14/2024    Procedure: CYSTOSCOPY, WITH RETROGRADE PYELOGRAM;  Surgeon: Nixon Swain MD;  Location: [facility] OR;  Service: Urology;  Laterality: Right;    CYSTOURETEROSCOPY,WITH HOLMIUM LASER LITHOTRIPSY OF URETERAL CALCULUS Left 4/25/2023    Procedure: CYSTOURETEROSCOPY,WITH HOLMIUM LASER LITHOTRIPSY OF URETERAL CALCULUS;  Surgeon: Ppee Bee Jr., MD;  Location: [facility] OR;  Service: Urology;  Laterality: Left;    EXTRACORPOREAL SHOCK WAVE LITHOTRIPSY Right 9/5/2024    Procedure: LITHOTRIPSY, ESWL;  Surgeon: Nixon Swain MD;  Location: [facility] OR;  Service: Urology;  Laterality: Right;    HAND SURGERY      traumatic injury    HEMORRHOID SURGERY      KIDNEY STONE SURGERY      KNEE ARTHROSCOPY W/ MENISCECTOMY Left 11/4/2020    Procedure: ARTHROSCOPY, KNEE, WITH  PARTIAL MEDIAL MENISCECTOMY;  Surgeon: Andres Blanco MD;  Location: [facility] OR;  Service: Orthopedics;  Laterality: Left;    TONSILLECTOMY      URETEROSCOPIC REMOVAL OF URETERIC CALCULUS Left 4/25/2023    Procedure: REMOVAL, CALCULUS, URETER, URETEROSCOPIC;  Surgeon: Pepe Bee Jr., MD;  Location: [facility] OR;  Service: Urology;  Laterality: Left;    URETEROSCOPIC REMOVAL OF URETERIC CALCULUS N/A 11/14/2024    Procedure: REMOVAL, CALCULUS, URETER, URETEROSCOPIC;  Surgeon: Nixon Swain MD;  Location: [facility] OR;  Service: Urology;  Laterality: N/A;    URETEROSCOPY N/A 11/14/2024    Procedure: URETEROSCOPY;  Surgeon: Nixon Swain MD;  Location: [facility] OR;  Service: Urology;  Laterality: N/A;     Family History   Problem Relation Name Age of Onset    Heart disease Mother      Diabetes Mother      Cancer Father      Depression Sister      Cancer Sister          ovarian    Diabetes Brother         All of your core healthy metrics are met.      The 10-year CVD risk score (D'Agostino, et al., 2008) is: 24.4%    Values used to calculate the score:      Age: 69 years      Sex: Male      Diabetic: No      Tobacco smoker: No      Systolic Blood Pressure: 118 mmHg      Is BP treated: Yes      HDL Cholesterol: 41 mg/dL      Total Cholesterol: 170 mg/dL     Marital Status:   Alcohol History:  reports current alcohol use.  Tobacco History:  reports that he has never smoked. He has never been exposed to tobacco smoke. He has never used smokeless tobacco.  Drug History:  reports no history of drug use.    Health Maintenance Topics with due status: Not Due       Topic Last Completion Date    TETANUS VACCINE 08/07/2019    Lipid Panel 05/10/2023    Colorectal Cancer Screening 07/24/2023     Immunization History   Administered Date(s) Administered    COVID-19, MRNA, LN-S, PF (Pfizer) (Purple Cap) 11/10/2021, 12/01/2021    Dtap, Unspecified Formulation 06/02/2016    Tdap 06/02/2016, 08/07/2019       Review of patient's  "allergies indicates:   Allergen Reactions    Iodinated contrast media Hives and Shortness Of Breath    Viagra [sildenafil] Other (See Comments)     Pounding in the head and chest  Headaches     Current Medications[1]        Objective:      Vitals:    04/14/25 1009   BP: 118/82   Pulse: 97   SpO2: 96%   Weight: 120.4 kg (265 lb 6.4 oz)   Height: 5' 9" (1.753 m)       Physical Exam  Vitals and nursing note reviewed.   Constitutional:       Appearance: Normal appearance. He is well-developed. He is obese.   Neck:      Trachea: No tracheal deviation.   Cardiovascular:      Rate and Rhythm: Normal rate and regular rhythm.      Heart sounds: No murmur heard.     No friction rub. No gallop.   Pulmonary:      Breath sounds: Normal breath sounds. No stridor. No wheezing or rales.   Abdominal:      General: There is distension.      Palpations: Abdomen is soft. There is no mass.      Tenderness: There is abdominal tenderness in the right upper quadrant.      Hernia: No hernia is present.   Musculoskeletal:         General: No deformity.      Cervical back: Neck supple.      Thoracic back: Tenderness present.   Lymphadenopathy:      Cervical: No cervical adenopathy.   Skin:     General: Skin is warm and dry.   Neurological:      Mental Status: He is alert and oriented to person, place, and time.      Coordination: Coordination normal.   Psychiatric:         Thought Content: Thought content normal.            Assessment:       1. Fatty liver    2. Elevated liver enzymes    3. Generalized abdominal pain    4. Back pain, unspecified back location, unspecified back pain laterality, unspecified chronicity    5. History of kidney stones    6. Insomnia, unspecified type           Assessment & Plan    - Reviewed MRI results showing moderate fatty liver, small renal cysts, and degenerative changes of the spine.  - Fatty liver unlikely to be the cause of pain and explained that fatty liver is common and typically does not cause pain.  - " Spinal issues potential source of pain based on MRI findings.  - Pain significantly impacting sleep and daily activities.  - Considered treating situational depression pending further evaluation of underlying pain cause.    FATTY LIVER:  - MRI confirms moderate fatty liver.  - Liver enzymes were last checked in November and were within normal limits.  - Ordered liver enzyme panel to further evaluate liver function.  - Ordered hepatitis screening.  - Acknowledged fatty liver but do not believe it's causing the pain.  - Recommend diet as the best treatment for fatty liver.  - Discussed that diet can help improve fatty liver condition.    DEGENERATIVE SPINE / SPONDYLOSIS:  - MRI shows degenerative changes of the spine.  - Lewis reports back pain radiating to other areas, affecting sleep and daily activities.  - Suspect some of the pain may be originating from the back due to degenerative spine.  - Informed the patient about the potential for back issues to cause referred pain in other areas.  - Referred the patient to Dr. eLonard Samuel for spine evaluation on the 20th of this month.  - Instructed the patient to  MRI disc from imaging center to bring to Dr. Samuel's appointment.    KIDNEY CYST:  - MRI shows small cysts in the right kidney.    HEMANGIOMA:  - MRI suggests several vertebral bodies hemangiomas.    RIGHT UPPER QUADRANT PAIN:  - Lewis reports pain in the right upper quadrant.  - Lewis reports pain in the right upper quadrant, radiating to other areas.  - Acknowledged the pain but uncertain of the cause, considering fatty liver and back issues as potential sources.  - Prescribed pain medication.  - Prescribed pain medication to be sent to [facility] pharmacy.    POST-CHOLECYSTECTOMY:  - MRI shows clips from previous cholecystectomy.    FOLLOW-UP:  - Instructed the patient to contact the office when lab results are available, expected by tomorrow.        Plan:       1. Fatty  liver  Comments:  labs. low fat diet  Orders:  -     Comprehensive Metabolic Panel; Future; Expected date: 04/14/2025  -     Hepatitis Panel, Acute; Future; Expected date: 04/14/2025    2. Elevated liver enzymes  Comments:  labs  Orders:  -     Hepatitis Panel, Acute; Future; Expected date: 04/14/2025    3. Generalized abdominal pain  -     Hepatitis Panel, Acute; Future; Expected date: 04/14/2025    4. Back pain, unspecified back location, unspecified back pain laterality, unspecified chronicity  Comments:  refer to back md  Orders:  -     HYDROcodone-acetaminophen (NORCO) 7.5-325 mg per tablet; Take 1 tablet by mouth every 8 (eight) hours as needed for Pain.  Dispense: 20 tablet; Refill: 0    5. History of kidney stones  Comments:  followed by dr. may    6. Insomnia, unspecified type      Follow up in about 4 weeks (around 5/12/2025), or if symptoms worsen or fail to improve, for medication management.          Counseled on age and gender appropriate medical preventative services, including cancer screenings, immunizations, overall nutritional health, need for a consistent exercise regimen and an overall push towards maintaining a vigorous and active lifestyle.      This note was generated with the assistance of ambient listening technology. Verbal consent was obtained by the patient and accompanying visitor(s) for the recording of patient appointment to facilitate this note. I attest to having reviewed and edited the generated note for accuracy, though some syntax or spelling errors may persist. Please contact the author of this note for any clarification.       4/30/2025 Ping Juarez NP         [1]   Current Outpatient Medications:     lisinopriL 10 MG tablet, Take 1 tablet (10 mg total) by mouth once daily., Disp: 90 tablet, Rfl: 1    traMADoL (ULTRAM) 50 mg tablet, Take 50 mg by mouth every 8 (eight) hours., Disp: , Rfl:     HYDROcodone-acetaminophen (NORCO) 7.5-325 mg per tablet, Take 1 tablet by  mouth every 8 (eight) hours as needed for Pain., Disp: 20 tablet, Rfl: 0    tamsulosin (FLOMAX) 0.4 mg Cap, Take 1 capsule (0.4 mg total) by mouth once daily. Take in evening., Disp: 90 capsule, Rfl: 4

## 2025-05-28 ENCOUNTER — TELEPHONE (OUTPATIENT)
Dept: FAMILY MEDICINE | Facility: CLINIC | Age: 70
End: 2025-05-28
Payer: MEDICARE

## 2025-05-28 NOTE — TELEPHONE ENCOUNTER
----- Message from Elin sent at 5/28/2025  2:37 PM CDT -----  Pt needs to be seen asap. Pain in right side. Pt #[phone]

## 2025-06-17 DIAGNOSIS — I10 ESSENTIAL HYPERTENSION: ICD-10-CM

## 2025-06-17 RX ORDER — LISINOPRIL 10 MG/1
10 TABLET ORAL DAILY
Qty: 90 TABLET | Refills: 1 | Status: SHIPPED | OUTPATIENT
Start: 2025-06-17

## 2025-06-17 NOTE — TELEPHONE ENCOUNTER
----- Message from Mary sent at 6/17/2025 10:11 AM CDT -----  Pt needs refill on blood pressure medication   [facility]   [phone]

## 2025-06-24 LAB — CRC RECOMMENDATION EXT: NORMAL

## 2025-07-17 ENCOUNTER — PATIENT OUTREACH (OUTPATIENT)
Dept: ADMINISTRATIVE | Facility: HOSPITAL | Age: 70
End: 2025-07-17
Payer: MEDICARE

## 2025-07-21 ENCOUNTER — OFFICE VISIT (OUTPATIENT)
Dept: FAMILY MEDICINE | Facility: CLINIC | Age: 70
End: 2025-07-21
Payer: MEDICARE

## 2025-07-21 VITALS
HEIGHT: 69 IN | BODY MASS INDEX: 38.24 KG/M2 | SYSTOLIC BLOOD PRESSURE: 116 MMHG | HEART RATE: 76 BPM | WEIGHT: 258.19 LBS | DIASTOLIC BLOOD PRESSURE: 62 MMHG | TEMPERATURE: 98 F | OXYGEN SATURATION: 96 %

## 2025-07-21 DIAGNOSIS — I10 ESSENTIAL HYPERTENSION: ICD-10-CM

## 2025-07-21 DIAGNOSIS — Z00.00 ENCOUNTER FOR MEDICARE ANNUAL WELLNESS EXAM: Primary | ICD-10-CM

## 2025-07-21 DIAGNOSIS — E66.01 SEVERE OBESITY (BMI 35.0-39.9) WITH COMORBIDITY: ICD-10-CM

## 2025-07-21 DIAGNOSIS — R26.9 ABNORMALITY OF GAIT AND MOBILITY: ICD-10-CM

## 2025-07-21 PROCEDURE — 1101F PT FALLS ASSESS-DOCD LE1/YR: CPT | Mod: CPTII,S$GLB,, | Performed by: NURSE PRACTITIONER

## 2025-07-21 PROCEDURE — 1159F MED LIST DOCD IN RCRD: CPT | Mod: CPTII,S$GLB,, | Performed by: NURSE PRACTITIONER

## 2025-07-21 PROCEDURE — 99999 PR PBB SHADOW E&M-EST. PATIENT-LVL III: CPT | Mod: PBBFAC,,, | Performed by: NURSE PRACTITIONER

## 2025-07-21 PROCEDURE — 4010F ACE/ARB THERAPY RXD/TAKEN: CPT | Mod: CPTII,S$GLB,, | Performed by: NURSE PRACTITIONER

## 2025-07-21 PROCEDURE — 1125F AMNT PAIN NOTED PAIN PRSNT: CPT | Mod: CPTII,S$GLB,, | Performed by: NURSE PRACTITIONER

## 2025-07-21 PROCEDURE — 3288F FALL RISK ASSESSMENT DOCD: CPT | Mod: CPTII,S$GLB,, | Performed by: NURSE PRACTITIONER

## 2025-07-21 PROCEDURE — 3074F SYST BP LT 130 MM HG: CPT | Mod: CPTII,S$GLB,, | Performed by: NURSE PRACTITIONER

## 2025-07-21 PROCEDURE — 1158F ADVNC CARE PLAN TLK DOCD: CPT | Mod: CPTII,S$GLB,, | Performed by: NURSE PRACTITIONER

## 2025-07-21 PROCEDURE — 1170F FXNL STATUS ASSESSED: CPT | Mod: CPTII,S$GLB,, | Performed by: NURSE PRACTITIONER

## 2025-07-21 PROCEDURE — G0439 PPPS, SUBSEQ VISIT: HCPCS | Mod: S$GLB,,, | Performed by: NURSE PRACTITIONER

## 2025-07-21 PROCEDURE — 3078F DIAST BP <80 MM HG: CPT | Mod: CPTII,S$GLB,, | Performed by: NURSE PRACTITIONER

## 2025-07-21 NOTE — PROGRESS NOTES
"  Lewis Matias presented for a  Medicare AWV and comprehensive Health Risk Assessment today. The following components were reviewed and updated:    Medical history  Family History  Social history  Allergies and Current Medications  Health Risk Assessment  Health Maintenance  Care Team         ** See Completed Assessments for Annual Wellness Visit within the encounter summary.**         The following assessments were completed:  Living Situation  CAGE  Depression Screening  Timed Get Up and Go  Whisper Test  Cognitive Function Screening  Nutrition Screening  ADL Screening  PAQ Screening    Clock in media   Opioid documentation:      Patient does have a current opioid prescription.      Patient accepted further discussion regarding opioid medication use.      Patient is currently taking hydrocodone narcotic for back pain.        Pain level today is 5/10.       In addition to narcotic pain medications, patient is also using physical therapy and topical analgesic for pain control.       Patient is followed by a specialist currently for their pain and will not be referred today.       Patient's opioid risk potential based on ORT-OUD tool:       Liam each box that applies   No   Yes     Family history of substance abuse   Alcohol [x] []   Illegal drugs [x] []   Rx drugs [x] []     Personal history of substance abuse   Alcohol [x] []   Illegal drugs [x] []   Rx drugs [x] []     Age between 16-45 years   [x]   []     Patient with ADD, OCD, Bipolar disorder, schizoprenia   [x]   []     Patient with depression   [x]   []                         Scoring total                                                   0              Non-opioid treatment options have been discussed today and added to the patient's after visit summary.        Vitals:    07/21/25 0929   BP: 116/62   Pulse: 76   Temp: 98.3 °F (36.8 °C)   TempSrc: Oral   SpO2: 96%   Weight: 117.1 kg (258 lb 2.5 oz)   Height: 5' 9" (1.753 m)     Body mass index is 38.12 " "kg/m².  Physical Exam  Constitutional:       Appearance: He is well-developed.   HENT:      Head: Normocephalic and atraumatic.      Right Ear: Hearing normal.      Left Ear: Hearing normal.      Nose: Nose normal.   Eyes:      General: Lids are normal.      Conjunctiva/sclera: Conjunctivae normal.      Pupils: Pupils are equal, round, and reactive to light.   Cardiovascular:      Rate and Rhythm: Normal rate.   Pulmonary:      Effort: Pulmonary effort is normal.   Abdominal:      Palpations: Abdomen is soft.   Musculoskeletal:         General: Normal range of motion.      Cervical back: Normal range of motion and neck supple.   Skin:     General: Skin is warm and dry.   Neurological:      Mental Status: He is alert and oriented to person, place, and time.               Diagnoses and health risks identified today and associated recommendations/orders:    1. Encounter for Medicare annual wellness exam  Discussed health maintenance guidelines appropriate for age.      - Referral to Enhanced Annual Wellness Visit (eAWV) W+1    2. Severe obesity (BMI 35.0-39.9) with comorbidity  Uncontrolled, Counseled patient on his ideal body weight, health consequences of being obese and current recommendations including weekly exercise and a heart healthy diet.  Current BMI is:Estimated body mass index is 38.12 kg/m² as calculated from the following:    Height as of this encounter: 5' 9" (1.753 m).    Weight as of this encounter: 117.1 kg (258 lb 2.5 oz)..  Patient is aware that ideal BMI < 25 or Weight in (lb) to have BMI = 25: 168.9.      3. Essential hypertension  Controlled, continue current medication regimen  Low salt diet  Increase physical activity  Followed by pcp        Provided Lewis with a 5-10 year written screening schedule and personal prevention plan. Recommendations were developed using the USPSTF age appropriate recommendations. Education, counseling, and referrals were provided as needed. After Visit Summary " printed and given to patient which includes a list of additional screenings\tests needed.    Follow up for One year for Annual Wellness Visit.    Laila Alvarado NP      I offered to discuss advanced care planning, including how to pick a person who would make decisions for you if you were unable to make them for yourself, called a health care power of , and what kind of decisions you might make such as use of life sustaining treatments such as ventilators and tube feeding when faced with a life limiting illness recorded on a living will that they will need to know. (How you want to be cared for as you near the end of your natural life)     X Patient is interested in learning more about how to make advanced directives.  I provided them paperwork and offered to discuss this with them.

## 2025-07-21 NOTE — PATIENT INSTRUCTIONS
Counseling and Referral of Other Preventative  (Italic type indicates deductible and co-insurance are waived)    Patient Name: Lewis Matias  Today's Date: 7/21/2025    Health Maintenance       Date Due Completion Date    Pneumococcal Vaccines (Age 50+) (1 of 2 - PCV) Never done ---    Hemoglobin A1c (Diabetic Prevention Screening) Never done ---    Shingles Vaccine (1 of 2) Never done ---    RSV Vaccine (Age 60+ and Pregnant patients) (1 - Risk 60-74 years 1-dose series) Never done ---    COVID-19 Vaccine (3 - 2024-25 season) 09/01/2024 12/1/2021    Influenza Vaccine (1) 09/01/2025 ---    Lipid Panel 05/10/2028 5/10/2023    Colorectal Cancer Screening 06/24/2028 6/24/2025    TETANUS VACCINE 08/07/2029 8/7/2019        No orders of the defined types were placed in this encounter.      The following information is provided to all patients.  This information is to help you find resources for any of the problems found today that may be affecting your health:                  Living healthy guide: www.[facility]      Understanding Diabetes: www.diabetes.org      Eating healthy: www.cdc.gov/healthyweight      CDC home safety checklist: www.cdc.gov/steadi/patient.html      Agency on Aging: www.goea.[place].gov      Alcoholics anonymous (AA): www.aa.org      Physical Activity: www.pebbles.nih.gov/xz9ypkj      Tobacco use: www.quitwithusla.org

## (undated) DEVICE — SOLUTION IRRI NS BOTTLE 1000ML R5200-01

## (undated) DEVICE — GOWN POLY REINF BRTH SLV XL

## (undated) DEVICE — TUBING SUC UNIV W/CONN 12FT

## (undated) DEVICE — SPONGE GAUZE 10S 4X4  442214

## (undated) DEVICE — BLADE 90-S SERFAS 3.5 279-351-100

## (undated) DEVICE — BASKET N-GAGE 2.2 FR/115CM

## (undated) DEVICE — KIT CATH URTRL CONE TIP 5F

## (undated) DEVICE — SHEATH URETERAL FLEXOR 12X28CM

## (undated) DEVICE — BOWL STERILE LARGE 32OZ

## (undated) DEVICE — JELLY SURGILUBE LUBE TUBE 2OZ

## (undated) DEVICE — SOL IRR WATER STRL 3000 ML

## (undated) DEVICE — CATH URETHRAL 16FR RED

## (undated) DEVICE — SPONGE BULKEE II ABSRB 6X6.75

## (undated) DEVICE — GLOVE SURG ULTRA TOUCH 7

## (undated) DEVICE — SHEATH URET ACCESS 14FRX45CM

## (undated) DEVICE — SOL IRR NACL .9% 3000ML

## (undated) DEVICE — SYR ONLY LUER LOCK 20CC

## (undated) DEVICE — CONTAINER SPECIMEN OR STER 4OZ

## (undated) DEVICE — BAG LINGEMAN DRAIN UROLOGY

## (undated) DEVICE — SET Y-TYPE TUR IRRIGATION 96IN

## (undated) DEVICE — SCRUB DYNA-HEX LIQ 4% CHG 4OZ

## (undated) DEVICE — GLOVE BIOGEL PI GOLD SZ  8.5

## (undated) DEVICE — DRAPE T CYSTOSCOPY STERILE

## (undated) DEVICE — SLEEVE SCD EXPRESS KNEE MEDIUM

## (undated) DEVICE — LEGGING CLEAR POLY 2/PACK

## (undated) DEVICE — STRAP OR TABLE 5IN X 72IN

## (undated) DEVICE — SET IRR URLGY 2LINE UNIV SPIKE

## (undated) DEVICE — TUBING CYSTO DOUBLE 654301

## (undated) DEVICE — GLOVE SURG BIOGEL LATEX SZ 7.5

## (undated) DEVICE — SOLUTION NACL 0.9% 3000ML

## (undated) DEVICE — GUIDEWIRE AMPLATZ .035X145 STR

## (undated) DEVICE — GOWN POLY REINF X-LONG XL

## (undated) DEVICE — GUIDEWIRE ZIPWIRE .035 D 150CM

## (undated) DEVICE — SOL IRRI STRL WATER 1000ML

## (undated) DEVICE — GUIDE WIRE MOTION .035 X 150CM

## (undated) DEVICE — PACK CYSTOSCOPY III SMH

## (undated) DEVICE — CUFF TOURNIQUET 34DUAL PRT 5921-034-235

## (undated) DEVICE — FIBER QUANTA OPT SDT 272UM

## (undated) DEVICE — STENT DBL PGTL URET 6FR 26CM: Type: IMPLANTABLE DEVICE | Site: URETER | Status: NON-FUNCTIONAL

## (undated) DEVICE — COVER TABLE 44X90 STERILE

## (undated) DEVICE — UNDERGLOVE BIOGEL PI MICRO BLUE SZ 6.5

## (undated) DEVICE — CATH 2 LUMEN URET 6/10FX50CM

## (undated) DEVICE — BLADE AGRESSIVE PLUS 4.0 375-544-000

## (undated) DEVICE — GOWN POLY REINF SET SLV XL

## (undated) DEVICE — UNDERGLOVE BIOGEL MICRO BLUE SZ 8.5

## (undated) DEVICE — PACK ARTHROSCOPY SMHS009-07

## (undated) DEVICE — GLOVE SURG ULTRA TOUCH 7.5

## (undated) DEVICE — CATH CONE TIP 400-212

## (undated) DEVICE — GLOVE SENSICARE PI GRN 6.5

## (undated) DEVICE — DRESSING ADAPTIC 3X3 6112

## (undated) DEVICE — SYR 30CC LUER LOCK

## (undated) DEVICE — SOL NACL IRR 1000ML BTL